# Patient Record
Sex: MALE | Race: WHITE | Employment: UNEMPLOYED | URBAN - METROPOLITAN AREA
[De-identification: names, ages, dates, MRNs, and addresses within clinical notes are randomized per-mention and may not be internally consistent; named-entity substitution may affect disease eponyms.]

---

## 2018-07-14 ENCOUNTER — HOSPITAL ENCOUNTER (INPATIENT)
Facility: HOSPITAL | Age: 48
LOS: 6 days | Discharge: HOME | DRG: 473 | End: 2018-07-20
Attending: EMERGENCY MEDICINE | Admitting: HOSPITALIST
Payer: COMMERCIAL

## 2018-07-14 ENCOUNTER — APPOINTMENT (OUTPATIENT)
Dept: MRI IMAGING | Facility: HOSPITAL | Age: 48
DRG: 473 | End: 2018-07-14
Attending: EMERGENCY MEDICINE
Payer: COMMERCIAL
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
## 2018-07-14 DIAGNOSIS — R20.2 PARESTHESIA OF ARM: ICD-10-CM

## 2018-07-14 DIAGNOSIS — S14.109A CONTUSION OF CERVICAL CORD, INITIAL ENCOUNTER (HCC): Primary | ICD-10-CM

## 2018-07-14 LAB
ANION GAP SERPL CALC-SCNC: 7 MMOL/L (ref 0–18)
ANTIBODY SCREEN: NEGATIVE
BASOPHILS # BLD: 0.2 K/UL (ref 0–0.2)
BASOPHILS NFR BLD: 2 %
BUN SERPL-MCNC: 13 MG/DL (ref 8–20)
BUN/CREAT SERPL: 15.5 (ref 10–20)
CALCIUM SERPL-MCNC: 8.9 MG/DL (ref 8.5–10.5)
CHLORIDE SERPL-SCNC: 106 MMOL/L (ref 95–110)
CO2 SERPL-SCNC: 23 MMOL/L (ref 22–32)
CREAT SERPL-MCNC: 0.84 MG/DL (ref 0.5–1.5)
EOSINOPHIL # BLD: 0.5 K/UL (ref 0–0.7)
EOSINOPHIL NFR BLD: 5 %
ERYTHROCYTE [DISTWIDTH] IN BLOOD BY AUTOMATED COUNT: 13.9 % (ref 11–15)
GLUCOSE SERPL-MCNC: 130 MG/DL (ref 70–99)
HCT VFR BLD AUTO: 41.6 % (ref 41–52)
HGB BLD-MCNC: 13.8 G/DL (ref 13.5–17.5)
LYMPHOCYTES # BLD: 2.5 K/UL (ref 1–4)
LYMPHOCYTES NFR BLD: 27 %
MCH RBC QN AUTO: 30.1 PG (ref 27–32)
MCHC RBC AUTO-ENTMCNC: 33.2 G/DL (ref 32–37)
MCV RBC AUTO: 90.7 FL (ref 80–100)
MONOCYTES # BLD: 0.7 K/UL (ref 0–1)
MONOCYTES NFR BLD: 7 %
NEUTROPHILS # BLD AUTO: 5.4 K/UL (ref 1.8–7.7)
NEUTROPHILS NFR BLD: 58 %
OSMOLALITY UR CALC.SUM OF ELEC: 284 MOSM/KG (ref 275–295)
PLATELET # BLD AUTO: 294 K/UL (ref 140–400)
PMV BLD AUTO: 8.9 FL (ref 7.4–10.3)
POTASSIUM SERPL-SCNC: 3.8 MMOL/L (ref 3.3–5.1)
RBC # BLD AUTO: 4.59 M/UL (ref 4.5–5.9)
RH BLOOD TYPE: POSITIVE
SODIUM SERPL-SCNC: 136 MMOL/L (ref 136–144)
WBC # BLD AUTO: 9.3 K/UL (ref 4–11)

## 2018-07-14 PROCEDURE — 96376 TX/PRO/DX INJ SAME DRUG ADON: CPT

## 2018-07-14 PROCEDURE — 86901 BLOOD TYPING SEROLOGIC RH(D): CPT | Performed by: EMERGENCY MEDICINE

## 2018-07-14 PROCEDURE — A4216 STERILE WATER/SALINE, 10 ML: HCPCS | Performed by: HOSPITALIST

## 2018-07-14 PROCEDURE — 86900 BLOOD TYPING SEROLOGIC ABO: CPT | Performed by: EMERGENCY MEDICINE

## 2018-07-14 PROCEDURE — 72141 MRI NECK SPINE W/O DYE: CPT | Performed by: EMERGENCY MEDICINE

## 2018-07-14 PROCEDURE — 86850 RBC ANTIBODY SCREEN: CPT | Performed by: EMERGENCY MEDICINE

## 2018-07-14 PROCEDURE — 85025 COMPLETE CBC W/AUTO DIFF WBC: CPT | Performed by: EMERGENCY MEDICINE

## 2018-07-14 PROCEDURE — 96374 THER/PROPH/DIAG INJ IV PUSH: CPT

## 2018-07-14 PROCEDURE — 99285 EMERGENCY DEPT VISIT HI MDM: CPT

## 2018-07-14 PROCEDURE — 96375 TX/PRO/DX INJ NEW DRUG ADDON: CPT

## 2018-07-14 PROCEDURE — 80048 BASIC METABOLIC PNL TOTAL CA: CPT | Performed by: EMERGENCY MEDICINE

## 2018-07-14 RX ORDER — SODIUM CHLORIDE 0.9 % (FLUSH) 0.9 %
3 SYRINGE (ML) INJECTION AS NEEDED
Status: DISCONTINUED | OUTPATIENT
Start: 2018-07-14 | End: 2018-07-20

## 2018-07-14 RX ORDER — ONDANSETRON 2 MG/ML
4 INJECTION INTRAMUSCULAR; INTRAVENOUS EVERY 6 HOURS PRN
Status: DISCONTINUED | OUTPATIENT
Start: 2018-07-14 | End: 2018-07-20

## 2018-07-14 RX ORDER — MORPHINE SULFATE 2 MG/ML
2 INJECTION, SOLUTION INTRAMUSCULAR; INTRAVENOUS EVERY 2 HOUR PRN
Status: DISCONTINUED | OUTPATIENT
Start: 2018-07-14 | End: 2018-07-20

## 2018-07-14 RX ORDER — DOCUSATE SODIUM 100 MG/1
100 CAPSULE, LIQUID FILLED ORAL 2 TIMES DAILY
Status: DISCONTINUED | OUTPATIENT
Start: 2018-07-14 | End: 2018-07-20

## 2018-07-14 RX ORDER — SODIUM PHOSPHATE, DIBASIC AND SODIUM PHOSPHATE, MONOBASIC 7; 19 G/133ML; G/133ML
1 ENEMA RECTAL ONCE AS NEEDED
Status: DISCONTINUED | OUTPATIENT
Start: 2018-07-14 | End: 2018-07-20

## 2018-07-14 RX ORDER — MORPHINE SULFATE 4 MG/ML
4 INJECTION, SOLUTION INTRAMUSCULAR; INTRAVENOUS EVERY 2 HOUR PRN
Status: DISCONTINUED | OUTPATIENT
Start: 2018-07-14 | End: 2018-07-20

## 2018-07-14 RX ORDER — METOCLOPRAMIDE HYDROCHLORIDE 5 MG/ML
10 INJECTION INTRAMUSCULAR; INTRAVENOUS EVERY 8 HOURS PRN
Status: DISCONTINUED | OUTPATIENT
Start: 2018-07-14 | End: 2018-07-20

## 2018-07-14 RX ORDER — POLYETHYLENE GLYCOL 3350 17 G/17G
17 POWDER, FOR SOLUTION ORAL DAILY PRN
Status: DISCONTINUED | OUTPATIENT
Start: 2018-07-14 | End: 2018-07-20

## 2018-07-14 RX ORDER — DEXAMETHASONE SODIUM PHOSPHATE 4 MG/ML
4 VIAL (ML) INJECTION EVERY 6 HOURS
Status: DISCONTINUED | OUTPATIENT
Start: 2018-07-14 | End: 2018-07-17

## 2018-07-14 RX ORDER — DEXAMETHASONE SODIUM PHOSPHATE 4 MG/ML
10 VIAL (ML) INJECTION ONCE
Status: COMPLETED | OUTPATIENT
Start: 2018-07-14 | End: 2018-07-14

## 2018-07-14 RX ORDER — ONDANSETRON 2 MG/ML
4 INJECTION INTRAMUSCULAR; INTRAVENOUS EVERY 4 HOURS PRN
Status: DISCONTINUED | OUTPATIENT
Start: 2018-07-14 | End: 2018-07-20

## 2018-07-14 RX ORDER — MORPHINE SULFATE 4 MG/ML
4 INJECTION, SOLUTION INTRAMUSCULAR; INTRAVENOUS ONCE
Status: COMPLETED | OUTPATIENT
Start: 2018-07-14 | End: 2018-07-14

## 2018-07-14 RX ORDER — SODIUM CHLORIDE 9 MG/ML
INJECTION, SOLUTION INTRAVENOUS CONTINUOUS
Status: DISCONTINUED | OUTPATIENT
Start: 2018-07-14 | End: 2018-07-19

## 2018-07-14 RX ORDER — BISACODYL 10 MG
10 SUPPOSITORY, RECTAL RECTAL
Status: DISCONTINUED | OUTPATIENT
Start: 2018-07-14 | End: 2018-07-20

## 2018-07-14 RX ORDER — MORPHINE SULFATE 2 MG/ML
1 INJECTION, SOLUTION INTRAMUSCULAR; INTRAVENOUS EVERY 2 HOUR PRN
Status: DISCONTINUED | OUTPATIENT
Start: 2018-07-14 | End: 2018-07-20

## 2018-07-14 RX ORDER — DEXTROSE AND SODIUM CHLORIDE 5; .45 G/100ML; G/100ML
INJECTION, SOLUTION INTRAVENOUS CONTINUOUS
Status: DISPENSED | OUTPATIENT
Start: 2018-07-14 | End: 2018-07-15
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
## 2018-07-15 LAB
ANION GAP SERPL CALC-SCNC: 7 MMOL/L (ref 0–18)
BASOPHILS # BLD: 0 K/UL (ref 0–0.2)
BASOPHILS NFR BLD: 0 %
BUN SERPL-MCNC: 10 MG/DL (ref 8–20)
BUN/CREAT SERPL: 12.3 (ref 10–20)
CALCIUM SERPL-MCNC: 8.9 MG/DL (ref 8.5–10.5)
CHLORIDE SERPL-SCNC: 107 MMOL/L (ref 95–110)
CO2 SERPL-SCNC: 23 MMOL/L (ref 22–32)
CREAT SERPL-MCNC: 0.81 MG/DL (ref 0.5–1.5)
EOSINOPHIL # BLD: 0 K/UL (ref 0–0.7)
EOSINOPHIL NFR BLD: 0 %
ERYTHROCYTE [DISTWIDTH] IN BLOOD BY AUTOMATED COUNT: 13.9 % (ref 11–15)
GLUCOSE SERPL-MCNC: 172 MG/DL (ref 70–99)
HCT VFR BLD AUTO: 41.3 % (ref 41–52)
HGB BLD-MCNC: 13.7 G/DL (ref 13.5–17.5)
LYMPHOCYTES # BLD: 1.1 K/UL (ref 1–4)
LYMPHOCYTES NFR BLD: 12 %
MCH RBC QN AUTO: 30.4 PG (ref 27–32)
MCHC RBC AUTO-ENTMCNC: 33.2 G/DL (ref 32–37)
MCV RBC AUTO: 91.5 FL (ref 80–100)
MONOCYTES # BLD: 0.1 K/UL (ref 0–1)
MONOCYTES NFR BLD: 1 %
NEUTROPHILS # BLD AUTO: 8 K/UL (ref 1.8–7.7)
NEUTROPHILS NFR BLD: 87 %
OSMOLALITY UR CALC.SUM OF ELEC: 287 MOSM/KG (ref 275–295)
PLATELET # BLD AUTO: 262 K/UL (ref 140–400)
PMV BLD AUTO: 7.9 FL (ref 7.4–10.3)
POTASSIUM SERPL-SCNC: 4.1 MMOL/L (ref 3.3–5.1)
RBC # BLD AUTO: 4.52 M/UL (ref 4.5–5.9)
SODIUM SERPL-SCNC: 137 MMOL/L (ref 136–144)
WBC # BLD AUTO: 9.2 K/UL (ref 4–11)

## 2018-07-15 PROCEDURE — A4216 STERILE WATER/SALINE, 10 ML: HCPCS | Performed by: HOSPITALIST

## 2018-07-15 PROCEDURE — 80048 BASIC METABOLIC PNL TOTAL CA: CPT | Performed by: HOSPITALIST

## 2018-07-15 PROCEDURE — 85025 COMPLETE CBC W/AUTO DIFF WBC: CPT | Performed by: HOSPITALIST

## 2018-07-15 RX ORDER — CALCIUM CARBONATE 200(500)MG
500 TABLET,CHEWABLE ORAL
Status: DISCONTINUED | OUTPATIENT
Start: 2018-07-15 | End: 2018-07-20
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
## 2018-07-16 ENCOUNTER — APPOINTMENT (OUTPATIENT)
Dept: GENERAL RADIOLOGY | Facility: HOSPITAL | Age: 48
DRG: 473 | End: 2018-07-16
Attending: NEUROLOGICAL SURGERY
Payer: COMMERCIAL

## 2018-07-16 ENCOUNTER — ANESTHESIA (OUTPATIENT)
Dept: SURGERY | Facility: HOSPITAL | Age: 48
DRG: 473 | End: 2018-07-16
Payer: COMMERCIAL

## 2018-07-16 ENCOUNTER — ANESTHESIA EVENT (OUTPATIENT)
Dept: SURGERY | Facility: HOSPITAL | Age: 48
DRG: 473 | End: 2018-07-16
Payer: COMMERCIAL

## 2018-07-16 PROCEDURE — 72040 X-RAY EXAM NECK SPINE 2-3 VW: CPT | Performed by: NEUROLOGICAL SURGERY

## 2018-07-16 PROCEDURE — 0RG20A0 FUSION OF 2 OR MORE CERVICAL VERTEBRAL JOINTS WITH INTERBODY FUSION DEVICE, ANTERIOR APPROACH, ANTERIOR COLUMN, OPEN APPROACH: ICD-10-PCS | Performed by: NEUROLOGICAL SURGERY

## 2018-07-16 PROCEDURE — S0028 INJECTION, FAMOTIDINE, 20 MG: HCPCS | Performed by: INTERNAL MEDICINE

## 2018-07-16 PROCEDURE — 0RB30ZZ EXCISION OF CERVICAL VERTEBRAL DISC, OPEN APPROACH: ICD-10-PCS | Performed by: NEUROLOGICAL SURGERY

## 2018-07-16 PROCEDURE — 76001 XR C-ARM FLUORO >1 HOUR  (CPT=76001): CPT | Performed by: NEUROLOGICAL SURGERY

## 2018-07-16 PROCEDURE — 93010 ELECTROCARDIOGRAM REPORT: CPT | Performed by: INTERNAL MEDICINE

## 2018-07-16 PROCEDURE — 93005 ELECTROCARDIOGRAM TRACING: CPT

## 2018-07-16 DEVICE — PLATE 1 LEVEL 22MM 7740122: Type: IMPLANTABLE DEVICE | Status: FUNCTIONAL

## 2018-07-16 DEVICE — COROENT ACR MRKR 7X17X14 10DEG: Type: IMPLANTABLE DEVICE | Status: FUNCTIONAL

## 2018-07-16 DEVICE — SCREW HELIX-T 4.5X15 VAR SLF T: Type: IMPLANTABLE DEVICE | Status: FUNCTIONAL

## 2018-07-16 DEVICE — OSTEOCEL PRO BONE MATRIX SM: Type: IMPLANTABLE DEVICE | Status: FUNCTIONAL

## 2018-07-16 RX ORDER — HYDROCODONE BITARTRATE AND ACETAMINOPHEN 10; 325 MG/1; MG/1
1 TABLET ORAL EVERY 4 HOURS PRN
Status: DISCONTINUED | OUTPATIENT
Start: 2018-07-16 | End: 2018-07-17

## 2018-07-16 RX ORDER — METHOCARBAMOL 750 MG/1
750 TABLET, FILM COATED ORAL EVERY 8 HOURS PRN
Status: DISCONTINUED | OUTPATIENT
Start: 2018-07-16 | End: 2018-07-17

## 2018-07-16 RX ORDER — ROCURONIUM BROMIDE 10 MG/ML
INJECTION, SOLUTION INTRAVENOUS AS NEEDED
Status: DISCONTINUED | OUTPATIENT
Start: 2018-07-16 | End: 2018-07-16 | Stop reason: SURG

## 2018-07-16 RX ORDER — HALOPERIDOL 5 MG/ML
0.25 INJECTION INTRAMUSCULAR ONCE AS NEEDED
Status: DISCONTINUED | OUTPATIENT
Start: 2018-07-16 | End: 2018-07-16 | Stop reason: HOSPADM

## 2018-07-16 RX ORDER — HYDROMORPHONE HYDROCHLORIDE 1 MG/ML
0.4 INJECTION, SOLUTION INTRAMUSCULAR; INTRAVENOUS; SUBCUTANEOUS EVERY 5 MIN PRN
Status: DISCONTINUED | OUTPATIENT
Start: 2018-07-16 | End: 2018-07-16 | Stop reason: HOSPADM

## 2018-07-16 RX ORDER — GLYCOPYRROLATE 0.2 MG/ML
INJECTION INTRAMUSCULAR; INTRAVENOUS AS NEEDED
Status: DISCONTINUED | OUTPATIENT
Start: 2018-07-16 | End: 2018-07-16 | Stop reason: SURG

## 2018-07-16 RX ORDER — HYDROCODONE BITARTRATE AND ACETAMINOPHEN 5; 325 MG/1; MG/1
2 TABLET ORAL AS NEEDED
Status: COMPLETED | OUTPATIENT
Start: 2018-07-16 | End: 2018-07-16

## 2018-07-16 RX ORDER — ONDANSETRON 2 MG/ML
INJECTION INTRAMUSCULAR; INTRAVENOUS AS NEEDED
Status: DISCONTINUED | OUTPATIENT
Start: 2018-07-16 | End: 2018-07-16 | Stop reason: SURG

## 2018-07-16 RX ORDER — HYDROMORPHONE HYDROCHLORIDE 1 MG/ML
0.2 INJECTION, SOLUTION INTRAMUSCULAR; INTRAVENOUS; SUBCUTANEOUS EVERY 5 MIN PRN
Status: DISCONTINUED | OUTPATIENT
Start: 2018-07-16 | End: 2018-07-16 | Stop reason: HOSPADM

## 2018-07-16 RX ORDER — ONDANSETRON 2 MG/ML
4 INJECTION INTRAMUSCULAR; INTRAVENOUS ONCE AS NEEDED
Status: DISCONTINUED | OUTPATIENT
Start: 2018-07-16 | End: 2018-07-16 | Stop reason: HOSPADM

## 2018-07-16 RX ORDER — SODIUM CHLORIDE, SODIUM LACTATE, POTASSIUM CHLORIDE, CALCIUM CHLORIDE 600; 310; 30; 20 MG/100ML; MG/100ML; MG/100ML; MG/100ML
INJECTION, SOLUTION INTRAVENOUS CONTINUOUS PRN
Status: DISCONTINUED | OUTPATIENT
Start: 2018-07-16 | End: 2018-07-16 | Stop reason: SURG

## 2018-07-16 RX ORDER — CEFAZOLIN SODIUM/WATER 2 G/20 ML
2 SYRINGE (ML) INTRAVENOUS ONCE
Status: COMPLETED | OUTPATIENT
Start: 2018-07-16 | End: 2018-07-16

## 2018-07-16 RX ORDER — FAMOTIDINE 10 MG/ML
20 INJECTION, SOLUTION INTRAVENOUS ONCE
Status: COMPLETED | OUTPATIENT
Start: 2018-07-16 | End: 2018-07-16

## 2018-07-16 RX ORDER — HYDROCODONE BITARTRATE AND ACETAMINOPHEN 5; 325 MG/1; MG/1
1 TABLET ORAL AS NEEDED
Status: COMPLETED | OUTPATIENT
Start: 2018-07-16 | End: 2018-07-16

## 2018-07-16 RX ORDER — SODIUM CHLORIDE, SODIUM LACTATE, POTASSIUM CHLORIDE, CALCIUM CHLORIDE 600; 310; 30; 20 MG/100ML; MG/100ML; MG/100ML; MG/100ML
INJECTION, SOLUTION INTRAVENOUS CONTINUOUS
Status: DISCONTINUED | OUTPATIENT
Start: 2018-07-16 | End: 2018-07-16 | Stop reason: HOSPADM

## 2018-07-16 RX ORDER — LIDOCAINE HYDROCHLORIDE 10 MG/ML
INJECTION, SOLUTION EPIDURAL; INFILTRATION; INTRACAUDAL; PERINEURAL AS NEEDED
Status: DISCONTINUED | OUTPATIENT
Start: 2018-07-16 | End: 2018-07-16 | Stop reason: SURG

## 2018-07-16 RX ORDER — NEOSTIGMINE METHYLSULFATE 0.5 MG/ML
INJECTION INTRAVENOUS AS NEEDED
Status: DISCONTINUED | OUTPATIENT
Start: 2018-07-16 | End: 2018-07-16 | Stop reason: SURG

## 2018-07-16 RX ORDER — HYDROCODONE BITARTRATE AND ACETAMINOPHEN 10; 325 MG/1; MG/1
2 TABLET ORAL EVERY 6 HOURS PRN
Status: DISCONTINUED | OUTPATIENT
Start: 2018-07-16 | End: 2018-07-17

## 2018-07-16 RX ORDER — HYDROMORPHONE HYDROCHLORIDE 1 MG/ML
0.6 INJECTION, SOLUTION INTRAMUSCULAR; INTRAVENOUS; SUBCUTANEOUS EVERY 5 MIN PRN
Status: DISCONTINUED | OUTPATIENT
Start: 2018-07-16 | End: 2018-07-16 | Stop reason: HOSPADM

## 2018-07-16 RX ORDER — NALOXONE HYDROCHLORIDE 0.4 MG/ML
80 INJECTION, SOLUTION INTRAMUSCULAR; INTRAVENOUS; SUBCUTANEOUS AS NEEDED
Status: DISCONTINUED | OUTPATIENT
Start: 2018-07-16 | End: 2018-07-16 | Stop reason: HOSPADM

## 2018-07-16 RX ADMIN — SODIUM CHLORIDE, SODIUM LACTATE, POTASSIUM CHLORIDE, CALCIUM CHLORIDE: 600; 310; 30; 20 INJECTION, SOLUTION INTRAVENOUS at 16:20:00

## 2018-07-16 RX ADMIN — NEOSTIGMINE METHYLSULFATE 3 MG: 0.5 INJECTION INTRAVENOUS at 16:13:00

## 2018-07-16 RX ADMIN — ROCURONIUM BROMIDE 10 MG: 10 INJECTION, SOLUTION INTRAVENOUS at 14:38:00

## 2018-07-16 RX ADMIN — GLYCOPYRROLATE 0.6 MG: 0.2 INJECTION INTRAMUSCULAR; INTRAVENOUS at 16:13:00

## 2018-07-16 RX ADMIN — LIDOCAINE HYDROCHLORIDE 50 MG: 10 INJECTION, SOLUTION EPIDURAL; INFILTRATION; INTRACAUDAL; PERINEURAL at 14:38:00

## 2018-07-16 RX ADMIN — ONDANSETRON 4 MG: 2 INJECTION INTRAMUSCULAR; INTRAVENOUS at 16:10:00

## 2018-07-16 RX ADMIN — SODIUM CHLORIDE, SODIUM LACTATE, POTASSIUM CHLORIDE, CALCIUM CHLORIDE: 600; 310; 30; 20 INJECTION, SOLUTION INTRAVENOUS at 14:15:00

## 2018-07-16 RX ADMIN — CEFAZOLIN SODIUM/WATER 2 G: 2 G/20 ML SYRINGE (ML) INTRAVENOUS at 14:32:00

## 2018-07-16 RX ADMIN — ROCURONIUM BROMIDE 20 MG: 10 INJECTION, SOLUTION INTRAVENOUS at 14:50:00

## 2018-07-16 NOTE — ANESTHESIA PROCEDURE NOTES
Airway  Urgency: elective      General Information and Staff    Patient location during procedure: OR  Anesthesiologist: Franklin Fraga  Performed: anesthesiologist     Indications and Patient Condition  Indications for airway management: anesthesia  S

## 2018-07-16 NOTE — PROGRESS NOTES
[facility] Hospitalist Progress Note     PCP: No primary care provider on file. Chief Complaint: follow-up    Overnight/Interim Events:      SUBJECTIVE:  Pt feeling ok, still numbness in hands. Some knee pain.      OBJECTIVE:  Temp:  [97.8 °F (36.6 °C)-98.3 ° HYDROmorphone HCl, Atropine Sulfate, ondansetron HCl, Prochlorperazine Edisylate, haloperidol lactate, Naloxone HCl, [MAR Hold] benzocaine-menthol, [MAR Hold] Calcium Carbonate Antacid, [MAR Hold] ondansetron HCl, [MAR Hold] Normal Saline Flush, [MAR Hold]
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
## 2018-07-16 NOTE — ANESTHESIA POSTPROCEDURE EVALUATION
Patient: Asher Carlson    Procedure Summary     Date:  07/16/18 Room / Location:  [address] [place] [address] MAIN OR 02 / 300 [facility] MAIN OR    Anesthesia Start:  3444 Anesthesia Stop:      Procedure:  ANTERIOR CERVICAL FUSION BG & INST 2 LEVEL (N/A ) Diagnosis:       Cervical steno

## 2018-07-16 NOTE — PLAN OF CARE
DISCHARGE PLANNING    • Discharge to home or other facility with appropriate resources Progressing        Per Dr. Helen Dorantes patient is on track for dc tomorrow am to home.      GENITOURINARY - ADULT    • Absence of urinary retention Progressing        Check void

## 2018-07-16 NOTE — ANESTHESIA PREPROCEDURE EVALUATION
Anesthesia PreOp Note    HPI:     Veronica Gallo is a 52year old male who presents for preoperative consultation requested by: Sangeeta Lamar MD    Date of Surgery: 7/14/2018 - 7/16/2018    Procedure(s):  ANTERIOR CERVICAL FUSION BG & INST 2 LEVEL  Indic Intravenous Q2H PRN Wilmar Dugan DO    Or        [facility] Hold] morphINE sulfate (PF) 2 MG/ML injection 2 mg 2 mg Intravenous Q2H PRN Wilmar Dugan DO 2 mg at 07/16/18 1023   Or        [MAR Hold] morphINE sulfate (PF) 4 MG/ML injection 4 mg 4 mg Intravenous 07/15/2018   K 4.1 07/15/2018    07/15/2018   CO2 23 07/15/2018   BUN 10 07/15/2018   CREATSERUM 0.81 07/15/2018    (H) 07/15/2018   CA 8.9 07/15/2018          Vital Signs: Body mass index is 38.74 kg/m².    height is 1.778 m (5' 10\") and kimi

## 2018-07-17 ENCOUNTER — APPOINTMENT (OUTPATIENT)
Dept: GENERAL RADIOLOGY | Facility: HOSPITAL | Age: 48
DRG: 473 | End: 2018-07-17
Attending: INTERNAL MEDICINE
Payer: COMMERCIAL

## 2018-07-17 PROCEDURE — A4216 STERILE WATER/SALINE, 10 ML: HCPCS | Performed by: HOSPITALIST

## 2018-07-17 PROCEDURE — 73560 X-RAY EXAM OF KNEE 1 OR 2: CPT | Performed by: INTERNAL MEDICINE

## 2018-07-17 RX ORDER — METHOCARBAMOL 750 MG/1
750 TABLET, FILM COATED ORAL 3 TIMES DAILY
Status: DISCONTINUED | OUTPATIENT
Start: 2018-07-17 | End: 2018-07-19

## 2018-07-17 RX ORDER — TRAMADOL HYDROCHLORIDE 50 MG/1
50 TABLET ORAL EVERY 6 HOURS PRN
Status: DISCONTINUED | OUTPATIENT
Start: 2018-07-17 | End: 2018-07-18

## 2018-07-17 RX ORDER — OXYCODONE AND ACETAMINOPHEN 10; 325 MG/1; MG/1
1-2 TABLET ORAL EVERY 6 HOURS PRN
Status: DISCONTINUED | OUTPATIENT
Start: 2018-07-17 | End: 2018-07-18
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
## 2018-07-17 NOTE — PROGRESS NOTES
POD #1  D/C note    Mr. Stephy Ramos is awake and alert and resting comfortably. He complains of neck pain and numbness in both arms. Feels better than before surgery. He has been out of bed and ambulating to the bathroom without difficulty.     VSS-afebri

## 2018-07-18 LAB
TROPONIN I SERPL-MCNC: 0 NG/ML (ref ?–0.03)

## 2018-07-18 PROCEDURE — 84484 ASSAY OF TROPONIN QUANT: CPT | Performed by: HOSPITALIST

## 2018-07-18 PROCEDURE — 93005 ELECTROCARDIOGRAM TRACING: CPT

## 2018-07-18 PROCEDURE — 93010 ELECTROCARDIOGRAM REPORT: CPT | Performed by: HOSPITALIST

## 2018-07-18 RX ORDER — OXYCODONE AND ACETAMINOPHEN 10; 325 MG/1; MG/1
1 TABLET ORAL EVERY 4 HOURS PRN
Qty: 40 TABLET | Refills: 0 | Status: SHIPPED | OUTPATIENT
Start: 2018-07-18 | End: 2019-06-13 | Stop reason: ALTCHOICE

## 2018-07-18 RX ORDER — NITROGLYCERIN 0.4 MG/1
TABLET SUBLINGUAL
Status: DISCONTINUED
Start: 2018-07-18 | End: 2018-07-18 | Stop reason: WASHOUT

## 2018-07-18 RX ORDER — MORPHINE SULFATE 2 MG/ML
1 INJECTION, SOLUTION INTRAMUSCULAR; INTRAVENOUS ONCE
Status: COMPLETED | OUTPATIENT
Start: 2018-07-18 | End: 2018-07-18

## 2018-07-18 RX ORDER — OXYCODONE AND ACETAMINOPHEN 10; 325 MG/1; MG/1
1-2 TABLET ORAL EVERY 4 HOURS PRN
Status: DISCONTINUED | OUTPATIENT
Start: 2018-07-18 | End: 2018-07-20

## 2018-07-18 RX ORDER — PSEUDOEPHEDRINE HCL 30 MG
100 TABLET ORAL 2 TIMES DAILY PRN
Qty: 14 CAPSULE | Refills: 0 | Status: SHIPPED | OUTPATIENT
Start: 2018-07-18 | End: 2019-04-01 | Stop reason: ALTCHOICE

## 2018-07-18 RX ORDER — NITROGLYCERIN 0.4 MG/1
0.4 TABLET SUBLINGUAL EVERY 5 MIN PRN
Status: DISCONTINUED | OUTPATIENT
Start: 2018-07-18 | End: 2018-07-20

## 2018-07-18 NOTE — PROGRESS NOTES
07/18/18 1422   Clinical Encounter Type   Visited With Health care provider   Routine Visit Introduction   Continue Visiting Yes   Crisis Visit (RRT)   Patient Spiritual Encounters   Spiritual Assessment Completed No   Responded to RRT.  Patient in care

## 2018-07-18 NOTE — PROGRESS NOTES
[facility] Hospitalist Progress Note     PCP: No primary care provider on file. Chief Complaint: follow-up    Overnight/Interim Events:RRT called this afternoon for chest pain      SUBJECTIVE:  Pt with chest pain moderate, radiating down L arm.  Worse with mo 07/17/18 1635     nitroGLYCERIN, TraMADol HCl, oxyCODONE-acetaminophen, Benzocaine, benzocaine-menthol, Calcium Carbonate Antacid, ondansetron HCl, Normal Saline Flush, morphINE sulfate **OR** morphINE sulfate **OR** morphINE sulfate, ondansetron HCl, Meto

## 2018-07-18 NOTE — PROGRESS NOTES
RRT    *See RRT Documentation Record*    Reason the RRT was called: c/o chest pain, sharp/squeezing 6/10, not relived by repositioning and not affected by breathing  Assessment of patient leading up to RRT: pt sitting in the chair, few min prior c/o chest

## 2018-07-19 ENCOUNTER — APPOINTMENT (OUTPATIENT)
Dept: GENERAL RADIOLOGY | Facility: HOSPITAL | Age: 48
DRG: 473 | End: 2018-07-19
Attending: HOSPITALIST
Payer: COMMERCIAL

## 2018-07-19 PROCEDURE — 71045 X-RAY EXAM CHEST 1 VIEW: CPT | Performed by: HOSPITALIST

## 2018-07-19 PROCEDURE — 97162 PT EVAL MOD COMPLEX 30 MIN: CPT

## 2018-07-19 PROCEDURE — 97530 THERAPEUTIC ACTIVITIES: CPT

## 2018-07-19 PROCEDURE — 97116 GAIT TRAINING THERAPY: CPT

## 2018-07-19 RX ORDER — METHOCARBAMOL 750 MG/1
750 TABLET, FILM COATED ORAL 3 TIMES DAILY PRN
Qty: 21 TABLET | Refills: 0 | Status: SHIPPED | OUTPATIENT
Start: 2018-07-19 | End: 2018-07-20

## 2018-07-19 RX ORDER — METHOCARBAMOL 750 MG/1
750 TABLET, FILM COATED ORAL 3 TIMES DAILY PRN
Status: DISCONTINUED | OUTPATIENT
Start: 2018-07-19 | End: 2018-07-19
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
## 2018-07-19 NOTE — PLAN OF CARE
DISCHARGE PLANNING    • Discharge to home or other facility with appropriate resources Progressing        GENITOURINARY - ADULT    • Absence of urinary retention Progressing        Impaired Functional Mobility    • Achieve highest/safest level of mobility/ consult.
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
## 2018-07-20 VITALS
HEART RATE: 93 BPM | TEMPERATURE: 98 F | SYSTOLIC BLOOD PRESSURE: 140 MMHG | HEIGHT: 70 IN | WEIGHT: 270 LBS | RESPIRATION RATE: 18 BRPM | OXYGEN SATURATION: 95 % | DIASTOLIC BLOOD PRESSURE: 97 MMHG | BODY MASS INDEX: 38.65 KG/M2

## 2018-07-20 PROCEDURE — 97110 THERAPEUTIC EXERCISES: CPT

## 2018-07-20 PROCEDURE — 97116 GAIT TRAINING THERAPY: CPT

## 2018-07-20 PROCEDURE — 97530 THERAPEUTIC ACTIVITIES: CPT

## 2018-07-20 RX ORDER — POLYETHYLENE GLYCOL 3350 17 G/17G
17 POWDER, FOR SOLUTION ORAL DAILY PRN
Qty: 14 EACH | Refills: 0 | Status: SHIPPED | OUTPATIENT
Start: 2018-07-20 | End: 2019-07-18 | Stop reason: ALTCHOICE

## 2018-07-20 RX ORDER — DOCUSATE SODIUM 100 MG/1
100 CAPSULE, LIQUID FILLED ORAL 2 TIMES DAILY PRN
Qty: 60 CAPSULE | Refills: 0 | Status: SHIPPED | OUTPATIENT
Start: 2018-07-20 | End: 2019-04-01 | Stop reason: ALTCHOICE
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
## 2018-07-20 NOTE — CM/SW NOTE
RUBÉN met with the pt. At bedside. The pt. Lives alone in a 2 story home with the bedrooms on the 2nd floor. The pt. Reports being independent prior to admission with adls, ambulation and driving. The pt. Has supportive family living in the area. The pt.
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
## 2018-07-22 ENCOUNTER — APPOINTMENT (OUTPATIENT)
Dept: CT IMAGING | Facility: HOSPITAL | Age: 48
End: 2018-07-22
Attending: EMERGENCY MEDICINE
Payer: COMMERCIAL

## 2018-07-22 ENCOUNTER — HOSPITAL ENCOUNTER (EMERGENCY)
Facility: HOSPITAL | Age: 48
Discharge: HOME | End: 2018-07-22
Attending: EMERGENCY MEDICINE
Payer: COMMERCIAL

## 2018-07-22 VITALS
TEMPERATURE: 99 F | WEIGHT: 260 LBS | HEIGHT: 70 IN | SYSTOLIC BLOOD PRESSURE: 148 MMHG | RESPIRATION RATE: 16 BRPM | OXYGEN SATURATION: 96 % | HEART RATE: 93 BPM | DIASTOLIC BLOOD PRESSURE: 89 MMHG | BODY MASS INDEX: 37.22 KG/M2

## 2018-07-22 DIAGNOSIS — S16.1XXA STRAIN OF CERVICAL PORTION OF RIGHT TRAPEZIUS MUSCLE: Primary | ICD-10-CM

## 2018-07-22 LAB
ANION GAP SERPL CALC-SCNC: 9 MMOL/L (ref 0–18)
BASOPHILS # BLD: 0.1 K/UL (ref 0–0.2)
BASOPHILS NFR BLD: 1 %
BUN SERPL-MCNC: 11 MG/DL (ref 8–20)
BUN/CREAT SERPL: 14.5 (ref 10–20)
CALCIUM SERPL-MCNC: 9.2 MG/DL (ref 8.5–10.5)
CHLORIDE SERPL-SCNC: 97 MMOL/L (ref 95–110)
CO2 SERPL-SCNC: 26 MMOL/L (ref 22–32)
CREAT SERPL-MCNC: 0.76 MG/DL (ref 0.5–1.5)
EOSINOPHIL # BLD: 0.6 K/UL (ref 0–0.7)
EOSINOPHIL NFR BLD: 4 %
ERYTHROCYTE [DISTWIDTH] IN BLOOD BY AUTOMATED COUNT: 14 % (ref 11–15)
GLUCOSE SERPL-MCNC: 143 MG/DL (ref 70–99)
HCT VFR BLD AUTO: 45.6 % (ref 41–52)
HGB BLD-MCNC: 15 G/DL (ref 13.5–17.5)
LYMPHOCYTES # BLD: 1.8 K/UL (ref 1–4)
LYMPHOCYTES NFR BLD: 12 %
MCH RBC QN AUTO: 29.7 PG (ref 27–32)
MCHC RBC AUTO-ENTMCNC: 32.9 G/DL (ref 32–37)
MCV RBC AUTO: 90.3 FL (ref 80–100)
MONOCYTES # BLD: 1.4 K/UL (ref 0–1)
MONOCYTES NFR BLD: 9 %
NEUTROPHILS # BLD AUTO: 10.7 K/UL (ref 1.8–7.7)
NEUTROPHILS NFR BLD: 73 %
OSMOLALITY UR CALC.SUM OF ELEC: 276 MOSM/KG (ref 275–295)
PLATELET # BLD AUTO: 297 K/UL (ref 140–400)
PMV BLD AUTO: 8 FL (ref 7.4–10.3)
POTASSIUM SERPL-SCNC: 4.5 MMOL/L (ref 3.3–5.1)
RBC # BLD AUTO: 5.04 M/UL (ref 4.5–5.9)
SODIUM SERPL-SCNC: 132 MMOL/L (ref 136–144)
WBC # BLD AUTO: 14.7 K/UL (ref 4–11)

## 2018-07-22 PROCEDURE — 99285 EMERGENCY DEPT VISIT HI MDM: CPT

## 2018-07-22 PROCEDURE — 96374 THER/PROPH/DIAG INJ IV PUSH: CPT

## 2018-07-22 PROCEDURE — 96376 TX/PRO/DX INJ SAME DRUG ADON: CPT

## 2018-07-22 PROCEDURE — 80048 BASIC METABOLIC PNL TOTAL CA: CPT | Performed by: EMERGENCY MEDICINE

## 2018-07-22 PROCEDURE — 85025 COMPLETE CBC W/AUTO DIFF WBC: CPT | Performed by: EMERGENCY MEDICINE

## 2018-07-22 PROCEDURE — 72125 CT NECK SPINE W/O DYE: CPT | Performed by: EMERGENCY MEDICINE

## 2018-07-22 RX ORDER — DEXAMETHASONE 4 MG/1
4 TABLET ORAL 2 TIMES DAILY WITH MEALS
Qty: 10 TABLET | Refills: 0 | Status: SHIPPED | OUTPATIENT
Start: 2018-07-22 | End: 2019-06-13 | Stop reason: ALTCHOICE

## 2018-07-22 RX ORDER — POLYETHYLENE GLYCOL 3350 17 G/17G
17 POWDER, FOR SOLUTION ORAL DAILY PRN
Qty: 12 EACH | Refills: 0 | Status: SHIPPED | OUTPATIENT
Start: 2018-07-22 | End: 2018-08-21

## 2018-07-22 RX ORDER — MORPHINE SULFATE 4 MG/ML
4 INJECTION, SOLUTION INTRAMUSCULAR; INTRAVENOUS ONCE
Status: COMPLETED | OUTPATIENT
Start: 2018-07-22 | End: 2018-07-22

## 2018-07-22 RX ORDER — LORAZEPAM 2 MG/ML
INJECTION INTRAMUSCULAR
Status: DISCONTINUED
Start: 2018-07-22 | End: 2018-07-22 | Stop reason: WASHOUT

## 2018-07-22 RX ORDER — MORPHINE SULFATE 4 MG/ML
INJECTION, SOLUTION INTRAMUSCULAR; INTRAVENOUS
Status: COMPLETED
Start: 2018-07-22 | End: 2018-07-22

## 2018-07-22 RX ORDER — GABAPENTIN 300 MG/1
300 CAPSULE ORAL 3 TIMES DAILY
Qty: 90 CAPSULE | Refills: 0 | Status: SHIPPED | OUTPATIENT
Start: 2018-07-22 | End: 2019-04-01 | Stop reason: ALTCHOICE

## 2018-07-22 NOTE — ED PROVIDER NOTES
Patient signed out with postsurgical neck pain CT was negative for acute process there was no hematoma noted.   Patient was going to get MRI but could not tolerate the MRI and did not want any relaxant medication he prefers to call the neurosurgeon tomorrow

## 2018-07-22 NOTE — ED PROVIDER NOTES
Patient Seen in: [facility] AND [facility] Emergency Department    History   Patient presents with:  Shoulder Pain    Stated Complaint: R shoulder pain     HPI    49-year-old male with past medical history significant for cervical spinal stenosis who is postoper (37.1 °C) (Temporal)   Resp 18   Ht 177.8 cm (5' 10\")   Wt 117.9 kg   SpO2 98%   BMI 37.31 kg/m²         Physical Exam    Physical Exam   Constitutional: Patient is oriented to person, place, and time.  Appears well-developed and well-nourished, with no di -----------         ------                     CBC W/ DIFFERENTIAL[[id number]]          Abnormal            Final result                 Please view results for these tests on the individual orders.    RAINBOW DRAW BLUE   RAINBOW

## 2018-07-22 NOTE — ED NOTES
Patient received discharge instructions with follow-up instructions/medications and verbalized understanding. Patient discharged out of ER in stable condition in no apparent distress, to be picked up by mother.

## 2018-07-30 NOTE — OPERATIVE REPORT
NEUROSURGERY OPERATIVE NOTE    Surgery Date: 7/16/2018  Hospital: [facility]  Patient Name: Dahlia Ohara  Patient MR#: [id number]  Surgeon: Dr. Tasia Shin.  Amanuel  Co-Surgeon: None  Assistant: None    Anesthesia:  GETA  Length: 2 hours  Antibiotics: IV  Spec potential risk for nerve or spinal cord injury leading to death, stroke, paralysis, weakness, numbness or pain were discussed.   I have specifically discussed that this surgery may not eliminate the patient’s complaints especially neck pain and may lead to and a small self-retaining retractor was placed to hold the surgical field open. A small opening was made in the platysma on the lateral edge and then undermined using the Metzenbaum scissors in a transverse fashion.   The platysma was divided transversely distraction between the two pins.   A #15 scalpel was used to incise the anterior disc annulus around its periphery and then various curettes and rongeurs were used to grossly remove the anterior annulus and debulk the disc space of disc material.  The micr debulk the disc space of disc material.  The microscope was then brought in for illumination and magnification.   A Midas Yordan drill with a matchstick eliseo was used to removed the remainder of the deeper disc material and expand the discectomy space laterall and then handheld Cloward retractors were used to inspect the surgical field. The esophagus was identified and inspected for any tears or injury but none were found.   Hemostasis was obtained with a combination of bipolar electrocautery and gelfoam and thr

## 2018-08-24 ENCOUNTER — HOSPITAL ENCOUNTER (OUTPATIENT)
Dept: MRI IMAGING | Facility: HOSPITAL | Age: 48
Discharge: HOME | End: 2018-08-24
Attending: ORTHOPAEDIC SURGERY
Payer: COMMERCIAL

## 2018-08-24 DIAGNOSIS — L85.8 KP (KERATOSIS PILARIS): ICD-10-CM

## 2018-08-24 PROCEDURE — 73721 MRI JNT OF LWR EXTRE W/O DYE: CPT | Performed by: ORTHOPAEDIC SURGERY

## 2018-09-20 ENCOUNTER — HOSPITAL ENCOUNTER (OUTPATIENT)
Dept: MRI IMAGING | Age: 48
Discharge: HOME | End: 2018-09-20
Attending: ANESTHESIOLOGY
Payer: COMMERCIAL

## 2018-09-20 DIAGNOSIS — M54.2 NECK PAIN: ICD-10-CM

## 2018-09-20 PROCEDURE — 72141 MRI NECK SPINE W/O DYE: CPT | Performed by: ANESTHESIOLOGY

## 2019-04-01 ENCOUNTER — OFFICE VISIT (OUTPATIENT)
Dept: INTERNAL MEDICINE CLINIC | Facility: CLINIC | Age: 49
End: 2019-04-01
Payer: MEDICAID

## 2019-04-01 VITALS
SYSTOLIC BLOOD PRESSURE: 130 MMHG | TEMPERATURE: 99 F | BODY MASS INDEX: 35.14 KG/M2 | HEART RATE: 71 BPM | WEIGHT: 251 LBS | RESPIRATION RATE: 17 BRPM | OXYGEN SATURATION: 99 % | DIASTOLIC BLOOD PRESSURE: 82 MMHG | HEIGHT: 71 IN

## 2019-04-01 DIAGNOSIS — K63.5 POLYP OF COLON, UNSPECIFIED PART OF COLON, UNSPECIFIED TYPE: ICD-10-CM

## 2019-04-01 DIAGNOSIS — Z12.5 SCREENING FOR PROSTATE CANCER: ICD-10-CM

## 2019-04-01 DIAGNOSIS — Z00.00 ANNUAL PHYSICAL EXAM: Primary | ICD-10-CM

## 2019-04-01 PROCEDURE — 99386 PREV VISIT NEW AGE 40-64: CPT | Performed by: INTERNAL MEDICINE

## 2019-04-01 RX ORDER — HYDROCODONE BITARTRATE AND ACETAMINOPHEN 10; 325 MG/1; MG/1
TABLET ORAL
Refills: 0 | COMMUNITY
Start: 2019-03-11 | End: 2019-06-13

## 2019-04-01 RX ORDER — PSEUDOEPHED/ACETAMINOPH/DIPHEN 30MG-500MG
TABLET ORAL
Refills: 0 | COMMUNITY
Start: 2019-03-25 | End: 2019-07-18 | Stop reason: ALTCHOICE

## 2019-04-01 RX ORDER — AMOXICILLIN 500 MG/1
CAPSULE ORAL
Refills: 0 | COMMUNITY
Start: 2019-03-25 | End: 2019-04-01 | Stop reason: ALTCHOICE

## 2019-04-01 RX ORDER — MORPHINE SULFATE 15 MG/1
TABLET, FILM COATED, EXTENDED RELEASE ORAL
Refills: 0 | COMMUNITY
Start: 2018-12-19 | End: 2019-04-01 | Stop reason: ALTCHOICE

## 2019-04-01 RX ORDER — DIAZEPAM 10 MG/1
TABLET ORAL
Refills: 0 | COMMUNITY
Start: 2019-02-21 | End: 2019-04-01 | Stop reason: ALTCHOICE

## 2019-04-01 NOTE — PROGRESS NOTES
Jame Mayers is a 50year old male.     Chief complaint:   Annual physical exam   HPI:   50year old male with PMH as listed below here for   Annual physical exam   Patient reports   Neck pain and radiating pain down to his arms  He is s/p spinal fusi systems was completed.   Pertinent positives and negatives noted in the the HPI            EXAM:   /62 (BP Location: Right arm, Patient Position: Sitting, Cuff Size: large)   Pulse 71   Temp 98.7 °F (37.1 °C) (Oral)   Resp 17   Ht 71\"   Wt 251 lb   S

## 2019-04-02 ENCOUNTER — NURSE ONLY (OUTPATIENT)
Dept: INTERNAL MEDICINE CLINIC | Facility: CLINIC | Age: 49
End: 2019-04-02
Payer: MEDICAID

## 2019-04-02 DIAGNOSIS — Z00.00 ANNUAL PHYSICAL EXAM: ICD-10-CM

## 2019-04-02 PROCEDURE — 80053 COMPREHEN METABOLIC PANEL: CPT | Performed by: INTERNAL MEDICINE

## 2019-04-02 PROCEDURE — 36415 COLL VENOUS BLD VENIPUNCTURE: CPT | Performed by: INTERNAL MEDICINE

## 2019-04-02 PROCEDURE — 85025 COMPLETE CBC W/AUTO DIFF WBC: CPT | Performed by: INTERNAL MEDICINE

## 2019-04-02 PROCEDURE — 80061 LIPID PANEL: CPT | Performed by: INTERNAL MEDICINE

## 2019-04-02 NOTE — PROGRESS NOTES
Pt presented to clinic today for blood draw. Per physician able to draw orders. Orders  documented within chart. Pt tolerated lab draw well.  verified.   Orders drawn include: cbc, cmp, lipid, psa  Site of draw: rt haja Braun CMA

## 2019-04-17 ENCOUNTER — OFFICE VISIT (OUTPATIENT)
Dept: INTERNAL MEDICINE CLINIC | Facility: CLINIC | Age: 49
End: 2019-04-17
Payer: MEDICAID

## 2019-04-17 ENCOUNTER — TELEPHONE (OUTPATIENT)
Dept: INTERNAL MEDICINE CLINIC | Facility: CLINIC | Age: 49
End: 2019-04-17

## 2019-04-17 VITALS
HEART RATE: 74 BPM | HEIGHT: 71 IN | BODY MASS INDEX: 34.72 KG/M2 | WEIGHT: 248 LBS | DIASTOLIC BLOOD PRESSURE: 72 MMHG | OXYGEN SATURATION: 99 % | SYSTOLIC BLOOD PRESSURE: 118 MMHG

## 2019-04-17 DIAGNOSIS — B35.3 TINEA PEDIS, UNSPECIFIED LATERALITY: ICD-10-CM

## 2019-04-17 DIAGNOSIS — B35.1 ONYCHOMYCOSIS: Primary | ICD-10-CM

## 2019-04-17 DIAGNOSIS — M72.2 PLANTAR FASCIITIS: ICD-10-CM

## 2019-04-17 PROCEDURE — 99213 OFFICE O/P EST LOW 20 MIN: CPT | Performed by: INTERNAL MEDICINE

## 2019-04-17 NOTE — PROGRESS NOTES
Eduin Le is a 50year old male.     Chief complaint: foot pain left and left toe color changes     HPI:   50year old male with PMH as listed below here for   Left foot pain   Has been going on for couple of months   Worse in the last week   Pain Position: Sitting, Cuff Size: adult)   Pulse 74   Ht 71\"   Wt 248 lb   SpO2 99%   BMI 34.59 kg/m²   GENERAL: well developed, well nourished,in no apparent distress  SKIN: no rashes,no suspicious lesions  HEENT: atraumatic, normocephalic,ears and throat ar

## 2019-04-17 NOTE — PATIENT INSTRUCTIONS
Plantar Fasciitis  Plantar fasciitis is a painful swelling of the plantar fascia. The plantar fascia is a thick, fibrous layer of tissue. It covers the bones on the bottom of your foot. And it supports the foot bones in an arched position.   This can happ · First thing in the morning and before sports, stretch the bottom of your feet. Gently flex your ankle so the toes move toward your knee. · Icing may help control heel pain. Apply an ice pack to the heel for 10-20 minutes as a preventive.  Or ice your sugey · To lessen severe pain and swelling, your healthcare provider may give you pills or injections. In some cases, you may need a walking cast. Physical therapy, such as ultrasound or a daily stretching program, may also be recommended.  Surgery is rarely need How to say it  PLAN-angelahr fa-see-IY-tis   What causes plantar fasciitis? Plantar fasciitis most often occurs from overusing the plantar fascia. The tissue may become damaged from activities that put repeated stress on the heel and foot.  Or it may wear down · Doing exercises and physical therapy. These stretch and strengthen the plantar fascia and the muscles in the leg that support the heel and foot. · Getting shots of medicine into the foot. These may help relieve symptoms for a time. · Having surgery.  Guillermina Hsu · It is important to keep the feet dry. Use absorbent cotton socks and change them if they become sweaty. Or wear an open-toe shoe or sandal. Wash the feet at least once a day with soap and water. · Apply the antifungal cream as prescribed.  Some antifunga A nail fungal infection changes the way fingernails and toenails look. They may thicken, discolor, change shape, or split. This condition is hard to treat because nails grow slowly and have limited blood supply.  The infection often comes back after treatme Call your healthcare provider right away if any of these occur:  · Skin by the nail becomes red, swollen, painful, or drains pus (a creamy yellow or white liquid)  · Side effects from oral anti-fungal medicines  Date Last Reviewed: 8/1/2016 © 2000-2018 Th

## 2019-04-18 ENCOUNTER — TELEPHONE (OUTPATIENT)
Dept: INTERNAL MEDICINE CLINIC | Facility: CLINIC | Age: 49
End: 2019-04-18

## 2019-05-14 ENCOUNTER — OFFICE VISIT (OUTPATIENT)
Dept: INTERNAL MEDICINE CLINIC | Facility: CLINIC | Age: 49
End: 2019-05-14
Payer: MEDICAID

## 2019-05-14 VITALS
WEIGHT: 247.38 LBS | SYSTOLIC BLOOD PRESSURE: 130 MMHG | HEIGHT: 71 IN | OXYGEN SATURATION: 99 % | BODY MASS INDEX: 34.63 KG/M2 | HEART RATE: 69 BPM | RESPIRATION RATE: 17 BRPM | TEMPERATURE: 99 F | DIASTOLIC BLOOD PRESSURE: 62 MMHG

## 2019-05-14 DIAGNOSIS — K43.9 ABDOMINAL WALL HERNIA: Primary | ICD-10-CM

## 2019-05-14 DIAGNOSIS — R10.9 ABDOMINAL PAIN, UNSPECIFIED ABDOMINAL LOCATION: ICD-10-CM

## 2019-05-14 PROCEDURE — 99213 OFFICE O/P EST LOW 20 MIN: CPT | Performed by: INTERNAL MEDICINE

## 2019-05-14 RX ORDER — AMOXICILLIN 500 MG/1
CAPSULE ORAL
Refills: 0 | COMMUNITY
Start: 2019-04-11 | End: 2019-05-21 | Stop reason: ALTCHOICE

## 2019-05-14 NOTE — PROGRESS NOTES
Cele Banks is a 50year old male.     Chief complaint: abdominal wall bulge/ pain    HPI:   Here for abdominal wall bulge / discomfort   Started around 4-8 weeks ago   More bulging at times   + pain with food   No N or vomiting  No diarrhea  No cons cord, initial encounter ([facility] [address]     Paresthesia of arm      REVIEW OF SYSTEMS:   A comprehensive 10 point review of systems was completed.   Pertinent positives and negatives noted in the the HPI            EXAM:   /62   Pulse 69   Temp 98.5 °F (36.9 °C

## 2019-05-16 DIAGNOSIS — M72.2 PLANTAR FASCIITIS: ICD-10-CM

## 2019-05-16 DIAGNOSIS — B35.1 ONYCHOMYCOSIS: ICD-10-CM

## 2019-05-16 DIAGNOSIS — B35.3 TINEA PEDIS, UNSPECIFIED LATERALITY: ICD-10-CM

## 2019-05-17 ENCOUNTER — HOSPITAL ENCOUNTER (OUTPATIENT)
Dept: GENERAL RADIOLOGY | Age: 49
Discharge: HOME | End: 2019-05-17
Attending: INTERNAL MEDICINE
Payer: MEDICAID

## 2019-05-17 ENCOUNTER — APPOINTMENT (OUTPATIENT)
Dept: LAB | Age: 49
End: 2019-05-17
Attending: INTERNAL MEDICINE
Payer: MEDICAID

## 2019-05-17 ENCOUNTER — HOSPITAL ENCOUNTER (OUTPATIENT)
Dept: CT IMAGING | Age: 49
Discharge: HOME | End: 2019-05-17
Attending: INTERNAL MEDICINE
Payer: MEDICAID

## 2019-05-17 DIAGNOSIS — B35.1 ONYCHOMYCOSIS: ICD-10-CM

## 2019-05-17 DIAGNOSIS — K43.9 ABDOMINAL WALL HERNIA: ICD-10-CM

## 2019-05-17 DIAGNOSIS — B35.3 TINEA PEDIS, UNSPECIFIED LATERALITY: ICD-10-CM

## 2019-05-17 DIAGNOSIS — R10.9 ABDOMINAL PAIN, UNSPECIFIED ABDOMINAL LOCATION: ICD-10-CM

## 2019-05-17 DIAGNOSIS — M72.2 PLANTAR FASCIITIS: ICD-10-CM

## 2019-05-17 PROCEDURE — 73630 X-RAY EXAM OF FOOT: CPT | Performed by: INTERNAL MEDICINE

## 2019-05-17 PROCEDURE — 84403 ASSAY OF TOTAL TESTOSTERONE: CPT

## 2019-05-17 PROCEDURE — 74177 CT ABD & PELVIS W/CONTRAST: CPT | Performed by: INTERNAL MEDICINE

## 2019-05-17 PROCEDURE — 36415 COLL VENOUS BLD VENIPUNCTURE: CPT

## 2019-05-17 PROCEDURE — 82565 ASSAY OF CREATININE: CPT

## 2019-05-17 PROCEDURE — 84402 ASSAY OF FREE TESTOSTERONE: CPT

## 2019-05-20 DIAGNOSIS — D35.00 ADRENAL ADENOMA, UNSPECIFIED LATERALITY: Primary | ICD-10-CM

## 2019-05-21 ENCOUNTER — OFFICE VISIT (OUTPATIENT)
Dept: SURGERY | Facility: CLINIC | Age: 49
End: 2019-05-21
Payer: MEDICAID

## 2019-05-21 ENCOUNTER — TELEPHONE (OUTPATIENT)
Dept: INTERNAL MEDICINE CLINIC | Facility: CLINIC | Age: 49
End: 2019-05-21

## 2019-05-21 VITALS — HEIGHT: 71 IN | BODY MASS INDEX: 33.6 KG/M2 | WEIGHT: 240 LBS

## 2019-05-21 DIAGNOSIS — M72.2 PLANTAR FASCIITIS: ICD-10-CM

## 2019-05-21 DIAGNOSIS — M79.673 PAIN OF FOOT, UNSPECIFIED LATERALITY: Primary | ICD-10-CM

## 2019-05-21 DIAGNOSIS — K43.9 VENTRAL HERNIA WITHOUT OBSTRUCTION OR GANGRENE: Primary | ICD-10-CM

## 2019-05-21 DIAGNOSIS — K40.90 NON-RECURRENT UNILATERAL INGUINAL HERNIA WITHOUT OBSTRUCTION OR GANGRENE: ICD-10-CM

## 2019-05-21 DIAGNOSIS — M19.079 OSTEOARTHRITIS OF FOOT, UNSPECIFIED LATERALITY, UNSPECIFIED OSTEOARTHRITIS TYPE: ICD-10-CM

## 2019-05-21 DIAGNOSIS — B35.1 ONYCHOMYCOSIS: ICD-10-CM

## 2019-05-21 PROCEDURE — 99204 OFFICE O/P NEW MOD 45 MIN: CPT | Performed by: SURGERY

## 2019-05-21 PROCEDURE — 99212 OFFICE O/P EST SF 10 MIN: CPT | Performed by: SURGERY

## 2019-05-21 NOTE — TELEPHONE ENCOUNTER
Pt could not get an appointment until 6.10.19 but PT says he can hardly walk on his foot it is so swollen    Is there another doctor he can see sooner and get a referral to     Please advise
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
## 2019-05-22 ENCOUNTER — OFFICE VISIT (OUTPATIENT)
Dept: ENDOCRINOLOGY CLINIC | Facility: CLINIC | Age: 49
End: 2019-05-22
Payer: MEDICAID

## 2019-05-22 VITALS
HEIGHT: 71 IN | DIASTOLIC BLOOD PRESSURE: 75 MMHG | BODY MASS INDEX: 34.19 KG/M2 | HEART RATE: 65 BPM | SYSTOLIC BLOOD PRESSURE: 113 MMHG | WEIGHT: 244.19 LBS

## 2019-05-22 DIAGNOSIS — D35.01 ADENOMA OF RIGHT ADRENAL GLAND: Primary | ICD-10-CM

## 2019-05-22 DIAGNOSIS — R79.89 LOW TESTOSTERONE: Primary | ICD-10-CM

## 2019-05-22 PROCEDURE — 99212 OFFICE O/P EST SF 10 MIN: CPT | Performed by: INTERNAL MEDICINE

## 2019-05-22 PROCEDURE — 99203 OFFICE O/P NEW LOW 30 MIN: CPT | Performed by: INTERNAL MEDICINE

## 2019-05-22 RX ORDER — DEXAMETHASONE 1 MG
1 TABLET ORAL ONCE
Qty: 1 TABLET | Refills: 0 | Status: SHIPPED | OUTPATIENT
Start: 2019-05-22 | End: 2019-05-22

## 2019-05-22 NOTE — H&P
New Patient Evaluation - History and Physical    CONSULT - Reason for Visit:  Adrenal adenoma  Requesting Physician: Dr. Angelo Mckee:  Patient presents with:  Consult: Adrenal adenoma referred by Dr. Rehana Vazquez Smoker      Smokeless tobacco: Never Used    Substance and Sexual Activity      Alcohol use:  Yes        Alcohol/week: 0.6 oz        Types: 1 Glasses of wine per week      Drug use: No    Other Topics      Concerns:      FAMILY HISTORY:   Family History   P reviewed    ASSESSMENT AND PLAN:    Patient is a 50year old male with a right sided adrenal adenoma.      I discussed the following:  - Adrenal gland structure and function  - Adrenal incidentaloma is a lesion that is over 1 cm in size  - All patients with

## 2019-05-22 NOTE — PATIENT INSTRUCTIONS
Complete lab for metanpehrine provided to you at the office visit today. Do these labs separate from your cortisol test. Perform fasting 7am-8am.      Once the first labs are complete. You can proceed with further testing as follows:     Take dexamethasone

## 2019-05-23 ENCOUNTER — TELEPHONE (OUTPATIENT)
Dept: ENDOCRINOLOGY CLINIC | Facility: CLINIC | Age: 49
End: 2019-05-23

## 2019-05-23 DIAGNOSIS — D35.00 ADRENAL ADENOMA, UNSPECIFIED LATERALITY: Primary | ICD-10-CM

## 2019-05-23 RX ORDER — DEXAMETHASONE 1 MG
TABLET ORAL
Qty: 1 TABLET | Refills: 0 | Status: SHIPPED | OUTPATIENT
Start: 2019-05-23 | End: 2019-06-13 | Stop reason: ALTCHOICE

## 2019-05-23 NOTE — TELEPHONE ENCOUNTER
ZOLTAN - RN spoke with patient and he responded as follows:    1.  Symptoms of low T  erectile dysfunction - YES  low sperm count - YES  depressed mood - MAYBE A LITTLE  decreased libido - YES  Lethargy - NOT SURE/HAD SURGERY   sleep disturbances - NO  decrease

## 2019-05-23 NOTE — TELEPHONE ENCOUNTER
I recently saw the patient for adrenal adenoma. I spoke with his PCP who will also like me to address him low testosterone levels. Please discuss the following:    A diagnosis of hypogonadism requires:    1.  Symptoms of low T  erectile dysfunction  l

## 2019-06-06 ENCOUNTER — LAB ENCOUNTER (OUTPATIENT)
Dept: LAB | Facility: HOSPITAL | Age: 49
End: 2019-06-06
Attending: INTERNAL MEDICINE
Payer: MEDICAID

## 2019-06-06 DIAGNOSIS — D35.01 ADENOMA OF RIGHT ADRENAL GLAND: ICD-10-CM

## 2019-06-06 DIAGNOSIS — R79.89 LOW TESTOSTERONE: ICD-10-CM

## 2019-06-06 DIAGNOSIS — D35.00 ADRENAL ADENOMA, UNSPECIFIED LATERALITY: ICD-10-CM

## 2019-06-06 PROCEDURE — 83001 ASSAY OF GONADOTROPIN (FSH): CPT

## 2019-06-06 PROCEDURE — 84402 ASSAY OF FREE TESTOSTERONE: CPT

## 2019-06-06 PROCEDURE — 83002 ASSAY OF GONADOTROPIN (LH): CPT

## 2019-06-06 PROCEDURE — 84403 ASSAY OF TOTAL TESTOSTERONE: CPT

## 2019-06-06 PROCEDURE — 83835 ASSAY OF METANEPHRINES: CPT

## 2019-06-06 PROCEDURE — 36415 COLL VENOUS BLD VENIPUNCTURE: CPT

## 2019-06-10 ENCOUNTER — TELEPHONE (OUTPATIENT)
Dept: ENDOCRINOLOGY CLINIC | Facility: CLINIC | Age: 49
End: 2019-06-10

## 2019-06-10 DIAGNOSIS — E29.1 HYPOGONADISM IN MALE: Primary | ICD-10-CM

## 2019-06-10 NOTE — TELEPHONE ENCOUNTER
RN spoke with patient about note below from provider. Patient verbalized understanding and states he has been dieting and lost 45 lbs. Pt declined referral for bariatrics. Pt states he would like to recheck TT in 3 mos per AM. Labs ordered per AM written.

## 2019-06-10 NOTE — TELEPHONE ENCOUNTER
TT is normal on repeat testing  I suggest weight loss to help improve it even further  Work on low carb, low calorie diet, can mail reading material  Okay to provided referral to bariatric clinic  Also, we can plan to repeat TT in 3 months.      Also, for a

## 2019-06-13 ENCOUNTER — OFFICE VISIT (OUTPATIENT)
Dept: PODIATRY CLINIC | Facility: CLINIC | Age: 49
End: 2019-06-13
Payer: MEDICAID

## 2019-06-13 DIAGNOSIS — M72.2 PLANTAR FASCIITIS OF LEFT FOOT: Primary | ICD-10-CM

## 2019-06-13 DIAGNOSIS — B35.1 ONYCHOMYCOSIS: ICD-10-CM

## 2019-06-13 PROCEDURE — 99203 OFFICE O/P NEW LOW 30 MIN: CPT | Performed by: PODIATRIST

## 2019-06-19 ENCOUNTER — APPOINTMENT (OUTPATIENT)
Dept: LAB | Facility: HOSPITAL | Age: 49
End: 2019-06-19
Attending: INTERNAL MEDICINE
Payer: MEDICAID

## 2019-06-19 DIAGNOSIS — D35.01 ADENOMA OF RIGHT ADRENAL GLAND: ICD-10-CM

## 2019-06-19 PROCEDURE — 82533 TOTAL CORTISOL: CPT

## 2019-06-19 PROCEDURE — 36415 COLL VENOUS BLD VENIPUNCTURE: CPT

## 2019-07-05 ENCOUNTER — TELEPHONE (OUTPATIENT)
Dept: ENDOCRINOLOGY CLINIC | Facility: CLINIC | Age: 49
End: 2019-07-05

## 2019-07-05 NOTE — TELEPHONE ENCOUNTER
Bud Solomon requesting RN to obtain prior auth for 7/8/2019 CT Scan. Pls call Angelica with auth #. Thank you.

## 2019-07-05 NOTE — TELEPHONE ENCOUNTER
Called Ted and initiated PA for CT adrenals scheduled Monday 7/8. [facility]#[id number]. Call transferred to clinical review. Meets protocol for CPT 55214 with and without contrast.    Authorized. 7/5/19 - 8/19/19. [id number].     Current order is for CT with

## 2019-07-08 ENCOUNTER — HOSPITAL ENCOUNTER (OUTPATIENT)
Dept: CT IMAGING | Age: 49
Discharge: HOME | End: 2019-07-08
Attending: INTERNAL MEDICINE
Payer: MEDICAID

## 2019-07-08 DIAGNOSIS — D35.01 ADENOMA OF RIGHT ADRENAL GLAND: ICD-10-CM

## 2019-07-08 LAB — CREAT BLD-MCNC: 0.7 MG/DL (ref 0.7–1.3)

## 2019-07-08 PROCEDURE — 74170 CT ABD WO CNTRST FLWD CNTRST: CPT | Performed by: INTERNAL MEDICINE

## 2019-07-08 PROCEDURE — 82565 ASSAY OF CREATININE: CPT

## 2019-07-10 ENCOUNTER — TELEPHONE (OUTPATIENT)
Dept: ENDOCRINOLOGY CLINIC | Facility: CLINIC | Age: 49
End: 2019-07-10

## 2019-07-10 DIAGNOSIS — R63.8 UNABLE TO LOSE WEIGHT: Primary | ICD-10-CM

## 2019-07-10 NOTE — TELEPHONE ENCOUNTER
Spoke w/ Joanne Woodward. He verbalizes understanding of CT result. Booked 9 mo follow up for 4/3/20.

## 2019-07-10 NOTE — TELEPHONE ENCOUNTER
CT scan shows a stable adrenal adenoma  We will repeat a CT scan in 9 months to monitor for stability  Please book FU in 9 months  Can provide orders for labs and CT scan at that time  Thanks

## 2019-07-11 ENCOUNTER — TELEPHONE (OUTPATIENT)
Dept: INTERNAL MEDICINE CLINIC | Facility: CLINIC | Age: 49
End: 2019-07-11

## 2019-07-11 NOTE — TELEPHONE ENCOUNTER
1)  Would like a referral to a pain specialist.  Knee and spine pain. 2)  Can he have a pain medication until he sees the specialist?  In a lot of pain. Please call with information.   He is willing to pay out of pocket until insurance authorization co

## 2019-07-18 ENCOUNTER — OFFICE VISIT (OUTPATIENT)
Dept: SURGERY | Facility: CLINIC | Age: 49
End: 2019-07-18
Payer: MEDICAID

## 2019-07-18 VITALS
HEIGHT: 71 IN | WEIGHT: 250.19 LBS | HEART RATE: 76 BPM | BODY MASS INDEX: 35.02 KG/M2 | SYSTOLIC BLOOD PRESSURE: 122 MMHG | OXYGEN SATURATION: 98 % | DIASTOLIC BLOOD PRESSURE: 74 MMHG

## 2019-07-18 DIAGNOSIS — S14.109S CONTUSION OF CERVICAL CORD, SEQUELA (HCC): ICD-10-CM

## 2019-07-18 DIAGNOSIS — E66.9 OBESITY (BMI 30-39.9): ICD-10-CM

## 2019-07-18 DIAGNOSIS — M54.12 CERVICAL RADICULOPATHY: ICD-10-CM

## 2019-07-18 DIAGNOSIS — Z51.81 ENCOUNTER FOR THERAPEUTIC DRUG MONITORING: ICD-10-CM

## 2019-07-18 DIAGNOSIS — R63.5 WEIGHT GAIN: Primary | ICD-10-CM

## 2019-07-18 DIAGNOSIS — R63.2 INCREASED APPETITE: ICD-10-CM

## 2019-07-18 PROCEDURE — 99204 OFFICE O/P NEW MOD 45 MIN: CPT | Performed by: NURSE PRACTITIONER

## 2019-07-18 RX ORDER — HYDROCODONE BITARTRATE AND ACETAMINOPHEN 5; 325 MG/1; MG/1
2 TABLET ORAL EVERY 4 HOURS
COMMUNITY
Start: 2019-07-01 | End: 2020-02-04 | Stop reason: ALTCHOICE

## 2019-07-18 RX ORDER — BUPROPION HYDROCHLORIDE 150 MG/1
150 TABLET ORAL DAILY
Qty: 30 TABLET | Refills: 1 | Status: SHIPPED | OUTPATIENT
Start: 2019-07-18 | End: 2020-02-24

## 2019-07-18 NOTE — PROGRESS NOTES
[facility] Consultation Note       Date of Consult:  2019    Patient:  Jame Mayers  :      1970  MRN:      [id number]    Referring Provider: Dr. Yanira Vides       Chief Complaint:  Patient presents with:  Consult  Weigh lb  05/14/19 : 247 lb 6.4 oz  04/17/19 : 248 lb  04/01/19 : 251 lb       Patient Medications:      Current Outpatient Medications:  HYDROcodone-acetaminophen (NORCO) 5-325 MG Oral Tab Take 2 tablets by mouth every 4 (four) hours.  Disp:  Rfl:        Loki Amor Concern: Not Asked        Weight Concern: Not Asked    Social History Narrative      Not on file    Surgical History:    Past Surgical History:   Procedure Laterality Date   • ANTERIOR CERVICAL FUSION BG & INST 2 LEVEL N/A 7/16/2018    Performed by Holden Hopkins negative  Integument/breast: negative  Hematologic/lymphatic: negative  Musculoskeletal:positive for arthralgias, neck pain and right knee pain, left foot pain  Neurological: positive for dizziness and headaches  Behavioral/Psych: stress  Endocrine: negati minutes of moderate physical activity per week. Discussed the importance of whole food, plant based, minimally to non-processed diet rich in fruits, vegetables, whole grains and healthy fats, and meats/seafood without hormones, steroids, or antibiotics.

## 2019-07-18 NOTE — PATIENT INSTRUCTIONS
Luis Collins, CBD oil    Meditation:  5-10 minutes at night  \"I am\" (breathe in for 5 seconds),    \"Happy\" (breathe out for 5 seconds)  Consider acupuncture     Consider a sleep study     Sit and Be Fit    Fast 16:8 or work up to 20 hours- [employer]

## 2019-08-08 ENCOUNTER — TELEPHONE (OUTPATIENT)
Dept: SURGERY | Facility: CLINIC | Age: 49
End: 2019-08-08

## 2019-08-08 ENCOUNTER — OFFICE VISIT (OUTPATIENT)
Dept: PODIATRY CLINIC | Facility: CLINIC | Age: 49
End: 2019-08-08
Payer: MEDICAID

## 2019-08-08 DIAGNOSIS — M72.2 PLANTAR FASCIITIS OF LEFT FOOT: Primary | ICD-10-CM

## 2019-08-08 DIAGNOSIS — E66.9 OBESITY (BMI 30-39.9): ICD-10-CM

## 2019-08-08 DIAGNOSIS — R63.5 WEIGHT GAIN: ICD-10-CM

## 2019-08-08 DIAGNOSIS — R63.2 INCREASED APPETITE: Primary | ICD-10-CM

## 2019-08-08 DIAGNOSIS — Z51.81 ENCOUNTER FOR THERAPEUTIC DRUG MONITORING: ICD-10-CM

## 2019-08-08 PROCEDURE — 99213 OFFICE O/P EST LOW 20 MIN: CPT | Performed by: PODIATRIST

## 2019-08-08 RX ORDER — TOPIRAMATE 25 MG/1
25 TABLET ORAL DAILY
Qty: 30 TABLET | Refills: 1 | Status: SHIPPED | OUTPATIENT
Start: 2019-08-08 | End: 2020-02-24

## 2019-08-08 NOTE — TELEPHONE ENCOUNTER
Spoke to patient re: Carmelina message.  Explained he can move wellbutrin dose to the morning (150 mg daily) and start topiramate in the evening 25 mg tablet to help with evening cravings as he does not feel Wellbutrin is helping with cravings or helping him

## 2019-08-08 NOTE — PROGRESS NOTES
HPI:    Patient ID: Shabnam Enriquez is a 52year old male. This 35-year-old male presents for follow-up in reference to left heel pain. Patient states on questioning that he is a minimum of 50% better.   He still has pain depending upon the degree of

## 2019-08-29 ENCOUNTER — OFFICE VISIT (OUTPATIENT)
Dept: PODIATRY CLINIC | Facility: CLINIC | Age: 49
End: 2019-08-29
Payer: MEDICAID

## 2019-08-29 DIAGNOSIS — M72.2 PLANTAR FASCIITIS OF LEFT FOOT: Primary | ICD-10-CM

## 2019-08-29 PROCEDURE — 99213 OFFICE O/P EST LOW 20 MIN: CPT | Performed by: PODIATRIST

## 2019-08-29 NOTE — PROGRESS NOTES
HPI:    Patient ID: Ericka Roman is a 52year old male. This 63-year-old male presents for follow-up in reference to left heel pain. Today he states he has no pain. It is off and on but nothing like it was originally.   He finds it necessary to w

## 2019-09-03 ENCOUNTER — OFFICE VISIT (OUTPATIENT)
Dept: INTERNAL MEDICINE CLINIC | Facility: CLINIC | Age: 49
End: 2019-09-03
Payer: MEDICAID

## 2019-09-03 VITALS
HEIGHT: 71 IN | DIASTOLIC BLOOD PRESSURE: 82 MMHG | HEART RATE: 74 BPM | BODY MASS INDEX: 35.02 KG/M2 | TEMPERATURE: 98 F | WEIGHT: 250.19 LBS | OXYGEN SATURATION: 98 % | SYSTOLIC BLOOD PRESSURE: 120 MMHG | RESPIRATION RATE: 18 BRPM

## 2019-09-03 DIAGNOSIS — M48.00 CENTRAL STENOSIS OF SPINAL CANAL: ICD-10-CM

## 2019-09-03 DIAGNOSIS — M48.00 SPINAL STENOSIS, UNSPECIFIED SPINAL REGION: ICD-10-CM

## 2019-09-03 DIAGNOSIS — M54.9 BACK PAIN, UNSPECIFIED BACK LOCATION, UNSPECIFIED BACK PAIN LATERALITY, UNSPECIFIED CHRONICITY: ICD-10-CM

## 2019-09-03 DIAGNOSIS — G47.00 INSOMNIA, UNSPECIFIED TYPE: Primary | ICD-10-CM

## 2019-09-03 PROCEDURE — 99213 OFFICE O/P EST LOW 20 MIN: CPT | Performed by: INTERNAL MEDICINE

## 2019-09-03 NOTE — PROGRESS NOTES
Mona Card is a 52year old male.     Chief complaint: spinal stenosis ,   DJD   Insomnia     HPI:   Here for insomnia   Has been having difficulty sleeping for years   Was told at times that he snores at night   Sleeps max for couple of hours  Uses Paresthesia of arm     Ventral hernia without obstruction or gangrene     Non-recurrent unilateral inguinal hernia without obstruction or gangrene     Adenoma of right adrenal gland     Weight gain     Obesity (BMI 30-39. 9)     Cervical radiculopathy     E Medicine  Diplomate of the American Board of Obesity Medicine

## 2019-09-12 ENCOUNTER — OFFICE VISIT (OUTPATIENT)
Dept: PODIATRY CLINIC | Facility: CLINIC | Age: 49
End: 2019-09-12
Payer: MEDICAID

## 2019-09-12 DIAGNOSIS — M72.2 PLANTAR FASCIITIS OF LEFT FOOT: Primary | ICD-10-CM

## 2019-09-12 PROCEDURE — 99212 OFFICE O/P EST SF 10 MIN: CPT | Performed by: PODIATRIST

## 2019-09-12 NOTE — PROGRESS NOTES
HPI:    Patient ID: Marisela Torres is a 52year old male. 49-year-old female presents for follow-up care in reference to left heel pain. He states that he essentially has no pain. He has some occasional discomfort.   He states that the insole matters

## 2019-09-17 ENCOUNTER — NURSE ONLY (OUTPATIENT)
Dept: INTERNAL MEDICINE CLINIC | Facility: CLINIC | Age: 49
End: 2019-09-17
Payer: MEDICAID

## 2019-09-17 PROCEDURE — 90686 IIV4 VACC NO PRSV 0.5 ML IM: CPT | Performed by: INTERNAL MEDICINE

## 2019-09-17 PROCEDURE — 90471 IMMUNIZATION ADMIN: CPT | Performed by: INTERNAL MEDICINE

## 2019-09-17 NOTE — PROGRESS NOTES
Per Dr. Ravindra Buchanan order, flu vaccine was given in right arm. Patient tolerated vaccine well.

## 2019-10-09 ENCOUNTER — TELEPHONE (OUTPATIENT)
Dept: INTERNAL MEDICINE CLINIC | Facility: CLINIC | Age: 49
End: 2019-10-09

## 2019-10-09 NOTE — TELEPHONE ENCOUNTER
referral has been approved.  CPT [id number] Authorization # [id number] Effective from 10/8/19 - 1/6/2020.  The patient can schedule this test at their convenience to take place on or before 01/06/2020.

## 2019-12-27 ENCOUNTER — APPOINTMENT (OUTPATIENT)
Dept: LAB | Facility: HOSPITAL | Age: 49
End: 2019-12-27
Attending: INTERNAL MEDICINE
Payer: MEDICAID

## 2019-12-27 DIAGNOSIS — E29.1 HYPOGONADISM IN MALE: ICD-10-CM

## 2019-12-27 PROCEDURE — 84403 ASSAY OF TOTAL TESTOSTERONE: CPT

## 2019-12-27 PROCEDURE — 36415 COLL VENOUS BLD VENIPUNCTURE: CPT

## 2019-12-28 ENCOUNTER — PATIENT MESSAGE (OUTPATIENT)
Dept: ENDOCRINOLOGY CLINIC | Facility: CLINIC | Age: 49
End: 2019-12-28

## 2019-12-29 ENCOUNTER — TELEPHONE (OUTPATIENT)
Dept: ENDOCRINOLOGY CLINIC | Facility: CLINIC | Age: 49
End: 2019-12-29

## 2019-12-30 NOTE — TELEPHONE ENCOUNTER
From: Artie Kang  To: Georgette Limon MD  Sent: 12/28/2019 3:55 PM CST  Subject: Test Results Katelynn Elizabeth   I have a question about TESTOSTERONE TOTAL resulted on 12/27/19 at 10:49 AM. Is this good or bad?  Please a

## 2019-12-30 NOTE — TELEPHONE ENCOUNTER
TT has been low normal in the past, but it is low now, Hence, I will like to see him in the clinic to discuss further.    Please book first available  Thanks

## 2020-01-25 ENCOUNTER — TELEPHONE (OUTPATIENT)
Dept: INTERNAL MEDICINE CLINIC | Facility: CLINIC | Age: 50
End: 2020-01-25

## 2020-01-25 NOTE — TELEPHONE ENCOUNTER
record request from [facility], [address] [place] [address] office asking for all records including X-rays and all  Imaging .  Request has been sent to scan stat    Mail request does have a check for 25.00 attached all sent to scan stat

## 2020-01-31 ENCOUNTER — TELEPHONE (OUTPATIENT)
Dept: SURGERY | Facility: CLINIC | Age: 50
End: 2020-01-31

## 2020-02-03 ENCOUNTER — TELEPHONE (OUTPATIENT)
Dept: SURGERY | Facility: CLINIC | Age: 50
End: 2020-02-03

## 2020-02-03 NOTE — TELEPHONE ENCOUNTER
L/M for pt. Due to surgery, I need to move pt's appt down to 12:45 tomorrow. Please call to confirm.

## 2020-02-04 ENCOUNTER — OFFICE VISIT (OUTPATIENT)
Dept: SURGERY | Facility: CLINIC | Age: 50
End: 2020-02-04
Payer: MEDICAID

## 2020-02-04 ENCOUNTER — TELEPHONE (OUTPATIENT)
Dept: INTERNAL MEDICINE CLINIC | Facility: CLINIC | Age: 50
End: 2020-02-04

## 2020-02-04 VITALS — BODY MASS INDEX: 35 KG/M2 | WEIGHT: 250 LBS

## 2020-02-04 DIAGNOSIS — K43.9 VENTRAL HERNIA WITHOUT OBSTRUCTION OR GANGRENE: Primary | ICD-10-CM

## 2020-02-04 PROCEDURE — 99214 OFFICE O/P EST MOD 30 MIN: CPT | Performed by: SURGERY

## 2020-02-04 NOTE — TELEPHONE ENCOUNTER
Having hernia repair March 3rd. In [facility] pain 7/10 constant for over one week. Doesn't think he can wait until March.   What can he do??    I suggested ER, but wants PCP input first.

## 2020-02-04 NOTE — TELEPHONE ENCOUNTER
Before calling patient back to inform him the PCP concurs that he should go to the ER, I noted he just completed a visit with the surgeon. I called surgeon's office and spoke with his nurse, Yasmin Alvarado, who stated that Dr. David Garcia did not see his issue as urgent.

## 2020-02-05 NOTE — H&P (VIEW-ONLY)
HPI:    Patient ID: Junaid Baca is a 52year old male presenting with Patient presents with:  Hernia: Patient states he has three hernias. Only knows he has a right groin hernia, does not know where the other ones are. Not able to reduce. on file        Attends Judaism service: Not on file        Active member of club or organization: Not on file        Attends meetings of clubs or organizations: Not on file        Relationship status: Not on file      Intimate partner violence:        Fe tenderness. There is no rebound and no guarding. No obvious fascia defect noted on physical exam due to pt's body habitus. Musculoskeletal: Normal range of motion. Neurological: He is alert and oriented to person, place, and time.    Skin: Skin is war

## 2020-02-05 NOTE — H&P
HPI:    Patient ID: Spring Wilkerson is a 52year old male presenting with Patient presents with:  Hernia: Patient states he has three hernias. Only knows he has a right groin hernia, does not know where the other ones are. Not able to reduce. on file        Attends [employer] service: Not on file        Active member of club or organization: Not on file        Attends meetings of clubs or organizations: Not on file        Relationship status: Not on file      Intimate partner violence:        Fe tenderness. There is no rebound and no guarding. No obvious fascia defect noted on physical exam due to pt's body habitus. Musculoskeletal: Normal range of motion. Neurological: He is alert and oriented to person, place, and time.    Skin: Skin is war

## 2020-02-11 ENCOUNTER — OFFICE VISIT (OUTPATIENT)
Dept: ENDOCRINOLOGY CLINIC | Facility: CLINIC | Age: 50
End: 2020-02-11
Payer: MEDICAID

## 2020-02-11 VITALS
WEIGHT: 273 LBS | HEART RATE: 73 BPM | DIASTOLIC BLOOD PRESSURE: 82 MMHG | BODY MASS INDEX: 38 KG/M2 | SYSTOLIC BLOOD PRESSURE: 121 MMHG

## 2020-02-11 DIAGNOSIS — E29.1 HYPOGONADISM IN MALE: ICD-10-CM

## 2020-02-11 DIAGNOSIS — D35.00 ADRENAL ADENOMA, UNSPECIFIED LATERALITY: Primary | ICD-10-CM

## 2020-02-11 PROCEDURE — 99214 OFFICE O/P EST MOD 30 MIN: CPT | Performed by: INTERNAL MEDICINE

## 2020-02-11 RX ORDER — HYDROCODONE BITARTRATE AND ACETAMINOPHEN 10; 325 MG/1; MG/1
1 TABLET ORAL EVERY 4 HOURS PRN
COMMUNITY
Start: 2020-02-06 | End: 2020-08-27

## 2020-02-11 RX ORDER — DEXAMETHASONE 1 MG
1 TABLET ORAL ONCE
Qty: 1 TABLET | Refills: 0 | Status: SHIPPED | OUTPATIENT
Start: 2020-02-11 | End: 2020-02-11

## 2020-02-11 NOTE — PROGRESS NOTES
Return Office Visit     CHIEF COMPLAINT:  Patient presents with: Follow - Up: Adenoma of right adrenal gland. Patient has hernia repair surgery on March 2nd.        HISTORY OF PRESENT ILLNESS:  Mahsa Monique is a 52year old male who presents Negative for: weight change, fever, fatigue, cold/heat intolerance  Eyes: Negative for:  Visual changes, proptosis, blurring  ENT: Negative for:  dysphagia, neck swelling, dysphonia  Respiratory: Negative for:  dyspnea, cough  Cardiovascular: Negative for: renin/ jose  - FU with imaging , adrenal dedicated CT in summer 2020  Will follow hormone levels annually x 5 years.        2.  Hypogonadism  Discussed Endocrine society guidelines for diagnosis of Hypogonadism.     Discussed the changes that can be induced

## 2020-02-25 ENCOUNTER — TELEPHONE (OUTPATIENT)
Dept: ENDOCRINOLOGY CLINIC | Facility: CLINIC | Age: 50
End: 2020-02-25

## 2020-02-25 NOTE — TELEPHONE ENCOUNTER
Please do CT after May 2020  Seems like they will cover CT in one year from last one  Last one was in may 2019  Thanks

## 2020-02-25 NOTE — TELEPHONE ENCOUNTER
Good Morning Dr Sangita Sifuentes    Referral for Ct Abdomen was denied by health plan    I contacted patient and informed to reach your office for plan of care      He verbally understood    I faxed denial letter to your office    Please advise denial letter Yudelka Jones

## 2020-02-26 NOTE — TELEPHONE ENCOUNTER
Called patient and states he is aware that it has to be done one year from previous one.  Will make appointment then

## 2020-03-02 ENCOUNTER — ANESTHESIA (OUTPATIENT)
Dept: SURGERY | Facility: HOSPITAL | Age: 50
End: 2020-03-02
Payer: MEDICAID

## 2020-03-02 ENCOUNTER — ANESTHESIA EVENT (OUTPATIENT)
Dept: SURGERY | Facility: HOSPITAL | Age: 50
End: 2020-03-02
Payer: MEDICAID

## 2020-03-02 ENCOUNTER — HOSPITAL ENCOUNTER (OUTPATIENT)
Facility: HOSPITAL | Age: 50
Discharge: HOME | End: 2020-03-03
Attending: SURGERY | Admitting: SURGERY
Payer: MEDICAID

## 2020-03-02 DIAGNOSIS — K43.9 VENTRAL HERNIA WITHOUT OBSTRUCTION OR GANGRENE: ICD-10-CM

## 2020-03-02 DIAGNOSIS — K66.0 ABDOMINAL ADHESIONS: Primary | ICD-10-CM

## 2020-03-02 PROCEDURE — 0WUF4JZ SUPPLEMENT ABDOMINAL WALL WITH SYNTHETIC SUBSTITUTE, PERCUTANEOUS ENDOSCOPIC APPROACH: ICD-10-PCS | Performed by: SURGERY

## 2020-03-02 PROCEDURE — 99225 SUBSEQUENT OBSERVATION CARE: CPT | Performed by: HOSPITALIST

## 2020-03-02 PROCEDURE — 49653 LAP VENT/ABD HERN PROC COMP: CPT | Performed by: SURGERY

## 2020-03-02 DEVICE — VENTRALIGHT ST MESH WITH ECHO PS POSITIONING SYSTEM
Type: IMPLANTABLE DEVICE | Site: ABDOMEN | Status: FUNCTIONAL
Brand: VENTRALIGHT ST MESH WITH ECHO PS POSITIONING SYSTEM

## 2020-03-02 RX ORDER — MORPHINE SULFATE 4 MG/ML
2 INJECTION, SOLUTION INTRAMUSCULAR; INTRAVENOUS EVERY 10 MIN PRN
Status: DISCONTINUED | OUTPATIENT
Start: 2020-03-02 | End: 2020-03-02 | Stop reason: HOSPADM

## 2020-03-02 RX ORDER — MORPHINE SULFATE 4 MG/ML
4 INJECTION, SOLUTION INTRAMUSCULAR; INTRAVENOUS EVERY 2 HOUR PRN
Status: DISCONTINUED | OUTPATIENT
Start: 2020-03-02 | End: 2020-03-03

## 2020-03-02 RX ORDER — NEOSTIGMINE METHYLSULFATE 1 MG/ML
INJECTION INTRAVENOUS AS NEEDED
Status: DISCONTINUED | OUTPATIENT
Start: 2020-03-02 | End: 2020-03-02 | Stop reason: SURG

## 2020-03-02 RX ORDER — HYDROMORPHONE HYDROCHLORIDE 1 MG/ML
0.2 INJECTION, SOLUTION INTRAMUSCULAR; INTRAVENOUS; SUBCUTANEOUS EVERY 5 MIN PRN
Status: DISCONTINUED | OUTPATIENT
Start: 2020-03-02 | End: 2020-03-02 | Stop reason: HOSPADM

## 2020-03-02 RX ORDER — HYDROCODONE BITARTRATE AND ACETAMINOPHEN 5; 325 MG/1; MG/1
1 TABLET ORAL AS NEEDED
Status: DISCONTINUED | OUTPATIENT
Start: 2020-03-02 | End: 2020-03-02 | Stop reason: HOSPADM

## 2020-03-02 RX ORDER — METOCLOPRAMIDE HYDROCHLORIDE 5 MG/ML
10 INJECTION INTRAMUSCULAR; INTRAVENOUS EVERY 6 HOURS PRN
Status: DISCONTINUED | OUTPATIENT
Start: 2020-03-02 | End: 2020-03-03

## 2020-03-02 RX ORDER — POLYETHYLENE GLYCOL 3350 17 G/17G
17 POWDER, FOR SOLUTION ORAL DAILY PRN
Status: DISCONTINUED | OUTPATIENT
Start: 2020-03-02 | End: 2020-03-03

## 2020-03-02 RX ORDER — ONDANSETRON 2 MG/ML
4 INJECTION INTRAMUSCULAR; INTRAVENOUS EVERY 6 HOURS PRN
Status: DISCONTINUED | OUTPATIENT
Start: 2020-03-02 | End: 2020-03-03

## 2020-03-02 RX ORDER — MORPHINE SULFATE 4 MG/ML
4 INJECTION, SOLUTION INTRAMUSCULAR; INTRAVENOUS EVERY 10 MIN PRN
Status: DISCONTINUED | OUTPATIENT
Start: 2020-03-02 | End: 2020-03-02 | Stop reason: HOSPADM

## 2020-03-02 RX ORDER — DOCUSATE SODIUM 100 MG/1
100 CAPSULE, LIQUID FILLED ORAL 2 TIMES DAILY
Status: DISCONTINUED | OUTPATIENT
Start: 2020-03-02 | End: 2020-03-03

## 2020-03-02 RX ORDER — ONDANSETRON 2 MG/ML
INJECTION INTRAMUSCULAR; INTRAVENOUS AS NEEDED
Status: DISCONTINUED | OUTPATIENT
Start: 2020-03-02 | End: 2020-03-02 | Stop reason: SURG

## 2020-03-02 RX ORDER — HYDROCODONE BITARTRATE AND ACETAMINOPHEN 5; 325 MG/1; MG/1
2 TABLET ORAL EVERY 4 HOURS PRN
Status: DISCONTINUED | OUTPATIENT
Start: 2020-03-02 | End: 2020-03-03

## 2020-03-02 RX ORDER — CEFAZOLIN SODIUM/WATER 2 G/20 ML
2 SYRINGE (ML) INTRAVENOUS ONCE
Status: COMPLETED | OUTPATIENT
Start: 2020-03-02 | End: 2020-03-02

## 2020-03-02 RX ORDER — METOCLOPRAMIDE 10 MG/1
10 TABLET ORAL ONCE
Status: COMPLETED | OUTPATIENT
Start: 2020-03-02 | End: 2020-03-02

## 2020-03-02 RX ORDER — KETOROLAC TROMETHAMINE 30 MG/ML
30 INJECTION, SOLUTION INTRAMUSCULAR; INTRAVENOUS EVERY 6 HOURS
Status: DISCONTINUED | OUTPATIENT
Start: 2020-03-02 | End: 2020-03-03

## 2020-03-02 RX ORDER — HYDROMORPHONE HYDROCHLORIDE 1 MG/ML
0.6 INJECTION, SOLUTION INTRAMUSCULAR; INTRAVENOUS; SUBCUTANEOUS EVERY 5 MIN PRN
Status: DISCONTINUED | OUTPATIENT
Start: 2020-03-02 | End: 2020-03-02 | Stop reason: HOSPADM

## 2020-03-02 RX ORDER — FAMOTIDINE 20 MG/1
20 TABLET ORAL ONCE
Status: COMPLETED | OUTPATIENT
Start: 2020-03-02 | End: 2020-03-02

## 2020-03-02 RX ORDER — PROCHLORPERAZINE EDISYLATE 5 MG/ML
5 INJECTION INTRAMUSCULAR; INTRAVENOUS ONCE AS NEEDED
Status: DISCONTINUED | OUTPATIENT
Start: 2020-03-02 | End: 2020-03-02 | Stop reason: HOSPADM

## 2020-03-02 RX ORDER — GLYCOPYRROLATE 0.2 MG/ML
INJECTION INTRAMUSCULAR; INTRAVENOUS AS NEEDED
Status: DISCONTINUED | OUTPATIENT
Start: 2020-03-02 | End: 2020-03-02 | Stop reason: SURG

## 2020-03-02 RX ORDER — HYDROMORPHONE HYDROCHLORIDE 1 MG/ML
0.4 INJECTION, SOLUTION INTRAMUSCULAR; INTRAVENOUS; SUBCUTANEOUS EVERY 5 MIN PRN
Status: DISCONTINUED | OUTPATIENT
Start: 2020-03-02 | End: 2020-03-02 | Stop reason: HOSPADM

## 2020-03-02 RX ORDER — MORPHINE SULFATE 4 MG/ML
8 INJECTION, SOLUTION INTRAMUSCULAR; INTRAVENOUS EVERY 2 HOUR PRN
Status: DISCONTINUED | OUTPATIENT
Start: 2020-03-02 | End: 2020-03-03

## 2020-03-02 RX ORDER — LIDOCAINE HYDROCHLORIDE 10 MG/ML
INJECTION, SOLUTION EPIDURAL; INFILTRATION; INTRACAUDAL; PERINEURAL AS NEEDED
Status: DISCONTINUED | OUTPATIENT
Start: 2020-03-02 | End: 2020-03-02 | Stop reason: SURG

## 2020-03-02 RX ORDER — BISACODYL 10 MG
10 SUPPOSITORY, RECTAL RECTAL
Status: DISCONTINUED | OUTPATIENT
Start: 2020-03-02 | End: 2020-03-03

## 2020-03-02 RX ORDER — ROCURONIUM BROMIDE 10 MG/ML
INJECTION, SOLUTION INTRAVENOUS AS NEEDED
Status: DISCONTINUED | OUTPATIENT
Start: 2020-03-02 | End: 2020-03-02 | Stop reason: SURG

## 2020-03-02 RX ORDER — ACETAMINOPHEN 500 MG
1000 TABLET ORAL ONCE
Status: DISCONTINUED | OUTPATIENT
Start: 2020-03-02 | End: 2020-03-02 | Stop reason: HOSPADM

## 2020-03-02 RX ORDER — NALOXONE HYDROCHLORIDE 0.4 MG/ML
80 INJECTION, SOLUTION INTRAMUSCULAR; INTRAVENOUS; SUBCUTANEOUS AS NEEDED
Status: DISCONTINUED | OUTPATIENT
Start: 2020-03-02 | End: 2020-03-02 | Stop reason: HOSPADM

## 2020-03-02 RX ORDER — MORPHINE SULFATE 10 MG/ML
6 INJECTION, SOLUTION INTRAMUSCULAR; INTRAVENOUS EVERY 10 MIN PRN
Status: DISCONTINUED | OUTPATIENT
Start: 2020-03-02 | End: 2020-03-02 | Stop reason: HOSPADM

## 2020-03-02 RX ORDER — HALOPERIDOL 5 MG/ML
0.25 INJECTION INTRAMUSCULAR ONCE AS NEEDED
Status: DISCONTINUED | OUTPATIENT
Start: 2020-03-02 | End: 2020-03-02 | Stop reason: HOSPADM

## 2020-03-02 RX ORDER — SODIUM CHLORIDE, SODIUM LACTATE, POTASSIUM CHLORIDE, CALCIUM CHLORIDE 600; 310; 30; 20 MG/100ML; MG/100ML; MG/100ML; MG/100ML
INJECTION, SOLUTION INTRAVENOUS CONTINUOUS
Status: DISCONTINUED | OUTPATIENT
Start: 2020-03-02 | End: 2020-03-03

## 2020-03-02 RX ORDER — DEXAMETHASONE SODIUM PHOSPHATE 4 MG/ML
VIAL (ML) INJECTION AS NEEDED
Status: DISCONTINUED | OUTPATIENT
Start: 2020-03-02 | End: 2020-03-02 | Stop reason: SURG

## 2020-03-02 RX ORDER — SODIUM PHOSPHATE, DIBASIC AND SODIUM PHOSPHATE, MONOBASIC 7; 19 G/133ML; G/133ML
1 ENEMA RECTAL ONCE AS NEEDED
Status: DISCONTINUED | OUTPATIENT
Start: 2020-03-02 | End: 2020-03-03

## 2020-03-02 RX ORDER — BUPIVACAINE HYDROCHLORIDE 5 MG/ML
INJECTION, SOLUTION EPIDURAL; INTRACAUDAL AS NEEDED
Status: DISCONTINUED | OUTPATIENT
Start: 2020-03-02 | End: 2020-03-02 | Stop reason: HOSPADM

## 2020-03-02 RX ORDER — ONDANSETRON 2 MG/ML
4 INJECTION INTRAMUSCULAR; INTRAVENOUS ONCE AS NEEDED
Status: DISCONTINUED | OUTPATIENT
Start: 2020-03-02 | End: 2020-03-02 | Stop reason: HOSPADM

## 2020-03-02 RX ORDER — HYDROCODONE BITARTRATE AND ACETAMINOPHEN 5; 325 MG/1; MG/1
1 TABLET ORAL EVERY 4 HOURS PRN
Status: DISCONTINUED | OUTPATIENT
Start: 2020-03-02 | End: 2020-03-03

## 2020-03-02 RX ORDER — HYDROCODONE BITARTRATE AND ACETAMINOPHEN 5; 325 MG/1; MG/1
2 TABLET ORAL AS NEEDED
Status: DISCONTINUED | OUTPATIENT
Start: 2020-03-02 | End: 2020-03-02 | Stop reason: HOSPADM

## 2020-03-02 RX ORDER — MORPHINE SULFATE 2 MG/ML
2 INJECTION, SOLUTION INTRAMUSCULAR; INTRAVENOUS EVERY 2 HOUR PRN
Status: DISCONTINUED | OUTPATIENT
Start: 2020-03-02 | End: 2020-03-03

## 2020-03-02 RX ORDER — MIDAZOLAM HYDROCHLORIDE 1 MG/ML
INJECTION INTRAMUSCULAR; INTRAVENOUS AS NEEDED
Status: DISCONTINUED | OUTPATIENT
Start: 2020-03-02 | End: 2020-03-02 | Stop reason: SURG

## 2020-03-02 RX ORDER — SODIUM CHLORIDE, SODIUM LACTATE, POTASSIUM CHLORIDE, CALCIUM CHLORIDE 600; 310; 30; 20 MG/100ML; MG/100ML; MG/100ML; MG/100ML
INJECTION, SOLUTION INTRAVENOUS CONTINUOUS
Status: DISCONTINUED | OUTPATIENT
Start: 2020-03-02 | End: 2020-03-02 | Stop reason: HOSPADM

## 2020-03-02 RX ADMIN — NEOSTIGMINE METHYLSULFATE 5 MG: 1 INJECTION INTRAVENOUS at 14:36:00

## 2020-03-02 RX ADMIN — LIDOCAINE HYDROCHLORIDE 30 MG: 10 INJECTION, SOLUTION EPIDURAL; INFILTRATION; INTRACAUDAL; PERINEURAL at 12:58:00

## 2020-03-02 RX ADMIN — SODIUM CHLORIDE, SODIUM LACTATE, POTASSIUM CHLORIDE, CALCIUM CHLORIDE: 600; 310; 30; 20 INJECTION, SOLUTION INTRAVENOUS at 14:30:00

## 2020-03-02 RX ADMIN — ONDANSETRON 4 MG: 2 INJECTION INTRAMUSCULAR; INTRAVENOUS at 13:09:00

## 2020-03-02 RX ADMIN — CEFAZOLIN SODIUM/WATER 2 G: 2 G/20 ML SYRINGE (ML) INTRAVENOUS at 13:08:00

## 2020-03-02 RX ADMIN — MIDAZOLAM HYDROCHLORIDE 2 MG: 1 INJECTION INTRAMUSCULAR; INTRAVENOUS at 12:53:00

## 2020-03-02 RX ADMIN — ROCURONIUM BROMIDE 30 MG: 10 INJECTION, SOLUTION INTRAVENOUS at 13:09:00

## 2020-03-02 RX ADMIN — LIDOCAINE HYDROCHLORIDE 10 MG: 10 INJECTION, SOLUTION EPIDURAL; INFILTRATION; INTRACAUDAL; PERINEURAL at 13:47:00

## 2020-03-02 RX ADMIN — DEXAMETHASONE SODIUM PHOSPHATE 4 MG: 4 MG/ML VIAL (ML) INJECTION at 13:09:00

## 2020-03-02 RX ADMIN — GLYCOPYRROLATE 1 MG: 0.2 INJECTION INTRAMUSCULAR; INTRAVENOUS at 14:36:00

## 2020-03-02 NOTE — INTERVAL H&P NOTE
Pre-op Diagnosis: Ventral hernia without obstruction or gangrene [K43.9]    The above referenced H&P was reviewed by Eleonora Mcduffie MD on 3/2/2020, the patient was examined and no significant changes have occurred in the patient's condition since the H&P was pe

## 2020-03-02 NOTE — PROGRESS NOTES
[facility]    Progress Note    Brendan Burkitt Patient Status:  Outpatient in a Bed    1970 MRN [id number]   Location [address] [facility] Attending Nicole Marina MD   Hosp Day # 0 PCP Todd Rodriguez RESPIRATORY STATUS. Adhesion of omentum  SEE ABOVE.              Results:     Lab Results   Component Value Date    WBC 8.8 04/02/2019    HGB 13.4 04/02/2019    HCT 44.4 04/02/2019    .0 04/02/2019    CREATSERUM 0.82 04/02/2019    BUN 16 04/02/

## 2020-03-02 NOTE — OPERATIVE REPORT
Operative Report    Patient Name:  Stephan Nagy  MR:  [id number]  :  1970  DOS:  20    Preop Dx: Incarcerated ventral hernia  Postop Dx: Incarcerated ventral hernia, Intra-abdominal adhesions   Procedure:    1.  Laparoscopic emily ligasure. Once we were able to completely free the omental adhesions to the hernia sac and abdominal wall, the two ventral hernias were seen. One measured 4 x 3 cm and the more cephalad one measured 1 x 1 cm.   The hernia defects were primarily closed usi

## 2020-03-02 NOTE — ANESTHESIA PROCEDURE NOTES
Airway  Urgency: Elective      General Information and Staff    Patient location during procedure: OR  Anesthesiologist: Karlee Ruby MD  Resident/CRNA: Sona Kurtz CRNA  Performed: CRNA     Indications and Patient Condition  Indications for airw

## 2020-03-02 NOTE — ANESTHESIA PREPROCEDURE EVALUATION
Anesthesia PreOp Note    HPI:     Radha Killian is a 52year old male who presents for preoperative consultation requested by: Preethi Bashir MD    Date of Surgery: 3/2/2020    Procedure(s):  LAPAROSCOPIC VENTRAL HERNIA REPAIR  Indication: Ventral HYDROcodone-acetaminophen  MG Oral Tab, Take 1 tablet by mouth every 4 (four) hours as needed.   , Disp: , Rfl: , 3/2/2020 at Unknown time      lactated ringers infusion, , Intravenous, Continuous, Omar George MD, Last Rate: 20 mL/hr at 03/02/20 1049 Relationship status: Not on file      Intimate partner violence:        Fear of current or ex partner: Not on file        Emotionally abused: Not on file        Physically abused: Not on file        Forced sexual activity: Not on file    Other Topics I have informed Pasquale Noel and/or legal guardian or family member of the nature of the anesthetic plan, benefits, risks including possible dental damage if relevant, major complications, and any alternative forms of anesthetic management.

## 2020-03-02 NOTE — ANESTHESIA POSTPROCEDURE EVALUATION
Patient: Paul Valverde    Procedure Summary     Date:  03/02/20 Room / Location:  [facility] OR  / [facility] OR    Anesthesia Start:  0860 Anesthesia Stop:      Procedure:  LAPAROSCOPIC VENTRAL HERNIA REPAIR (N/A Abdomen) Diagnosis:       Isa Loera

## 2020-03-03 VITALS
OXYGEN SATURATION: 95 % | SYSTOLIC BLOOD PRESSURE: 98 MMHG | TEMPERATURE: 98 F | RESPIRATION RATE: 20 BRPM | WEIGHT: 258 LBS | HEIGHT: 70 IN | DIASTOLIC BLOOD PRESSURE: 74 MMHG | HEART RATE: 56 BPM | BODY MASS INDEX: 36.94 KG/M2

## 2020-03-03 PROCEDURE — 99226 SUBSEQUENT OBSERVATION CARE: CPT | Performed by: HOSPITALIST

## 2020-03-03 RX ORDER — PSEUDOEPHEDRINE HCL 30 MG
100 TABLET ORAL 2 TIMES DAILY
Qty: 30 CAPSULE | Refills: 0 | Status: SHIPPED | OUTPATIENT
Start: 2020-03-03 | End: 2020-06-22

## 2020-03-03 NOTE — PLAN OF CARE
Problem: Patient Centered Care  Goal: Patient preferences are identified and integrated in the patient's plan of care  Description  Interventions:  - What would you like us to know as we care for you?  Keep family informed    - Provide timely, complete, a schedule  Outcome: Progressing    Pt  arrived on floor, vitals stable. Pain medication given. Admission complete. Tolerating CLD. Check void. [place] fall precautions in place, call light in reach.

## 2020-03-03 NOTE — PLAN OF CARE
Pt is A&Ox4, VSS. Complaining of abdominal pain, scheduled toradol given, PRN norco and morphine given too. Saline locked. Voiding. Tolerating full liquid diet, advanced to low fiber/soft for AM. Lap sites are CDI. Up with SBA.  Pt ambulated in [facility] fall precautions as indicated by assessment.  - Educate pt/family on patient safety including physical limitations  - Instruct pt to call for assistance with activity based on assessment  - Modify environment to reduce risk of injury  - Provide assistive d

## 2020-03-03 NOTE — PROGRESS NOTES
[facility] - [facility]    Progress Note    Oscar Key Patient Status:  Outpatient in a Bed    1970 MRN [id number]   Location [facility] 4W/SW/SE Attending Misha Elaine MD   Hosp Day # 0 PCP Dwain Sherman MD     Assess Exam:   /83 (BP Location: Right arm)   Pulse 72   Temp 97.8 °F (36.6 °C) (Oral)   Resp 20   Ht 5' 10\" (1.778 m)   Wt 258 lb (117 kg)   SpO2 93%   BMI 37.02 kg/m²   Gen:  NAD  Abd:  Soft, mild generalized TTP, incisions without associated eryth

## 2020-03-03 NOTE — PLAN OF CARE
Patient is alert and orientated. VSS. Toradol, Morphine and Norco for pain control. Abdominal lap sites c/d/I. Voiding freely. IVF infusing. Tolerating LFS diet. Ambulating independently. Plan for discharge today. All needs met at this time. IV removed.  Judd Cava of falls.   - [place] fall precautions as indicated by assessment.  - Educate pt/family on patient safety including physical limitations  - Instruct pt to call for assistance with activity based on assessment  - Modify environment to reduce risk of injury

## 2020-03-03 NOTE — PROGRESS NOTES
[facility] - [facility]    Progress Note    Joanne Talbert Patient Status:  Outpatient in a Bed    1970 MRN [id number]   Location [address] [facility] Attending Symone Ny MD   Hosp Day # 0 PCP Todd Chapa CONTROL  ADVANCE DIET AS TOLERATED  DVT PROPHYLAXIS. Difficulty urinating  -check post void residual    Obesity (BMI 30-39. 9)  MONITOR HEMODYNAMIC AND RESPIRATORY STATUS. Adhesion of omentum  SEE ABOVE.      Dispo--> pending, home later today

## 2020-03-11 ENCOUNTER — APPOINTMENT (OUTPATIENT)
Dept: CT IMAGING | Facility: HOSPITAL | Age: 50
End: 2020-03-11
Attending: EMERGENCY MEDICINE
Payer: MEDICAID

## 2020-03-11 ENCOUNTER — OFFICE VISIT (OUTPATIENT)
Dept: INTERNAL MEDICINE CLINIC | Facility: CLINIC | Age: 50
End: 2020-03-11
Payer: MEDICAID

## 2020-03-11 ENCOUNTER — HOSPITAL ENCOUNTER (EMERGENCY)
Facility: HOSPITAL | Age: 50
Discharge: HOME | End: 2020-03-11
Attending: EMERGENCY MEDICINE
Payer: MEDICAID

## 2020-03-11 VITALS
DIASTOLIC BLOOD PRESSURE: 62 MMHG | HEIGHT: 70 IN | WEIGHT: 268 LBS | HEART RATE: 74 BPM | SYSTOLIC BLOOD PRESSURE: 108 MMHG | TEMPERATURE: 99 F | OXYGEN SATURATION: 98 % | BODY MASS INDEX: 38.37 KG/M2

## 2020-03-11 VITALS
HEART RATE: 80 BPM | HEIGHT: 70 IN | WEIGHT: 260 LBS | BODY MASS INDEX: 37.22 KG/M2 | OXYGEN SATURATION: 94 % | TEMPERATURE: 99 F | SYSTOLIC BLOOD PRESSURE: 125 MMHG | RESPIRATION RATE: 18 BRPM | DIASTOLIC BLOOD PRESSURE: 79 MMHG

## 2020-03-11 DIAGNOSIS — Z98.890 S/P LAPAROSCOPIC HERNIA REPAIR: ICD-10-CM

## 2020-03-11 DIAGNOSIS — K59.00 CONSTIPATION, UNSPECIFIED CONSTIPATION TYPE: ICD-10-CM

## 2020-03-11 DIAGNOSIS — R68.83 CHILLS: ICD-10-CM

## 2020-03-11 DIAGNOSIS — R10.9 ABDOMINAL PAIN, UNSPECIFIED ABDOMINAL LOCATION: Primary | ICD-10-CM

## 2020-03-11 DIAGNOSIS — Z87.19 S/P LAPAROSCOPIC HERNIA REPAIR: ICD-10-CM

## 2020-03-11 DIAGNOSIS — K56.2: Primary | ICD-10-CM

## 2020-03-11 LAB
ALBUMIN SERPL-MCNC: 3.5 G/DL (ref 3.4–5)
ALBUMIN/GLOB SERPL: 0.8 {RATIO} (ref 1–2)
ALP LIVER SERPL-CCNC: 76 U/L (ref 45–117)
ALT SERPL-CCNC: 30 U/L (ref 16–61)
ANION GAP SERPL CALC-SCNC: 5 MMOL/L (ref 0–18)
AST SERPL-CCNC: 15 U/L (ref 15–37)
BASOPHILS # BLD AUTO: 0.06 X10(3) UL (ref 0–0.2)
BASOPHILS NFR BLD AUTO: 0.5 %
BILIRUB SERPL-MCNC: 0.3 MG/DL (ref 0.1–2)
BUN BLD-MCNC: 12 MG/DL (ref 7–18)
BUN/CREAT SERPL: 13.2 (ref 10–20)
CALCIUM BLD-MCNC: 9.1 MG/DL (ref 8.5–10.1)
CHLORIDE SERPL-SCNC: 104 MMOL/L (ref 98–112)
CO2 SERPL-SCNC: 28 MMOL/L (ref 21–32)
CREAT BLD-MCNC: 0.91 MG/DL (ref 0.7–1.3)
DEPRECATED RDW RBC AUTO: 44.4 FL (ref 35.1–46.3)
EOSINOPHIL # BLD AUTO: 0.56 X10(3) UL (ref 0–0.7)
EOSINOPHIL NFR BLD AUTO: 5 %
ERYTHROCYTE [DISTWIDTH] IN BLOOD BY AUTOMATED COUNT: 12.8 % (ref 11–15)
GLOBULIN PLAS-MCNC: 4.6 G/DL (ref 2.8–4.4)
GLUCOSE BLD-MCNC: 131 MG/DL (ref 70–99)
HCT VFR BLD AUTO: 41.4 % (ref 39–53)
HGB BLD-MCNC: 13.3 G/DL (ref 13–17.5)
IMM GRANULOCYTES # BLD AUTO: 0.06 X10(3) UL (ref 0–1)
IMM GRANULOCYTES NFR BLD: 0.5 %
LYMPHOCYTES # BLD AUTO: 2.35 X10(3) UL (ref 1–4)
LYMPHOCYTES NFR BLD AUTO: 20.8 %
M PROTEIN MFR SERPL ELPH: 8.1 G/DL (ref 6.4–8.2)
MCH RBC QN AUTO: 30.2 PG (ref 26–34)
MCHC RBC AUTO-ENTMCNC: 32.1 G/DL (ref 31–37)
MCV RBC AUTO: 93.9 FL (ref 80–100)
MONOCYTES # BLD AUTO: 1.05 X10(3) UL (ref 0.1–1)
MONOCYTES NFR BLD AUTO: 9.3 %
NEUTROPHILS # BLD AUTO: 7.2 X10 (3) UL (ref 1.5–7.7)
NEUTROPHILS # BLD AUTO: 7.2 X10(3) UL (ref 1.5–7.7)
NEUTROPHILS NFR BLD AUTO: 63.9 %
OSMOLALITY SERPL CALC.SUM OF ELEC: 286 MOSM/KG (ref 275–295)
PLATELET # BLD AUTO: 355 10(3)UL (ref 150–450)
POTASSIUM SERPL-SCNC: 3.8 MMOL/L (ref 3.5–5.1)
RBC # BLD AUTO: 4.41 X10(6)UL (ref 4.3–5.7)
SODIUM SERPL-SCNC: 137 MMOL/L (ref 136–145)
WBC # BLD AUTO: 11.3 X10(3) UL (ref 4–11)

## 2020-03-11 PROCEDURE — 74177 CT ABD & PELVIS W/CONTRAST: CPT | Performed by: EMERGENCY MEDICINE

## 2020-03-11 PROCEDURE — 85025 COMPLETE CBC W/AUTO DIFF WBC: CPT | Performed by: EMERGENCY MEDICINE

## 2020-03-11 PROCEDURE — 96360 HYDRATION IV INFUSION INIT: CPT

## 2020-03-11 PROCEDURE — 80053 COMPREHEN METABOLIC PANEL: CPT | Performed by: EMERGENCY MEDICINE

## 2020-03-11 PROCEDURE — 85025 COMPLETE CBC W/AUTO DIFF WBC: CPT

## 2020-03-11 PROCEDURE — 96361 HYDRATE IV INFUSION ADD-ON: CPT

## 2020-03-11 PROCEDURE — 99214 OFFICE O/P EST MOD 30 MIN: CPT | Performed by: INTERNAL MEDICINE

## 2020-03-11 PROCEDURE — 80053 COMPREHEN METABOLIC PANEL: CPT

## 2020-03-11 PROCEDURE — 1111F DSCHRG MED/CURRENT MED MERGE: CPT | Performed by: INTERNAL MEDICINE

## 2020-03-11 PROCEDURE — 99284 EMERGENCY DEPT VISIT MOD MDM: CPT

## 2020-03-11 RX ORDER — IBUPROFEN 600 MG/1
600 TABLET ORAL EVERY 6 HOURS PRN
Qty: 30 TABLET | Refills: 0 | Status: SHIPPED | OUTPATIENT
Start: 2020-03-11 | End: 2020-03-18

## 2020-03-11 RX ORDER — HYDROCODONE BITARTRATE AND ACETAMINOPHEN 5; 325 MG/1; MG/1
2 TABLET ORAL ONCE
Status: COMPLETED | OUTPATIENT
Start: 2020-03-11 | End: 2020-03-11

## 2020-03-11 NOTE — ED NOTES
Patient presents with abdominal pain   Admits to having periods of chills and diaphoresis at home. States he has been urinating normally, did admit that he had not has a BM in 1 week, but has been somewhat normal again since Friday.    Patient feels his a

## 2020-03-11 NOTE — ED INITIAL ASSESSMENT (HPI)
Pt s/p hernia surgery 3/2. Pt now havin generalized abd pain. \"lump\" at surgical site. +fevers. MD sent here for workup.

## 2020-03-11 NOTE — ED NOTES
Patient safe to DC home per MD. Gaurav Bruno to dress self. DC teaching done, instructions reviewed with patient including when and how to follow up with healthcare providers and when to seek emergency care. The patient verbalizes understanding.  Peripheral IV disc
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
## 2020-03-11 NOTE — ED PROVIDER NOTES
Patient Seen in: [facility] AND [facility] Emergency Department      History   Patient presents with:  Postop/Procedure Problem    Stated Complaint: wound care    HPI    52year old male with recent incarcerated ventral hernia repair with mesh placement on 3/2 w Vitals signs and nursing note reviewed. Constitutional:       General: He is not in acute distress. Appearance: He is well-developed. He is not toxic-appearing or diaphoretic. HENT:      Head: Normocephalic and atraumatic.    Eyes:      Conjunctiva/ The following orders were created for panel order CBC WITH DIFFERENTIAL WITH PLATELET.   Procedure                               Abnormality         Status                     ---------                               -----------         ------ D/w Dr Fantasma Blanchard - advises pt to f/u outpatient in office in next 1-2 days for recheck, fat incarceration would be managed non-surgically, monitor for signs of infection.     I advised the pt of the plan, he is comfortable f/u in office for re-exam and underst

## 2020-06-22 ENCOUNTER — OFFICE VISIT (OUTPATIENT)
Dept: INTERNAL MEDICINE CLINIC | Facility: CLINIC | Age: 50
End: 2020-06-22
Payer: MEDICAID

## 2020-06-22 VITALS
SYSTOLIC BLOOD PRESSURE: 118 MMHG | WEIGHT: 255.63 LBS | BODY MASS INDEX: 36.6 KG/M2 | OXYGEN SATURATION: 99 % | HEART RATE: 67 BPM | DIASTOLIC BLOOD PRESSURE: 84 MMHG | HEIGHT: 70 IN

## 2020-06-22 DIAGNOSIS — L91.8 CUTANEOUS SKIN TAGS: ICD-10-CM

## 2020-06-22 DIAGNOSIS — M48.00 CENTRAL STENOSIS OF SPINAL CANAL: ICD-10-CM

## 2020-06-22 DIAGNOSIS — E29.1 HYPOGONADISM MALE: ICD-10-CM

## 2020-06-22 DIAGNOSIS — M17.0 OSTEOARTHRITIS OF BOTH KNEES, UNSPECIFIED OSTEOARTHRITIS TYPE: ICD-10-CM

## 2020-06-22 DIAGNOSIS — M54.12 CERVICAL RADICULOPATHY: ICD-10-CM

## 2020-06-22 DIAGNOSIS — M48.00 SPINAL STENOSIS, UNSPECIFIED SPINAL REGION: ICD-10-CM

## 2020-06-22 DIAGNOSIS — D35.00 ADRENAL ADENOMA, UNSPECIFIED LATERALITY: ICD-10-CM

## 2020-06-22 DIAGNOSIS — M54.10 RADICULOPATHY, UNSPECIFIED SPINAL REGION: ICD-10-CM

## 2020-06-22 DIAGNOSIS — Z87.19 S/P HERNIA REPAIR: ICD-10-CM

## 2020-06-22 DIAGNOSIS — K59.00 CONSTIPATION, UNSPECIFIED CONSTIPATION TYPE: ICD-10-CM

## 2020-06-22 DIAGNOSIS — Z98.890 S/P HERNIA REPAIR: ICD-10-CM

## 2020-06-22 DIAGNOSIS — M19.90 OSTEOARTHRITIS, UNSPECIFIED OSTEOARTHRITIS TYPE, UNSPECIFIED SITE: ICD-10-CM

## 2020-06-22 DIAGNOSIS — Z00.00 ANNUAL PHYSICAL EXAM: Primary | ICD-10-CM

## 2020-06-22 DIAGNOSIS — Z87.19 S/P LAPAROSCOPIC HERNIA REPAIR: ICD-10-CM

## 2020-06-22 DIAGNOSIS — Z98.890 S/P LAPAROSCOPIC HERNIA REPAIR: ICD-10-CM

## 2020-06-22 PROCEDURE — 99396 PREV VISIT EST AGE 40-64: CPT | Performed by: INTERNAL MEDICINE

## 2020-06-22 RX ORDER — DULOXETIN HYDROCHLORIDE 20 MG/1
CAPSULE, DELAYED RELEASE ORAL
COMMUNITY
Start: 2020-06-02 | End: 2020-10-16

## 2020-06-23 ENCOUNTER — TELEPHONE (OUTPATIENT)
Dept: INTERNAL MEDICINE CLINIC | Facility: CLINIC | Age: 50
End: 2020-06-23

## 2020-06-23 NOTE — TELEPHONE ENCOUNTER
Jesi Manley called the office today requesting that we expedite authorization for CT of Pelvis and Abdomen for this patient. Pt is very uncomfortable and has appointment scheduled Saturday morning.  Jesi Manley just wants to ensure that this is authorized bruce

## 2020-06-27 ENCOUNTER — HOSPITAL ENCOUNTER (OUTPATIENT)
Dept: CT IMAGING | Facility: HOSPITAL | Age: 50
Discharge: HOME | End: 2020-06-27
Attending: INTERNAL MEDICINE
Payer: MEDICAID

## 2020-06-27 ENCOUNTER — LAB ENCOUNTER (OUTPATIENT)
Dept: LAB | Facility: HOSPITAL | Age: 50
End: 2020-06-27
Attending: INTERNAL MEDICINE
Payer: MEDICAID

## 2020-06-27 ENCOUNTER — TELEPHONE (OUTPATIENT)
Dept: ENDOCRINOLOGY CLINIC | Facility: CLINIC | Age: 50
End: 2020-06-27

## 2020-06-27 DIAGNOSIS — M48.00 SPINAL STENOSIS, UNSPECIFIED SPINAL REGION: ICD-10-CM

## 2020-06-27 DIAGNOSIS — M54.12 CERVICAL RADICULOPATHY: ICD-10-CM

## 2020-06-27 DIAGNOSIS — Z98.890 S/P LAPAROSCOPIC HERNIA REPAIR: ICD-10-CM

## 2020-06-27 DIAGNOSIS — Z87.19 S/P HERNIA REPAIR: ICD-10-CM

## 2020-06-27 DIAGNOSIS — Z98.890 S/P HERNIA REPAIR: ICD-10-CM

## 2020-06-27 DIAGNOSIS — Z00.00 ANNUAL PHYSICAL EXAM: ICD-10-CM

## 2020-06-27 DIAGNOSIS — Z87.19 S/P LAPAROSCOPIC HERNIA REPAIR: ICD-10-CM

## 2020-06-27 DIAGNOSIS — M19.90 OSTEOARTHRITIS, UNSPECIFIED OSTEOARTHRITIS TYPE, UNSPECIFIED SITE: ICD-10-CM

## 2020-06-27 DIAGNOSIS — K59.00 CONSTIPATION, UNSPECIFIED CONSTIPATION TYPE: ICD-10-CM

## 2020-06-27 DIAGNOSIS — E29.1 HYPOGONADISM IN MALE: ICD-10-CM

## 2020-06-27 DIAGNOSIS — M54.10 RADICULOPATHY, UNSPECIFIED SPINAL REGION: ICD-10-CM

## 2020-06-27 DIAGNOSIS — M48.00 CENTRAL STENOSIS OF SPINAL CANAL: ICD-10-CM

## 2020-06-27 DIAGNOSIS — M17.0 OSTEOARTHRITIS OF BOTH KNEES, UNSPECIFIED OSTEOARTHRITIS TYPE: ICD-10-CM

## 2020-06-27 DIAGNOSIS — L91.8 CUTANEOUS SKIN TAGS: ICD-10-CM

## 2020-06-27 DIAGNOSIS — D35.00 ADRENAL ADENOMA, UNSPECIFIED LATERALITY: ICD-10-CM

## 2020-06-27 DIAGNOSIS — E29.1 HYPOGONADISM MALE: ICD-10-CM

## 2020-06-27 PROCEDURE — 36415 COLL VENOUS BLD VENIPUNCTURE: CPT

## 2020-06-27 PROCEDURE — 82533 TOTAL CORTISOL: CPT

## 2020-06-27 PROCEDURE — 82248 BILIRUBIN DIRECT: CPT

## 2020-06-27 PROCEDURE — 85025 COMPLETE CBC W/AUTO DIFF WBC: CPT

## 2020-06-27 PROCEDURE — 80061 LIPID PANEL: CPT

## 2020-06-27 PROCEDURE — 80053 COMPREHEN METABOLIC PANEL: CPT

## 2020-06-27 PROCEDURE — 83835 ASSAY OF METANEPHRINES: CPT

## 2020-06-27 PROCEDURE — 84403 ASSAY OF TOTAL TESTOSTERONE: CPT

## 2020-06-27 PROCEDURE — 74177 CT ABD & PELVIS W/CONTRAST: CPT | Performed by: INTERNAL MEDICINE

## 2020-06-27 NOTE — TELEPHONE ENCOUNTER
Reviewed labs. TT is low  Baseline labs okay  Can start T 125 mg q 2 weeks. Two injections in the office, if he is comfortable injecting insulin, we can prescribe for home use after that  TT levels one week after 5th injection.      Metanephrine level i

## 2020-06-29 ENCOUNTER — PATIENT MESSAGE (OUTPATIENT)
Dept: ENDOCRINOLOGY CLINIC | Facility: CLINIC | Age: 50
End: 2020-06-29

## 2020-06-29 NOTE — TELEPHONE ENCOUNTER
From: Ronnell Hazel  To: Laurelyn Spatz, MD  Sent: 6/29/2020 9:35 AM CDT  Subject: Test Results Question    I have a question about CORTISOL resulted on 6/27/20 at 8:57 AM.    Good morning Doctor   Could you please elaborate the test results?

## 2020-07-01 ENCOUNTER — TELEPHONE (OUTPATIENT)
Dept: ENDOCRINOLOGY CLINIC | Facility: CLINIC | Age: 50
End: 2020-07-01

## 2020-07-01 ENCOUNTER — PATIENT MESSAGE (OUTPATIENT)
Dept: ENDOCRINOLOGY CLINIC | Facility: CLINIC | Age: 50
End: 2020-07-01

## 2020-07-01 DIAGNOSIS — N32.89 BLADDER WALL THICKENING: Primary | ICD-10-CM

## 2020-07-01 DIAGNOSIS — E29.1 HYPOGONADISM IN MALE: Primary | ICD-10-CM

## 2020-07-01 LAB
METANEPHRINE: 0.15 NMOL/L
NORMETANEPHRINE: 0.28 NMOL/L

## 2020-07-02 RX ORDER — TESTOSTERONE CYPIONATE 200 MG/ML
125 INJECTION INTRAMUSCULAR
Status: DISCONTINUED | OUTPATIENT
Start: 2020-07-07 | End: 2020-07-21

## 2020-07-02 NOTE — TELEPHONE ENCOUNTER
Pt called and informed of message by dr Rosalinda Neville,   Dr Rosalinda Neville patient did not take dexamethasone Piyush Christophe not ordered for him    Patient made cece for testosterone inj 7/720

## 2020-07-02 NOTE — TELEPHONE ENCOUNTER
Metanephrine normal  Cortisol is normal: please confirm that he DID NOT take dex the night before cortisol test  Thanks

## 2020-07-07 ENCOUNTER — NURSE ONLY (OUTPATIENT)
Dept: ENDOCRINOLOGY CLINIC | Facility: CLINIC | Age: 50
End: 2020-07-07
Payer: MEDICAID

## 2020-07-07 DIAGNOSIS — E29.1 HYPOGONADISM IN MALE: Primary | ICD-10-CM

## 2020-07-07 PROCEDURE — 96372 THER/PROPH/DIAG INJ SC/IM: CPT | Performed by: INTERNAL MEDICINE

## 2020-07-07 RX ADMIN — TESTOSTERONE CYPIONATE 126 MG: 200 INJECTION INTRAMUSCULAR at 14:30:00

## 2020-07-07 NOTE — PROGRESS NOTES
RN reviewed pt lab work, appropriate to receive testosterone injection during RN visit. Per encounter on 6/29/20, plan is to start testosterone at 125mg every 2 weeks. Two injections in the office, and if pt comfortable can prescribe for home use.  Pt admin

## 2020-07-21 ENCOUNTER — NURSE ONLY (OUTPATIENT)
Dept: ENDOCRINOLOGY CLINIC | Facility: CLINIC | Age: 50
End: 2020-07-21
Payer: MEDICAID

## 2020-07-21 ENCOUNTER — TELEPHONE (OUTPATIENT)
Dept: ENDOCRINOLOGY CLINIC | Facility: CLINIC | Age: 50
End: 2020-07-21

## 2020-07-21 DIAGNOSIS — E29.1 HYPOGONADISM IN MALE: Primary | ICD-10-CM

## 2020-07-21 PROCEDURE — 96372 THER/PROPH/DIAG INJ SC/IM: CPT | Performed by: INTERNAL MEDICINE

## 2020-07-21 RX ORDER — TESTOSTERONE CYPIONATE 200 MG/ML
126 INJECTION INTRAMUSCULAR
Qty: 4 ML | Refills: 0 | Status: SHIPPED | OUTPATIENT
Start: 2020-07-21 | End: 2020-11-30

## 2020-07-21 RX ORDER — TESTOSTERONE CYPIONATE 200 MG/ML
126 INJECTION INTRAMUSCULAR
Qty: 4 ML | Refills: 0 | Status: CANCELLED | OUTPATIENT
Start: 2020-07-21

## 2020-07-21 RX ADMIN — TESTOSTERONE CYPIONATE 126 MG: 200 INJECTION INTRAMUSCULAR at 09:35:00

## 2020-07-21 NOTE — TELEPHONE ENCOUNTER
Go to [email or web address] and click enter a key  [id number]     •  Testosterone cypionate 200 MG/ML Intramuscular Solution, Inject 0.63 mL (126 mg total) into the muscle every 14 (fourteen) days. , Disp: 4 mL, Rfl: 0  •

## 2020-07-21 NOTE — PROGRESS NOTES
Patient presents to clinic for second testosterone injection. Tolerated 126 mg of testosterone injection to the left deltoid. Third dose will be given at home as per previous plan.   Patient states his sister, who is a paramedic, will be administering his

## 2020-07-21 NOTE — TELEPHONE ENCOUNTER
Medication PA Requested:  Testosterone 200 mg/ml                                                         CoverMyMeds Used:  Yes  Key: [id number]  Sig: Inject 126 mg IM every 14 days  DX Code:  E29.1                                   CPT code (if applicable):

## 2020-07-21 NOTE — TELEPHONE ENCOUNTER
Dr. Franki Albert,     Per your plan patient's 3rd dose will be administered at home. Please review and sign pended script for testosterone therapy at home. Thank you.

## 2020-07-24 NOTE — TELEPHONE ENCOUNTER
Medication PA Requested:  Testosterone 200 mg/ml                                                         CoverMyMeds Used:  Yes  Key: [id number]  Sig: Inject 126 mg IM every 14 days  DX Code:  E29.1                                   CPT code (if applicable):

## 2020-07-27 NOTE — TELEPHONE ENCOUNTER
Please let the patient know  We can do the lab again, before 10 am  Total and Free testosterone  Thanks

## 2020-07-27 NOTE — TELEPHONE ENCOUNTER
Called pt and notified to get lab drawn again, pt states he is going tomorrow morning before 10am. Will review results and send to insurance for appeal.

## 2020-07-27 NOTE — TELEPHONE ENCOUNTER
Received fax from [address] that PA for Testosterone Cypoionate 200mg/ml Soln was denied. Must have two low testosterone levels, it appears pt only have one low testosterone total from 5/17/19.  Testosterone drawn on 6/27/20 was within normal r

## 2020-07-28 ENCOUNTER — APPOINTMENT (OUTPATIENT)
Dept: LAB | Facility: HOSPITAL | Age: 50
End: 2020-07-28
Attending: INTERNAL MEDICINE
Payer: MEDICAID

## 2020-07-28 DIAGNOSIS — E29.1 HYPOGONADISM IN MALE: ICD-10-CM

## 2020-07-28 DIAGNOSIS — N32.89 BLADDER WALL THICKENING: ICD-10-CM

## 2020-07-28 LAB
BACTERIA UR QL AUTO: NEGATIVE /HPF
BILIRUB UR QL: NEGATIVE
CLARITY UR: CLEAR
COLOR UR: YELLOW
GLUCOSE UR-MCNC: NEGATIVE MG/DL
KETONES UR-MCNC: NEGATIVE MG/DL
LEUKOCYTE ESTERASE UR QL STRIP.AUTO: NEGATIVE
NITRITE UR QL STRIP.AUTO: NEGATIVE
PH UR: 5 [PH] (ref 5–8)
PROT UR-MCNC: NEGATIVE MG/DL
RBC #/AREA URNS AUTO: <1 /HPF
SP GR UR STRIP: 1.02 (ref 1–1.03)
UROBILINOGEN UR STRIP-ACNC: <2
WBC #/AREA URNS AUTO: 1 /HPF

## 2020-07-28 PROCEDURE — 36415 COLL VENOUS BLD VENIPUNCTURE: CPT

## 2020-07-28 PROCEDURE — 81001 URINALYSIS AUTO W/SCOPE: CPT

## 2020-07-28 PROCEDURE — 84403 ASSAY OF TOTAL TESTOSTERONE: CPT

## 2020-07-28 PROCEDURE — 84402 ASSAY OF FREE TESTOSTERONE: CPT

## 2020-07-31 DIAGNOSIS — R82.90 ABNORMAL URINALYSIS: Primary | ICD-10-CM

## 2020-07-31 LAB
TESTOSTERONE, FREE, S: 14.6 NG/DL
TESTOSTERONE, TOTAL, S: 523 NG/DL

## 2020-08-03 ENCOUNTER — OFFICE VISIT (OUTPATIENT)
Dept: NEUROLOGY | Facility: CLINIC | Age: 50
End: 2020-08-03
Payer: MEDICAID

## 2020-08-03 VITALS — BODY MASS INDEX: 34.3 KG/M2 | WEIGHT: 245 LBS | HEIGHT: 71 IN

## 2020-08-03 DIAGNOSIS — M48.02 CERVICAL STENOSIS OF SPINE: ICD-10-CM

## 2020-08-03 DIAGNOSIS — R29.898 WEAKNESS OF BOTH HANDS: ICD-10-CM

## 2020-08-03 DIAGNOSIS — M50.90 CERVICAL DISC DISEASE: ICD-10-CM

## 2020-08-03 DIAGNOSIS — R20.2 NUMBNESS AND TINGLING IN BOTH HANDS: Primary | ICD-10-CM

## 2020-08-03 DIAGNOSIS — Z98.1 S/P CERVICAL SPINAL FUSION: ICD-10-CM

## 2020-08-03 DIAGNOSIS — R20.0 NUMBNESS AND TINGLING IN BOTH HANDS: Primary | ICD-10-CM

## 2020-08-03 PROCEDURE — 3008F BODY MASS INDEX DOCD: CPT | Performed by: PHYSICAL MEDICINE & REHABILITATION

## 2020-08-03 PROCEDURE — 99244 OFF/OP CNSLTJ NEW/EST MOD 40: CPT | Performed by: PHYSICAL MEDICINE & REHABILITATION

## 2020-08-03 RX ORDER — CYCLOBENZAPRINE HCL 10 MG
10 TABLET ORAL NIGHTLY
Qty: 30 TABLET | Refills: 2 | Status: SHIPPED | OUTPATIENT
Start: 2020-08-03 | End: 2020-09-28

## 2020-08-03 NOTE — PATIENT INSTRUCTIONS
Plan  He will bring in his old records and most recent films for my review. After I have reviewed these, I will decide which type of injection and PT he will need. He will try Flexeril for the insomnia.     He will continue with the Norco for the pain
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
## 2020-08-04 ENCOUNTER — NURSE ONLY (OUTPATIENT)
Dept: ENDOCRINOLOGY CLINIC | Facility: CLINIC | Age: 50
End: 2020-08-04
Payer: MEDICAID

## 2020-08-04 DIAGNOSIS — E29.1 HYPOGONADISM IN MALE: Primary | ICD-10-CM

## 2020-08-04 PROCEDURE — 96372 THER/PROPH/DIAG INJ SC/IM: CPT | Performed by: INTERNAL MEDICINE

## 2020-08-04 RX ORDER — TESTOSTERONE CYPIONATE 200 MG/ML
126 INJECTION INTRAMUSCULAR
Status: DISCONTINUED | OUTPATIENT
Start: 2020-08-04 | End: 2020-11-30

## 2020-08-04 RX ADMIN — TESTOSTERONE CYPIONATE 126 MG: 200 INJECTION INTRAMUSCULAR at 10:22:00

## 2020-08-04 NOTE — TELEPHONE ENCOUNTER
Pt here in office today for testosterone injections/. Gita Lucio to give per dr Shawanda Clark and wait 6 weeks for next testosterone level. Patient advised and agreed with plan.   Lab ordered

## 2020-08-04 NOTE — TELEPHONE ENCOUNTER
Repeat testosterone labs. Please advise on next steps.      Component      Latest Ref Rng & Units 7/28/2020   Testosterone, Free, S      4.26 - 16.4 ng/dL 14.6   Testosterone, Total, S      240 - 950 ng/dL 52

## 2020-08-04 NOTE — TELEPHONE ENCOUNTER
His repeat T level is normal   Hence, he is not a candidate for T replacement based on this level    Please make sure he is not on any T replacement     He can repeat a TT before 10 am in 3 months to be monitor for stability    Thanks

## 2020-08-04 NOTE — PROGRESS NOTES
Patient presents to clinic for testosterone injection. Tolerated testosterone injection to Right deltoid well.  Patient understands plan of care to hold testosterone injections for 6 weeks and then have a testosterone level done before resuming to get insur

## 2020-08-27 RX ORDER — HYDROCODONE BITARTRATE AND ACETAMINOPHEN 10; 325 MG/1; MG/1
TABLET ORAL
Qty: 150 TABLET | Refills: 0 | Status: SHIPPED | OUTPATIENT
Start: 2020-09-01 | End: 2020-09-28

## 2020-08-27 NOTE — TELEPHONE ENCOUNTER
Medication request: Norco 10/325mg Oral Tab, #150, no refills  Take 1 tablet by mouth every 4-6 hours PRN (max 5 tabs per day)    [place]: [place]2/20    LOV: 8/3/20  NOV: Not yet scheduled - to f/u in 2 months - routing to front office to schedule

## 2020-09-10 ENCOUNTER — TELEPHONE (OUTPATIENT)
Dept: NEUROLOGY | Facility: CLINIC | Age: 50
End: 2020-09-10

## 2020-09-10 DIAGNOSIS — R29.898 WEAKNESS OF BOTH HANDS: ICD-10-CM

## 2020-09-10 DIAGNOSIS — R20.0 NUMBNESS AND TINGLING IN BOTH HANDS: ICD-10-CM

## 2020-09-10 DIAGNOSIS — R20.2 NUMBNESS AND TINGLING IN BOTH HANDS: ICD-10-CM

## 2020-09-10 DIAGNOSIS — Z98.1 S/P CERVICAL SPINAL FUSION: ICD-10-CM

## 2020-09-10 DIAGNOSIS — M50.90 CERVICAL DISC DISEASE: Primary | ICD-10-CM

## 2020-09-10 DIAGNOSIS — M48.02 CERVICAL STENOSIS OF SPINE: ICD-10-CM

## 2020-09-10 NOTE — TELEPHONE ENCOUNTER
I reviewed through his old records and if he does not have a MRI that I can review from the last year, then he will need to have a new MRI of the cervical spine before I can do any injections.   I have placed a PT order and he can get started on this now th

## 2020-09-10 NOTE — TELEPHONE ENCOUNTER
Patient informed of Dr. Yolette Turner message verbalizing understanding. Patient will get new MRI once return from out of town and will call to schedule PT at [facility].

## 2020-09-10 NOTE — TELEPHONE ENCOUNTER
Received records from [facility]    Dr. Yvonne Cobb reviewed records  8/26/2019 C2-4 laminectomy and fusion    Records sent to scanning.

## 2020-09-10 NOTE — TELEPHONE ENCOUNTER
Ted Celaya for authorization of approval for MRI C-spine w/wo cpt code 23962. Approval was given with Authorization Number: [id number] effective 09/10/20 to 03/09/21. Will call Pt. To inform. Pt.  Informed of approval. He will call later to schedule appt

## 2020-09-14 ENCOUNTER — APPOINTMENT (OUTPATIENT)
Dept: GENERAL RADIOLOGY | Facility: HOSPITAL | Age: 50
End: 2020-09-14
Attending: NURSE PRACTITIONER
Payer: MEDICAID

## 2020-09-14 ENCOUNTER — HOSPITAL ENCOUNTER (EMERGENCY)
Facility: HOSPITAL | Age: 50
Discharge: HOME | End: 2020-09-14
Payer: MEDICAID

## 2020-09-14 ENCOUNTER — HOSPITAL ENCOUNTER (OUTPATIENT)
Dept: MRI IMAGING | Age: 50
Discharge: HOME | End: 2020-09-14
Attending: PHYSICAL MEDICINE & REHABILITATION
Payer: MEDICAID

## 2020-09-14 VITALS
WEIGHT: 250 LBS | TEMPERATURE: 98 F | BODY MASS INDEX: 35 KG/M2 | HEART RATE: 84 BPM | RESPIRATION RATE: 15 BRPM | DIASTOLIC BLOOD PRESSURE: 91 MMHG | OXYGEN SATURATION: 96 % | HEIGHT: 71 IN | SYSTOLIC BLOOD PRESSURE: 136 MMHG

## 2020-09-14 DIAGNOSIS — R20.2 NUMBNESS AND TINGLING IN BOTH HANDS: ICD-10-CM

## 2020-09-14 DIAGNOSIS — S83.92XA SPRAIN OF LEFT KNEE, UNSPECIFIED LIGAMENT, INITIAL ENCOUNTER: ICD-10-CM

## 2020-09-14 DIAGNOSIS — Z98.1 S/P CERVICAL SPINAL FUSION: ICD-10-CM

## 2020-09-14 DIAGNOSIS — R20.0 NUMBNESS AND TINGLING IN BOTH HANDS: ICD-10-CM

## 2020-09-14 DIAGNOSIS — M54.10 BACK PAIN WITH RADICULOPATHY: Primary | ICD-10-CM

## 2020-09-14 DIAGNOSIS — R29.898 WEAKNESS OF BOTH HANDS: ICD-10-CM

## 2020-09-14 DIAGNOSIS — S93.401A MILD SPRAIN OF RIGHT ANKLE, INITIAL ENCOUNTER: ICD-10-CM

## 2020-09-14 DIAGNOSIS — M50.90 CERVICAL DISC DISEASE: ICD-10-CM

## 2020-09-14 DIAGNOSIS — M48.02 CERVICAL STENOSIS OF SPINE: ICD-10-CM

## 2020-09-14 PROCEDURE — A9575 INJ GADOTERATE MEGLUMI 0.1ML: HCPCS | Performed by: PHYSICAL MEDICINE & REHABILITATION

## 2020-09-14 PROCEDURE — 72156 MRI NECK SPINE W/O & W/DYE: CPT | Performed by: PHYSICAL MEDICINE & REHABILITATION

## 2020-09-14 PROCEDURE — 73610 X-RAY EXAM OF ANKLE: CPT | Performed by: NURSE PRACTITIONER

## 2020-09-14 PROCEDURE — 73502 X-RAY EXAM HIP UNI 2-3 VIEWS: CPT | Performed by: NURSE PRACTITIONER

## 2020-09-14 PROCEDURE — 72100 X-RAY EXAM L-S SPINE 2/3 VWS: CPT | Performed by: NURSE PRACTITIONER

## 2020-09-14 PROCEDURE — 73560 X-RAY EXAM OF KNEE 1 OR 2: CPT | Performed by: NURSE PRACTITIONER

## 2020-09-14 PROCEDURE — 99284 EMERGENCY DEPT VISIT MOD MDM: CPT

## 2020-09-14 RX ORDER — METHYLPREDNISOLONE 4 MG/1
TABLET ORAL
Qty: 1 PACKAGE | Refills: 0 | Status: SHIPPED | OUTPATIENT
Start: 2020-09-14 | End: 2020-10-06

## 2020-09-14 RX ORDER — HYDROCODONE BITARTRATE AND ACETAMINOPHEN 5; 325 MG/1; MG/1
1 TABLET ORAL ONCE
Status: COMPLETED | OUTPATIENT
Start: 2020-09-14 | End: 2020-09-14

## 2020-09-15 NOTE — ED PROVIDER NOTES
Patient Seen in: [facility] AND [facility] Emergency Department      History   Patient presents with:  Pain    Stated Complaint: fall     HPI    70-year-old male, with a history of chronic neck pain post cervical fusion surgery in 2018, presents to the emergency 136/91   Pulse 84   Resp 15   Temp 98.4 °F (36.9 °C)   Temp src Oral   SpO2 96 %   O2 Device None (Room air)       Current:BP (!) 136/91   Pulse 84   Temp 98.4 °F (36.9 °C) (Oral)   Resp 15   Ht 180.3 cm (5' 11\")   Wt 113.4 kg   SpO2 96%   BMI 34.87 kg/m² current pain medication as needed  Patient states that his specialist has prescribed him muscle relaxers as well will attempt a course of steroids for tx of radicular symptoms  Ace wrap left knee/stirrup splint to ankle  Close pmd fu/strict return precauti

## 2020-09-15 NOTE — ED INITIAL ASSESSMENT (HPI)
Patient c/o pain to his left leg, hip and back after he fell through his friend's deck on 9/6. Patient states only his leg went through the deck.

## 2020-09-21 ENCOUNTER — TELEPHONE (OUTPATIENT)
Dept: ENDOCRINOLOGY CLINIC | Facility: CLINIC | Age: 50
End: 2020-09-21

## 2020-09-21 NOTE — TELEPHONE ENCOUNTER
Pt states he just got back in town and did not do the lab he was supposed to do and wants to know if he should cancel his appt. Please call and he now says he wants to reschedule for Friday.  Please advise

## 2020-09-21 NOTE — TELEPHONE ENCOUNTER
Spoke with Ricky Dears:    Patient will be getting his lab work completed tomorrow. His appointment is on 9/28. RN reassured patient that should be enough time, and there is no need to cancel.

## 2020-09-23 ENCOUNTER — LAB ENCOUNTER (OUTPATIENT)
Dept: LAB | Facility: HOSPITAL | Age: 50
End: 2020-09-23
Attending: INTERNAL MEDICINE
Payer: MEDICAID

## 2020-09-23 DIAGNOSIS — E29.1 HYPOGONADISM IN MALE: ICD-10-CM

## 2020-09-23 PROCEDURE — 84402 ASSAY OF FREE TESTOSTERONE: CPT

## 2020-09-23 PROCEDURE — 84403 ASSAY OF TOTAL TESTOSTERONE: CPT

## 2020-09-23 PROCEDURE — 36415 COLL VENOUS BLD VENIPUNCTURE: CPT

## 2020-09-28 ENCOUNTER — TELEPHONE (OUTPATIENT)
Dept: ENDOCRINOLOGY CLINIC | Facility: CLINIC | Age: 50
End: 2020-09-28

## 2020-09-28 NOTE — TELEPHONE ENCOUNTER
We can re schedule his visit to one in another week once we have the results  I can just seehim that day instaed of his apt on 10/31 to save him a visit  Thanks

## 2020-09-28 NOTE — TELEPHONE ENCOUNTER
Pt agreeable to reschedule injection this Friday 10/2/20 for RN visit for testosterone injection. Pt needs second low T result for insurance to cover TT replacement at home. Will await result from lab. Pt was taking 125 mg every 2 weeks.

## 2020-09-28 NOTE — TELEPHONE ENCOUNTER
Refill request for norco 10/325 mg, take 1 tab every 4-6 hrs as needed, #150, no refills    LOV: 8/3/20  NOV: 10/26/20  [facility]     Refill request for cyclobenzaprine 10 mg, take 1 tab nightly, #30, 2 refills - last refilled on 8/

## 2020-09-28 NOTE — TELEPHONE ENCOUNTER
Please advise if ok to give testosterone injection today w/ RN and if so how much?     TT lab in still \"in process\" from 8/23/20 - RN called lab for result and they state the person who does this at the lab is not here today but they will work on it and c

## 2020-09-30 ENCOUNTER — TELEPHONE (OUTPATIENT)
Dept: ENDOCRINOLOGY CLINIC | Facility: CLINIC | Age: 50
End: 2020-09-30

## 2020-09-30 LAB
TESTOSTERONE, FREE, S: 6.32 NG/DL
TESTOSTERONE, TOTAL, S: 204 NG/DL

## 2020-09-30 NOTE — TELEPHONE ENCOUNTER
T is low  Insurance wanted two low T levels to approve T for home use.    Can be submitted to insurance  Thanks

## 2020-10-01 RX ORDER — CYCLOBENZAPRINE HCL 10 MG
10 TABLET ORAL NIGHTLY
Qty: 30 TABLET | Refills: 2 | Status: SHIPPED | OUTPATIENT
Start: 2020-10-01 | End: 2021-01-25

## 2020-10-01 RX ORDER — HYDROCODONE BITARTRATE AND ACETAMINOPHEN 10; 325 MG/1; MG/1
TABLET ORAL
Qty: 150 TABLET | Refills: 0 | Status: SHIPPED | OUTPATIENT
Start: 2020-10-01 | End: 2020-10-16

## 2020-10-01 NOTE — TELEPHONE ENCOUNTER
Spoke to Michelle Co - per Michelle Co - new PA needed with chart notes and last 2 low testosterone labs    Thanks

## 2020-10-02 ENCOUNTER — NURSE ONLY (OUTPATIENT)
Dept: ENDOCRINOLOGY CLINIC | Facility: CLINIC | Age: 50
End: 2020-10-02
Payer: MEDICAID

## 2020-10-02 DIAGNOSIS — E29.1 HYPOGONADISM IN MALE: Primary | ICD-10-CM

## 2020-10-02 PROCEDURE — 96372 THER/PROPH/DIAG INJ SC/IM: CPT | Performed by: INTERNAL MEDICINE

## 2020-10-02 RX ADMIN — TESTOSTERONE CYPIONATE 126 MG: 200 INJECTION INTRAMUSCULAR at 11:17:00

## 2020-10-02 NOTE — TELEPHONE ENCOUNTER
Medication PA Requested:  Testosterone Cypoinate 200 MG/ML             CoverMyMeds Used: Yes  Key: [id number]  Si MG every 14 days  DX Code:  Hypogonadism                                     PA submitted with 2 low testosterone levels and LOV note.  Adi

## 2020-10-02 NOTE — PROGRESS NOTES
RN reviewed patient lab work prior to appointment. Last injection in the clinic was 8/4/20. Per TE 9/28/20, insurance requesting two low testosterone levels to cover TT replacement at home.  Patient recently completed the second testosterone level, so the p

## 2020-10-05 ENCOUNTER — TELEPHONE (OUTPATIENT)
Dept: NEUROLOGY | Facility: CLINIC | Age: 50
End: 2020-10-05

## 2020-10-05 NOTE — TELEPHONE ENCOUNTER
PA approved for testosterone cypionate. Does patient need RN visit to learn how administer injections properly at home?

## 2020-10-05 NOTE — TELEPHONE ENCOUNTER
PA approved for TT - Please advise if okay to send/fill to pharm. Pt states his sister is a \"medic\" in the [employer] and will give injections to him at home.     Last injection 10/2/20 -

## 2020-10-05 NOTE — TELEPHONE ENCOUNTER
----- Message from Kaleb Tellez MD sent at 10/4/2020  9:00 PM CDT -----  C2-3 and C6-7 bulging discs. Will discuss with him at his follow up. His follow up appointment needs to be moved up.

## 2020-10-06 ENCOUNTER — OFFICE VISIT (OUTPATIENT)
Dept: ENDOCRINOLOGY CLINIC | Facility: CLINIC | Age: 50
End: 2020-10-06
Payer: MEDICAID

## 2020-10-06 VITALS — SYSTOLIC BLOOD PRESSURE: 128 MMHG | HEART RATE: 89 BPM | DIASTOLIC BLOOD PRESSURE: 83 MMHG

## 2020-10-06 DIAGNOSIS — E29.1 HYPOGONADISM IN MALE: Primary | ICD-10-CM

## 2020-10-06 PROCEDURE — 3079F DIAST BP 80-89 MM HG: CPT | Performed by: INTERNAL MEDICINE

## 2020-10-06 PROCEDURE — 3074F SYST BP LT 130 MM HG: CPT | Performed by: INTERNAL MEDICINE

## 2020-10-06 PROCEDURE — 99214 OFFICE O/P EST MOD 30 MIN: CPT | Performed by: INTERNAL MEDICINE

## 2020-10-06 NOTE — PROGRESS NOTES
Return Office Visit     CHIEF COMPLAINT:  Patient presents with: Follow - Up: follow visit for hormones.         HISTORY OF PRESENT ILLNESS:  Brendan Burkitt is a 48year old male who presents for follow up for for evaluation of an adrenal adeno MEDICAL, SOCIAL AND FAMILY HISTORY:  See past medical history marked as reviewed. See past surgical history marked as reviewed. See past family history marked as reviewed. See past social history marked as reviewed.     ASSESSMENTS:     REVIEW OF SYSTEMS metanephrine normal in 6/2020, will repeat next year  - He does not have HTN, hence does not need renin/ jose  - CT abdomen 6/2020: Stable 2.6 cm right adrenal adenoma.   Will repeat in 12-18 months  Will follow hormone levels annually x 5 years from [place] trough TT and Hct after 5th injection       Patient verbalized a complete  understanding of all of the above and did not have any further questions.         RTC in 6 months  Call for results.             No orders of the defined types were placed in this en

## 2020-10-06 NOTE — TELEPHONE ENCOUNTER
Spoke with Kareen Ruffin:    Relayed message below. Patient would like to start on his injections at home. Since his appointment is not until 10/31, would you like for him to schedule another nurse visit?  He is willing to come into the office for another injection

## 2020-10-16 ENCOUNTER — OFFICE VISIT (OUTPATIENT)
Dept: NEUROLOGY | Facility: CLINIC | Age: 50
End: 2020-10-16
Payer: MEDICAID

## 2020-10-16 DIAGNOSIS — R29.898 WEAKNESS OF BOTH HANDS: ICD-10-CM

## 2020-10-16 DIAGNOSIS — M51.9 LUMBAR DISC DISEASE: ICD-10-CM

## 2020-10-16 DIAGNOSIS — R20.2 NUMBNESS AND TINGLING IN BOTH HANDS: ICD-10-CM

## 2020-10-16 DIAGNOSIS — R20.0 NUMBNESS AND TINGLING IN BOTH HANDS: ICD-10-CM

## 2020-10-16 DIAGNOSIS — M45.0 ANKYLOSING SPONDYLITIS OF MULTIPLE SITES IN SPINE (HCC): ICD-10-CM

## 2020-10-16 DIAGNOSIS — M43.22 ACQUIRED FUSION OF CERVICAL SPINE: Primary | ICD-10-CM

## 2020-10-16 DIAGNOSIS — M48.02 CERVICAL STENOSIS OF SPINE: ICD-10-CM

## 2020-10-16 DIAGNOSIS — M50.90 CERVICAL DISC DISEASE: ICD-10-CM

## 2020-10-16 PROCEDURE — 99214 OFFICE O/P EST MOD 30 MIN: CPT | Performed by: PHYSICAL MEDICINE & REHABILITATION

## 2020-10-16 RX ORDER — HYDROCODONE BITARTRATE AND ACETAMINOPHEN 10; 325 MG/1; MG/1
TABLET ORAL
Qty: 150 TABLET | Refills: 0 | Status: SHIPPED | OUTPATIENT
Start: 2020-10-31 | End: 2020-11-30

## 2020-10-16 NOTE — PROGRESS NOTES
Low Back Pain H & P    Chief Complaint: Patient presents with:  Neck Pain: pt here for follow up to review Cervical Spine MRI with c/o continued neck pain with numbning pain in both hands. pain increase with certain sitting positions.  LOV 8/3/20    Nursing REPAIR   • LAPAROSCOPIC VENTRAL HERNIA REPAIR N/A 3/2/2020    Performed by Preethi Bashir MD at [facility] MAIN OR       Family History   Family History   Problem Relation Age of Onset   • Heart Disorder Paternal Grandmother    • Diabetes Paternal Grandmother    • Ca Weight Concern: Not Asked         Service: Not Asked        Blood Transfusions: Not Asked        Occupational Exposure: Not Asked        Hobby Hazards: Not Asked        Sleep Concern: Not Asked        Back Care: Not Asked        Bike Helmet: No Spinous Processes: Non-tender for all Spinous Processes   Z-joints: Tender at  right T7-8 and right T11-12   SIJ: Tender at right > left SIJ   Piriformis Muscle: Tender at right Piriformis muscle(s)   Greater Trochanteric Bursa: Non-tender for bilateral

## 2020-10-16 NOTE — PATIENT INSTRUCTIONS
As of October 6th 2014, the Drug Enforcement Agency [facility]) is reclassifying all hydrocodone combination medications from Schedule III to Schedule II. This includes medications such as Norco, Vicodin, Lortab, Zohydro, and Vicoprofen.     What this means for y will bring in the report for the EMG/NCs of the hands that he had last year. He will bring in copies of the MRI scans of the thoracic and lumbar spines that he recently had. He will get lab work to diagnose the ankylosing spondylitis.     He will cont

## 2020-11-30 ENCOUNTER — PATIENT MESSAGE (OUTPATIENT)
Dept: NEUROLOGY | Facility: CLINIC | Age: 50
End: 2020-11-30

## 2020-11-30 RX ORDER — CYCLOBENZAPRINE HCL 10 MG
10 TABLET ORAL NIGHTLY
Qty: 30 TABLET | Refills: 2 | Status: CANCELLED | OUTPATIENT
Start: 2020-11-30

## 2020-11-30 RX ORDER — TESTOSTERONE CYPIONATE 200 MG/ML
126 INJECTION INTRAMUSCULAR
Qty: 4 ML | Refills: 0 | Status: SHIPPED | OUTPATIENT
Start: 2020-11-30 | End: 2021-01-04

## 2020-11-30 NOTE — TELEPHONE ENCOUNTER
Refill request for norco 10/325 mg, take 1 tab every 4-6 hrs as needed, #150, no refills    LOV: 10/16/20  NOV: none  [place]    Cyclobenzaprine 10 mg was refilled on 10/1/20 for 30 day supply with 2 refills

## 2020-12-01 ENCOUNTER — PATIENT MESSAGE (OUTPATIENT)
Dept: NEUROLOGY | Facility: CLINIC | Age: 50
End: 2020-12-01

## 2020-12-01 RX ORDER — HYDROCODONE BITARTRATE AND ACETAMINOPHEN 10; 325 MG/1; MG/1
TABLET ORAL
Qty: 150 TABLET | Refills: 0 | Status: SHIPPED | OUTPATIENT
Start: 2020-12-01 | End: 2020-12-30

## 2020-12-01 NOTE — TELEPHONE ENCOUNTER
From: Filemon Stratton  To: Bandar Desai MD  Sent: 11/30/2020 6:10 PM CST  Subject: Prescription Question    Hello Doctor   [facility] all is well. I was told that would get a call to schedule an appointment but nothing yet.  My prescription is due t

## 2020-12-01 NOTE — TELEPHONE ENCOUNTER
Replied to patient to inform him that Thai Euceda has his request also that he needs to schedule a NOV.

## 2020-12-01 NOTE — TELEPHONE ENCOUNTER
From: Filemon Stratton  To: Bandar Desai MD  Sent: 12/1/2020 2:54 PM CST  Subject: Prescription Question    Good afternoon Doctor  [facility] AT [facility] all is well. I am out of pain med and they are do today.  I sent a message yesterday but I still haven't hea

## 2020-12-02 NOTE — TELEPHONE ENCOUNTER
From: Beatriz Norman  To: Sindi Donnelly MD  Sent: 12/1/2020 4:29 PM CST  Subject: Other    The pharmacy never received my prescription order. I'm out of meds in a lot of pain. Can you guys help? Or should I find another doctor?  I called Shiraz Damon

## 2020-12-02 NOTE — TELEPHONE ENCOUNTER
Called patient to inform him that Yessica Maxwell will sign off on RX    Patient also informed he will need a NOV. Patient request video visit for next visit relate to Covid.     Instructed patient to obtain all the records per LOV note and have testing done pr

## 2020-12-02 NOTE — TELEPHONE ENCOUNTER
ACM attempted ED outreach. Left message. Contact information for call back provided.      ED visit on 7/17/20 nasal congestion, pharyngitis Medication refilled last night.

## 2020-12-29 ENCOUNTER — LAB ENCOUNTER (OUTPATIENT)
Dept: LAB | Facility: HOSPITAL | Age: 50
End: 2020-12-29
Attending: PHYSICAL MEDICINE & REHABILITATION
Payer: MEDICAID

## 2020-12-29 DIAGNOSIS — M45.0 ANKYLOSING SPONDYLITIS OF MULTIPLE SITES IN SPINE (HCC): ICD-10-CM

## 2020-12-29 DIAGNOSIS — R82.90 ABNORMAL URINALYSIS: ICD-10-CM

## 2020-12-29 DIAGNOSIS — M43.22 ACQUIRED FUSION OF CERVICAL SPINE: ICD-10-CM

## 2020-12-29 DIAGNOSIS — E29.1 HYPOGONADISM IN MALE: ICD-10-CM

## 2020-12-29 LAB
BACTERIA UR QL AUTO: NEGATIVE /HPF
BILIRUB UR QL: NEGATIVE
CLARITY UR: CLEAR
COLOR UR: YELLOW
DEPRECATED RDW RBC AUTO: 48.4 FL (ref 35.1–46.3)
ERYTHROCYTE [DISTWIDTH] IN BLOOD BY AUTOMATED COUNT: 14 % (ref 11–15)
GLUCOSE UR-MCNC: NEGATIVE MG/DL
HCT VFR BLD AUTO: 42.6 %
HGB BLD-MCNC: 13.8 G/DL
HYALINE CASTS #/AREA URNS AUTO: 3 /LPF
KETONES UR-MCNC: NEGATIVE MG/DL
LEUKOCYTE ESTERASE UR QL STRIP.AUTO: NEGATIVE
MCH RBC QN AUTO: 30.5 PG (ref 26–34)
MCHC RBC AUTO-ENTMCNC: 32.4 G/DL (ref 31–37)
MCV RBC AUTO: 94 FL
NITRITE UR QL STRIP.AUTO: NEGATIVE
PH UR: 5 [PH] (ref 5–8)
PLATELET # BLD AUTO: 275 10(3)UL (ref 150–450)
PROT UR-MCNC: 30 MG/DL
RBC # BLD AUTO: 4.53 X10(6)UL
RBC #/AREA URNS AUTO: 2 /HPF
SP GR UR STRIP: 1.02 (ref 1–1.03)
TESTOST SERPL-MCNC: 96.34 NG/DL
UROBILINOGEN UR STRIP-ACNC: <2
WBC # BLD AUTO: 7.5 X10(3) UL (ref 4–11)
WBC #/AREA URNS AUTO: 4 /HPF

## 2020-12-29 PROCEDURE — 84403 ASSAY OF TOTAL TESTOSTERONE: CPT

## 2020-12-29 PROCEDURE — 80053 COMPREHEN METABOLIC PANEL: CPT | Performed by: PHYSICAL MEDICINE & REHABILITATION

## 2020-12-29 PROCEDURE — 36415 COLL VENOUS BLD VENIPUNCTURE: CPT | Performed by: PHYSICAL MEDICINE & REHABILITATION

## 2020-12-29 PROCEDURE — 82607 VITAMIN B-12: CPT | Performed by: PHYSICAL MEDICINE & REHABILITATION

## 2020-12-29 PROCEDURE — 85652 RBC SED RATE AUTOMATED: CPT | Performed by: PHYSICAL MEDICINE & REHABILITATION

## 2020-12-29 PROCEDURE — 86812 HLA TYPING A B OR C: CPT

## 2020-12-29 PROCEDURE — 82746 ASSAY OF FOLIC ACID SERUM: CPT | Performed by: PHYSICAL MEDICINE & REHABILITATION

## 2020-12-29 PROCEDURE — 86140 C-REACTIVE PROTEIN: CPT | Performed by: PHYSICAL MEDICINE & REHABILITATION

## 2020-12-29 PROCEDURE — 86431 RHEUMATOID FACTOR QUANT: CPT | Performed by: PHYSICAL MEDICINE & REHABILITATION

## 2020-12-29 PROCEDURE — 85027 COMPLETE CBC AUTOMATED: CPT

## 2020-12-29 PROCEDURE — 81001 URINALYSIS AUTO W/SCOPE: CPT

## 2020-12-30 ENCOUNTER — PATIENT MESSAGE (OUTPATIENT)
Dept: NEUROLOGY | Facility: CLINIC | Age: 50
End: 2020-12-30

## 2020-12-30 RX ORDER — HYDROCODONE BITARTRATE AND ACETAMINOPHEN 10; 325 MG/1; MG/1
TABLET ORAL
Qty: 150 TABLET | Refills: 0 | Status: SHIPPED | OUTPATIENT
Start: 2020-12-31 | End: 2021-01-25

## 2020-12-30 NOTE — TELEPHONE ENCOUNTER
Refill request for norco 10/325 mg, take 1 tab every 4-6 hrs as needed, #150, no refills    LOV: 10/16/20  NOV: 1/14/21  [place]

## 2020-12-30 NOTE — TELEPHONE ENCOUNTER
From: Pasquale Noel  To: Yonas Neville MD  Sent: 12/30/2020 11:04 AM CST  Subject: Prescription Question    Good morning  Happy Holidays. Just wanted request my prescription that is due tomorrow, please.      Thank you kindly    Tarun Angeles

## 2021-01-02 DIAGNOSIS — E29.1 HYPOGONADISM IN MALE: Primary | ICD-10-CM

## 2021-01-02 NOTE — TELEPHONE ENCOUNTER
T is low  Please confirm dose of T 126 mg q 2 weeks  Also, please confirm the timing of labs. Did he do them midway between two injections? Please ask when he injected before doing the blood work?    Thanks

## 2021-01-04 ENCOUNTER — PATIENT MESSAGE (OUTPATIENT)
Dept: NEUROLOGY | Facility: CLINIC | Age: 51
End: 2021-01-04

## 2021-01-04 RX ORDER — TESTOSTERONE CYPIONATE 200 MG/ML
150 INJECTION INTRAMUSCULAR
Qty: 4 ML | Refills: 0 | Status: SHIPPED | OUTPATIENT
Start: 2021-01-04 | End: 2021-02-24

## 2021-01-04 NOTE — TELEPHONE ENCOUNTER
Noted  Increase T from 126 to 150 mg every two weeks  Repeat TT , PSA, hematocrit, ha[atic panel and HDL one week after 5th dose and FU in clinic  Lease book   Thanks

## 2021-01-04 NOTE — TELEPHONE ENCOUNTER
rn called patient and jverbalized understanding of new instructions  Booked cece 3/3/21  rx pended for provider as is controlled

## 2021-01-04 NOTE — TELEPHONE ENCOUNTER
rn called patient , patient states he had injection 1 week before doing blood work and confirmed he is getting the dose ordered.    Please advise

## 2021-01-05 DIAGNOSIS — R31.29 MICROHEMATURIA: Primary | ICD-10-CM

## 2021-01-05 NOTE — TELEPHONE ENCOUNTER
From: Yu Najera  To: Angel TOSCANO [place]y Molina MD  Sent: 1/4/2021 4:50 PM CST  Subject: Referral Wolfgang gonsalez all is well. Dr. Betty Prince referred my mother Laverne Delgado to you and supposedly she sent you a copy of her file.  This

## 2021-01-13 ENCOUNTER — TELEMEDICINE (OUTPATIENT)
Dept: NEUROLOGY | Facility: CLINIC | Age: 51
End: 2021-01-13
Payer: MEDICAID

## 2021-01-13 DIAGNOSIS — M51.9 LUMBAR DISC DISEASE: ICD-10-CM

## 2021-01-13 DIAGNOSIS — M54.12 CERVICAL RADICULOPATHY: ICD-10-CM

## 2021-01-13 DIAGNOSIS — M45.0 ANKYLOSING SPONDYLITIS OF MULTIPLE SITES IN SPINE (HCC): ICD-10-CM

## 2021-01-13 DIAGNOSIS — R29.898 WEAKNESS OF BOTH HANDS: ICD-10-CM

## 2021-01-13 DIAGNOSIS — R20.0 NUMBNESS AND TINGLING IN BOTH HANDS: ICD-10-CM

## 2021-01-13 DIAGNOSIS — Z98.1 S/P CERVICAL SPINAL FUSION: ICD-10-CM

## 2021-01-13 DIAGNOSIS — M48.02 CERVICAL STENOSIS OF SPINE: ICD-10-CM

## 2021-01-13 DIAGNOSIS — M50.90 CERVICAL DISC DISEASE: Primary | ICD-10-CM

## 2021-01-13 DIAGNOSIS — R20.2 NUMBNESS AND TINGLING IN BOTH HANDS: ICD-10-CM

## 2021-01-13 PROCEDURE — 99214 OFFICE O/P EST MOD 30 MIN: CPT | Performed by: PHYSICAL MEDICINE & REHABILITATION

## 2021-01-14 NOTE — PROGRESS NOTES
Yordan King 89  [facility]    Telemedicine Visit - Evaluation    Oscar Key verbally consents to a Telemedicine Visit on 01/13/21.  This visit is conducted using Telemedicine with live, interactive audio and INST 2 LEVEL N/A 7/16/2018    Performed by Jacque Peña MD at [facility] MAIN OR   • CT CERVICAL SPINE      c3 & 4    • HERNIA SURGERY  2005    hiatal hernia   • KNEE SURGERY      RIGHT ACL REPAIR   • LAPAROSCOPIC VENTRAL HERNIA REPAIR N/A 3/2/2020    Performed Level   Head: In neutral   Cervical Flexion: 40 degrees Painless   Cervical Extension: 20 degrees Painless   RIGHT rotation: 30 degrees Painless   LEFT rotation: 40 degrees Painless       LABS:   No results found for: EAG, A1C  Lab Results   Component Valu get copies of the MRI scans and drop them off for my review. He will get the copy of the EMG report and will drop this off for my review. He will get the HLA B27 blood work when he is able.     Follow-up:  3 months or sooner if needed    We discussed in [place]. Included in this visit, time may have been spent reviewing labs, medications, radiology tests and decision making. Appropriate medical decision-making and tests are ordered as detailed in the plan of care above.   Coding/billing information i

## 2021-01-25 RX ORDER — HYDROCODONE BITARTRATE AND ACETAMINOPHEN 10; 325 MG/1; MG/1
TABLET ORAL
Qty: 150 TABLET | Refills: 0 | Status: SHIPPED | OUTPATIENT
Start: 2021-01-30 | End: 2021-03-01

## 2021-01-25 RX ORDER — CYCLOBENZAPRINE HCL 10 MG
10 TABLET ORAL NIGHTLY
Qty: 30 TABLET | Refills: 0 | Status: SHIPPED | OUTPATIENT
Start: 2021-01-25 | End: 2021-03-03

## 2021-01-25 NOTE — TELEPHONE ENCOUNTER
Norco 10/325mg Take 1 tablet every 4-6 hours prn pain.  #150     [place]    Flexeril 10mg Take 1 tablet rayne    Last filled-10/1/20 with 2 refill      LOV-1/13/2021  NOV-none    ezequielhart messaged patient to inform to schedule NOV

## 2021-02-24 DIAGNOSIS — E29.1 HYPOGONADISM IN MALE: ICD-10-CM

## 2021-02-27 RX ORDER — TESTOSTERONE CYPIONATE 200 MG/ML
150 INJECTION INTRAMUSCULAR
Qty: 4 ML | Refills: 0 | Status: SHIPPED | OUTPATIENT
Start: 2021-02-27 | End: 2021-04-15

## 2021-03-01 ENCOUNTER — PATIENT MESSAGE (OUTPATIENT)
Dept: NEUROLOGY | Facility: CLINIC | Age: 51
End: 2021-03-01

## 2021-03-01 NOTE — TELEPHONE ENCOUNTER
From: Nan Reyes  To: Carolina Hernandez MD  Sent: 3/1/2021 2:01 PM CST  Subject: Prescription Curvin Perish Dr. Cielo Musa all is well.  I'm not sure if you received my request yet, this cece doesn't let me know or shows me the option to see sen

## 2021-03-01 NOTE — TELEPHONE ENCOUNTER
Medication request: HYDROcodone-acetaminophen  MG Oral Tab. Take 1 tablet by mouth every 4-6 hours PRN pain (max 5 tabs in 24 hours). [place] 01/31/2021 #150 Refill-0    Medication request: cyclobenzaprine 10 MG Oral Tab.  Take 1 table

## 2021-03-03 ENCOUNTER — PATIENT MESSAGE (OUTPATIENT)
Dept: NEUROLOGY | Facility: CLINIC | Age: 51
End: 2021-03-03

## 2021-03-03 ENCOUNTER — TELEPHONE (OUTPATIENT)
Dept: NEUROLOGY | Facility: CLINIC | Age: 51
End: 2021-03-03

## 2021-03-03 RX ORDER — HYDROCODONE BITARTRATE AND ACETAMINOPHEN 10; 325 MG/1; MG/1
TABLET ORAL
Qty: 150 TABLET | Refills: 0 | Status: SHIPPED | OUTPATIENT
Start: 2021-03-03 | End: 2021-03-30

## 2021-03-03 RX ORDER — CYCLOBENZAPRINE HCL 10 MG
10 TABLET ORAL NIGHTLY
Qty: 30 TABLET | Refills: 0 | Status: SHIPPED | OUTPATIENT
Start: 2021-03-03 | End: 2021-03-30

## 2021-03-03 NOTE — TELEPHONE ENCOUNTER
From: Filemon Stratton  To: Bandar Desai MD  Sent: 3/3/2021 12:40 PM CST  Subject: Prescription Question    Good afternoon Doctor  I understand that your busy but I've been calling and even scheduled an appointment to see you on March 15th as

## 2021-03-04 NOTE — TELEPHONE ENCOUNTER
Called pharmacy to confirm [place] scripted was sent over.  [facility]

## 2021-03-29 ENCOUNTER — PATIENT MESSAGE (OUTPATIENT)
Dept: NEUROLOGY | Facility: CLINIC | Age: 51
End: 2021-03-29

## 2021-03-30 NOTE — TELEPHONE ENCOUNTER
Medication request: HYDROcodone-acetaminophen  MG Oral Tab. Take 1 tablet by mouth every 4-6 hours PRN pain (max 5 tabs in 24 hours). [place]: 03/03/2021 #150 Refill-0    Medication request: cyclobenzaprine 10 MG Oral Tab.  Take 1 tablet

## 2021-03-30 NOTE — TELEPHONE ENCOUNTER
From: Alen Nyhan  To: Masha Kwon MD  Sent: 3/29/2021 5:35 PM CDT  Subject: Prescription Question    Hello there.   I would like to request a refill for my prescriptions due on 4/2/21 please    Thank you

## 2021-03-31 RX ORDER — CYCLOBENZAPRINE HCL 10 MG
10 TABLET ORAL NIGHTLY
Qty: 30 TABLET | Refills: 0 | Status: SHIPPED | OUTPATIENT
Start: 2021-04-02 | End: 2021-04-28

## 2021-03-31 RX ORDER — HYDROCODONE BITARTRATE AND ACETAMINOPHEN 10; 325 MG/1; MG/1
TABLET ORAL
Qty: 150 TABLET | Refills: 0 | Status: SHIPPED | OUTPATIENT
Start: 2021-04-02 | End: 2021-04-28

## 2021-04-03 ENCOUNTER — LAB ENCOUNTER (OUTPATIENT)
Dept: LAB | Facility: HOSPITAL | Age: 51
End: 2021-04-03
Attending: PHYSICAL MEDICINE & REHABILITATION
Payer: MEDICAID

## 2021-04-03 DIAGNOSIS — E29.1 HYPOGONADISM IN MALE: ICD-10-CM

## 2021-04-03 DIAGNOSIS — R31.29 MICROHEMATURIA: ICD-10-CM

## 2021-04-03 DIAGNOSIS — M43.22 ACQUIRED FUSION OF CERVICAL SPINE: ICD-10-CM

## 2021-04-03 PROCEDURE — 86235 NUCLEAR ANTIGEN ANTIBODY: CPT

## 2021-04-03 PROCEDURE — 82570 ASSAY OF URINE CREATININE: CPT

## 2021-04-03 PROCEDURE — 84403 ASSAY OF TOTAL TESTOSTERONE: CPT

## 2021-04-03 PROCEDURE — 84402 ASSAY OF FREE TESTOSTERONE: CPT

## 2021-04-03 PROCEDURE — 83718 ASSAY OF LIPOPROTEIN: CPT

## 2021-04-03 PROCEDURE — 81001 URINALYSIS AUTO W/SCOPE: CPT

## 2021-04-03 PROCEDURE — 82043 UR ALBUMIN QUANTITATIVE: CPT

## 2021-04-03 PROCEDURE — 86039 ANTINUCLEAR ANTIBODIES (ANA): CPT

## 2021-04-03 PROCEDURE — 80076 HEPATIC FUNCTION PANEL: CPT

## 2021-04-03 PROCEDURE — 86225 DNA ANTIBODY NATIVE: CPT

## 2021-04-03 PROCEDURE — 86038 ANTINUCLEAR ANTIBODIES: CPT

## 2021-04-03 PROCEDURE — 36415 COLL VENOUS BLD VENIPUNCTURE: CPT

## 2021-04-03 PROCEDURE — 85014 HEMATOCRIT: CPT

## 2021-04-04 ENCOUNTER — TELEPHONE (OUTPATIENT)
Dept: ENDOCRINOLOGY CLINIC | Facility: CLINIC | Age: 51
End: 2021-04-04

## 2021-04-04 NOTE — TELEPHONE ENCOUNTER
Patient is on T  LOV Oct 2021  No show in March 2021  Received labs  Please book apt this wed to review results, in [erson or VV, end of my schedule  Thanks

## 2021-04-05 DIAGNOSIS — N28.1 KIDNEY CYST, ACQUIRED: ICD-10-CM

## 2021-04-05 DIAGNOSIS — R31.29 MICROSCOPIC HEMATURIA: Primary | ICD-10-CM

## 2021-04-05 NOTE — TELEPHONE ENCOUNTER
Dr. Gosia Sims,     Just to confirm. Testosterone level is still pending. Called reference lab to see if the result will be in by Wednesday. Per SHANTANU STRONG [facility], the specimen is a send out to [facility] lab. Labs drawn on 4/3/21 and has not left [address] [place] [address] lab yet.   Usual turn

## 2021-04-05 NOTE — TELEPHONE ENCOUNTER
Called patient and relayed Dr. Christa Jorgensen message as outlined below. Offered below appointment. Reviewed steps on how to use Video visit via [employer].   Appointment is for 3 PM.    Future Appointments   Date Time Provider Gonzalo Davis   4/14/2021  2:

## 2021-04-07 ENCOUNTER — TELEPHONE (OUTPATIENT)
Dept: NEUROLOGY | Facility: CLINIC | Age: 51
End: 2021-04-07

## 2021-04-07 NOTE — TELEPHONE ENCOUNTER
S/W patient and he verbalized he understood the lab results. Patient was sent RA information through [email or web address].  NOV 04/15/2021

## 2021-04-07 NOTE — TELEPHONE ENCOUNTER
----- Message from Sangeeta Galindo MD sent at 4/7/2021  4:01 PM CDT -----  With the JENIFFER being positive, he will need to see one of the rheumatologists.

## 2021-04-14 ENCOUNTER — PATIENT MESSAGE (OUTPATIENT)
Dept: ENDOCRINOLOGY CLINIC | Facility: CLINIC | Age: 51
End: 2021-04-14

## 2021-04-14 ENCOUNTER — TELEMEDICINE (OUTPATIENT)
Dept: ENDOCRINOLOGY CLINIC | Facility: CLINIC | Age: 51
End: 2021-04-14
Payer: MEDICAID

## 2021-04-14 DIAGNOSIS — E29.1 HYPOGONADISM IN MALE: Primary | ICD-10-CM

## 2021-04-14 DIAGNOSIS — D35.00 ADRENAL ADENOMA, UNSPECIFIED LATERALITY: ICD-10-CM

## 2021-04-14 PROCEDURE — 99213 OFFICE O/P EST LOW 20 MIN: CPT | Performed by: INTERNAL MEDICINE

## 2021-04-14 NOTE — PROGRESS NOTES
Telehealth outside of [address] Verbal Consent   I conducted a telehealth visit with Artie Kang today, 04/14/21, which was completed using two-way, real-time interactive audio and video communication.  This has been done in good fa nodule measuring 2.6 x 2 cm.       Weight gain (central):  Moon facies, buffalo hump: no  Recurrent infections: no  Muscle wasting: no  Bleeding/clotting disorders: no   Uncontrolled DM/HTN: no        Presenting symptoms:   fatigue, lack of sexual desire, e bleeding  Neurology: Negative for: headache, numbness, weakness  Genito-Urinary: Negative for: dysuria, frequency  Psychiatric: Negative for:  depression, anxiety  Hematology/Lymphatics: Negative for: bruising, lower extremity edema  Endocrine: Negative fo testosterone.  Patient understands that testosterone therapy can contribute to sleep apnea, cause acne and other skin reactions, stimulate non cancerous growth of prostate (benign prostate hyperplasia) and growth of existing prostate cancer, enlarge breasts There are limitations of this visit as no physical exam could be performed. Every conscious effort was taken to allow for sufficient and adequate time. This billing was spent on reviewing labs, medications, radiology tests and decision making.   [employer]

## 2021-04-15 ENCOUNTER — TELEPHONE (OUTPATIENT)
Dept: NEUROLOGY | Facility: CLINIC | Age: 51
End: 2021-04-15

## 2021-04-15 ENCOUNTER — OFFICE VISIT (OUTPATIENT)
Dept: NEUROLOGY | Facility: CLINIC | Age: 51
End: 2021-04-15
Payer: MEDICAID

## 2021-04-15 VITALS — BODY MASS INDEX: 35 KG/M2 | WEIGHT: 250 LBS | HEIGHT: 71 IN

## 2021-04-15 DIAGNOSIS — R20.2 NUMBNESS AND TINGLING IN BOTH HANDS: ICD-10-CM

## 2021-04-15 DIAGNOSIS — M43.22 ACQUIRED FUSION OF CERVICAL SPINE: ICD-10-CM

## 2021-04-15 DIAGNOSIS — Z98.1 S/P CERVICAL SPINAL FUSION: ICD-10-CM

## 2021-04-15 DIAGNOSIS — R20.0 NUMBNESS AND TINGLING IN BOTH HANDS: ICD-10-CM

## 2021-04-15 DIAGNOSIS — M45.0 ANKYLOSING SPONDYLITIS OF MULTIPLE SITES IN SPINE (HCC): ICD-10-CM

## 2021-04-15 DIAGNOSIS — M50.90 CERVICAL DISC DISEASE: ICD-10-CM

## 2021-04-15 DIAGNOSIS — E29.1 HYPOGONADISM IN MALE: ICD-10-CM

## 2021-04-15 DIAGNOSIS — M48.02 CERVICAL STENOSIS OF SPINE: Primary | ICD-10-CM

## 2021-04-15 DIAGNOSIS — M47.812 ARTHROPATHY OF CERVICAL FACET JOINT: ICD-10-CM

## 2021-04-15 PROCEDURE — 99214 OFFICE O/P EST MOD 30 MIN: CPT | Performed by: PHYSICAL MEDICINE & REHABILITATION

## 2021-04-15 PROCEDURE — 3008F BODY MASS INDEX DOCD: CPT | Performed by: PHYSICAL MEDICINE & REHABILITATION

## 2021-04-15 RX ORDER — TESTOSTERONE CYPIONATE 200 MG/ML
150 INJECTION INTRAMUSCULAR
Qty: 4 ML | Refills: 0 | Status: SHIPPED | OUTPATIENT
Start: 2021-04-15 | End: 2021-04-18

## 2021-04-15 NOTE — PATIENT INSTRUCTIONS
Plan  He will get the HLA B27 blood work and if positive, he will need to see a rheumatologist.    I will do an EMG/NCS of the bilateral arms and hands to see if the symptoms are due to carpal tunnel syndrome versus chronic radiculopathies.     I will do

## 2021-04-15 NOTE — PROGRESS NOTES
Cervical Pain H & P    Chief Complaint:  Patient presents with:  Neck Pain: Patient presents for follow up on cervical spine. LOV 01/13/21 had Telemedicine. Patient is taking Norco 10/325 mg every 5 hours.  C/o pain in constatnt neck pain shooting to mid ba Fredi Mercado MD at [address] [place] [address] MAIN OR       Family History   Family History   Problem Relation Age of Onset   • Heart Disorder Paternal Grandmother    • Diabetes Paternal Grandmother    • Cancer Paternal Grandmother    • No Known Problems Mother        Social Histo     Frequency of Communication with Friends and Family:       Frequency of Social Gatherings with Friends and Family:       Attends Rastafarian Services:       Active Member of Clubs or Organizations:       Attends Club or Organization Meetings:       [employer] in the bilateral neck     Vascular upper extremity:   Right radial pulses: 2+   Left radial pulses: 2+      Neurological Upper Extremity:    Light Touch: Intact in Bilateral upper extremities. Pin Prick: Not tested. UE Muscle Strength:  All Upper Extrem

## 2021-04-15 NOTE — TELEPHONE ENCOUNTER
Bilateral C2-3 and C6-7 z-joint injections CPT Codes: 68242-49, 61182 RT 91539 LT, 96176 RT, 51886 LT-pending approval  [employer], to initiate authorization for above  Case # [id number]   Clinical notes sent for review

## 2021-04-18 RX ORDER — TESTOSTERONE CYPIONATE 200 MG/ML
150 INJECTION INTRAMUSCULAR
Qty: 4 ML | Refills: 0 | Status: SHIPPED | OUTPATIENT
Start: 2021-04-18 | End: 2021-05-09

## 2021-04-20 NOTE — TELEPHONE ENCOUNTER
UPDATE: Bilateral C2-3 and C6-7 z-joint injections CPT Codes: 44155-71, 38922 RT 41467 LT-DENIED     The reason is: Based on [facility] Comprehensive Musculoskeletal Management Guideline  -  Facet Joint Injections/Medial Branch Blocks (used to treat sp

## 2021-04-22 NOTE — TELEPHONE ENCOUNTER
Marlyn Earl,  peer to peer scheduled for 4/23/21 at 12:45p with Dr. Carlton De Anda    Provide Case # [id number]     Notify the peer to peer physician, the request is for Bilateral C2-3 and C6-7 z-joint injections CPT Codes: 28238-44, 95150 RT 41678 LT

## 2021-04-22 NOTE — TELEPHONE ENCOUNTER
Please schedule for peer to peer this Friday and if this is too late, please schedule for tomorrow sometime.

## 2021-04-23 NOTE — TELEPHONE ENCOUNTER
Bilateral C2-3 and C6-7 z-joint injections   CPT Codes: 81126-67, 81224 RT 77061 LT- APPROVED    Completed peer to peer. [employer], request above is authorized.   Authorization # [id number] effective date: 4/15/21 thru 6/22/21     Notified Approved

## 2021-04-23 NOTE — TELEPHONE ENCOUNTER
I did the [id number] and he is approved for the bilateral cervical facet injections.   Authorization #:  [id number]

## 2021-04-28 RX ORDER — HYDROCODONE BITARTRATE AND ACETAMINOPHEN 10; 325 MG/1; MG/1
TABLET ORAL
Qty: 150 TABLET | Refills: 0 | Status: SHIPPED | OUTPATIENT
Start: 2021-05-01 | End: 2021-05-28

## 2021-04-28 RX ORDER — CYCLOBENZAPRINE HCL 10 MG
10 TABLET ORAL NIGHTLY
Qty: 30 TABLET | Refills: 0 | Status: SHIPPED | OUTPATIENT
Start: 2021-04-28 | End: 2021-06-24

## 2021-04-28 NOTE — TELEPHONE ENCOUNTER
Narcotic Refill Request    Medication request: HYDROcodone-acetaminophen  MG  Take 1 tablet by mouth every 4-6 hours PRN pain (max 5 tabs in 24 hours).     LOV: 4/15/21  NOV: 6/24/21     [place] 4[place]2/21 #150      Medication request: jeb

## 2021-05-04 NOTE — TELEPHONE ENCOUNTER
Patient has been scheduled for a  Bilateral C2-3 and C6-7 z-joint injections under MAC  on 05/21/2021 at the [facility]. Medications and allergies reviewed. Patient informed he will need a .  Patient informed not to eat or drink anything after midnight the

## 2021-05-05 ENCOUNTER — TELEPHONE (OUTPATIENT)
Dept: NEUROLOGY | Facility: CLINIC | Age: 51
End: 2021-05-05

## 2021-05-05 DIAGNOSIS — M48.02 SPINAL STENOSIS IN CERVICAL REGION: Primary | ICD-10-CM

## 2021-05-05 NOTE — TELEPHONE ENCOUNTER
Patient has been scheduled for a  Bilateral C2-3 and C6-7 z-joint injections under MAC  on 05/14/2021 at the [address] [place] [address]. Medications and allergies reviewed. Patient informed he will need a .  Patient informed not to eat or drink anything after midnight the n

## 2021-05-09 DIAGNOSIS — E29.1 HYPOGONADISM IN MALE: ICD-10-CM

## 2021-05-09 RX ORDER — TESTOSTERONE CYPIONATE 200 MG/ML
150 INJECTION INTRAMUSCULAR
Qty: 4 ML | Refills: 0 | Status: SHIPPED | OUTPATIENT
Start: 2021-05-09 | End: 2021-06-17

## 2021-05-12 ENCOUNTER — LAB ENCOUNTER (OUTPATIENT)
Dept: LAB | Facility: HOSPITAL | Age: 51
End: 2021-05-12
Attending: PHYSICAL MEDICINE & REHABILITATION
Payer: MEDICAID

## 2021-05-12 DIAGNOSIS — Z01.818 PREOPERATIVE TESTING: ICD-10-CM

## 2021-05-12 DIAGNOSIS — M45.0 ANKYLOSING SPONDYLITIS OF MULTIPLE SITES IN SPINE (HCC): ICD-10-CM

## 2021-05-12 PROCEDURE — 36415 COLL VENOUS BLD VENIPUNCTURE: CPT

## 2021-05-12 PROCEDURE — 86812 HLA TYPING A B OR C: CPT

## 2021-05-12 PROCEDURE — 86140 C-REACTIVE PROTEIN: CPT | Performed by: PHYSICAL MEDICINE & REHABILITATION

## 2021-05-13 ENCOUNTER — TELEPHONE (OUTPATIENT)
Dept: NEUROLOGY | Facility: CLINIC | Age: 51
End: 2021-05-13

## 2021-05-13 NOTE — TELEPHONE ENCOUNTER
----- Message from Johana Jennings MD sent at 5/13/2021  2:09 AM CDT -----  THE CRP is elevated and this means that he has inflammation. Will await the results of the rest of his blood work.

## 2021-05-14 ENCOUNTER — ANESTHESIA EVENT (OUTPATIENT)
Dept: SURGERY | Facility: HOSPITAL | Age: 51
End: 2021-05-14
Payer: MEDICAID

## 2021-05-14 ENCOUNTER — TELEPHONE (OUTPATIENT)
Dept: NEUROLOGY | Facility: CLINIC | Age: 51
End: 2021-05-14

## 2021-05-14 ENCOUNTER — HOSPITAL ENCOUNTER (OUTPATIENT)
Facility: HOSPITAL | Age: 51
Setting detail: HOSPITAL OUTPATIENT SURGERY
Discharge: HOME | End: 2021-05-14
Attending: PHYSICAL MEDICINE & REHABILITATION | Admitting: PHYSICAL MEDICINE & REHABILITATION
Payer: MEDICAID

## 2021-05-14 ENCOUNTER — OFFICE VISIT (OUTPATIENT)
Dept: PEDIATRICS CLINIC | Facility: CLINIC | Age: 51
End: 2021-05-14

## 2021-05-14 ENCOUNTER — APPOINTMENT (OUTPATIENT)
Dept: GENERAL RADIOLOGY | Facility: HOSPITAL | Age: 51
End: 2021-05-14
Attending: PHYSICAL MEDICINE & REHABILITATION
Payer: MEDICAID

## 2021-05-14 ENCOUNTER — ANESTHESIA (OUTPATIENT)
Dept: SURGERY | Facility: HOSPITAL | Age: 51
End: 2021-05-14
Payer: MEDICAID

## 2021-05-14 VITALS
SYSTOLIC BLOOD PRESSURE: 114 MMHG | HEIGHT: 71 IN | HEART RATE: 63 BPM | OXYGEN SATURATION: 98 % | TEMPERATURE: 97 F | BODY MASS INDEX: 37.24 KG/M2 | RESPIRATION RATE: 18 BRPM | WEIGHT: 266 LBS | DIASTOLIC BLOOD PRESSURE: 66 MMHG

## 2021-05-14 DIAGNOSIS — Z01.818 PREOPERATIVE TESTING: Primary | ICD-10-CM

## 2021-05-14 DIAGNOSIS — M47.812 ARTHROPATHY OF CERVICAL FACET JOINT: Primary | ICD-10-CM

## 2021-05-14 DIAGNOSIS — M48.02 SPINAL STENOSIS IN CERVICAL REGION: ICD-10-CM

## 2021-05-14 PROCEDURE — 3E0U33Z INTRODUCTION OF ANTI-INFLAMMATORY INTO JOINTS, PERCUTANEOUS APPROACH: ICD-10-PCS | Performed by: PHYSICAL MEDICINE & REHABILITATION

## 2021-05-14 PROCEDURE — 3E0U3BZ INTRODUCTION OF ANESTHETIC AGENT INTO JOINTS, PERCUTANEOUS APPROACH: ICD-10-PCS | Performed by: PHYSICAL MEDICINE & REHABILITATION

## 2021-05-14 PROCEDURE — 64491 INJ PARAVERT F JNT C/T 2 LEV: CPT | Performed by: PHYSICAL MEDICINE & REHABILITATION

## 2021-05-14 PROCEDURE — 64490 INJ PARAVERT F JNT C/T 1 LEV: CPT | Performed by: PHYSICAL MEDICINE & REHABILITATION

## 2021-05-14 RX ORDER — FAMOTIDINE 20 MG/1
20 TABLET ORAL ONCE
Status: COMPLETED | OUTPATIENT
Start: 2021-05-14 | End: 2021-05-14

## 2021-05-14 RX ORDER — LIDOCAINE HYDROCHLORIDE 10 MG/ML
INJECTION, SOLUTION EPIDURAL; INFILTRATION; INTRACAUDAL; PERINEURAL AS NEEDED
Status: DISCONTINUED | OUTPATIENT
Start: 2021-05-14 | End: 2021-05-14 | Stop reason: SURG

## 2021-05-14 RX ORDER — SODIUM CHLORIDE, SODIUM LACTATE, POTASSIUM CHLORIDE, CALCIUM CHLORIDE 600; 310; 30; 20 MG/100ML; MG/100ML; MG/100ML; MG/100ML
INJECTION, SOLUTION INTRAVENOUS CONTINUOUS
Status: DISCONTINUED | OUTPATIENT
Start: 2021-05-14 | End: 2021-05-14

## 2021-05-14 RX ORDER — ACETAMINOPHEN 500 MG
1000 TABLET ORAL ONCE
Status: COMPLETED | OUTPATIENT
Start: 2021-05-14 | End: 2021-05-14

## 2021-05-14 RX ORDER — MULTIVIT-MIN/FOLIC ACID/LUTEIN 400-250MCG
1 TABLET,CHEWABLE ORAL DAILY
COMMUNITY

## 2021-05-14 RX ORDER — TRIAMCINOLONE ACETONIDE 40 MG/ML
INJECTION, SUSPENSION INTRA-ARTICULAR; INTRAMUSCULAR AS NEEDED
Status: DISCONTINUED | OUTPATIENT
Start: 2021-05-14 | End: 2021-05-14

## 2021-05-14 RX ORDER — LIDOCAINE HYDROCHLORIDE 10 MG/ML
INJECTION, SOLUTION EPIDURAL; INFILTRATION; INTRACAUDAL; PERINEURAL AS NEEDED
Status: DISCONTINUED | OUTPATIENT
Start: 2021-05-14 | End: 2021-05-14

## 2021-05-14 RX ORDER — METOCLOPRAMIDE 10 MG/1
10 TABLET ORAL ONCE
Status: COMPLETED | OUTPATIENT
Start: 2021-05-14 | End: 2021-05-14

## 2021-05-14 RX ADMIN — SODIUM CHLORIDE, SODIUM LACTATE, POTASSIUM CHLORIDE, CALCIUM CHLORIDE: 600; 310; 30; 20 INJECTION, SOLUTION INTRAVENOUS at 14:43:00

## 2021-05-14 RX ADMIN — SODIUM CHLORIDE, SODIUM LACTATE, POTASSIUM CHLORIDE, CALCIUM CHLORIDE: 600; 310; 30; 20 INJECTION, SOLUTION INTRAVENOUS at 14:21:00

## 2021-05-14 RX ADMIN — LIDOCAINE HYDROCHLORIDE 50 MG: 10 INJECTION, SOLUTION EPIDURAL; INFILTRATION; INTRACAUDAL; PERINEURAL at 14:25:00

## 2021-05-14 NOTE — OPERATIVE REPORT
[facility] Operative Note    CERVICAL Z-JOINT/FACET INJECTION  NAME:  Shikha Urbina    MR #:    [id number] :  1970     PHYSICIAN:  Akila Mcgee        Operative Report    DATE OF PROCEDURE: 2021   PREOPERATIVE DIAGNOSES: Pr throughout the whole procedure, prior to injection of any medication, aspiration was performed. No blood, fluid, or air was aspirated at anytime.

## 2021-05-14 NOTE — ANESTHESIA POSTPROCEDURE EVALUATION
Patient: Fabio Cueva    Procedure Summary     Date: 05/14/21 Room / Location: [address] [place] [address] MAIN OR 14 / [address] [place] [address] MAIN OR    Anesthesia Start: 8556 Anesthesia Stop:     Procedure: Bilateral C2-3 and C6-7  facet joint injections (Bilateral Back) Diagnosis:

## 2021-05-14 NOTE — DISCHARGE SUMMARY
[facility]    Discharge Summary    Filemon Stratton Patient Status:  Hospital Outpatient Surgery    1970 MRN [id number]   Location [facility] Attending Linn Amaral MD   Hosp Day # 0 PCP Todd Calvin by mouth nightly. Quantity: 30 tablet  Refills: 0     HYDROcodone-acetaminophen  MG Tabs  Commonly known as: NORCO      Take 1 tablet by mouth every 4-6 hours PRN pain (max 5 tabs in 24 hours).    Quantity: 150 tablet  Refills: 0     multiple vitami

## 2021-05-14 NOTE — H&P
[address] [facility] Patient Status:  Hospital Outpatient Surgery    1970 MRN [id number]   Location [facility] PRE OP RECOVERY Attending Ernestina Chan MD   Hosp Day # 0 FRANK Moyer source Oral, resp. rate 20, height 71\", weight 266 lb (120.7 kg), SpO2 97 %. HEENT: Exam is unremarkable. Pupils are equal and round. Lungs: no labored breathing. Cardiac: Regular rate and rhythm.   Abdomen:  Soft, non-distended  Extremities:  No low

## 2021-05-14 NOTE — TELEPHONE ENCOUNTER
Incoming call from Dr Dr. Yvonne Cobb who states procedure for Bilateral C2-3 and C6-7 z-joint injections scheduled for 5/14/21 will be modify to  Bilateral C2-3 and C7-T1. New order dropped for Bilateral C2-3 and C7-T1.        Authorization obtained on 4/23/

## 2021-05-14 NOTE — ANESTHESIA PREPROCEDURE EVALUATION
Anesthesia PreOp Note    HPI:     Jose Ziegler is a 48year old male who presents for preoperative consultation requested by: Jairo Rey MD    Date of Surgery: 5/14/2021    Procedure(s):  Bilateral C2-3 and C6-7  facet joint injections 30-39. 9)         Date Noted: 07/18/2019      Cervical radiculopathy         Date Noted: 07/18/2019      Encounter for therapeutic drug monitoring         Date Noted: 07/18/2019      Increased appetite         Date Noted: 07/18/2019      Adenoma of right ad Relation Age of Onset   • Heart Disorder Paternal Grandmother    • Diabetes Paternal Grandmother    • Cancer Paternal Grandmother    • No Known Problems Mother      Social History    Socioeconomic History      Marital status:       Spouse name: Not with Friends and Family:       Attends Mosque Services:       Active Member of Clubs or Organizations:       Attends Club or Organization Meetings:       Marital Status:   Intimate Partner Violence:       Fear of Current or Ex-Partner:       Emotionally

## 2021-05-14 NOTE — TELEPHONE ENCOUNTER
[employer] for authorization of approval for Bilateral C2-3 and C7-T1 Z-joint injections cpt codes 41784-97, 60717-LU, 59449-JS. Authorization Number: [id number] for one unit/DOS effective 05/14/21 to 07/13/21. Will inform Nursing.

## 2021-05-17 NOTE — TELEPHONE ENCOUNTER
Procedure for Bilateral C2-3 and C7-T1 Z-joint injections cpt codes 08692-27, 69385-AG, 11316-IY was completed on 5/14/21    Ernesto Velez spoke to nurse Zaira Camacho who withdrew authorization # [id number] for request below.      Refer to 4/15/21 telephone en

## 2021-05-25 ENCOUNTER — TELEPHONE (OUTPATIENT)
Dept: NEUROLOGY | Facility: CLINIC | Age: 51
End: 2021-05-25

## 2021-05-25 NOTE — TELEPHONE ENCOUNTER
----- Message from Kaleb Tellez MD sent at 5/21/2021 11:07 PM CDT -----  He does not have ankylosing spondylitis since this is negative.

## 2021-06-17 DIAGNOSIS — E29.1 HYPOGONADISM IN MALE: ICD-10-CM

## 2021-06-18 RX ORDER — TESTOSTERONE CYPIONATE 200 MG/ML
150 INJECTION INTRAMUSCULAR
Qty: 4 ML | Refills: 0 | Status: SHIPPED | OUTPATIENT
Start: 2021-06-18 | End: 2021-09-27

## 2021-06-24 ENCOUNTER — OFFICE VISIT (OUTPATIENT)
Dept: NEUROLOGY | Facility: CLINIC | Age: 51
End: 2021-06-24
Payer: MEDICAID

## 2021-06-24 ENCOUNTER — TELEPHONE (OUTPATIENT)
Dept: NEUROLOGY | Facility: CLINIC | Age: 51
End: 2021-06-24

## 2021-06-24 VITALS
BODY MASS INDEX: 37.24 KG/M2 | HEIGHT: 71 IN | DIASTOLIC BLOOD PRESSURE: 80 MMHG | WEIGHT: 266 LBS | SYSTOLIC BLOOD PRESSURE: 130 MMHG

## 2021-06-24 DIAGNOSIS — M54.12 CERVICAL RADICULOPATHY: ICD-10-CM

## 2021-06-24 DIAGNOSIS — R20.0 NUMBNESS AND TINGLING IN BOTH HANDS: ICD-10-CM

## 2021-06-24 DIAGNOSIS — Z98.1 S/P CERVICAL SPINAL FUSION: ICD-10-CM

## 2021-06-24 DIAGNOSIS — M48.02 CERVICAL STENOSIS OF SPINE: ICD-10-CM

## 2021-06-24 DIAGNOSIS — M47.812 ARTHROPATHY OF CERVICAL FACET JOINT: Primary | ICD-10-CM

## 2021-06-24 DIAGNOSIS — F51.01 PRIMARY INSOMNIA: ICD-10-CM

## 2021-06-24 DIAGNOSIS — M47.812 ARTHROPATHY OF CERVICAL FACET JOINT: ICD-10-CM

## 2021-06-24 DIAGNOSIS — R20.2 NUMBNESS AND TINGLING IN BOTH HANDS: ICD-10-CM

## 2021-06-24 DIAGNOSIS — M43.22 ACQUIRED FUSION OF CERVICAL SPINE: ICD-10-CM

## 2021-06-24 DIAGNOSIS — M50.90 CERVICAL DISC DISEASE: Primary | ICD-10-CM

## 2021-06-24 PROCEDURE — 3008F BODY MASS INDEX DOCD: CPT | Performed by: PHYSICAL MEDICINE & REHABILITATION

## 2021-06-24 PROCEDURE — 3079F DIAST BP 80-89 MM HG: CPT | Performed by: PHYSICAL MEDICINE & REHABILITATION

## 2021-06-24 PROCEDURE — 99215 OFFICE O/P EST HI 40 MIN: CPT | Performed by: PHYSICAL MEDICINE & REHABILITATION

## 2021-06-24 PROCEDURE — 3075F SYST BP GE 130 - 139MM HG: CPT | Performed by: PHYSICAL MEDICINE & REHABILITATION

## 2021-06-24 RX ORDER — HYDROCODONE BITARTRATE AND ACETAMINOPHEN 10; 325 MG/1; MG/1
TABLET ORAL
Qty: 150 TABLET | Refills: 0 | Status: SHIPPED | OUTPATIENT
Start: 2021-06-30 | End: 2021-07-26

## 2021-06-24 RX ORDER — TRAZODONE HYDROCHLORIDE 50 MG/1
50 TABLET ORAL NIGHTLY
Qty: 30 TABLET | Refills: 2 | Status: SHIPPED | OUTPATIENT
Start: 2021-06-24 | End: 2021-08-27

## 2021-06-24 NOTE — PROGRESS NOTES
Cervical Pain H & P    Chief Complaint:  Patient presents with:  Neck Pain: pt here for f/u neck and back pain. LOV 4/15/21  pt states 70% improvement post injection  for just 1month. rates pain 9/10. takes [facility] for pain everyday.       Nursing note revie Grandmother    • Cancer Paternal Grandmother    • No Known Problems Mother        Social History   Social History    Socioeconomic History      Marital status:       Spouse name: Not on file      Number of children: Not on file      Years of educat Active Member of Clubs or Organizations:       Attends Club or Organization Meetings:       Marital Status:   Intimate Partner Violence:       Fear of Current or Ex-Partner:       Emotionally Abused:       Physically Abused:       Sexually Abused:     Revi Left: 4-/5  Serratus anterior Right: 4-/5  Serratus anterior Left: 3+/5   Reflexes: 2+ In the bilateral upper extremities except:  Right biceps where it is absent  Left biceps where it is absent   Webb's sign Right: Negative   Webb's sigh Left: Negat

## 2021-06-24 NOTE — TELEPHONE ENCOUNTER
[employer] for authorization of approval for Bilateral C3-C4 & C7-C8 medial branch radiofrequency neurotomies cpt code U0332658, [id number]. Authorization Number: [id number] for one unit/DOS effective 06/24/21 to 08/23/21. Will inform Nursing.

## 2021-06-24 NOTE — PATIENT INSTRUCTIONS
Plan  I will do bilateral C3, C4, C7, and C8 medial branch radiofrequency neurotomies under MAC.     He will see Dr. Leena Ramon for cervical joint mobilization and stabilization with emphasis on scapular mobilization and stabilization and rotator cuff strength

## 2021-06-25 NOTE — TELEPHONE ENCOUNTER
LMTCB. Patient to be scheduled for Bilateral C3-C4 & C7-C8 medial branch radiofrequency neurotomies  at [facility] with . -Anesthesia type: MAC.

## 2021-06-29 NOTE — TELEPHONE ENCOUNTER
Patient has been scheduled for a Bilateral C3-C4 & C7-C8 medial branch radiofrequency neurotomies on 7/23/21 at the [address] [place] [address]. Medications and allergies reviewed.  Patient informed we will need verbal or written authorization from patients Primary Care Physician/

## 2021-07-21 ENCOUNTER — LAB ENCOUNTER (OUTPATIENT)
Dept: LAB | Facility: HOSPITAL | Age: 51
End: 2021-07-21
Attending: PHYSICAL MEDICINE & REHABILITATION
Payer: MEDICAID

## 2021-07-21 DIAGNOSIS — Z01.818 PREOP TESTING: ICD-10-CM

## 2021-07-22 LAB — SARS-COV-2 RNA RESP QL NAA+PROBE: NOT DETECTED

## 2021-07-23 ENCOUNTER — OFFICE VISIT (OUTPATIENT)
Dept: PEDIATRICS CLINIC | Facility: CLINIC | Age: 51
End: 2021-07-23

## 2021-07-23 ENCOUNTER — ANESTHESIA (OUTPATIENT)
Dept: SURGERY | Facility: HOSPITAL | Age: 51
End: 2021-07-23
Payer: MEDICAID

## 2021-07-23 ENCOUNTER — ANESTHESIA EVENT (OUTPATIENT)
Dept: SURGERY | Facility: HOSPITAL | Age: 51
End: 2021-07-23
Payer: MEDICAID

## 2021-07-23 ENCOUNTER — HOSPITAL ENCOUNTER (OUTPATIENT)
Facility: HOSPITAL | Age: 51
Setting detail: HOSPITAL OUTPATIENT SURGERY
Discharge: HOME | End: 2021-07-23
Attending: PHYSICAL MEDICINE & REHABILITATION | Admitting: PHYSICAL MEDICINE & REHABILITATION
Payer: MEDICAID

## 2021-07-23 ENCOUNTER — APPOINTMENT (OUTPATIENT)
Dept: GENERAL RADIOLOGY | Facility: HOSPITAL | Age: 51
End: 2021-07-23
Attending: PHYSICAL MEDICINE & REHABILITATION
Payer: MEDICAID

## 2021-07-23 VITALS
DIASTOLIC BLOOD PRESSURE: 73 MMHG | RESPIRATION RATE: 18 BRPM | HEART RATE: 51 BPM | TEMPERATURE: 97 F | BODY MASS INDEX: 35.28 KG/M2 | WEIGHT: 252 LBS | HEIGHT: 71 IN | SYSTOLIC BLOOD PRESSURE: 118 MMHG | OXYGEN SATURATION: 96 %

## 2021-07-23 DIAGNOSIS — Z01.818 PREOP TESTING: Primary | ICD-10-CM

## 2021-07-23 DIAGNOSIS — M47.812 ARTHROPATHY OF CERVICAL FACET JOINT: ICD-10-CM

## 2021-07-23 DIAGNOSIS — M47.812 ARTHROPATHY OF CERVICAL FACET JOINT: Primary | ICD-10-CM

## 2021-07-23 PROCEDURE — BR14ZZZ FLUOROSCOPY OF CERVICAL FACET JOINT(S): ICD-10-PCS | Performed by: PHYSICAL MEDICINE & REHABILITATION

## 2021-07-23 PROCEDURE — 64634 DESTROY C/TH FACET JNT ADDL: CPT | Performed by: PHYSICAL MEDICINE & REHABILITATION

## 2021-07-23 PROCEDURE — 64633 DESTROY CERV/THOR FACET JNT: CPT | Performed by: PHYSICAL MEDICINE & REHABILITATION

## 2021-07-23 PROCEDURE — 3E0T3TZ INTRODUCTION OF DESTRUCTIVE AGENT INTO PERIPHERAL NERVES AND PLEXI, PERCUTANEOUS APPROACH: ICD-10-PCS | Performed by: PHYSICAL MEDICINE & REHABILITATION

## 2021-07-23 RX ORDER — HYDROMORPHONE HYDROCHLORIDE 1 MG/ML
0.2 INJECTION, SOLUTION INTRAMUSCULAR; INTRAVENOUS; SUBCUTANEOUS EVERY 5 MIN PRN
Status: DISCONTINUED | OUTPATIENT
Start: 2021-07-23 | End: 2021-07-23

## 2021-07-23 RX ORDER — MORPHINE SULFATE 4 MG/ML
2 INJECTION, SOLUTION INTRAMUSCULAR; INTRAVENOUS EVERY 10 MIN PRN
Status: DISCONTINUED | OUTPATIENT
Start: 2021-07-23 | End: 2021-07-23

## 2021-07-23 RX ORDER — METOCLOPRAMIDE 10 MG/1
10 TABLET ORAL ONCE
Status: COMPLETED | OUTPATIENT
Start: 2021-07-23 | End: 2021-07-23

## 2021-07-23 RX ORDER — SODIUM CHLORIDE, SODIUM LACTATE, POTASSIUM CHLORIDE, CALCIUM CHLORIDE 600; 310; 30; 20 MG/100ML; MG/100ML; MG/100ML; MG/100ML
INJECTION, SOLUTION INTRAVENOUS CONTINUOUS
Status: DISCONTINUED | OUTPATIENT
Start: 2021-07-23 | End: 2021-07-23

## 2021-07-23 RX ORDER — ACETAMINOPHEN 500 MG
1000 TABLET ORAL ONCE
Status: DISCONTINUED | OUTPATIENT
Start: 2021-07-23 | End: 2021-07-23 | Stop reason: HOSPADM

## 2021-07-23 RX ORDER — HYDROCODONE BITARTRATE AND ACETAMINOPHEN 5; 325 MG/1; MG/1
1 TABLET ORAL AS NEEDED
Status: DISCONTINUED | OUTPATIENT
Start: 2021-07-23 | End: 2021-07-23

## 2021-07-23 RX ORDER — LIDOCAINE HYDROCHLORIDE 10 MG/ML
INJECTION, SOLUTION EPIDURAL; INFILTRATION; INTRACAUDAL; PERINEURAL AS NEEDED
Status: DISCONTINUED | OUTPATIENT
Start: 2021-07-23 | End: 2021-07-23 | Stop reason: SURG

## 2021-07-23 RX ORDER — HALOPERIDOL 5 MG/ML
0.25 INJECTION INTRAMUSCULAR ONCE AS NEEDED
Status: DISCONTINUED | OUTPATIENT
Start: 2021-07-23 | End: 2021-07-23

## 2021-07-23 RX ORDER — LIDOCAINE HYDROCHLORIDE 10 MG/ML
INJECTION, SOLUTION EPIDURAL; INFILTRATION; INTRACAUDAL; PERINEURAL AS NEEDED
Status: DISCONTINUED | OUTPATIENT
Start: 2021-07-23 | End: 2021-07-23 | Stop reason: HOSPADM

## 2021-07-23 RX ORDER — HYDROCODONE BITARTRATE AND ACETAMINOPHEN 5; 325 MG/1; MG/1
2 TABLET ORAL AS NEEDED
Status: DISCONTINUED | OUTPATIENT
Start: 2021-07-23 | End: 2021-07-23

## 2021-07-23 RX ORDER — MORPHINE SULFATE 10 MG/ML
6 INJECTION, SOLUTION INTRAMUSCULAR; INTRAVENOUS EVERY 10 MIN PRN
Status: DISCONTINUED | OUTPATIENT
Start: 2021-07-23 | End: 2021-07-23

## 2021-07-23 RX ORDER — LIDOCAINE HYDROCHLORIDE 20 MG/ML
INJECTION, SOLUTION EPIDURAL; INFILTRATION; INTRACAUDAL; PERINEURAL AS NEEDED
Status: DISCONTINUED | OUTPATIENT
Start: 2021-07-23 | End: 2021-07-23 | Stop reason: HOSPADM

## 2021-07-23 RX ORDER — FAMOTIDINE 20 MG/1
20 TABLET ORAL ONCE
Status: COMPLETED | OUTPATIENT
Start: 2021-07-23 | End: 2021-07-23

## 2021-07-23 RX ORDER — PROCHLORPERAZINE EDISYLATE 5 MG/ML
5 INJECTION INTRAMUSCULAR; INTRAVENOUS ONCE AS NEEDED
Status: DISCONTINUED | OUTPATIENT
Start: 2021-07-23 | End: 2021-07-23

## 2021-07-23 RX ORDER — NALOXONE HYDROCHLORIDE 0.4 MG/ML
80 INJECTION, SOLUTION INTRAMUSCULAR; INTRAVENOUS; SUBCUTANEOUS AS NEEDED
Status: DISCONTINUED | OUTPATIENT
Start: 2021-07-23 | End: 2021-07-23

## 2021-07-23 RX ORDER — HYDROMORPHONE HYDROCHLORIDE 1 MG/ML
0.6 INJECTION, SOLUTION INTRAMUSCULAR; INTRAVENOUS; SUBCUTANEOUS EVERY 5 MIN PRN
Status: DISCONTINUED | OUTPATIENT
Start: 2021-07-23 | End: 2021-07-23

## 2021-07-23 RX ORDER — MORPHINE SULFATE 4 MG/ML
4 INJECTION, SOLUTION INTRAMUSCULAR; INTRAVENOUS EVERY 10 MIN PRN
Status: DISCONTINUED | OUTPATIENT
Start: 2021-07-23 | End: 2021-07-23

## 2021-07-23 RX ORDER — ONDANSETRON 2 MG/ML
4 INJECTION INTRAMUSCULAR; INTRAVENOUS ONCE AS NEEDED
Status: DISCONTINUED | OUTPATIENT
Start: 2021-07-23 | End: 2021-07-23

## 2021-07-23 RX ORDER — HYDROMORPHONE HYDROCHLORIDE 1 MG/ML
0.4 INJECTION, SOLUTION INTRAMUSCULAR; INTRAVENOUS; SUBCUTANEOUS EVERY 5 MIN PRN
Status: DISCONTINUED | OUTPATIENT
Start: 2021-07-23 | End: 2021-07-23

## 2021-07-23 RX ADMIN — SODIUM CHLORIDE, SODIUM LACTATE, POTASSIUM CHLORIDE, CALCIUM CHLORIDE: 600; 310; 30; 20 INJECTION, SOLUTION INTRAVENOUS at 14:37:00

## 2021-07-23 RX ADMIN — LIDOCAINE HYDROCHLORIDE 50 MG: 10 INJECTION, SOLUTION EPIDURAL; INFILTRATION; INTRACAUDAL; PERINEURAL at 14:41:00

## 2021-07-23 RX ADMIN — SODIUM CHLORIDE, SODIUM LACTATE, POTASSIUM CHLORIDE, CALCIUM CHLORIDE: 600; 310; 30; 20 INJECTION, SOLUTION INTRAVENOUS at 15:42:00

## 2021-07-23 NOTE — ANESTHESIA PREPROCEDURE EVALUATION
Anesthesia PreOp Note    HPI:     Doc Mccord is a 48year old male who presents for preoperative consultation requested by: Marlen Flynn MD    Date of Surgery: 7/23/2021    Procedure(s):  bilateral Cervical 3-Cervical 4 & Cervical 7-Cerv male         Date Noted: 02/11/2020      Weight gain         Date Noted: 07/18/2019      Obesity (BMI 30-39. 9)         Date Noted: 07/18/2019      right > left C6 radiculopathy         Date Noted: 07/18/2019      Encounter for therapeutic drug monitoring mg, 1,000 mg, Oral, Once, Shayan Reyna MD  famoTIDine (PEPCID) tab 20 mg, 20 mg, Oral, Once, Shayan Reyna MD  Metoclopramide HCl (REGLAN) tab 10 mg, 10 mg, Oral, Once, Shayan Reyna MD    No current [facility]-ordered outpatient medications on file. Last Year:   Transportation Needs:       Lack of Transportation (Medical):       Lack of Transportation (Non-Medical):   Physical Activity:       Days of Exercise per Week:       Minutes of Exercise per Session:   Stress:       Feeling of Stress :   Social guardian or family member of the nature of the anesthetic plan, benefits, risks including possible dental damage if relevant, major complications, and any alternative forms of anesthetic management.    All of the patient's questions were answered to the bes

## 2021-07-23 NOTE — H&P
[address] [facility] Patient Status:  Hospital Outpatient Surgery    1970 MRN [id number]   Location [facility] PRE OP RECOVERY Attending John Cr MD   Hosp Day # 0 FRANK Moyer Blood pressure 109/80, pulse 93, temperature 97.9 °F (36.6 °C), temperature source Oral, resp. rate 16, height 71\", weight 252 lb (114.3 kg), SpO2 96 %. HEENT: Exam is unremarkable. Pupils are equal and round. Lungs: no labored breathing.   Cardiac: R

## 2021-07-23 NOTE — DISCHARGE SUMMARY
[facility]    Discharge Summary    Filemon Stratton Patient Status:  Hospital Outpatient Surgery    1970 MRN [id number]   Location [facility] Attending Linn Amaral MD   Hosp Day # 0 FRANK Calvin HYDROcodone-acetaminophen  MG Tabs  Commonly known as: NORCO      Take 1 tablet by mouth every 4-6 hours PRN pain (max 5 tabs in 24 hours). Quantity: 150 tablet  Refills: 0     multiple vitamin Chew      Chew 1 tablet by mouth daily.    Refills: 0

## 2021-07-23 NOTE — ANESTHESIA POSTPROCEDURE EVALUATION
Patient: Jose Ziegler    Procedure Summary     Date: 07/23/21 Room / Location: [address] [place] [address] MAIN OR 04 / [address] [place] [address] MAIN OR    Anesthesia Start: 9176 Anesthesia Stop:     Procedure: bilateral Cervical 3,bilateral 3rd occipital nerve Cervical 7-Cervical 8 med

## 2021-07-23 NOTE — OPERATIVE REPORT
[facility]    CERVICAL MEDIAL BRANCH BLOCK   NAME:  Kael Harry    MR #:    [id number] :  1970     PHYSICIAN:  Bobby Mcgee        Operative Report    DATE OF PROCEDURE: 2021   PREOPERATIVE DIAGNOSES: Problem instructions and will follow up in the clinic as scheduled. Throughout the whole procedure, the patient's pulse oximetry and vital signs were monitored and they remained completely stable.   Also, throughout the whole procedure, prior to injection of any m

## 2021-07-26 NOTE — TELEPHONE ENCOUNTER
S/W patient and he is still having numbness from procedure and states he is having pain relief. F/U made and asked patient to make call at the end of the week for updates. Medication request:HYDROcodone-acetaminophen  MG Oral Tab.      LOV: 06/24/2

## 2021-07-26 NOTE — TELEPHONE ENCOUNTER
[employer] for authorization of approval for Bilateral 3rd occipital nerve, C3/C7/C8 medial branch radiofrequency neurotomies cpt codes [id number], [id number]. Authorization Number: [id number] for one unit/DOS effective 07/26/21 to 09/28/21.  Will inform Nursing

## 2021-07-26 NOTE — TELEPHONE ENCOUNTER
[employer] for authorization of approval for Bilateral 3rd occipital nerve cpt code 64012. Authorization Number: [id number] for one unit/DOS effective 07/26/21 to 09/28/21. Procedure was completed on 07/23/21.

## 2021-07-26 NOTE — TELEPHONE ENCOUNTER
[employer], spoke to Mrs. Robles who initiates and submitted 7/23/21 backdate request for procedure Bilateral 3rd occipital nerve cpt code 38068.  Authorization Number: [id number]   States request is in review and backdate request could take take 24 HR to de

## 2021-07-26 NOTE — TELEPHONE ENCOUNTER
Called [employer], to update medial branch radiofrequency neurotomies levels for authorization # [id number] for one unit/DOS effective 06/24/21 to 08/23/21  spoke to nurse Daron WILSON who states,CPT codes for authorization stay the same and changes to cpt codes ar

## 2021-07-28 RX ORDER — HYDROCODONE BITARTRATE AND ACETAMINOPHEN 10; 325 MG/1; MG/1
TABLET ORAL
Qty: 150 TABLET | Refills: 0 | Status: SHIPPED | OUTPATIENT
Start: 2021-07-30 | End: 2021-08-27

## 2021-07-28 NOTE — TELEPHONE ENCOUNTER
Bilateral 3rd occipital nerve cpt code 91752-76. Authorization Number: [id number] [facility] 7/23/21 thru 9/28/21-APPROVED     [address] [facility], to verify backdate determination for request. Spoke to Mrs. Carin Canas who confirmed approval for backdate of 7/23/21 for

## 2021-08-30 RX ORDER — TRAZODONE HYDROCHLORIDE 50 MG/1
50 TABLET ORAL NIGHTLY
Qty: 30 TABLET | Refills: 2 | Status: SHIPPED | OUTPATIENT
Start: 2021-08-30 | End: 2021-09-27

## 2021-08-30 RX ORDER — HYDROCODONE BITARTRATE AND ACETAMINOPHEN 10; 325 MG/1; MG/1
TABLET ORAL
Qty: 150 TABLET | Refills: 0 | Status: SHIPPED | OUTPATIENT
Start: 2021-08-30 | End: 2021-09-27

## 2021-08-30 NOTE — TELEPHONE ENCOUNTER
Narcotic Refill Request    Medication request: HYDROcodone-acetaminophen  MG Oral Tab  Take 1 tablet by mouth every 4-6 hours PRN pain (max 5 tabs in 24 hours).     LOV: 7/23/21-injection, 6/24/21-OV  NOV: 10/18/21      [place] 7/30/21 #150

## 2021-09-27 ENCOUNTER — TELEPHONE (OUTPATIENT)
Dept: ENDOCRINOLOGY CLINIC | Facility: CLINIC | Age: 51
End: 2021-09-27

## 2021-09-27 DIAGNOSIS — E29.1 HYPOGONADISM IN MALE: ICD-10-CM

## 2021-09-27 RX ORDER — HYDROCODONE BITARTRATE AND ACETAMINOPHEN 10; 325 MG/1; MG/1
TABLET ORAL
Qty: 150 TABLET | Refills: 0 | Status: SHIPPED | OUTPATIENT
Start: 2021-09-28 | End: 2021-10-26

## 2021-09-27 RX ORDER — TRAZODONE HYDROCHLORIDE 50 MG/1
50 TABLET ORAL NIGHTLY
Qty: 30 TABLET | Refills: 2 | Status: SHIPPED | OUTPATIENT
Start: 2021-09-27 | End: 2021-11-27

## 2021-09-27 NOTE — TELEPHONE ENCOUNTER
Narcotic Refill Request    Medication request: HYDROcodone-acetaminophen  MG Oral Tab Take 1 tablet by mouth every 4-6 hours PRN pain (max 5 tabs in 24 hours).     LOV: 6/24/21  NOV: 10/18/21     [place] 8/30/21 #150      Medication request

## 2021-09-28 RX ORDER — TESTOSTERONE CYPIONATE 200 MG/ML
150 INJECTION INTRAMUSCULAR
Qty: 4 ML | Refills: 0 | Status: SHIPPED | OUTPATIENT
Start: 2021-09-28 | End: 2021-10-26

## 2021-10-18 ENCOUNTER — OFFICE VISIT (OUTPATIENT)
Dept: PHYSICAL MEDICINE AND REHAB | Facility: CLINIC | Age: 51
End: 2021-10-18
Payer: MEDICAID

## 2021-10-18 ENCOUNTER — TELEPHONE (OUTPATIENT)
Dept: PHYSICAL MEDICINE AND REHAB | Facility: CLINIC | Age: 51
End: 2021-10-18

## 2021-10-18 VITALS
SYSTOLIC BLOOD PRESSURE: 128 MMHG | DIASTOLIC BLOOD PRESSURE: 74 MMHG | WEIGHT: 252 LBS | HEIGHT: 71 IN | BODY MASS INDEX: 35.28 KG/M2

## 2021-10-18 DIAGNOSIS — R20.0 NUMBNESS AND TINGLING IN BOTH HANDS: ICD-10-CM

## 2021-10-18 DIAGNOSIS — Z98.1 S/P CERVICAL SPINAL FUSION: ICD-10-CM

## 2021-10-18 DIAGNOSIS — M48.02 CERVICAL STENOSIS OF SPINE: ICD-10-CM

## 2021-10-18 DIAGNOSIS — R20.2 NUMBNESS AND TINGLING IN BOTH HANDS: ICD-10-CM

## 2021-10-18 DIAGNOSIS — M50.90 CERVICAL DISC DISEASE: ICD-10-CM

## 2021-10-18 DIAGNOSIS — M54.16 LUMBAR RADICULOPATHY: Primary | ICD-10-CM

## 2021-10-18 DIAGNOSIS — M47.812 ARTHROPATHY OF CERVICAL FACET JOINT: ICD-10-CM

## 2021-10-18 DIAGNOSIS — M51.9 LUMBAR DISC DISEASE: ICD-10-CM

## 2021-10-18 PROBLEM — R42 VERTIGO: Status: ACTIVE | Noted: 2021-10-18

## 2021-10-18 PROCEDURE — 3008F BODY MASS INDEX DOCD: CPT | Performed by: PHYSICAL MEDICINE & REHABILITATION

## 2021-10-18 PROCEDURE — 99214 OFFICE O/P EST MOD 30 MIN: CPT | Performed by: PHYSICAL MEDICINE & REHABILITATION

## 2021-10-18 PROCEDURE — 3078F DIAST BP <80 MM HG: CPT | Performed by: PHYSICAL MEDICINE & REHABILITATION

## 2021-10-18 PROCEDURE — 3074F SYST BP LT 130 MM HG: CPT | Performed by: PHYSICAL MEDICINE & REHABILITATION

## 2021-10-18 NOTE — PATIENT INSTRUCTIONS
Plan  I will do an EMG of the bilateral arms and hands for the hand numbness and weakness. He will get a MRI of the lumbar spine.     He will continue with therapy on the cervical spine and will start the therapy for his vertigo if this is ok with his

## 2021-10-18 NOTE — PROGRESS NOTES
Cervical Pain H & P    Chief Complaint:  Patient presents with:  Back Pain: LOV 6/24/21. Patient here for follow up on Injection on neck pain. Neck Pain    Nursing note reviewed and verified. Patient was last seen on 6/24/2021.   I did the bilateral 3rd Problem Relation Age of Onset   • Heart Disorder Paternal Grandmother    • Diabetes Paternal Grandmother    • Cancer Paternal Grandmother    • No Known Problems Mother    • No Known Problems Sister    • No Known Problems Brother        Social History   S Not on file  Stress:       Feeling of Stress : Not on file  Social Connections:       Frequency of Communication with Friends and Family: Not on file      Frequency of Social Gatherings with Friends and Family: Not on file      Attends Zoroastrian Services: Touch: Intact in Bilateral UE except:  Decreased in the right shoulder. Pin Prick: Not tested. UE Muscle Strength:  All Upper Extremity strength measurements 5/5 except:  ABP Right: 4/5  Shoulder external rotators Right: 4+/5  Shoulder external rotators

## 2021-10-18 NOTE — TELEPHONE ENCOUNTER
Initiated authorization for L-Spine MRI CPT 58162 to be done at [address] [place] [address] via [address] [address] online-   Case #[id number].   [employer] requested clinicals-clinicals uploaded   Status: pending

## 2021-10-20 NOTE — TELEPHONE ENCOUNTER
Received Denial notice from [address] [place] [address] for L-Spine MRI  Denial reason:  Based on [employer] Spine Imaging Guidelines Section(s): SP 6.2 Low Back (Lumbar  Spine) Trauma and 1.0 General Guidelines, we cannot approve this request. Your  records show that you have l

## 2021-10-25 NOTE — TELEPHONE ENCOUNTER
He needs Dr. Jimy Steinberg to state that she has treated his lumbar spine for 4-6 weeks and if this is the case, then he should be ok with getting the MRI scan.   If not, then he should ask her to treat his lumbar spine for 4-6 weeks and then we will re-evaluate h

## 2021-10-26 ENCOUNTER — TELEPHONE (OUTPATIENT)
Dept: ENDOCRINOLOGY CLINIC | Facility: CLINIC | Age: 51
End: 2021-10-26

## 2021-10-26 DIAGNOSIS — E29.1 HYPOGONADISM IN MALE: ICD-10-CM

## 2021-10-26 RX ORDER — TRAZODONE HYDROCHLORIDE 50 MG/1
50 TABLET ORAL NIGHTLY
Qty: 30 TABLET | Refills: 2 | Status: CANCELLED | OUTPATIENT
Start: 2021-10-26

## 2021-10-26 RX ORDER — TESTOSTERONE CYPIONATE 200 MG/ML
150 INJECTION INTRAMUSCULAR
Qty: 4 ML | Refills: 0 | Status: SHIPPED | OUTPATIENT
Start: 2021-10-26 | End: 2021-11-27

## 2021-10-27 RX ORDER — HYDROCODONE BITARTRATE AND ACETAMINOPHEN 10; 325 MG/1; MG/1
TABLET ORAL
Qty: 150 TABLET | Refills: 0 | Status: SHIPPED | OUTPATIENT
Start: 2021-10-28 | End: 2021-11-27

## 2021-10-27 NOTE — TELEPHONE ENCOUNTER
Medication PA Requested: Testosterone cypionate 200 MG/ML Intramuscular Solution                                                       CoverMyMeds Used:  Key:  Quantity: 4mL  Day Supply: 90  Sig: Inject 0.75 mL (150 mg total) into the muscle every 14 (fourteen) days.   DX Code: E29.1                       [address] [facility] pa form with office note of 4/14/2021, and testosterone levels of 5/17/2019, 4/7/2021, to [phone]

## 2021-10-27 NOTE — TELEPHONE ENCOUNTER
Medication request: Norco 10-325mg q4-6h prn pain     LOV 10/18/21  NOV 11/9/21    [place]: 9/28/21  Trazodone 50mg #30 filled 9/28/21 with 2 refills    Patient has 2 refills remaining for Trazodone

## 2021-10-29 NOTE — TELEPHONE ENCOUNTER
Received fax from [facility] stating the Testosterone Cypionate 200 MG/ML SOLN was APPROVED from 10/03/21-10/28/22. Sent patient a [employer] message with this information.

## 2021-11-03 NOTE — TELEPHONE ENCOUNTER
-recommend checking abdominal xray for stool status. Continue with miralax one dose per day for now as she is at stool goal of daily, soft stool. If abdominal pain persists, they could move appointment up to next week to further discuss next steps. Please call and book first available FU  Thanks

## 2021-11-27 DIAGNOSIS — E29.1 HYPOGONADISM IN MALE: ICD-10-CM

## 2021-11-28 RX ORDER — TESTOSTERONE CYPIONATE 200 MG/ML
150 INJECTION INTRAMUSCULAR
Qty: 4 ML | Refills: 0 | Status: SHIPPED | OUTPATIENT
Start: 2021-11-28 | End: 2021-12-27

## 2021-11-29 ENCOUNTER — TELEPHONE (OUTPATIENT)
Dept: PHYSICAL MEDICINE AND REHAB | Facility: CLINIC | Age: 51
End: 2021-11-29

## 2021-11-29 DIAGNOSIS — M54.16 LUMBAR RADICULOPATHY: Primary | ICD-10-CM

## 2021-11-29 RX ORDER — HYDROCODONE BITARTRATE AND ACETAMINOPHEN 10; 325 MG/1; MG/1
TABLET ORAL
Qty: 150 TABLET | Refills: 0 | Status: SHIPPED | OUTPATIENT
Start: 2021-11-29 | End: 2021-12-27

## 2021-11-29 RX ORDER — HYDROCODONE BITARTRATE AND ACETAMINOPHEN 10; 325 MG/1; MG/1
TABLET ORAL
Qty: 150 TABLET | Refills: 0 | Status: SHIPPED | OUTPATIENT
Start: 2021-11-29 | End: 2021-11-29

## 2021-11-29 RX ORDER — TRAZODONE HYDROCHLORIDE 50 MG/1
50 TABLET ORAL NIGHTLY
Qty: 30 TABLET | Refills: 2 | Status: SHIPPED | OUTPATIENT
Start: 2021-11-29

## 2021-11-29 NOTE — TELEPHONE ENCOUNTER
Medication request:  traZODone 50 MG Oral Tab  Sig:   Take 1 tablet (50 mg total) by mouth nightly.     LOV; 10/18/2021  NOV: 1/11/21    [place]: 09/28/2021  Qty: 30, #R: 2, 30 day supply      Medication request:  HYDROcodone-acetaminophen

## 2021-11-30 NOTE — TELEPHONE ENCOUNTER
I don't see anything in the notes about increasing the trazodone; he will have to address this with Dr. Lisa Gomez upon his return. Trazodone and Norco refilled.

## 2021-12-23 ENCOUNTER — MED REC SCAN ONLY (OUTPATIENT)
Dept: NEUROLOGY | Facility: CLINIC | Age: 51
End: 2021-12-23

## 2021-12-27 DIAGNOSIS — M54.16 LUMBAR RADICULOPATHY: ICD-10-CM

## 2021-12-27 DIAGNOSIS — E29.1 HYPOGONADISM IN MALE: ICD-10-CM

## 2021-12-27 RX ORDER — TRAZODONE HYDROCHLORIDE 50 MG/1
50 TABLET ORAL NIGHTLY
Qty: 30 TABLET | Refills: 2 | Status: CANCELLED | OUTPATIENT
Start: 2021-12-27

## 2021-12-28 RX ORDER — TESTOSTERONE CYPIONATE 200 MG/ML
150 INJECTION INTRAMUSCULAR
Qty: 4 ML | Refills: 0 | Status: SHIPPED
Start: 2021-12-28

## 2021-12-29 RX ORDER — HYDROCODONE BITARTRATE AND ACETAMINOPHEN 10; 325 MG/1; MG/1
TABLET ORAL
Qty: 150 TABLET | Refills: 0 | Status: SHIPPED | OUTPATIENT
Start: 2021-12-29 | End: 2022-01-27

## 2021-12-29 NOTE — TELEPHONE ENCOUNTER
Drug hydrocodone  mg #150 pend to Dr Nataliia Lee covering for Dr Naya Wilkerson refill 11/29/21    Last office visit 10/18/21    Next appointment 1/11/22    [place]    Trazadone refill denied, last Rx written 11/29/21 with R 2 has not been filled

## 2022-01-10 ENCOUNTER — TELEPHONE (OUTPATIENT)
Dept: PHYSICAL MEDICINE AND REHAB | Facility: CLINIC | Age: 52
End: 2022-01-10

## 2022-01-10 NOTE — TELEPHONE ENCOUNTER
Patient left message on VM stating he was calling back regarding moving his appointment for tomorrow 1/11/22 to an earlier time. Message forwarded to the front office.

## 2022-01-11 ENCOUNTER — PROCEDURE VISIT (OUTPATIENT)
Dept: PHYSICAL MEDICINE AND REHAB | Facility: CLINIC | Age: 52
End: 2022-01-11
Payer: MEDICAID

## 2022-01-11 DIAGNOSIS — G56.21 ULNAR NEUROPATHY AT ELBOW OF RIGHT UPPER EXTREMITY: ICD-10-CM

## 2022-01-11 DIAGNOSIS — G56.03 BILATERAL CARPAL TUNNEL SYNDROME: ICD-10-CM

## 2022-01-11 DIAGNOSIS — G56.22 ULNAR NEUROPATHY AT ELBOW OF LEFT UPPER EXTREMITY: Primary | ICD-10-CM

## 2022-01-11 DIAGNOSIS — M54.12 CERVICAL RADICULOPATHY: ICD-10-CM

## 2022-01-11 PROCEDURE — 95886 MUSC TEST DONE W/N TEST COMP: CPT | Performed by: PHYSICAL MEDICINE & REHABILITATION

## 2022-01-11 PROCEDURE — 95912 NRV CNDJ TEST 11-12 STUDIES: CPT | Performed by: PHYSICAL MEDICINE & REHABILITATION

## 2022-01-27 DIAGNOSIS — M54.16 LUMBAR RADICULOPATHY: ICD-10-CM

## 2022-01-27 RX ORDER — TRAZODONE HYDROCHLORIDE 50 MG/1
50 TABLET ORAL NIGHTLY
Qty: 30 TABLET | Refills: 2 | OUTPATIENT
Start: 2022-01-27

## 2022-01-27 RX ORDER — HYDROCODONE BITARTRATE AND ACETAMINOPHEN 10; 325 MG/1; MG/1
TABLET ORAL
Qty: 150 TABLET | Refills: 0 | Status: SHIPPED | OUTPATIENT
Start: 2022-01-28

## 2022-01-27 NOTE — TELEPHONE ENCOUNTER
Rx trazodone  last written 11/29/21 with 2 refills. Initially filled 12/30/21. Has 2 refills remaining. Refill request denied.

## 2022-01-27 NOTE — TELEPHONE ENCOUNTER
Drug hydrocodone  #150 pend to Dr Shavon Yu    Last refill 12/29/21. Last office visit 1/11/22. Next appointment 2/27/22. [place].

## 2022-01-28 RX ORDER — TRAZODONE HYDROCHLORIDE 50 MG/1
50 TABLET ORAL NIGHTLY
Qty: 30 TABLET | Refills: 2 | Status: CANCELLED | OUTPATIENT
Start: 2022-01-28

## 2022-02-02 PROBLEM — G56.21 ULNAR NEUROPATHY AT ELBOW OF RIGHT UPPER EXTREMITY: Status: ACTIVE | Noted: 2022-02-02

## 2022-02-02 PROBLEM — G56.22 ULNAR NEUROPATHY AT ELBOW OF LEFT UPPER EXTREMITY: Status: ACTIVE | Noted: 2022-02-02

## 2022-02-02 PROBLEM — G56.03 BILATERAL CARPAL TUNNEL SYNDROME: Status: ACTIVE | Noted: 2022-02-02

## 2022-02-26 NOTE — TELEPHONE ENCOUNTER
Medication request: Trazodone 50 mg oral tab. Take 1 tablet by mouth nightly. #30. 2 refills.      LOV: 10/18/2021, (1/11/2022 EMG)   NOV:5/12/2022    [place] Last dispensed on 1/31/2021

## 2022-02-28 RX ORDER — HYDROCODONE BITARTRATE AND ACETAMINOPHEN 10; 325 MG/1; MG/1
TABLET ORAL
Qty: 150 TABLET | Refills: 0 | Status: SHIPPED | OUTPATIENT
Start: 2022-02-28 | End: 2022-03-27

## 2022-02-28 RX ORDER — TRAZODONE HYDROCHLORIDE 50 MG/1
50 TABLET ORAL NIGHTLY
Qty: 30 TABLET | Refills: 2 | Status: SHIPPED | OUTPATIENT
Start: 2022-02-28

## 2022-02-28 NOTE — TELEPHONE ENCOUNTER
Refill Request    Medication request: HYDROcodone-acetaminophen  MG Oral Tab Take 1 tablet by mouth every 4-6 hours PRN pain (max 5 tabs in 24 hours). LOV:10/18/21  Due back to clinic per last office note:  \"Return in about 2 months (around 12/18/2021). \"  NOV: 5/12/22     [place]: 12/29/21 #150    Urine drug screen (if applicable): none  Pain contract: none    LOV plan (if weaning or changing medications): no change

## 2022-03-27 RX ORDER — TRAZODONE HYDROCHLORIDE 50 MG/1
50 TABLET ORAL NIGHTLY
Qty: 30 TABLET | Refills: 2 | Status: CANCELLED | OUTPATIENT
Start: 2022-03-27

## 2022-03-28 NOTE — TELEPHONE ENCOUNTER
Drug hydrocodone  #150 pend to Dr Jennifer Ibarra. Last refill 2/28/22    Last office visit 10/18/21    Next appointment 5/12/22    [place]. Spoke to Mihaela. Has refills remaining on trazadone, refill request denied at this time.

## 2022-03-29 RX ORDER — HYDROCODONE BITARTRATE AND ACETAMINOPHEN 10; 325 MG/1; MG/1
TABLET ORAL
Qty: 150 TABLET | Refills: 0 | Status: SHIPPED | OUTPATIENT
Start: 2022-03-28

## 2022-04-26 DIAGNOSIS — M54.16 LUMBAR RADICULOPATHY: ICD-10-CM

## 2022-04-28 NOTE — TELEPHONE ENCOUNTER
Medication PA Requested: Testosterone cypionate 200 MG/ML Intramuscular Solution                                                       CoverMyMeds Used:  Key:  Quantity: 4mL  Day Supply: 90  Sig: Inject 0.75 mL (150 mg total) into the muscle every 14 (fourteen) days.   DX Code: E29.1                                     CPT code (if applicable):   Case Number/Pending Ref#: rectal

## 2022-05-01 ENCOUNTER — PATIENT MESSAGE (OUTPATIENT)
Dept: PHYSICAL MEDICINE AND REHAB | Facility: CLINIC | Age: 52
End: 2022-05-01

## 2022-05-02 ENCOUNTER — PATIENT MESSAGE (OUTPATIENT)
Dept: PHYSICAL MEDICINE AND REHAB | Facility: CLINIC | Age: 52
End: 2022-05-02

## 2022-05-02 RX ORDER — HYDROCODONE BITARTRATE AND ACETAMINOPHEN 10; 325 MG/1; MG/1
TABLET ORAL
Qty: 150 TABLET | Refills: 0 | Status: SHIPPED | OUTPATIENT
Start: 2022-05-02 | End: 2022-05-29

## 2022-05-02 RX ORDER — TRAZODONE HYDROCHLORIDE 50 MG/1
50 TABLET ORAL NIGHTLY
Qty: 30 TABLET | Refills: 2 | Status: SHIPPED | OUTPATIENT
Start: 2022-05-02 | End: 2022-06-24

## 2022-05-02 NOTE — TELEPHONE ENCOUNTER
Refill Request    Medication request: ***    LOV:6/24/2021 Mary Ellen Mcgee MD   Due back to clinic per last office note:  ***  NOV: Visit date not found      [place]: *** #***    Urine drug screen (if applicable): ***  Pain contract: ***    LOV plan (if weaning or changing medications): ***

## 2022-05-02 NOTE — TELEPHONE ENCOUNTER
From: Katelin Bean  To: Adam Gonzalez MD  Sent: 5/1/2022 4:23 PM CDT  Subject: Still waiting for Refills on my prescriptions    Good afternoon Doctor   When would I be getting the refills on mu presentations that I sent a request 5 days ago. Please elaborate what the hold up may be.      Thank you

## 2022-05-02 NOTE — TELEPHONE ENCOUNTER
Refill Request    Medication request: HYDROcodone-acetaminophen  MG Oral Tab. Take 1 tablet by mouth every 4-6 hours PRN pain (max 5 tabs in 24 hours). LOV:01/11/22 (EMG - BUE); 10/18/21 (OV)Perla Mcgee MD   Due back to clinic per last office note:  Per Dr. Macho Gudino: Lily Kim in about 2 months (12/18/2021). \"  NOV: 5/12/2022     [place]: 3/29/2022 #150    Urine drug screen (if applicable): none  Pain contract: none    LOV plan (if weaning or changing medications): Per LOV note - no changes to medications at this time. Per OV note from 6/24/21: \"He will continue with the [place] as needed for the pain. He will try Trazodone for the insomnia. \"        Refill Request    Medication request: traZODone 50 MG Oral Tab. Take 1 tablet (50 mg total) by mouth nightly. AUK:5/74/5721 Winnie Tobar MD   Due back to clinic per last office note: Per Dr. Macho Gudino: Lily Kim in about 2 months (12/18/2021). \"  NOV: 5/12/2022     [place]: 3/27/2022 #30    Urine drug screen (if applicable): none  Pain contract: none    LOV plan (if weaning or changing medications): Per LOV note - no changes to medications at this time. Per OV note from 6/24/21: \"He will continue with the [place] as needed for the pain. He will try Trazodone for the insomnia. \"

## 2022-05-02 NOTE — TELEPHONE ENCOUNTER
Patient calling to find out status of his prescription. Why is it taking so long to refill? He is in a lot of pain. Please call patient with status.

## 2022-05-12 ENCOUNTER — TELEPHONE (OUTPATIENT)
Dept: PHYSICAL MEDICINE AND REHAB | Facility: CLINIC | Age: 52
End: 2022-05-12

## 2022-05-12 ENCOUNTER — OFFICE VISIT (OUTPATIENT)
Dept: PHYSICAL MEDICINE AND REHAB | Facility: CLINIC | Age: 52
End: 2022-05-12
Payer: MEDICAID

## 2022-05-12 VITALS
WEIGHT: 265 LBS | SYSTOLIC BLOOD PRESSURE: 144 MMHG | OXYGEN SATURATION: 96 % | DIASTOLIC BLOOD PRESSURE: 96 MMHG | HEART RATE: 82 BPM | HEIGHT: 71 IN | BODY MASS INDEX: 37.1 KG/M2

## 2022-05-12 DIAGNOSIS — G56.21 ULNAR NEUROPATHY AT ELBOW OF RIGHT UPPER EXTREMITY: ICD-10-CM

## 2022-05-12 DIAGNOSIS — G56.03 BILATERAL CARPAL TUNNEL SYNDROME: ICD-10-CM

## 2022-05-12 DIAGNOSIS — M47.812 ARTHROPATHY OF CERVICAL FACET JOINT: ICD-10-CM

## 2022-05-12 DIAGNOSIS — E66.9 OBESITY (BMI 30-39.9): ICD-10-CM

## 2022-05-12 DIAGNOSIS — G56.22 ULNAR NEUROPATHY AT ELBOW OF LEFT UPPER EXTREMITY: ICD-10-CM

## 2022-05-12 DIAGNOSIS — M48.02 CERVICAL STENOSIS OF SPINE: ICD-10-CM

## 2022-05-12 DIAGNOSIS — M50.90 CERVICAL DISC DISEASE: Primary | ICD-10-CM

## 2022-05-12 DIAGNOSIS — M47.812 ARTHROPATHY OF CERVICAL FACET JOINT: Primary | ICD-10-CM

## 2022-05-12 DIAGNOSIS — M54.12 CERVICAL RADICULOPATHY: ICD-10-CM

## 2022-05-12 DIAGNOSIS — Z98.1 S/P CERVICAL SPINAL FUSION: ICD-10-CM

## 2022-05-12 PROCEDURE — 3080F DIAST BP >= 90 MM HG: CPT | Performed by: PHYSICAL MEDICINE & REHABILITATION

## 2022-05-12 PROCEDURE — 99214 OFFICE O/P EST MOD 30 MIN: CPT | Performed by: PHYSICAL MEDICINE & REHABILITATION

## 2022-05-12 PROCEDURE — 3077F SYST BP >= 140 MM HG: CPT | Performed by: PHYSICAL MEDICINE & REHABILITATION

## 2022-05-12 PROCEDURE — 3008F BODY MASS INDEX DOCD: CPT | Performed by: PHYSICAL MEDICINE & REHABILITATION

## 2022-05-12 NOTE — TELEPHONE ENCOUNTER
Initiated authorization for left 3rd occipital nerve, C3/C7/C8 medial branch radiofrequency neurotomies CPT 03982+00954 dx:M47.812 to be done at [address] [place] [address] with Ted online  Case #[id number].   [employer] requested clinicals-clinicals uploaded  Status: pending

## 2022-05-12 NOTE — PATIENT INSTRUCTIONS
Plan  He will get back into the chiropractic care with Dr. Edmund Peacock. I will repeat the left 3rd occipital nerve and left C3, C7, and C8 radiofrequency neurotomies under MAC. He would like to hold on carpal tunnel injections at this time. Once he is feeling better after having the above, then I will start to wean him down on the [place].    The patient will follow up in 2 months, but the patient will call me 2 weeks after having the injection to let me know how the injection worked.

## 2022-05-16 NOTE — TELEPHONE ENCOUNTER
Received Denial from [address] for left 3rd occipital nerve, C3/C7/C8 medial branch radiofrequency neurotomies  Denial reason-Based on [facility] Comprehensive Musculoskeletal Management Guideline, :  Radiofrequency Joint Ablation/Denervation, we cannot approve this request because  the following are needed: There was at least 50% improvement of pain for at least 12  weeks after the last treatment. The information provided does not show what is needed. Contacted Ms Rubiasheri Ying at [address] who states the documentation only showed 50% relief for one month then 30% relief after that.   Ms Bethany Ying states next option would be a Reconsideration fax or P2P which is allotted for 7 days post denial

## 2022-05-18 ENCOUNTER — TELEPHONE (OUTPATIENT)
Dept: NEUROLOGY | Facility: CLINIC | Age: 52
End: 2022-05-18

## 2022-05-18 DIAGNOSIS — Z98.1 S/P CERVICAL SPINAL FUSION: ICD-10-CM

## 2022-05-18 DIAGNOSIS — M47.812 ARTHROPATHY OF CERVICAL FACET JOINT: Primary | ICD-10-CM

## 2022-05-18 NOTE — TELEPHONE ENCOUNTER
[employer] for authorization of approval for Left C2-3 and C7-T1 Zygapophyseal joint injections cpt codes 25563-KD, 46552-KV, [id number]. Uploaded clinical notes.  Case Number: [id number] pending approval.

## 2022-05-18 NOTE — TELEPHONE ENCOUNTER
Spoke with patient and let him know the MBRFN has been denied but Dr. Ashley Galo would like to do left C2-3 and C7-T1 z-joint injections and he wanted to know if the patient needs to have MAC sedation. Patient stated he would like to have MAC. Order modified for left C2-3 and C7-T1 z-joint injections under MAC.

## 2022-05-19 NOTE — TELEPHONE ENCOUNTER
[employer] to check on status of authorization for Left C2-3 and C7-T1 Zygapophyseal joint injections. They were denied  Based on [facility] Comprehensive Musculoskeletal Management Guideline, : Facet Joint Injections/Medial Branch Blocks, we cannot approve this request because   the following are needed: There is neck or back pain that does not travel to other areas of the body. There was at least 80% less pain. This improvement should have lasted for the amount   of time the drug used was meant to work. Or there has been a spine fusion since the   last injection and this request will be at a level of the spine that has not been fused. Other conditions that could be the cause of this pain have been ruled out. These may   include: one, infection. Two, tumor. Three, fracture. Or four, cord compression in the   Spine. Escobar [email or web address] scheduled peer to Peer with Dr. Connie Whitakeron 05/20/22 at 2:15 pm.CST. Case Number: [id number]. Will inform Dr. Enma Mendez.

## 2022-05-20 NOTE — TELEPHONE ENCOUNTER
I spoke with Dr. Cl Nina he stated that he would authorization for cervical medial branch blocks and the authorization number is [id number]. If he does well with these, then I can re-order the RFN.

## 2022-05-23 NOTE — TELEPHONE ENCOUNTER
Patient has been scheduled for Left C2-3 and C7-T1 Zygapophyseal joint injections  on 07/08/22 at the [address] [place] [address] with [address]. -Anesthesia type: MAC.  -If receiving MAC or IVC sedation patient will need to get COVID tested 3 days prior even if already vaccinated (order placed by [facility].)  -If scheduling [address] [place] [address] covid testing required for all procedures whether patient is vaccinated or not. -Patient informed not to eat or drink anything after midnight the night prior to the procedure, if being sedated. -Patient was advised that if he/she does receive the covid vaccine it needs to be at least 2 weeks before or after the injection. -Medications and allergies reviewed. -Patient reminded to hold NSAIDs (Ibuprofen, ASA 81, Aleve, Naproxen, Mobic etc.) for 3 days prior to [facility]  if BMI is greater than 35. For Cervical injections only hold multivitamins, Vitamin E, Fish Oil, Phentermine (Lomaira) for 7 days prior to injection and NSAIDS.  mg to be held for 7 days prior to injections.  -If patient is receiving MAC/IVCS Phentermine Gaylia Few) will need to be held for 7 days prior to injection.  -If on blood thinner clearance has been received to hold this medication by provider.   -Patient informed he/she will need a  to and from procedure. -[facility] is located in the [facility] 1st floor. Patient may park in the yellow parking. Patient verbalized understanding and agrees with plan.  -----> Scheduled in Epic: Yes  -----> Scheduled in [employer]:  Yes

## 2022-05-23 NOTE — TELEPHONE ENCOUNTER
After Peer to Peer Left C2-3 and C7-T1 Zygapophyseal joint injections were approved. Authorization Number: [id number] valid 5/20/2022 - 7/19/2022. Will inform Nursing.

## 2022-05-29 DIAGNOSIS — M54.16 LUMBAR RADICULOPATHY: ICD-10-CM

## 2022-05-31 RX ORDER — TRAZODONE HYDROCHLORIDE 50 MG/1
50 TABLET ORAL NIGHTLY
Qty: 30 TABLET | Refills: 2 | Status: CANCELLED | OUTPATIENT
Start: 2022-05-31

## 2022-06-01 RX ORDER — HYDROCODONE BITARTRATE AND ACETAMINOPHEN 10; 325 MG/1; MG/1
TABLET ORAL
Qty: 150 TABLET | Refills: 0 | Status: SHIPPED | OUTPATIENT
Start: 2022-06-01

## 2022-06-24 DIAGNOSIS — M54.16 LUMBAR RADICULOPATHY: ICD-10-CM

## 2022-06-27 RX ORDER — HYDROCODONE BITARTRATE AND ACETAMINOPHEN 10; 325 MG/1; MG/1
TABLET ORAL
Qty: 150 TABLET | Refills: 0 | Status: SHIPPED | OUTPATIENT
Start: 2022-07-01

## 2022-06-27 RX ORDER — TRAZODONE HYDROCHLORIDE 50 MG/1
50 TABLET ORAL NIGHTLY
Qty: 30 TABLET | Refills: 2 | Status: SHIPPED | OUTPATIENT
Start: 2022-06-27

## 2022-06-27 NOTE — TELEPHONE ENCOUNTER
Refill Request    Medication request: HYDROcodone-acetaminophen  MG Oral Tab  Take 1 tablet by mouth every 4-6 hours PRN pain (max 5 tabs in 24 hours). LOV:5 /12/22  Due back to clinic per last office note:  \"follow up in 2 months\"  NOV: 8/18/22     [place] ref[place]: 6/1/22 #120    Urine drug screen (if applicable): none  Pain contract: Valid until 5/16/23    LOV plan (if weaning or changing medications): Per Katya Teague at [address] [place] [address]: \"Once he is feeling better after having the above, then I will start to wean him down on the Norco.\"         Medication request: traZODone 50 MG Oral Tab  Take 1 tablet (50 mg total) by mouth nightly.      [place] ref[place]: 5/2/22 #30

## 2022-07-05 ENCOUNTER — LAB ENCOUNTER (OUTPATIENT)
Dept: LAB | Facility: HOSPITAL | Age: 52
End: 2022-07-05
Attending: PHYSICAL MEDICINE & REHABILITATION
Payer: MEDICAID

## 2022-07-05 DIAGNOSIS — Z01.818 PREOP TESTING: ICD-10-CM

## 2022-07-05 LAB — SARS-COV-2 RNA RESP QL NAA+PROBE: NOT DETECTED

## 2022-07-08 ENCOUNTER — ANESTHESIA EVENT (OUTPATIENT)
Dept: SURGERY | Facility: HOSPITAL | Age: 52
End: 2022-07-08
Payer: MEDICAID

## 2022-07-08 ENCOUNTER — HOSPITAL ENCOUNTER (OUTPATIENT)
Facility: HOSPITAL | Age: 52
Setting detail: HOSPITAL OUTPATIENT SURGERY
Discharge: HOME | End: 2022-07-08
Attending: PHYSICAL MEDICINE & REHABILITATION | Admitting: PHYSICAL MEDICINE & REHABILITATION
Payer: MEDICAID

## 2022-07-08 ENCOUNTER — APPOINTMENT (OUTPATIENT)
Dept: GENERAL RADIOLOGY | Facility: HOSPITAL | Age: 52
End: 2022-07-08
Attending: PHYSICAL MEDICINE & REHABILITATION
Payer: MEDICAID

## 2022-07-08 ENCOUNTER — ANESTHESIA (OUTPATIENT)
Dept: SURGERY | Facility: HOSPITAL | Age: 52
End: 2022-07-08
Payer: MEDICAID

## 2022-07-08 VITALS
DIASTOLIC BLOOD PRESSURE: 82 MMHG | BODY MASS INDEX: 35.84 KG/M2 | OXYGEN SATURATION: 97 % | HEART RATE: 62 BPM | TEMPERATURE: 98 F | SYSTOLIC BLOOD PRESSURE: 116 MMHG | HEIGHT: 71 IN | WEIGHT: 256 LBS | RESPIRATION RATE: 16 BRPM

## 2022-07-08 DIAGNOSIS — Z98.1 S/P CERVICAL SPINAL FUSION: ICD-10-CM

## 2022-07-08 DIAGNOSIS — Z01.818 PREOP TESTING: Primary | ICD-10-CM

## 2022-07-08 DIAGNOSIS — M47.812 ARTHROPATHY OF CERVICAL FACET JOINT: ICD-10-CM

## 2022-07-08 PROCEDURE — 64491 INJ PARAVERT F JNT C/T 2 LEV: CPT | Performed by: PHYSICAL MEDICINE & REHABILITATION

## 2022-07-08 PROCEDURE — 3E0U3BZ INTRODUCTION OF ANESTHETIC AGENT INTO JOINTS, PERCUTANEOUS APPROACH: ICD-10-PCS | Performed by: PHYSICAL MEDICINE & REHABILITATION

## 2022-07-08 PROCEDURE — 3E0U33Z INTRODUCTION OF ANTI-INFLAMMATORY INTO JOINTS, PERCUTANEOUS APPROACH: ICD-10-PCS | Performed by: PHYSICAL MEDICINE & REHABILITATION

## 2022-07-08 PROCEDURE — 64490 INJ PARAVERT F JNT C/T 1 LEV: CPT | Performed by: PHYSICAL MEDICINE & REHABILITATION

## 2022-07-08 RX ORDER — MORPHINE SULFATE 4 MG/ML
2 INJECTION, SOLUTION INTRAMUSCULAR; INTRAVENOUS EVERY 10 MIN PRN
Status: DISCONTINUED | OUTPATIENT
Start: 2022-07-08 | End: 2022-07-08

## 2022-07-08 RX ORDER — LIDOCAINE HYDROCHLORIDE 10 MG/ML
INJECTION, SOLUTION EPIDURAL; INFILTRATION; INTRACAUDAL; PERINEURAL AS NEEDED
Status: DISCONTINUED | OUTPATIENT
Start: 2022-07-08 | End: 2022-07-08 | Stop reason: SURG

## 2022-07-08 RX ORDER — HYDROMORPHONE HYDROCHLORIDE 1 MG/ML
0.4 INJECTION, SOLUTION INTRAMUSCULAR; INTRAVENOUS; SUBCUTANEOUS EVERY 5 MIN PRN
Status: DISCONTINUED | OUTPATIENT
Start: 2022-07-08 | End: 2022-07-08

## 2022-07-08 RX ORDER — MORPHINE SULFATE 4 MG/ML
4 INJECTION, SOLUTION INTRAMUSCULAR; INTRAVENOUS EVERY 10 MIN PRN
Status: DISCONTINUED | OUTPATIENT
Start: 2022-07-08 | End: 2022-07-08

## 2022-07-08 RX ORDER — HYDROMORPHONE HYDROCHLORIDE 1 MG/ML
0.2 INJECTION, SOLUTION INTRAMUSCULAR; INTRAVENOUS; SUBCUTANEOUS EVERY 5 MIN PRN
Status: DISCONTINUED | OUTPATIENT
Start: 2022-07-08 | End: 2022-07-08

## 2022-07-08 RX ORDER — MORPHINE SULFATE 10 MG/ML
6 INJECTION, SOLUTION INTRAMUSCULAR; INTRAVENOUS EVERY 10 MIN PRN
Status: DISCONTINUED | OUTPATIENT
Start: 2022-07-08 | End: 2022-07-08

## 2022-07-08 RX ORDER — TRIAMCINOLONE ACETONIDE 40 MG/ML
INJECTION, SUSPENSION INTRA-ARTICULAR; INTRAMUSCULAR AS NEEDED
Status: DISCONTINUED | OUTPATIENT
Start: 2022-07-08 | End: 2022-07-08 | Stop reason: HOSPADM

## 2022-07-08 RX ORDER — FAMOTIDINE 20 MG/1
20 TABLET, FILM COATED ORAL ONCE
Status: COMPLETED | OUTPATIENT
Start: 2022-07-08 | End: 2022-07-08

## 2022-07-08 RX ORDER — GLYCOPYRROLATE 0.2 MG/ML
INJECTION, SOLUTION INTRAMUSCULAR; INTRAVENOUS AS NEEDED
Status: DISCONTINUED | OUTPATIENT
Start: 2022-07-08 | End: 2022-07-08 | Stop reason: SURG

## 2022-07-08 RX ORDER — LIDOCAINE HYDROCHLORIDE 10 MG/ML
INJECTION, SOLUTION EPIDURAL; INFILTRATION; INTRACAUDAL; PERINEURAL AS NEEDED
Status: DISCONTINUED | OUTPATIENT
Start: 2022-07-08 | End: 2022-07-08 | Stop reason: HOSPADM

## 2022-07-08 RX ORDER — SODIUM CHLORIDE, SODIUM LACTATE, POTASSIUM CHLORIDE, CALCIUM CHLORIDE 600; 310; 30; 20 MG/100ML; MG/100ML; MG/100ML; MG/100ML
INJECTION, SOLUTION INTRAVENOUS CONTINUOUS
Status: DISCONTINUED | OUTPATIENT
Start: 2022-07-08 | End: 2022-07-08

## 2022-07-08 RX ORDER — HYDROMORPHONE HYDROCHLORIDE 1 MG/ML
0.6 INJECTION, SOLUTION INTRAMUSCULAR; INTRAVENOUS; SUBCUTANEOUS EVERY 5 MIN PRN
Status: DISCONTINUED | OUTPATIENT
Start: 2022-07-08 | End: 2022-07-08

## 2022-07-08 RX ORDER — ACETAMINOPHEN 500 MG
1000 TABLET ORAL ONCE
Status: COMPLETED | OUTPATIENT
Start: 2022-07-08 | End: 2022-07-08

## 2022-07-08 RX ORDER — NALOXONE HYDROCHLORIDE 0.4 MG/ML
80 INJECTION, SOLUTION INTRAMUSCULAR; INTRAVENOUS; SUBCUTANEOUS AS NEEDED
Status: DISCONTINUED | OUTPATIENT
Start: 2022-07-08 | End: 2022-07-08

## 2022-07-08 RX ADMIN — SODIUM CHLORIDE, SODIUM LACTATE, POTASSIUM CHLORIDE, CALCIUM CHLORIDE: 600; 310; 30; 20 INJECTION, SOLUTION INTRAVENOUS at 14:14:00

## 2022-07-08 RX ADMIN — LIDOCAINE HYDROCHLORIDE 50 MG: 10 INJECTION, SOLUTION EPIDURAL; INFILTRATION; INTRACAUDAL; PERINEURAL at 14:22:00

## 2022-07-08 RX ADMIN — SODIUM CHLORIDE, SODIUM LACTATE, POTASSIUM CHLORIDE, CALCIUM CHLORIDE: 600; 310; 30; 20 INJECTION, SOLUTION INTRAVENOUS at 14:35:00

## 2022-07-08 RX ADMIN — GLYCOPYRROLATE 0.1 MG: 0.2 INJECTION, SOLUTION INTRAMUSCULAR; INTRAVENOUS at 14:14:00
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
## 2022-07-08 NOTE — OPERATIVE REPORT
[facility] Operative Note    CERVICAL Z-JOINT/FACET INJECTION  NAME:  Katelin Bean    MR #:    [id number] :  1970     PHYSICIAN:  Adam Mcgee MD        Operative Report    DATE OF PROCEDURE: 2022   PREOPERATIVE DIAGNOSES: Problem   Arthropathy of Cervical Facet Joint   S/P cervical spinal fusion: C3-7 ACDF       POSTOPERATIVE DIAGNOSES:   same    PROCEDURES: left C2-3 and C7-T1 Z-Joint Injection done under fluoroscopic guidance with contrast enhancement. SURGEON: Adam Mcgee MD   ANESTHESIA: MAC   INDICATIONS:      OPERATIVE PROCEDURE:  Written consent was obtained from the patient. The patient was brought into the operating room and placed in the prone position on the fluoroscopy table with pillow underneath the chest and shoulders. The patient's skin was cleaned and draped in a normal sterile fashion. Using AP fluoroscopy, the left C2-3 and C7-T1 z-joints were identified. Skin was anesthetized with 1% PF lidocaine without epinephrine overlying each joint. Then, a 3-1/2 inch, 22-gauge spinal needle was inserted and directed towards the left C2-3 and C7-T1 z-joint(s). When they were engaged, Omnipaque-240 contrast was used to obtain a good arthrogram indicating correct needle placement. Then, aspiration was performed. No blood, fluid, or air was aspirated. Then, each joint was injected with a 1 cc solution of 0.5 cc of 1% PF lidocaine without epinephrine and 0.5 cc of 1 mg/cc of 40 mg of Kenalog/cc. Then, the needles were removed. The left C3-4 z-joint was also injected in the same fashion as the other 2 joints. The patient's skin was cleaned. Band-Aids were applied. The patient was transferred to the cart and into [facility]. The patient was given discharge instructions and will follow up in the clinic as scheduled. Throughout the whole procedure, the patient's pulse oximetry and vital signs were monitored and they remained completely stable.   Also, throughout the whole procedure, prior to injection of any medication, aspiration was performed. No blood, fluid, or air was aspirated at anytime.

## 2022-07-12 NOTE — PROGRESS NOTES
HPI:    Patient ID: Michael Sanchez is a 50year old male. This pleasant 75-year-old male presents as new patient to me and states that he self-referred. His primary concern is a painful left heel that is been present for approximately 1 month.   He is antifungal solution on the nails for about a month and so far he is not seeing a change. I instructed this patient although I do not have a lot of genevieve in the antifungal solutions 1 month clearly is not sufficient.   I encourage washing[place] garaz and Saritha Mccall No

## 2022-07-26 DIAGNOSIS — M54.16 LUMBAR RADICULOPATHY: ICD-10-CM

## 2022-07-27 RX ORDER — HYDROCODONE BITARTRATE AND ACETAMINOPHEN 10; 325 MG/1; MG/1
TABLET ORAL
Qty: 150 TABLET | Refills: 0 | Status: SHIPPED | OUTPATIENT
Start: 2022-07-31

## 2022-07-27 NOTE — TELEPHONE ENCOUNTER
Refill Request    Medication request: HYDROcodone-acetaminophen  MG Oral Tab Take 1 tablet by mouth every 4-6 hours PRN pain (max 5 tabs in 24 hours). LOV: 5/12/22   Due back to clinic per last office note:  \"follow up in 2 months\"  NOV: 8/18/22     [place] ref[place]: 7/1/22 #150    Urine drug screen (if applicable): none  Pain contract: Valid until 5/16/23    LOV plan (if weaning or changing medications): Per  at [address] [place] [address]: \"He will get back into the chiropractic care with Dr. Garfield Velazquez. I will repeat the left 3rd occipital nerve and left C3, C7, and C8 radiofrequency neurotomies under MAC. He would like to hold on carpal tunnel injections at this time. Once he is feeling better after having the above, then I will start to wean him down on the [place].\"    Patient had left C2-3 and C7-T1 z-joint injections on 7/8/22.
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
## 2022-08-18 ENCOUNTER — OFFICE VISIT (OUTPATIENT)
Dept: PHYSICAL MEDICINE AND REHAB | Facility: CLINIC | Age: 52
End: 2022-08-18
Payer: MEDICAID

## 2022-08-18 VITALS — BODY MASS INDEX: 33.88 KG/M2 | HEART RATE: 97 BPM | OXYGEN SATURATION: 96 % | HEIGHT: 71 IN | WEIGHT: 242 LBS

## 2022-08-18 DIAGNOSIS — M45.0 ANKYLOSING SPONDYLITIS OF MULTIPLE SITES IN SPINE (HCC): ICD-10-CM

## 2022-08-18 DIAGNOSIS — E66.9 OBESITY (BMI 30-39.9): ICD-10-CM

## 2022-08-18 DIAGNOSIS — G56.03 BILATERAL CARPAL TUNNEL SYNDROME: Primary | ICD-10-CM

## 2022-08-18 DIAGNOSIS — M50.90 CERVICAL DISC DISEASE: ICD-10-CM

## 2022-08-18 DIAGNOSIS — M48.02 CERVICAL STENOSIS OF SPINE: ICD-10-CM

## 2022-08-18 DIAGNOSIS — Z98.1 S/P CERVICAL SPINAL FUSION: ICD-10-CM

## 2022-08-18 DIAGNOSIS — G56.22 ULNAR NEUROPATHY AT ELBOW OF LEFT UPPER EXTREMITY: ICD-10-CM

## 2022-08-18 DIAGNOSIS — G56.21 ULNAR NEUROPATHY AT ELBOW OF RIGHT UPPER EXTREMITY: ICD-10-CM

## 2022-08-18 PROCEDURE — 3008F BODY MASS INDEX DOCD: CPT | Performed by: PHYSICAL MEDICINE & REHABILITATION

## 2022-08-18 PROCEDURE — 99214 OFFICE O/P EST MOD 30 MIN: CPT | Performed by: PHYSICAL MEDICINE & REHABILITATION

## 2022-08-18 RX ORDER — TIZANIDINE 4 MG/1
4 TABLET ORAL NIGHTLY
Qty: 30 TABLET | Refills: 3 | Status: SHIPPED | OUTPATIENT
Start: 2022-08-18

## 2022-08-18 NOTE — PATIENT INSTRUCTIONS
Plan  He will decrease the Norco back to 4 tablets per day. He will try Tizanidine 4 mg at night time for the sleep. He will continue with Dr. William Coon. He will look into starting PT for the cervical spine. He will follow up in 3-6 months or sooner if needed.

## 2022-08-30 ENCOUNTER — PATIENT MESSAGE (OUTPATIENT)
Dept: PHYSICAL MEDICINE AND REHAB | Facility: CLINIC | Age: 52
End: 2022-08-30

## 2022-08-30 DIAGNOSIS — M54.16 LUMBAR RADICULOPATHY: ICD-10-CM

## 2022-08-31 NOTE — TELEPHONE ENCOUNTER
From: Hailee Ferro  To: John Duncan MD  Sent: 8/30/2022 1:13 PM CDT  Subject: In regards to the new prescription     [address] [facility] all [facility] well, I'm sorry I didn't get the chance to request my Narco prescription earlier as usual. With being said, just giving you an update on the new prescription that you resendly prescribe to me. The muscle relaxers unfortunately, are not helping so far. I get to sleep for a couple hours and I'm back up. The only one that has keeping the pain away at as well n I can sleep are the Hydrocodone so far. I thought shere that with you, and thank you for doing an amazing job with me so far.      Best Regards  Cee

## 2022-08-31 NOTE — TELEPHONE ENCOUNTER
Refill Request    Medication request: HYDROcodone-acetaminophen  MG Oral Tab Take 1 tablet by mouth every 4-6 hours PRN pain (max 5 tabs in 24 hours). LOV: 8/18/22  Due back to clinic per last office note:  \"follow up in 3-6 months \"  NOV: Visit date not found      [place] ref[place]: 7/31/22 #150    Urine drug screen (if applicable): none  Pain contract: Valid until 5/16/23    LOV plan (if weaning or changing medications): Per  at [address] [place] [address]: \"He will decrease the Norco back to 4 tablets per day. \"

## 2022-09-02 RX ORDER — HYDROCODONE BITARTRATE AND ACETAMINOPHEN 10; 325 MG/1; MG/1
TABLET ORAL
Qty: 150 TABLET | Refills: 0 | Status: SHIPPED | OUTPATIENT
Start: 2022-09-02

## 2022-09-30 DIAGNOSIS — M54.16 LUMBAR RADICULOPATHY: ICD-10-CM

## 2022-09-30 NOTE — TELEPHONE ENCOUNTER
Refill Request    Medication request: HYDROcodone-acetaminophen  MG Oral Tab. Take 1 tablet by mouth every 4-6 hours PRN pain (max 5 tabs in 24 hours). LOV: 08/18/2022 with Mehul Wang M.D. Due back to clinic per last office note: Per Dr. Zoila Wang: \"He will follow up in 3-6 months or sooner if needed. \"  NOV: Visit date not found   - Sent patient Pixchart message to make follow-up appointment     [place] refill: 09/02/2022 #150    Urine drug screen (if applicable): none  Pain contract: VALID - Expires on 05/16/2023. LOV plan (if weaning or changing medications): Per Dr. Zoila Wang: \"He will decrease the Norco back to 4 tablets per day. \"

## 2022-10-04 RX ORDER — HYDROCODONE BITARTRATE AND ACETAMINOPHEN 10; 325 MG/1; MG/1
TABLET ORAL
Qty: 150 TABLET | Refills: 0 | Status: SHIPPED | OUTPATIENT
Start: 2022-10-04

## 2022-10-25 DIAGNOSIS — M54.16 LUMBAR RADICULOPATHY: ICD-10-CM

## 2022-10-26 NOTE — TELEPHONE ENCOUNTER
Pt is calling to check status on medication refill, he has made his f/u appt in Feb. Please advise-NL

## 2022-10-26 NOTE — TELEPHONE ENCOUNTER
Message below noted. Patient has f/u appt scheduled for 2/13/22. Rx is currently pending review/approval by Melvi Bach. Message sent to patient via [employer]

## 2022-10-27 RX ORDER — HYDROCODONE BITARTRATE AND ACETAMINOPHEN 10; 325 MG/1; MG/1
TABLET ORAL
Qty: 150 TABLET | Refills: 0 | Status: SHIPPED | OUTPATIENT
Start: 2022-10-27

## 2022-11-01 ENCOUNTER — APPOINTMENT (OUTPATIENT)
Dept: GENERAL RADIOLOGY | Facility: HOSPITAL | Age: 52
End: 2022-11-01
Payer: MEDICAID

## 2022-11-01 ENCOUNTER — HOSPITAL ENCOUNTER (EMERGENCY)
Facility: HOSPITAL | Age: 52
Discharge: HOME | End: 2022-11-01
Attending: EMERGENCY MEDICINE
Payer: MEDICAID

## 2022-11-01 VITALS
DIASTOLIC BLOOD PRESSURE: 96 MMHG | WEIGHT: 251 LBS | RESPIRATION RATE: 18 BRPM | BODY MASS INDEX: 35 KG/M2 | OXYGEN SATURATION: 95 % | HEART RATE: 96 BPM | TEMPERATURE: 98 F | SYSTOLIC BLOOD PRESSURE: 142 MMHG

## 2022-11-01 DIAGNOSIS — M25.572 ACUTE LEFT ANKLE PAIN: Primary | ICD-10-CM

## 2022-11-01 PROCEDURE — 99283 EMERGENCY DEPT VISIT LOW MDM: CPT

## 2022-11-01 PROCEDURE — 73610 X-RAY EXAM OF ANKLE: CPT

## 2022-11-01 PROCEDURE — 99284 EMERGENCY DEPT VISIT MOD MDM: CPT

## 2022-11-01 RX ORDER — NAPROXEN 500 MG/1
500 TABLET ORAL 2 TIMES DAILY PRN
Qty: 20 TABLET | Refills: 0 | Status: SHIPPED | OUTPATIENT
Start: 2022-11-01

## 2022-11-01 RX ORDER — PREDNISONE 20 MG/1
40 TABLET ORAL ONCE
Status: COMPLETED | OUTPATIENT
Start: 2022-11-01 | End: 2022-11-01

## 2022-11-01 RX ORDER — PREDNISONE 20 MG/1
40 TABLET ORAL DAILY
Qty: 8 TABLET | Refills: 0 | Status: SHIPPED | OUTPATIENT
Start: 2022-11-01 | End: 2022-11-05

## 2022-11-01 RX ORDER — TRAMADOL HYDROCHLORIDE 50 MG/1
50 TABLET ORAL EVERY 8 HOURS PRN
Qty: 14 TABLET | Refills: 0 | Status: SHIPPED | OUTPATIENT
Start: 2022-11-01 | End: 2022-11-02

## 2022-11-01 RX ORDER — TRAMADOL HYDROCHLORIDE 50 MG/1
50 TABLET ORAL ONCE
Status: COMPLETED | OUTPATIENT
Start: 2022-11-01 | End: 2022-11-01

## 2022-11-01 RX ORDER — IBUPROFEN 600 MG/1
600 TABLET ORAL ONCE
Status: COMPLETED | OUTPATIENT
Start: 2022-11-01 | End: 2022-11-01

## 2022-11-02 ENCOUNTER — TELEPHONE (OUTPATIENT)
Dept: PHYSICAL MEDICINE AND REHAB | Facility: CLINIC | Age: 52
End: 2022-11-02
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
## 2022-11-02 NOTE — DISCHARGE INSTRUCTIONS
Rest ice elevate follow-up with your doctor naproxen twice daily for pain tramadol for severe pain prednisone daily

## 2022-11-04 ENCOUNTER — PATIENT OUTREACH (OUTPATIENT)
Dept: CASE MANAGEMENT | Age: 52
End: 2022-11-04

## 2022-11-04 NOTE — PROGRESS NOTES
1st attempt; pt had recent ED visit, calling to offer PCP f/u apt (dc 11/1)      [facility]  Internal Medicine  [place] [phone]      appt scheduled for 11/14/22 @ 9:00 AM Dr. Elise Springer to pt who verbalized understanding    Closing encounter

## 2022-11-26 DIAGNOSIS — M54.16 LUMBAR RADICULOPATHY: ICD-10-CM

## 2022-11-28 RX ORDER — HYDROCODONE BITARTRATE AND ACETAMINOPHEN 10; 325 MG/1; MG/1
TABLET ORAL
Qty: 150 TABLET | Refills: 0 | Status: SHIPPED | OUTPATIENT
Start: 2022-12-01

## 2022-12-06 ENCOUNTER — OFFICE VISIT (OUTPATIENT)
Dept: INTERNAL MEDICINE CLINIC | Facility: CLINIC | Age: 52
End: 2022-12-06
Payer: MEDICAID

## 2022-12-06 VITALS
HEART RATE: 74 BPM | WEIGHT: 272.81 LBS | OXYGEN SATURATION: 98 % | SYSTOLIC BLOOD PRESSURE: 106 MMHG | DIASTOLIC BLOOD PRESSURE: 72 MMHG | BODY MASS INDEX: 38.19 KG/M2 | HEIGHT: 71 IN

## 2022-12-06 DIAGNOSIS — R03.0 ELEVATED BLOOD PRESSURE READING: ICD-10-CM

## 2022-12-06 DIAGNOSIS — B35.1 ONYCHOMYCOSIS: ICD-10-CM

## 2022-12-06 DIAGNOSIS — E66.9 OBESITY (BMI 30-39.9): ICD-10-CM

## 2022-12-06 DIAGNOSIS — R25.2 LEG CRAMPS: ICD-10-CM

## 2022-12-06 DIAGNOSIS — R79.89 LOW TESTOSTERONE IN MALE: ICD-10-CM

## 2022-12-06 DIAGNOSIS — Z00.00 ANNUAL PHYSICAL EXAM: Primary | ICD-10-CM

## 2022-12-06 DIAGNOSIS — M47.812 OSTEOARTHRITIS OF CERVICAL SPINE, UNSPECIFIED SPINAL OSTEOARTHRITIS COMPLICATION STATUS: ICD-10-CM

## 2022-12-06 DIAGNOSIS — D35.00 ADRENAL ADENOMA, UNSPECIFIED LATERALITY: ICD-10-CM

## 2022-12-06 DIAGNOSIS — R42 VERTIGO: ICD-10-CM

## 2022-12-06 DIAGNOSIS — Z98.1 S/P CERVICAL SPINAL FUSION: ICD-10-CM

## 2022-12-06 DIAGNOSIS — E29.1 HYPOGONADISM IN MALE: ICD-10-CM

## 2022-12-06 DIAGNOSIS — Z87.19 S/P LAPAROSCOPIC HERNIA REPAIR: ICD-10-CM

## 2022-12-06 DIAGNOSIS — Z98.890 S/P LAPAROSCOPIC HERNIA REPAIR: ICD-10-CM

## 2022-12-06 DIAGNOSIS — M48.02 CERVICAL STENOSIS OF SPINE: ICD-10-CM

## 2022-12-06 PROCEDURE — 3008F BODY MASS INDEX DOCD: CPT | Performed by: INTERNAL MEDICINE

## 2022-12-06 PROCEDURE — 99396 PREV VISIT EST AGE 40-64: CPT | Performed by: INTERNAL MEDICINE

## 2022-12-06 PROCEDURE — 3074F SYST BP LT 130 MM HG: CPT | Performed by: INTERNAL MEDICINE

## 2022-12-06 PROCEDURE — 3078F DIAST BP <80 MM HG: CPT | Performed by: INTERNAL MEDICINE

## 2022-12-06 PROCEDURE — 99213 OFFICE O/P EST LOW 20 MIN: CPT | Performed by: INTERNAL MEDICINE

## 2022-12-06 RX ORDER — IBUPROFEN 600 MG/1
TABLET ORAL
COMMUNITY
Start: 2022-09-27 | End: 2022-12-06

## 2022-12-06 RX ORDER — AMOXICILLIN 500 MG/1
CAPSULE ORAL
COMMUNITY
Start: 2022-09-27 | End: 2022-12-06

## 2022-12-06 RX ORDER — MECLIZINE HYDROCHLORIDE 25 MG/1
25 TABLET ORAL 3 TIMES DAILY PRN
Qty: 30 TABLET | Refills: 0 | Status: SHIPPED | OUTPATIENT
Start: 2022-12-06

## 2022-12-06 RX ORDER — PANTOPRAZOLE SODIUM 20 MG/1
20 TABLET, DELAYED RELEASE ORAL
Qty: 90 TABLET | Refills: 1 | Status: SHIPPED | OUTPATIENT
Start: 2022-12-06 | End: 2023-03-06

## 2022-12-13 ENCOUNTER — TELEPHONE (OUTPATIENT)
Dept: INTERNAL MEDICINE CLINIC | Facility: CLINIC | Age: 52
End: 2022-12-13

## 2022-12-13 NOTE — TELEPHONE ENCOUNTER
Patient is calling in stating he would like to get an order for colon cancer test.    States he would like to know where else he can get his shingles injection. Please call to advise.

## 2022-12-16 ENCOUNTER — OFFICE VISIT (OUTPATIENT)
Dept: PODIATRY CLINIC | Facility: CLINIC | Age: 52
End: 2022-12-16
Payer: MEDICAID

## 2022-12-16 DIAGNOSIS — M25.572 CHRONIC PAIN OF LEFT ANKLE: Primary | ICD-10-CM

## 2022-12-16 DIAGNOSIS — G89.29 CHRONIC PAIN OF LEFT ANKLE: Primary | ICD-10-CM

## 2022-12-16 DIAGNOSIS — B35.1 ONYCHOMYCOSIS: ICD-10-CM

## 2022-12-21 ENCOUNTER — APPOINTMENT (OUTPATIENT)
Dept: PHYSICAL THERAPY | Facility: HOSPITAL | Age: 52
End: 2022-12-21
Attending: INTERNAL MEDICINE
Payer: MEDICAID

## 2022-12-22 ENCOUNTER — APPOINTMENT (OUTPATIENT)
Dept: PHYSICAL THERAPY | Facility: HOSPITAL | Age: 52
End: 2022-12-22
Attending: INTERNAL MEDICINE
Payer: MEDICAID

## 2022-12-22 ENCOUNTER — TELEPHONE (OUTPATIENT)
Dept: PHYSICAL THERAPY | Facility: HOSPITAL | Age: 52
End: 2022-12-22

## 2022-12-22 ENCOUNTER — TELEPHONE (OUTPATIENT)
Dept: INTERNAL MEDICINE CLINIC | Facility: CLINIC | Age: 52
End: 2022-12-22

## 2022-12-22 NOTE — TELEPHONE ENCOUNTER
Patient said he is having pain in his left wrist.  Hard to grab things. He is asking if he should have an X-Ray on his wrist?    Also, asking for orders to have his [place] vaccination.

## 2022-12-27 DIAGNOSIS — R52 PAIN: Primary | ICD-10-CM

## 2022-12-27 DIAGNOSIS — M54.16 LUMBAR RADICULOPATHY: ICD-10-CM

## 2022-12-27 RX ORDER — HYDROCODONE BITARTRATE AND ACETAMINOPHEN 10; 325 MG/1; MG/1
TABLET ORAL
Qty: 150 TABLET | Refills: 0 | Status: SHIPPED | OUTPATIENT
Start: 2022-12-31

## 2022-12-27 NOTE — TELEPHONE ENCOUNTER
Refill Request    Medication request: HYDROcodone-acetaminophen  MG Oral Tab Take 1 tablet by mouth every 4-6 hours PRN pain (max 5 tabs in 24 hours). LOV:8/18/22  Due back to clinic per last office note:  \"follow up in 3-6 months \"  NOV: 1/5/23     [place]: 12/1/22 #150    Urine drug screen (if applicable): none  Pain contract: Valid until 5/16/23    LOV plan (if weaning or changing medications): Per  at LOV: \"He will decrease the Norco back to 4 tablets per day. \"

## 2022-12-28 ENCOUNTER — LAB ENCOUNTER (OUTPATIENT)
Dept: LAB | Facility: HOSPITAL | Age: 52
End: 2022-12-28
Attending: INTERNAL MEDICINE
Payer: MEDICAID

## 2022-12-28 ENCOUNTER — OFFICE VISIT (OUTPATIENT)
Dept: PHYSICAL THERAPY | Facility: HOSPITAL | Age: 52
End: 2022-12-28
Attending: INTERNAL MEDICINE
Payer: MEDICAID

## 2022-12-28 ENCOUNTER — HOSPITAL ENCOUNTER (OUTPATIENT)
Dept: GENERAL RADIOLOGY | Facility: HOSPITAL | Age: 52
Discharge: HOME | End: 2022-12-28
Attending: INTERNAL MEDICINE
Payer: MEDICAID

## 2022-12-28 DIAGNOSIS — R52 PAIN: ICD-10-CM

## 2022-12-28 DIAGNOSIS — M48.02 CERVICAL STENOSIS OF SPINE: ICD-10-CM

## 2022-12-28 DIAGNOSIS — Z00.00 ANNUAL PHYSICAL EXAM: ICD-10-CM

## 2022-12-28 DIAGNOSIS — E66.9 OBESITY (BMI 30-39.9): ICD-10-CM

## 2022-12-28 DIAGNOSIS — R42 VERTIGO: ICD-10-CM

## 2022-12-28 DIAGNOSIS — B35.1 ONYCHOMYCOSIS: ICD-10-CM

## 2022-12-28 DIAGNOSIS — R25.2 LEG CRAMPS: ICD-10-CM

## 2022-12-28 DIAGNOSIS — R03.0 ELEVATED BLOOD PRESSURE READING: ICD-10-CM

## 2022-12-28 DIAGNOSIS — Z98.890 S/P LAPAROSCOPIC HERNIA REPAIR: ICD-10-CM

## 2022-12-28 DIAGNOSIS — R79.89 LOW TESTOSTERONE IN MALE: ICD-10-CM

## 2022-12-28 DIAGNOSIS — D35.00 ADRENAL ADENOMA, UNSPECIFIED LATERALITY: ICD-10-CM

## 2022-12-28 DIAGNOSIS — M47.812 OSTEOARTHRITIS OF CERVICAL SPINE, UNSPECIFIED SPINAL OSTEOARTHRITIS COMPLICATION STATUS: ICD-10-CM

## 2022-12-28 DIAGNOSIS — Z87.19 S/P LAPAROSCOPIC HERNIA REPAIR: ICD-10-CM

## 2022-12-28 DIAGNOSIS — Z98.1 S/P CERVICAL SPINAL FUSION: ICD-10-CM

## 2022-12-28 DIAGNOSIS — E29.1 HYPOGONADISM IN MALE: ICD-10-CM

## 2022-12-28 PROCEDURE — 93005 ELECTROCARDIOGRAM TRACING: CPT

## 2022-12-28 PROCEDURE — 97163 PT EVAL HIGH COMPLEX 45 MIN: CPT

## 2022-12-28 PROCEDURE — 73110 X-RAY EXAM OF WRIST: CPT | Performed by: INTERNAL MEDICINE

## 2022-12-28 PROCEDURE — 97140 MANUAL THERAPY 1/> REGIONS: CPT

## 2022-12-28 PROCEDURE — 93010 ELECTROCARDIOGRAM REPORT: CPT | Performed by: INTERNAL MEDICINE

## 2022-12-29 ENCOUNTER — NURSE ONLY (OUTPATIENT)
Dept: INTERNAL MEDICINE CLINIC | Facility: CLINIC | Age: 52
End: 2022-12-29
Payer: MEDICAID

## 2022-12-29 LAB
ATRIAL RATE: 77 BPM
P AXIS: 45 DEGREES
P-R INTERVAL: 140 MS
Q-T INTERVAL: 370 MS
QRS DURATION: 88 MS
QTC CALCULATION (BEZET): 418 MS
R AXIS: -35 DEGREES
T AXIS: 11 DEGREES
VENTRICULAR RATE: 77 BPM

## 2022-12-29 PROCEDURE — 90471 IMMUNIZATION ADMIN: CPT | Performed by: INTERNAL MEDICINE

## 2022-12-29 PROCEDURE — 90750 HZV VACC RECOMBINANT IM: CPT | Performed by: INTERNAL MEDICINE

## 2023-01-04 ENCOUNTER — OFFICE VISIT (OUTPATIENT)
Dept: PHYSICAL THERAPY | Facility: HOSPITAL | Age: 53
End: 2023-01-04
Attending: INTERNAL MEDICINE
Payer: MEDICAID

## 2023-01-04 DIAGNOSIS — Z76.89 SLEEP CONCERN: ICD-10-CM

## 2023-01-04 DIAGNOSIS — R94.31 ABNORMAL EKG: Primary | ICD-10-CM

## 2023-01-04 DIAGNOSIS — R94.31 ABNORMAL Q WAVES ON ELECTROCARDIOGRAM: ICD-10-CM

## 2023-01-04 PROCEDURE — 97110 THERAPEUTIC EXERCISES: CPT

## 2023-01-04 PROCEDURE — 97140 MANUAL THERAPY 1/> REGIONS: CPT

## 2023-01-05 ENCOUNTER — LAB ENCOUNTER (OUTPATIENT)
Dept: LAB | Facility: HOSPITAL | Age: 53
End: 2023-01-05
Attending: PHYSICAL MEDICINE & REHABILITATION
Payer: MEDICAID

## 2023-01-05 ENCOUNTER — OFFICE VISIT (OUTPATIENT)
Dept: PHYSICAL MEDICINE AND REHAB | Facility: CLINIC | Age: 53
End: 2023-01-05
Payer: MEDICAID

## 2023-01-05 VITALS — OXYGEN SATURATION: 96 % | HEART RATE: 88 BPM | BODY MASS INDEX: 38 KG/M2 | WEIGHT: 270 LBS

## 2023-01-05 DIAGNOSIS — E66.9 OBESITY (BMI 30-39.9): ICD-10-CM

## 2023-01-05 DIAGNOSIS — G56.21 ULNAR NEUROPATHY AT ELBOW OF RIGHT UPPER EXTREMITY: ICD-10-CM

## 2023-01-05 DIAGNOSIS — S69.82XA INJURY OF TRIANGULAR FIBROCARTILAGE COMPLEX (TFCC) OF LEFT WRIST, INITIAL ENCOUNTER: ICD-10-CM

## 2023-01-05 DIAGNOSIS — G56.22 ULNAR NEUROPATHY AT ELBOW OF LEFT UPPER EXTREMITY: ICD-10-CM

## 2023-01-05 DIAGNOSIS — M50.90 CERVICAL DISC DISEASE: Primary | ICD-10-CM

## 2023-01-05 DIAGNOSIS — Z98.1 S/P CERVICAL SPINAL FUSION: ICD-10-CM

## 2023-01-05 DIAGNOSIS — M11.20 CALCIUM PYROPHOSPHATE DEPOSITION DISEASE (CPPD): ICD-10-CM

## 2023-01-05 DIAGNOSIS — Q76.1 CERVICAL FUSION SYNDROME: ICD-10-CM

## 2023-01-05 DIAGNOSIS — M19.032 PRIMARY OSTEOARTHRITIS OF LEFT WRIST: ICD-10-CM

## 2023-01-05 DIAGNOSIS — F11.90 OPIOID USE: ICD-10-CM

## 2023-01-05 DIAGNOSIS — M48.02 CERVICAL STENOSIS OF SPINE: ICD-10-CM

## 2023-01-05 DIAGNOSIS — M45.0 ANKYLOSING SPONDYLITIS OF MULTIPLE SITES IN SPINE (HCC): ICD-10-CM

## 2023-01-05 DIAGNOSIS — G56.03 BILATERAL CARPAL TUNNEL SYNDROME: ICD-10-CM

## 2023-01-05 LAB
AMPHET UR QL SCN: NEGATIVE
BARBITURATES UR QL SCN: NEGATIVE
BENZODIAZ UR QL SCN: NEGATIVE
CANNABINOIDS UR QL SCN: NEGATIVE
COCAINE UR QL: NEGATIVE
CREAT UR-SCNC: 111 MG/DL
MDMA UR QL SCN: NEGATIVE
METHADONE UR QL SCN: NEGATIVE
OXYCODONE UR QL SCN: NEGATIVE
PCP UR QL SCN: NEGATIVE

## 2023-01-05 PROCEDURE — 80307 DRUG TEST PRSMV CHEM ANLYZR: CPT

## 2023-01-05 PROCEDURE — 99215 OFFICE O/P EST HI 40 MIN: CPT | Performed by: PHYSICAL MEDICINE & REHABILITATION

## 2023-01-05 NOTE — PATIENT INSTRUCTIONS
Plan  He will continue with the PT. He will get a resting wrist splint and will use it as needed for the left wrist pain. He will continue with the [place] for the pain. He will see a rheumatologist for the diagnosis of ankylosing spondylitis. He will get a UDS. He will follow up in 3 months or sooner if needed.

## 2023-01-09 ENCOUNTER — OFFICE VISIT (OUTPATIENT)
Dept: PHYSICAL THERAPY | Facility: HOSPITAL | Age: 53
End: 2023-01-09
Attending: INTERNAL MEDICINE
Payer: MEDICAID

## 2023-01-09 PROCEDURE — 97140 MANUAL THERAPY 1/> REGIONS: CPT

## 2023-01-09 PROCEDURE — 97110 THERAPEUTIC EXERCISES: CPT

## 2023-01-11 ENCOUNTER — OFFICE VISIT (OUTPATIENT)
Dept: PHYSICAL THERAPY | Facility: HOSPITAL | Age: 53
End: 2023-01-11
Attending: INTERNAL MEDICINE
Payer: MEDICAID

## 2023-01-11 PROCEDURE — 97012 MECHANICAL TRACTION THERAPY: CPT

## 2023-01-11 PROCEDURE — 97140 MANUAL THERAPY 1/> REGIONS: CPT

## 2023-01-11 PROCEDURE — 97110 THERAPEUTIC EXERCISES: CPT

## 2023-01-16 ENCOUNTER — OFFICE VISIT (OUTPATIENT)
Dept: FAMILY MEDICINE CLINIC | Facility: CLINIC | Age: 53
End: 2023-01-16
Payer: MEDICAID

## 2023-01-16 ENCOUNTER — OFFICE VISIT (OUTPATIENT)
Dept: PHYSICAL THERAPY | Facility: HOSPITAL | Age: 53
End: 2023-01-16
Attending: INTERNAL MEDICINE
Payer: MEDICAID

## 2023-01-16 ENCOUNTER — TELEPHONE (OUTPATIENT)
Dept: INTERNAL MEDICINE CLINIC | Facility: CLINIC | Age: 53
End: 2023-01-16

## 2023-01-16 VITALS
HEIGHT: 71 IN | SYSTOLIC BLOOD PRESSURE: 144 MMHG | BODY MASS INDEX: 37.8 KG/M2 | RESPIRATION RATE: 18 BRPM | DIASTOLIC BLOOD PRESSURE: 80 MMHG | HEART RATE: 76 BPM | OXYGEN SATURATION: 98 % | TEMPERATURE: 98 F | WEIGHT: 270 LBS

## 2023-01-16 DIAGNOSIS — H61.21 IMPACTED CERUMEN OF RIGHT EAR: Primary | ICD-10-CM

## 2023-01-16 PROCEDURE — 97140 MANUAL THERAPY 1/> REGIONS: CPT

## 2023-01-16 PROCEDURE — 97110 THERAPEUTIC EXERCISES: CPT

## 2023-01-16 PROCEDURE — 69210 REMOVE IMPACTED EAR WAX UNI: CPT | Performed by: NURSE PRACTITIONER

## 2023-01-16 PROCEDURE — 3008F BODY MASS INDEX DOCD: CPT | Performed by: NURSE PRACTITIONER

## 2023-01-16 PROCEDURE — 3077F SYST BP >= 140 MM HG: CPT | Performed by: NURSE PRACTITIONER

## 2023-01-16 PROCEDURE — 3079F DIAST BP 80-89 MM HG: CPT | Performed by: NURSE PRACTITIONER

## 2023-01-16 NOTE — TELEPHONE ENCOUNTER
Patient left a voicemail that he is loosing his hearing for the last week and a half. Asking for a call back, who he should see for hearing loss.

## 2023-01-16 NOTE — PROGRESS NOTES
Patient presents with:    Ear problem    HPI: Patient presents for ear feeling very plugged, new hearing loss on right. Symptoms for 10 days. In the past few days he is starting to get some minor discomfort as well. No prior known issue with ear wax. Home treatments tried: None. Denies ear pain, ear drainage, fever, chills. Cerumen Removal Procedure  Patient gave verbal consent. Risks and Benefits of removal were discussed with the patient. Pt agreed to proceed with procedure. Location: Right ear  Indication: TM not visible, feels plugged, decreased hearing. Prep: Hydrogen Peroxide with warm water via sterile syringe. Removal: Otoscope visualization of cerumen, curette, and ear lavage were used to remove the wax  Patient Status: Tolerated Well; No complications      EXAM: RT TM is only approx. 1/4 visible but appears normal.  Small to moderate amount of cerumen remains in canal.  Canal otherwise appears normal.    Assessment:  Cerumen impaction of right ear    Plan:   - Was able to remove approx 2/3 of the cerumen. Remaining portion is hard/sticky and far in canal.  Unable to remove this last portion even after utilization of wax softening gtts x15 mins. - Pt tolerated procedure well. He does have some improvement in hearing, feels a little less plugged. - Advised utilization of Debrox drops at home and return to clinic within next several days for 2nd attempt at removal.  - Follow up sooner if new ear pain, swelling, or drainage.  - Pt does mention some on-going left dorsal foot pain. Has had imaging and seen specialty in the past.  Further Tx out of scope for [facility]. Advised pt follows up with PCP or ortho/podiatry on this. - Pt verbalizes understanding and is agreeable with plan.

## 2023-01-16 NOTE — TELEPHONE ENCOUNTER
Returned call to patient messaging re: his hearing diminishing bilaterally, but worse on the right side than left over the past 7-10 days. States he is afebrile, had some nasal/ head congestion but no cold/ cough or other symptoms. Pt estimates he has lost 85% of his hearing in Right ear. Discussed range pf possible underlying root cause issues; Pt has not taken any OTC meds  see if he has impacted cerumen that can be flushed out. Advised walk-in clinic as option this afternoon to have ears examined and possibly ears irrigated if impacted cerumen noted. Pt agrees and states he is at his PT session currently. Will proceed to [facility] soon when finished w/ PT. Leonel Coyne

## 2023-01-17 ENCOUNTER — HOSPITAL ENCOUNTER (OUTPATIENT)
Dept: CV DIAGNOSTICS | Facility: HOSPITAL | Age: 53
Discharge: HOME | End: 2023-01-17
Attending: INTERNAL MEDICINE
Payer: MEDICAID

## 2023-01-17 ENCOUNTER — TELEPHONE (OUTPATIENT)
Dept: INTERNAL MEDICINE CLINIC | Facility: CLINIC | Age: 53
End: 2023-01-17

## 2023-01-17 ENCOUNTER — LAB ENCOUNTER (OUTPATIENT)
Dept: LAB | Facility: REFERENCE LAB | Age: 53
End: 2023-01-17
Attending: INTERNAL MEDICINE
Payer: MEDICAID

## 2023-01-17 DIAGNOSIS — R94.31 ABNORMAL Q WAVES ON ELECTROCARDIOGRAM: ICD-10-CM

## 2023-01-17 DIAGNOSIS — R79.89 LOW TESTOSTERONE IN MALE: ICD-10-CM

## 2023-01-17 DIAGNOSIS — Z98.890 S/P LAPAROSCOPIC HERNIA REPAIR: ICD-10-CM

## 2023-01-17 DIAGNOSIS — E66.9 OBESITY (BMI 30-39.9): ICD-10-CM

## 2023-01-17 DIAGNOSIS — Z99.89 CRUTCHES AS AMBULATION AID: ICD-10-CM

## 2023-01-17 DIAGNOSIS — R03.0 ELEVATED BLOOD PRESSURE READING: ICD-10-CM

## 2023-01-17 DIAGNOSIS — R25.2 LEG CRAMPS: ICD-10-CM

## 2023-01-17 DIAGNOSIS — R42 VERTIGO: ICD-10-CM

## 2023-01-17 DIAGNOSIS — M47.812 OSTEOARTHRITIS OF CERVICAL SPINE, UNSPECIFIED SPINAL OSTEOARTHRITIS COMPLICATION STATUS: ICD-10-CM

## 2023-01-17 DIAGNOSIS — Z00.00 ANNUAL PHYSICAL EXAM: ICD-10-CM

## 2023-01-17 DIAGNOSIS — M48.02 CERVICAL STENOSIS OF SPINE: ICD-10-CM

## 2023-01-17 DIAGNOSIS — R94.31 ABNORMAL EKG: ICD-10-CM

## 2023-01-17 DIAGNOSIS — R94.31 ABNORMAL ELECTROCARDIOGRAM: Primary | ICD-10-CM

## 2023-01-17 DIAGNOSIS — Z87.19 S/P LAPAROSCOPIC HERNIA REPAIR: ICD-10-CM

## 2023-01-17 DIAGNOSIS — B35.1 ONYCHOMYCOSIS: ICD-10-CM

## 2023-01-17 DIAGNOSIS — E29.1 HYPOGONADISM IN MALE: ICD-10-CM

## 2023-01-17 DIAGNOSIS — Z98.1 S/P CERVICAL SPINAL FUSION: ICD-10-CM

## 2023-01-17 DIAGNOSIS — D35.00 ADRENAL ADENOMA, UNSPECIFIED LATERALITY: ICD-10-CM

## 2023-01-17 LAB
ALBUMIN SERPL-MCNC: 3.6 G/DL (ref 3.4–5)
ALBUMIN/GLOB SERPL: 0.9 {RATIO} (ref 1–2)
ALP LIVER SERPL-CCNC: 54 U/L
ALT SERPL-CCNC: 35 U/L
ANION GAP SERPL CALC-SCNC: 6 MMOL/L (ref 0–18)
AST SERPL-CCNC: 18 U/L (ref 15–37)
BASOPHILS # BLD AUTO: 0.07 X10(3) UL (ref 0–0.2)
BASOPHILS NFR BLD AUTO: 1 %
BILIRUB SERPL-MCNC: 0.6 MG/DL (ref 0.1–2)
BILIRUB UR QL: NEGATIVE
BUN BLD-MCNC: 10 MG/DL (ref 7–18)
BUN/CREAT SERPL: 10.4 (ref 10–20)
CALCIUM BLD-MCNC: 8.7 MG/DL (ref 8.5–10.1)
CHLORIDE SERPL-SCNC: 107 MMOL/L (ref 98–112)
CHOLEST SERPL-MCNC: 185 MG/DL (ref ?–200)
CLARITY UR: CLEAR
CO2 SERPL-SCNC: 27 MMOL/L (ref 21–32)
COLOR UR: YELLOW
COMPLEXED PSA SERPL-MCNC: 0.72 NG/ML (ref ?–4)
CREAT BLD-MCNC: 0.96 MG/DL
DEPRECATED HBV CORE AB SER IA-ACNC: 62.4 NG/ML
DEPRECATED RDW RBC AUTO: 50.8 FL (ref 35.1–46.3)
EOSINOPHIL # BLD AUTO: 0.4 X10(3) UL (ref 0–0.7)
EOSINOPHIL NFR BLD AUTO: 5.5 %
ERYTHROCYTE [DISTWIDTH] IN BLOOD BY AUTOMATED COUNT: 14.6 % (ref 11–15)
EST. AVERAGE GLUCOSE BLD GHB EST-MCNC: 137 MG/DL (ref 68–126)
FASTING PATIENT LIPID ANSWER: YES
FASTING STATUS PATIENT QL REPORTED: YES
GFR SERPLBLD BASED ON 1.73 SQ M-ARVRAT: 95 ML/MIN/1.73M2 (ref 60–?)
GLOBULIN PLAS-MCNC: 3.9 G/DL (ref 2.8–4.4)
GLUCOSE BLD-MCNC: 114 MG/DL (ref 70–99)
GLUCOSE UR-MCNC: NEGATIVE MG/DL
HBA1C MFR BLD: 6.4 % (ref ?–5.7)
HCT VFR BLD AUTO: 51.3 %
HDLC SERPL-MCNC: 41 MG/DL (ref 40–59)
HGB BLD-MCNC: 16.9 G/DL
IMM GRANULOCYTES # BLD AUTO: 0.04 X10(3) UL (ref 0–1)
IMM GRANULOCYTES NFR BLD: 0.5 %
IRON SATN MFR SERPL: 31 %
IRON SERPL-MCNC: 112 UG/DL
KETONES UR-MCNC: NEGATIVE MG/DL
LDLC SERPL CALC-MCNC: 123 MG/DL (ref ?–100)
LEUKOCYTE ESTERASE UR QL STRIP.AUTO: NEGATIVE
LYMPHOCYTES # BLD AUTO: 2.38 X10(3) UL (ref 1–4)
LYMPHOCYTES NFR BLD AUTO: 32.5 %
MAGNESIUM SERPL-MCNC: 2 MG/DL (ref 1.6–2.6)
MCH RBC QN AUTO: 30.7 PG (ref 26–34)
MCHC RBC AUTO-ENTMCNC: 32.9 G/DL (ref 31–37)
MCV RBC AUTO: 93.1 FL
MONOCYTES # BLD AUTO: 0.77 X10(3) UL (ref 0.1–1)
MONOCYTES NFR BLD AUTO: 10.5 %
NEUTROPHILS # BLD AUTO: 3.66 X10 (3) UL (ref 1.5–7.7)
NEUTROPHILS # BLD AUTO: 3.66 X10(3) UL (ref 1.5–7.7)
NEUTROPHILS NFR BLD AUTO: 50 %
NITRITE UR QL STRIP.AUTO: NEGATIVE
NONHDLC SERPL-MCNC: 144 MG/DL (ref ?–130)
OSMOLALITY SERPL CALC.SUM OF ELEC: 290 MOSM/KG (ref 275–295)
PH UR: 5.5 [PH] (ref 5–8)
PLATELET # BLD AUTO: 236 10(3)UL (ref 150–450)
POTASSIUM SERPL-SCNC: 4.1 MMOL/L (ref 3.5–5.1)
PROT SERPL-MCNC: 7.5 G/DL (ref 6.4–8.2)
RBC # BLD AUTO: 5.51 X10(6)UL
SODIUM SERPL-SCNC: 140 MMOL/L (ref 136–145)
SP GR UR STRIP: >=1.03 (ref 1–1.03)
TIBC SERPL-MCNC: 358 UG/DL (ref 240–450)
TRANSFERRIN SERPL-MCNC: 240 MG/DL (ref 200–360)
TRIGL SERPL-MCNC: 113 MG/DL (ref 30–149)
TSI SER-ACNC: 2.08 MIU/ML (ref 0.36–3.74)
UROBILINOGEN UR STRIP-ACNC: 0.2
VIT D+METAB SERPL-MCNC: 38.2 NG/ML (ref 30–100)
VLDLC SERPL CALC-MCNC: 20 MG/DL (ref 0–30)
WBC # BLD AUTO: 7.3 X10(3) UL (ref 4–11)

## 2023-01-17 PROCEDURE — 83540 ASSAY OF IRON: CPT

## 2023-01-17 PROCEDURE — 84403 ASSAY OF TOTAL TESTOSTERONE: CPT

## 2023-01-17 PROCEDURE — 84443 ASSAY THYROID STIM HORMONE: CPT

## 2023-01-17 PROCEDURE — 83735 ASSAY OF MAGNESIUM: CPT

## 2023-01-17 PROCEDURE — 81015 MICROSCOPIC EXAM OF URINE: CPT

## 2023-01-17 PROCEDURE — 81001 URINALYSIS AUTO W/SCOPE: CPT

## 2023-01-17 PROCEDURE — 84402 ASSAY OF FREE TESTOSTERONE: CPT

## 2023-01-17 PROCEDURE — 80053 COMPREHEN METABOLIC PANEL: CPT

## 2023-01-17 PROCEDURE — 83036 HEMOGLOBIN GLYCOSYLATED A1C: CPT

## 2023-01-17 PROCEDURE — 82728 ASSAY OF FERRITIN: CPT

## 2023-01-17 PROCEDURE — 84466 ASSAY OF TRANSFERRIN: CPT

## 2023-01-17 PROCEDURE — 80061 LIPID PANEL: CPT

## 2023-01-17 PROCEDURE — 36415 COLL VENOUS BLD VENIPUNCTURE: CPT

## 2023-01-17 PROCEDURE — 82306 VITAMIN D 25 HYDROXY: CPT

## 2023-01-17 PROCEDURE — 85025 COMPLETE CBC W/AUTO DIFF WBC: CPT

## 2023-01-17 NOTE — IMAGING NOTE
Pt here for stress echo. On crutches, states foot is painfool due to heel spur. [address] left at Dr. Aristides Lewis ofc. Re; Above c/o. [phone] Crackle nurse returned call and informed this RN for an order of Imelda Muscat stress test. Will wait for authorization from pt's. Health insurance before scheduling pt.[id number] Pt. Informed of new stress test ordered by ordering MD. Hever Barker to check with MD fox after 5 days for authorization and then call scheduling dept. Once it is approved. Pt verbalized understanding.

## 2023-01-17 NOTE — TELEPHONE ENCOUNTER
Triage call received from [facility]--Cardiac stress echo ordered cannot be performed today as pt arrived on crutches and cannot walk/run on treadmill for test to be performed. Asking for alternative orders for nuclear/ pharmacological stress echo. Discussed w/ Dr Jb Funes- Pharmacological nuclear stress test ordered.

## 2023-01-18 ENCOUNTER — OFFICE VISIT (OUTPATIENT)
Dept: PHYSICAL THERAPY | Facility: HOSPITAL | Age: 53
End: 2023-01-18
Attending: INTERNAL MEDICINE
Payer: MEDICAID

## 2023-01-18 DIAGNOSIS — R31.29 MICROSCOPIC HEMATURIA: Primary | ICD-10-CM

## 2023-01-18 DIAGNOSIS — R82.90 ABNORMAL URINALYSIS: ICD-10-CM

## 2023-01-18 PROCEDURE — 97140 MANUAL THERAPY 1/> REGIONS: CPT

## 2023-01-18 PROCEDURE — 97110 THERAPEUTIC EXERCISES: CPT

## 2023-01-23 ENCOUNTER — OFFICE VISIT (OUTPATIENT)
Dept: PHYSICAL THERAPY | Facility: HOSPITAL | Age: 53
End: 2023-01-23
Attending: INTERNAL MEDICINE
Payer: MEDICAID

## 2023-01-23 PROCEDURE — 97012 MECHANICAL TRACTION THERAPY: CPT

## 2023-01-23 PROCEDURE — 97140 MANUAL THERAPY 1/> REGIONS: CPT

## 2023-01-24 LAB
TESTOSTERONE, FREE, S: 28.9 NG/DL
TESTOSTERONE, TOTAL, S: 775 NG/DL

## 2023-01-27 DIAGNOSIS — M54.16 LUMBAR RADICULOPATHY: ICD-10-CM

## 2023-01-30 RX ORDER — HYDROCODONE BITARTRATE AND ACETAMINOPHEN 10; 325 MG/1; MG/1
TABLET ORAL
Qty: 150 TABLET | Refills: 0 | Status: SHIPPED | OUTPATIENT
Start: 2023-01-30

## 2023-01-30 NOTE — TELEPHONE ENCOUNTER
Refill Request    Medication request: HYDROcodone-acetaminophen  MG Oral Tab  Take 1 tablet by mouth every 4-6 hours PRN pain (max 5 tabs in 24 hours). LOV:1/5/23  Due back to clinic per last office note:  \"follow up in 3 months \"  NOV: Visit date not found      [place]: 12/31/22 #150    Urine drug screen (if applicable): 3/5/95  Pain contract: Valid until 5/16/23    LOV plan (if weaning or changing medications): Per  at [address] [place] [address]: \"He will continue with the [address] [place] [address],[address] for the pain. \"      Message sent to patient via [employer] reminding him to schedule a f/u appt.

## 2023-02-01 ENCOUNTER — TELEPHONE (OUTPATIENT)
Dept: INTERNAL MEDICINE CLINIC | Facility: CLINIC | Age: 53
End: 2023-02-01

## 2023-02-01 ENCOUNTER — APPOINTMENT (OUTPATIENT)
Dept: PHYSICAL THERAPY | Facility: HOSPITAL | Age: 53
End: 2023-02-01
Attending: INTERNAL MEDICINE
Payer: MEDICAID

## 2023-02-01 ENCOUNTER — TELEPHONE (OUTPATIENT)
Dept: PHYSICAL THERAPY | Facility: HOSPITAL | Age: 53
End: 2023-02-01

## 2023-02-01 NOTE — TELEPHONE ENCOUNTER
Responded to patient's message requesting referral/ order to complete colorectal cancer screening. Sent patient [employer] message RE: order/ referral to Dr Zaria Martinez placed in 12/2022 for patient by Dr Valorie Kolb. PH# to schedule appt/ colonoscopy px  Provided in message.

## 2023-02-02 ENCOUNTER — OFFICE VISIT (OUTPATIENT)
Dept: PHYSICAL THERAPY | Facility: HOSPITAL | Age: 53
End: 2023-02-02
Attending: INTERNAL MEDICINE
Payer: MEDICAID

## 2023-02-02 PROCEDURE — 97110 THERAPEUTIC EXERCISES: CPT

## 2023-02-02 PROCEDURE — 97140 MANUAL THERAPY 1/> REGIONS: CPT

## 2023-02-07 ENCOUNTER — HOSPITAL ENCOUNTER (OUTPATIENT)
Dept: CV DIAGNOSTICS | Facility: HOSPITAL | Age: 53
End: 2023-02-07
Attending: INTERNAL MEDICINE
Payer: MEDICAID

## 2023-02-07 ENCOUNTER — APPOINTMENT (OUTPATIENT)
Dept: NUCLEAR MEDICINE | Facility: HOSPITAL | Age: 53
End: 2023-02-07
Attending: INTERNAL MEDICINE
Payer: MEDICAID

## 2023-02-09 ENCOUNTER — OFFICE VISIT (OUTPATIENT)
Dept: PHYSICAL THERAPY | Facility: HOSPITAL | Age: 53
End: 2023-02-09
Attending: INTERNAL MEDICINE
Payer: MEDICAID

## 2023-02-09 PROCEDURE — 97110 THERAPEUTIC EXERCISES: CPT

## 2023-02-09 PROCEDURE — 97140 MANUAL THERAPY 1/> REGIONS: CPT

## 2023-02-13 ENCOUNTER — OFFICE VISIT (OUTPATIENT)
Dept: PHYSICAL THERAPY | Facility: HOSPITAL | Age: 53
End: 2023-02-13
Attending: INTERNAL MEDICINE
Payer: MEDICAID

## 2023-02-13 PROCEDURE — 97140 MANUAL THERAPY 1/> REGIONS: CPT

## 2023-02-13 PROCEDURE — 97110 THERAPEUTIC EXERCISES: CPT

## 2023-02-14 ENCOUNTER — HOSPITAL ENCOUNTER (OUTPATIENT)
Dept: CV DIAGNOSTICS | Facility: HOSPITAL | Age: 53
Discharge: HOME | End: 2023-02-14
Attending: INTERNAL MEDICINE
Payer: MEDICAID

## 2023-02-14 ENCOUNTER — HOSPITAL ENCOUNTER (OUTPATIENT)
Dept: NUCLEAR MEDICINE | Facility: HOSPITAL | Age: 53
Discharge: HOME | End: 2023-02-14
Attending: INTERNAL MEDICINE
Payer: MEDICAID

## 2023-02-14 DIAGNOSIS — Z99.89 CRUTCHES AS AMBULATION AID: ICD-10-CM

## 2023-02-14 DIAGNOSIS — R94.31 ABNORMAL ELECTROCARDIOGRAM: ICD-10-CM

## 2023-02-14 LAB
% OF MAX PREDICTED HR: 100 %
MAX DIASTOLIC BP: 81 MMHG
MAX HEART RATE: 92 BPM
MAX PREDICTED HEART RATE: 168 BPM
MAX SYSTOLIC BP: 136 MMHG
MAX WORK LOAD: 10

## 2023-02-14 PROCEDURE — 93016 CV STRESS TEST SUPVJ ONLY: CPT | Performed by: INTERNAL MEDICINE

## 2023-02-14 PROCEDURE — 93018 CV STRESS TEST I&R ONLY: CPT | Performed by: INTERNAL MEDICINE

## 2023-02-14 PROCEDURE — 78452 HT MUSCLE IMAGE SPECT MULT: CPT | Performed by: INTERNAL MEDICINE

## 2023-02-14 PROCEDURE — 93017 CV STRESS TEST TRACING ONLY: CPT | Performed by: INTERNAL MEDICINE

## 2023-02-15 ENCOUNTER — NURSE ONLY (OUTPATIENT)
Facility: CLINIC | Age: 53
End: 2023-02-15

## 2023-02-20 ENCOUNTER — APPOINTMENT (OUTPATIENT)
Dept: PHYSICAL THERAPY | Facility: HOSPITAL | Age: 53
End: 2023-02-20
Attending: INTERNAL MEDICINE
Payer: MEDICAID

## 2023-02-20 ENCOUNTER — TELEPHONE (OUTPATIENT)
Dept: PHYSICAL THERAPY | Facility: HOSPITAL | Age: 53
End: 2023-02-20

## 2023-02-26 DIAGNOSIS — M54.16 LUMBAR RADICULOPATHY: ICD-10-CM

## 2023-02-27 ENCOUNTER — OFFICE VISIT (OUTPATIENT)
Dept: PHYSICAL THERAPY | Facility: HOSPITAL | Age: 53
End: 2023-02-27
Attending: INTERNAL MEDICINE
Payer: MEDICAID

## 2023-02-27 PROCEDURE — 97140 MANUAL THERAPY 1/> REGIONS: CPT

## 2023-02-27 PROCEDURE — 97110 THERAPEUTIC EXERCISES: CPT

## 2023-02-27 NOTE — TELEPHONE ENCOUNTER
Refill Request    Medication request: HYDROcodone-acetaminophen  MG Oral Tab Take 1 tablet by mouth every 4-6 hours PRN pain (max 5 tabs in 24 hours). LOV: 1/5/23   Due back to clinic per last office note: \" follow up in 3 months \"  NOV: 4/4/23     [place]: 1/30/23 #150    Urine drug screen (if applicable): 8/9/05  Pain contract: Valid until 5/16/23    LOV plan (if weaning or changing medications): Per  at LOV: \"Norco 10mg 4-5 a day. \" \"He will continue with the Norco for the pain. \"

## 2023-03-01 RX ORDER — HYDROCODONE BITARTRATE AND ACETAMINOPHEN 10; 325 MG/1; MG/1
TABLET ORAL
Qty: 150 TABLET | Refills: 0 | Status: SHIPPED | OUTPATIENT
Start: 2023-03-01

## 2023-03-06 ENCOUNTER — OFFICE VISIT (OUTPATIENT)
Dept: PHYSICAL THERAPY | Facility: HOSPITAL | Age: 53
End: 2023-03-06
Attending: INTERNAL MEDICINE
Payer: MEDICAID

## 2023-03-06 PROCEDURE — 97140 MANUAL THERAPY 1/> REGIONS: CPT

## 2023-03-06 PROCEDURE — 97110 THERAPEUTIC EXERCISES: CPT

## 2023-03-10 ENCOUNTER — TELEPHONE (OUTPATIENT)
Dept: SURGERY | Facility: CLINIC | Age: 53
End: 2023-03-10

## 2023-03-10 NOTE — TELEPHONE ENCOUNTER
-S/w pt; identity verified with name & .  -Pt scheduled for X2 Consult appts with [place]; 3/13 & 3/22/23.  -Pt clarified the 3/22/23 appt date is correct. -Encounter complete.

## 2023-03-23 ENCOUNTER — HOSPITAL ENCOUNTER (OUTPATIENT)
Dept: CT IMAGING | Age: 53
Discharge: HOME | End: 2023-03-23
Attending: INTERNAL MEDICINE

## 2023-03-23 DIAGNOSIS — Z98.890 S/P LAPAROSCOPIC HERNIA REPAIR: ICD-10-CM

## 2023-03-23 DIAGNOSIS — Z87.19 S/P LAPAROSCOPIC HERNIA REPAIR: ICD-10-CM

## 2023-03-23 DIAGNOSIS — M48.02 CERVICAL STENOSIS OF SPINE: ICD-10-CM

## 2023-03-23 DIAGNOSIS — R79.89 LOW TESTOSTERONE IN MALE: ICD-10-CM

## 2023-03-23 DIAGNOSIS — M47.812 OSTEOARTHRITIS OF CERVICAL SPINE, UNSPECIFIED SPINAL OSTEOARTHRITIS COMPLICATION STATUS: ICD-10-CM

## 2023-03-23 DIAGNOSIS — Z98.1 S/P CERVICAL SPINAL FUSION: ICD-10-CM

## 2023-03-23 DIAGNOSIS — Z00.00 ANNUAL PHYSICAL EXAM: ICD-10-CM

## 2023-03-23 DIAGNOSIS — D35.00 ADRENAL ADENOMA, UNSPECIFIED LATERALITY: ICD-10-CM

## 2023-03-23 DIAGNOSIS — E66.9 OBESITY (BMI 30-39.9): ICD-10-CM

## 2023-03-23 DIAGNOSIS — R42 VERTIGO: ICD-10-CM

## 2023-03-23 DIAGNOSIS — B35.1 ONYCHOMYCOSIS: ICD-10-CM

## 2023-03-23 DIAGNOSIS — R03.0 ELEVATED BLOOD PRESSURE READING: ICD-10-CM

## 2023-03-23 DIAGNOSIS — R25.2 LEG CRAMPS: ICD-10-CM

## 2023-03-23 DIAGNOSIS — E29.1 HYPOGONADISM IN MALE: ICD-10-CM

## 2023-03-23 NOTE — PROGRESS NOTES
Date of Service 3/23/2023    Ashely Meléndez  Date of Birth 8/1/1970    Patient Age: 46year old    PCP: Neptali Cali MD  [address] [facility] [address]    Consult Type  Type Scan/Screening: Heart Scan  Preliminary Heart Scan Score: 0                Body Mass Index  There is no height or weight on file to calculate BMI. Lipid Profile  Cholesterol: 185, done on 1/17/2023. HDL Cholesterol: 41, done on 1/17/2023. LDL Cholesterol: 123, done on 1/17/2023. TriGlycerides 113, done on 1/17/2023. No medication. Nurse Review  Risk factor information and results reviewed with Nurse: Yes     /80 no medication. Recommended Follow Up:  Consult your physician regarding[de-identified] Final Heart Scan Report; Discuss potential for Incidental Finding    No data recorded      Recommendations for Change:  Nutrition Changes: Low Saturated Fat;Low Fat Dairy; Low Salt Eating  Cholesterol Modification (goal of therapy depends upon your risk): Increase HDL (Healthy/Good) Normal >45 Men >55 Women;Decrease LDL (Lousy/Bad) Ideal <100  Exercise: Initiate Program  Smoking Cessation: No Change Needed  Weight Management: Decrease Current Weight  Stress Management: Adopt Stress Management Techniques  Repeat Heart Scan: 5 years if Calcium Score is 0. 0; Discuss with your Physician          Rae Recommended Resources:  Recommended Resources: Upcoming Classes, Medical Services and Health Library [email or web address]. Palma Prado RN        Please Contact the Nurse Heart Line with any Questions or Concerns [phone].

## 2023-03-27 DIAGNOSIS — M54.16 LUMBAR RADICULOPATHY: ICD-10-CM

## 2023-03-27 NOTE — TELEPHONE ENCOUNTER
Refill Request    Medication request: HYDROcodone-acetaminophen  MG Oral Tab  Take 1 tablet by mouth every 4-6 hours PRN pain (max 5 tabs in 24 hours). LOV:1/5/23  Due back to clinic per last office note:  \"follow up in 3 months \"  NOV: 4/4/23     [place]: 3/1/23 #150    Urine drug screen (if applicable): 0/8/98  Pain contract: Valid until 5/16/23    LOV plan (if weaning or changing medications): Per  at LOV: \"Norco 10mg 4-5 a day. \" \"He will continue with the Norco for the pain. \"

## 2023-03-29 RX ORDER — HYDROCODONE BITARTRATE AND ACETAMINOPHEN 10; 325 MG/1; MG/1
TABLET ORAL
Qty: 150 TABLET | Refills: 0 | Status: SHIPPED | OUTPATIENT
Start: 2023-03-31

## 2023-03-31 ENCOUNTER — HOSPITAL ENCOUNTER (OUTPATIENT)
Age: 53
Discharge: HOME | End: 2023-03-31
Payer: MEDICAID

## 2023-03-31 ENCOUNTER — APPOINTMENT (OUTPATIENT)
Dept: GENERAL RADIOLOGY | Age: 53
End: 2023-03-31
Attending: NURSE PRACTITIONER
Payer: MEDICAID

## 2023-03-31 VITALS
RESPIRATION RATE: 18 BRPM | SYSTOLIC BLOOD PRESSURE: 132 MMHG | OXYGEN SATURATION: 98 % | HEART RATE: 65 BPM | DIASTOLIC BLOOD PRESSURE: 82 MMHG | TEMPERATURE: 97 F

## 2023-03-31 DIAGNOSIS — M25.571 ACUTE RIGHT ANKLE PAIN: Primary | ICD-10-CM

## 2023-03-31 PROCEDURE — 99214 OFFICE O/P EST MOD 30 MIN: CPT

## 2023-03-31 PROCEDURE — 99213 OFFICE O/P EST LOW 20 MIN: CPT

## 2023-03-31 PROCEDURE — 73610 X-RAY EXAM OF ANKLE: CPT | Performed by: NURSE PRACTITIONER

## 2023-03-31 NOTE — ED INITIAL ASSESSMENT (HPI)
PATIENT ARRIVED AMBULATORY TO ROOM C/O RIGHT ANKLE PAIN. PATIENT STATES PAIN STARTED 2 WEEKS AGO. DENIES INJURY.

## 2023-03-31 NOTE — DISCHARGE INSTRUCTIONS
Continue taking the ibuprofen and your prescribed Norco for pain. Ice pack to the ankle area of discomfort 2-3 times per day inside of a pillowcase or cloth. Elevate the extremity when you are at rest.  Follow-up with your primary care provider in 2 to 3 days or take the first available appointment. If you develop worsening pain swelling redness or warmth of the skin fevers or chills return to [facility] or go to the nearest emergency department.

## 2023-04-04 ENCOUNTER — OFFICE VISIT (OUTPATIENT)
Dept: INTERNAL MEDICINE CLINIC | Facility: CLINIC | Age: 53
End: 2023-04-04
Payer: MEDICAID

## 2023-04-04 ENCOUNTER — OFFICE VISIT (OUTPATIENT)
Dept: PHYSICAL MEDICINE AND REHAB | Facility: CLINIC | Age: 53
End: 2023-04-04
Payer: MEDICAID

## 2023-04-04 VITALS
OXYGEN SATURATION: 100 % | BODY MASS INDEX: 39.43 KG/M2 | SYSTOLIC BLOOD PRESSURE: 130 MMHG | DIASTOLIC BLOOD PRESSURE: 88 MMHG | WEIGHT: 281.63 LBS | HEART RATE: 87 BPM | HEIGHT: 71 IN

## 2023-04-04 DIAGNOSIS — G54.0 TOS (THORACIC OUTLET SYNDROME): ICD-10-CM

## 2023-04-04 DIAGNOSIS — F11.90 OPIOID USE: ICD-10-CM

## 2023-04-04 DIAGNOSIS — M48.02 CERVICAL STENOSIS OF SPINE: ICD-10-CM

## 2023-04-04 DIAGNOSIS — M79.671 RIGHT FOOT PAIN: Primary | ICD-10-CM

## 2023-04-04 DIAGNOSIS — R53.83 OTHER FATIGUE: ICD-10-CM

## 2023-04-04 DIAGNOSIS — F43.9 STRESS: ICD-10-CM

## 2023-04-04 DIAGNOSIS — M45.0 ANKYLOSING SPONDYLITIS OF MULTIPLE SITES IN SPINE (HCC): ICD-10-CM

## 2023-04-04 DIAGNOSIS — M79.671 RIGHT FOOT PAIN: ICD-10-CM

## 2023-04-04 DIAGNOSIS — G56.22 ULNAR NEUROPATHY AT ELBOW OF LEFT UPPER EXTREMITY: ICD-10-CM

## 2023-04-04 DIAGNOSIS — Q76.1 CERVICAL FUSION SYNDROME: ICD-10-CM

## 2023-04-04 DIAGNOSIS — M54.16 LUMBAR RADICULOPATHY: ICD-10-CM

## 2023-04-04 DIAGNOSIS — R73.03 PREDIABETES: ICD-10-CM

## 2023-04-04 DIAGNOSIS — M19.071 PRIMARY OSTEOARTHRITIS OF RIGHT ANKLE: Primary | ICD-10-CM

## 2023-04-04 DIAGNOSIS — E66.9 OBESITY (BMI 30-39.9): ICD-10-CM

## 2023-04-04 DIAGNOSIS — G56.21 ULNAR NEUROPATHY AT ELBOW OF RIGHT UPPER EXTREMITY: ICD-10-CM

## 2023-04-04 DIAGNOSIS — M25.571 RIGHT ANKLE PAIN, UNSPECIFIED CHRONICITY: ICD-10-CM

## 2023-04-04 DIAGNOSIS — M54.12 CERVICAL RADICULOPATHY: ICD-10-CM

## 2023-04-04 DIAGNOSIS — M50.90 CERVICAL DISC DISEASE: ICD-10-CM

## 2023-04-04 DIAGNOSIS — Z98.1 S/P CERVICAL SPINAL FUSION: ICD-10-CM

## 2023-04-04 DIAGNOSIS — Z76.89 SLEEP CONCERN: ICD-10-CM

## 2023-04-04 PROCEDURE — 99214 OFFICE O/P EST MOD 30 MIN: CPT | Performed by: PHYSICAL MEDICINE & REHABILITATION

## 2023-04-04 PROCEDURE — 3008F BODY MASS INDEX DOCD: CPT | Performed by: INTERNAL MEDICINE

## 2023-04-04 PROCEDURE — 83036 HEMOGLOBIN GLYCOSYLATED A1C: CPT | Performed by: INTERNAL MEDICINE

## 2023-04-04 PROCEDURE — 3079F DIAST BP 80-89 MM HG: CPT | Performed by: INTERNAL MEDICINE

## 2023-04-04 PROCEDURE — 3075F SYST BP GE 130 - 139MM HG: CPT | Performed by: INTERNAL MEDICINE

## 2023-04-04 PROCEDURE — 82607 VITAMIN B-12: CPT | Performed by: INTERNAL MEDICINE

## 2023-04-04 PROCEDURE — 99214 OFFICE O/P EST MOD 30 MIN: CPT | Performed by: INTERNAL MEDICINE

## 2023-04-04 PROCEDURE — 3044F HG A1C LEVEL LT 7.0%: CPT | Performed by: INTERNAL MEDICINE

## 2023-04-04 RX ORDER — COVID-19 ANTIGEN TEST
KIT MISCELLANEOUS
COMMUNITY
Start: 2023-03-24

## 2023-04-04 RX ORDER — AMOXICILLIN 500 MG/1
CAPSULE ORAL
COMMUNITY
Start: 2023-03-30

## 2023-04-04 RX ORDER — IBUPROFEN 600 MG/1
TABLET ORAL
COMMUNITY
Start: 2023-03-30 | End: 2023-04-06

## 2023-04-04 RX ORDER — IBUPROFEN 600 MG/1
600 TABLET ORAL EVERY 8 HOURS PRN
Qty: 21 TABLET | Refills: 0 | Status: SHIPPED | OUTPATIENT
Start: 2023-04-04 | End: 2023-04-11

## 2023-04-04 RX ORDER — BUPROPION HYDROCHLORIDE 150 MG/1
150 TABLET, EXTENDED RELEASE ORAL 2 TIMES DAILY
Qty: 180 TABLET | Refills: 1 | Status: SHIPPED | OUTPATIENT
Start: 2023-04-04 | End: 2023-07-03

## 2023-04-04 NOTE — PATIENT INSTRUCTIONS
Plan  The patient will continue with his home exercise program.    He will continue to work on his posture. He will continue seeing Dr. Velma Sigala. He will see the podiatrist and will need to get new arch supports to help the right ankle pain. He will work on the weight loss. He will continue with the Norco taking no more than 5 per day. The patient does not need any injections at this time. He will follow up in 3 months or sooner if needed.

## 2023-04-05 LAB
EST. AVERAGE GLUCOSE BLD GHB EST-MCNC: 140 MG/DL (ref 68–126)
HBA1C MFR BLD: 6.5 % (ref ?–5.7)
VIT B12 SERPL-MCNC: 712 PG/ML (ref 193–986)

## 2023-04-06 ENCOUNTER — OFFICE VISIT (OUTPATIENT)
Dept: ORTHOPEDICS CLINIC | Facility: CLINIC | Age: 53
End: 2023-04-06
Payer: MEDICAID

## 2023-04-06 VITALS — BODY MASS INDEX: 39.2 KG/M2 | HEIGHT: 71 IN | WEIGHT: 280 LBS

## 2023-04-06 DIAGNOSIS — G89.29 CHRONIC PAIN OF RIGHT ANKLE: ICD-10-CM

## 2023-04-06 DIAGNOSIS — M25.871 IMPINGEMENT SYNDROME OF RIGHT ANKLE: Primary | ICD-10-CM

## 2023-04-06 DIAGNOSIS — M25.571 CHRONIC PAIN OF RIGHT ANKLE: ICD-10-CM

## 2023-04-06 PROCEDURE — 3008F BODY MASS INDEX DOCD: CPT | Performed by: PODIATRIST

## 2023-04-06 PROCEDURE — 99203 OFFICE O/P NEW LOW 30 MIN: CPT | Performed by: PODIATRIST

## 2023-04-07 ENCOUNTER — PATIENT MESSAGE (OUTPATIENT)
Dept: INTERNAL MEDICINE CLINIC | Facility: CLINIC | Age: 53
End: 2023-04-07

## 2023-04-07 ENCOUNTER — TELEPHONE (OUTPATIENT)
Dept: INTERNAL MEDICINE CLINIC | Facility: CLINIC | Age: 53
End: 2023-04-07

## 2023-04-07 DIAGNOSIS — E11.9 NEWLY DIAGNOSED DIABETES (HCC): Primary | ICD-10-CM

## 2023-04-07 RX ORDER — BLOOD-GLUCOSE METER
KIT MISCELLANEOUS
Qty: 100 STRIP | Refills: 0 | Status: SHIPPED | OUTPATIENT
Start: 2023-04-07 | End: 2024-04-06

## 2023-04-07 RX ORDER — LANCETS 30 GAUGE
EACH MISCELLANEOUS
Qty: 200 EACH | Refills: 1 | Status: SHIPPED | OUTPATIENT
Start: 2023-04-07

## 2023-04-07 RX ORDER — BLOOD-GLUCOSE METER
KIT MISCELLANEOUS
Qty: 100 STRIP | Refills: 0 | Status: SHIPPED | OUTPATIENT
Start: 2023-04-07 | End: 2023-04-07

## 2023-04-07 RX ORDER — METFORMIN HYDROCHLORIDE 750 MG/1
750 TABLET, EXTENDED RELEASE ORAL
Qty: 90 TABLET | Refills: 1 | Status: SHIPPED | OUTPATIENT
Start: 2023-04-07

## 2023-04-07 RX ORDER — BLOOD SUGAR DIAGNOSTIC
STRIP MISCELLANEOUS
Qty: 200 STRIP | Refills: 3 | Status: SHIPPED | OUTPATIENT
Start: 2023-04-07

## 2023-04-07 RX ORDER — BLOOD-GLUCOSE METER
1 KIT MISCELLANEOUS 2 TIMES DAILY
Qty: 1 EACH | Refills: 3 | COMMUNITY
Start: 2023-04-07 | End: 2024-04-06

## 2023-04-07 NOTE — TELEPHONE ENCOUNTER
Per MD result note:  \"Your blood work shows diabetes   Recommend that you go to diabetes education at [facility] metformin that can help with both weight loss and diabetes   Cut down on the carbohydrates   Will send you a glucometer \"    mychart to patient and reiterated note.

## 2023-04-07 NOTE — TELEPHONE ENCOUNTER
Elvis Matias called. Insurance doesn't cover Freestyle test strips. They are asking if order can be switched to One Touch test strips.

## 2023-04-07 NOTE — TELEPHONE ENCOUNTER
Changed to onetouch test strips per request. Noted to pharmacy to please provide covered alternative.

## 2023-04-07 NOTE — TELEPHONE ENCOUNTER
From: Dirk Steele  To: Jose Joshi MD  Sent: 4/7/2023 11:15 AM CDT  Subject: Question regarding HEMOGLOBIN A1C    Good morning Doctor Todd thank you for reaching out. So I'm diabetic now? If so, can I reverse it?  Please advise, thank you

## 2023-04-10 ENCOUNTER — OFFICE VISIT (OUTPATIENT)
Dept: SURGERY | Facility: CLINIC | Age: 53
End: 2023-04-10

## 2023-04-10 ENCOUNTER — TELEPHONE (OUTPATIENT)
Dept: INTERNAL MEDICINE CLINIC | Facility: CLINIC | Age: 53
End: 2023-04-10

## 2023-04-10 VITALS
HEART RATE: 81 BPM | WEIGHT: 280 LBS | HEIGHT: 71 IN | BODY MASS INDEX: 39.2 KG/M2 | SYSTOLIC BLOOD PRESSURE: 139 MMHG | DIASTOLIC BLOOD PRESSURE: 88 MMHG

## 2023-04-10 DIAGNOSIS — R31.29 MICROHEMATURIA: Primary | ICD-10-CM

## 2023-04-10 PROCEDURE — 3075F SYST BP GE 130 - 139MM HG: CPT | Performed by: NURSE PRACTITIONER

## 2023-04-10 PROCEDURE — 3079F DIAST BP 80-89 MM HG: CPT | Performed by: NURSE PRACTITIONER

## 2023-04-10 PROCEDURE — 99204 OFFICE O/P NEW MOD 45 MIN: CPT | Performed by: NURSE PRACTITIONER

## 2023-04-10 PROCEDURE — 3008F BODY MASS INDEX DOCD: CPT | Performed by: NURSE PRACTITIONER

## 2023-04-10 NOTE — TELEPHONE ENCOUNTER
Patient called the office as he said he is now diabetic. Medication was prescribed. But he said he has no machine. No strips. Nothing at the pharmacy.

## 2023-04-10 NOTE — TELEPHONE ENCOUNTER
Waiting for Payer Response  4/10/2023  3:34 PM  Case ID: [id number] Reply deadline: April 12, 2023 12:46 PM Sending user: Mervat Banks CMA   Note from payer: If you have an attachment to add, please visit this URL: [email or web address] and enter the code [email or web address] - Prescriber details have been updated to match the prescriber directory. Note to payer: E11.9   Payer:  [address] [place] [address] Road    [phone]    Attachment:  Document: ePA Attachment Glucose Blood (Amalia Ware) In Vitro Strip 4/10/2023- 3:33 PM   Medicaid [employer]SC Glucose Test Strips QL [id number]  Not available    Question  Answer   Select your role in relation to this review. Prescriber or submitting medical personnel   Is the patient currently taking the requested agent? No   Is the requested quantity for more than 102 tests per 30 days?   No         Waiting for Payer Response  4/7/2023 12:46 PM  Sending user: Dennis Delgado RN   Payer:  [address]    [phone]

## 2023-04-11 DIAGNOSIS — R31.29 MICROHEMATURIA: Primary | ICD-10-CM

## 2023-04-11 NOTE — TELEPHONE ENCOUNTER
Approved    Prior authorization approved   Case ID: [id number]      Payer:  [facility]    [phone]    The case has been Approved from   to   Electronic appeal:  Not supported   View History    Medication Being Authorized     Glucose Blood (ONETOUCH VERIO) In Vitro Strip    Test blood sugar twice daily. Dispense: 200 strip Refills: 3     Start: 2023      Class: Normal      This order has been released to its destination.    To be filled at: [facility] [id number]  [address] [place] [address] [phone], [phone]         Pharmacy Benefits     Kurtis THOMPSON [facility])    Covered:  Retail, Mail Order    Unknown:  Specialty, Long-Term Care   Member ID:  [id number]   Group ID:  Guillermina Otto   Group name:  Yani Pringle:  [id number]   PCN:  ILCAID    :  [id number]   Legal sex:  M   Address:  [address] [place] [id number]

## 2023-04-12 ENCOUNTER — HOSPITAL ENCOUNTER (OUTPATIENT)
Dept: ENDOCRINOLOGY | Facility: HOSPITAL | Age: 53
Discharge: HOME | End: 2023-04-12
Attending: INTERNAL MEDICINE
Payer: MEDICAID

## 2023-04-12 VITALS — BODY MASS INDEX: 39 KG/M2 | WEIGHT: 280.69 LBS

## 2023-04-12 DIAGNOSIS — E11.9 DIABETES MELLITUS, NEW ONSET (HCC): Primary | ICD-10-CM

## 2023-04-24 DIAGNOSIS — M54.16 LUMBAR RADICULOPATHY: ICD-10-CM

## 2023-04-25 RX ORDER — HYDROCODONE BITARTRATE AND ACETAMINOPHEN 10; 325 MG/1; MG/1
TABLET ORAL
Qty: 150 TABLET | Refills: 0 | Status: SHIPPED | OUTPATIENT
Start: 2023-04-25

## 2023-04-25 NOTE — TELEPHONE ENCOUNTER
Refill Request    Medication request: HYDROcodone-acetaminophen  MG Oral Tab    LOV:4/4/2023  RTC: 3 months  NOV: Visit date not found      [place]: 3/1/2023 #30 days    UDS: (if applicable): 4/3/1171  Pain contract: Valid through 5/16/2023    LOV plan (if weaning or changing medications):   He will continue with the Norco taking no more than 5 per day.

## 2023-04-26 ENCOUNTER — NURSE ONLY (OUTPATIENT)
Facility: CLINIC | Age: 53
End: 2023-04-26

## 2023-04-26 NOTE — PROGRESS NOTES
Patient contacted. PCP prescribed him some pantoprazole which is helping but has GERD and difficulty swallowing from time to time. He would like this addressed as well-failed TCS due to having symptoms. Patient accepted below appointment and given office directions.     Your Appointments       Friday May 26, 2023  8:30 AM  Consult with Veronica Lee PA-C  [address]) [address] [place] [address],[address]  [phone]

## 2023-05-09 ENCOUNTER — OFFICE VISIT (OUTPATIENT)
Dept: PHYSICAL THERAPY | Facility: HOSPITAL | Age: 53
End: 2023-05-09
Attending: PODIATRIST
Payer: MEDICAID

## 2023-05-09 DIAGNOSIS — M25.871 IMPINGEMENT SYNDROME OF RIGHT ANKLE: ICD-10-CM

## 2023-05-09 DIAGNOSIS — M25.571 CHRONIC PAIN OF RIGHT ANKLE: ICD-10-CM

## 2023-05-09 DIAGNOSIS — G89.29 CHRONIC PAIN OF RIGHT ANKLE: ICD-10-CM

## 2023-05-09 PROCEDURE — 97162 PT EVAL MOD COMPLEX 30 MIN: CPT

## 2023-05-09 PROCEDURE — 97140 MANUAL THERAPY 1/> REGIONS: CPT

## 2023-05-10 ENCOUNTER — APPOINTMENT (OUTPATIENT)
Dept: ENDOCRINOLOGY | Facility: HOSPITAL | Age: 53
End: 2023-05-10
Attending: INTERNAL MEDICINE
Payer: MEDICAID

## 2023-05-10 ENCOUNTER — PATIENT MESSAGE (OUTPATIENT)
Dept: INTERNAL MEDICINE CLINIC | Facility: CLINIC | Age: 53
End: 2023-05-10

## 2023-05-10 DIAGNOSIS — E11.9 NEWLY DIAGNOSED DIABETES (HCC): Primary | ICD-10-CM

## 2023-05-11 RX ORDER — TIRZEPATIDE 2.5 MG/.5ML
2.5 INJECTION, SOLUTION SUBCUTANEOUS WEEKLY
Qty: 2 ML | Refills: 0 | Status: SHIPPED | OUTPATIENT
Start: 2023-05-11

## 2023-05-11 NOTE — TELEPHONE ENCOUNTER
[employer], [address] 5/11/2023 7:53 AM CDT      ----- Message -----  From: Eileen Kay  Sent: 5/10/2023 10:18 PM CDT  To: Racheal Avery Clinical Staff  Subject: Question regarding metforming     Sorry I meant to say that she's also type 2 diabetic and her doctor prescribed her maunjaro so I think I'll be safe to take it as well.  Thank you :)

## 2023-05-15 ENCOUNTER — TELEPHONE (OUTPATIENT)
Dept: PHYSICAL THERAPY | Facility: HOSPITAL | Age: 53
End: 2023-05-15

## 2023-05-15 ENCOUNTER — APPOINTMENT (OUTPATIENT)
Dept: PHYSICAL THERAPY | Facility: HOSPITAL | Age: 53
End: 2023-05-15
Attending: PODIATRIST
Payer: MEDICAID

## 2023-05-17 ENCOUNTER — APPOINTMENT (OUTPATIENT)
Dept: ENDOCRINOLOGY | Facility: HOSPITAL | Age: 53
End: 2023-05-17
Attending: INTERNAL MEDICINE
Payer: MEDICAID

## 2023-05-17 ENCOUNTER — OFFICE VISIT (OUTPATIENT)
Dept: PHYSICAL THERAPY | Facility: HOSPITAL | Age: 53
End: 2023-05-17
Attending: PODIATRIST
Payer: MEDICAID

## 2023-05-17 PROCEDURE — 97110 THERAPEUTIC EXERCISES: CPT

## 2023-05-17 PROCEDURE — 97140 MANUAL THERAPY 1/> REGIONS: CPT

## 2023-05-22 ENCOUNTER — OFFICE VISIT (OUTPATIENT)
Dept: PHYSICAL THERAPY | Facility: HOSPITAL | Age: 53
End: 2023-05-22
Attending: PODIATRIST
Payer: MEDICAID

## 2023-05-22 PROCEDURE — 97110 THERAPEUTIC EXERCISES: CPT

## 2023-05-22 PROCEDURE — 97140 MANUAL THERAPY 1/> REGIONS: CPT

## 2023-05-24 ENCOUNTER — OFFICE VISIT (OUTPATIENT)
Dept: PHYSICAL THERAPY | Facility: HOSPITAL | Age: 53
End: 2023-05-24
Attending: PODIATRIST
Payer: MEDICAID

## 2023-05-24 PROCEDURE — 97110 THERAPEUTIC EXERCISES: CPT

## 2023-05-24 PROCEDURE — 97140 MANUAL THERAPY 1/> REGIONS: CPT

## 2023-05-26 ENCOUNTER — OFFICE VISIT (OUTPATIENT)
Facility: CLINIC | Age: 53
End: 2023-05-26

## 2023-05-26 ENCOUNTER — TELEPHONE (OUTPATIENT)
Facility: CLINIC | Age: 53
End: 2023-05-26

## 2023-05-26 VITALS
BODY MASS INDEX: 37.8 KG/M2 | SYSTOLIC BLOOD PRESSURE: 130 MMHG | WEIGHT: 270 LBS | DIASTOLIC BLOOD PRESSURE: 81 MMHG | HEIGHT: 71 IN | HEART RATE: 76 BPM

## 2023-05-26 DIAGNOSIS — R13.10 DYSPHAGIA, UNSPECIFIED TYPE: ICD-10-CM

## 2023-05-26 DIAGNOSIS — Z12.11 SCREENING FOR COLON CANCER: Primary | ICD-10-CM

## 2023-05-26 DIAGNOSIS — R13.19 ESOPHAGEAL DYSPHAGIA: ICD-10-CM

## 2023-05-26 DIAGNOSIS — K21.9 GASTROESOPHAGEAL REFLUX DISEASE WITHOUT ESOPHAGITIS: Primary | ICD-10-CM

## 2023-05-26 DIAGNOSIS — K21.9 GASTROESOPHAGEAL REFLUX DISEASE, UNSPECIFIED WHETHER ESOPHAGITIS PRESENT: ICD-10-CM

## 2023-05-26 DIAGNOSIS — Z12.11 COLON CANCER SCREENING: ICD-10-CM

## 2023-05-26 RX ORDER — PANTOPRAZOLE SODIUM 40 MG/1
40 TABLET, DELAYED RELEASE ORAL
Qty: 90 TABLET | Refills: 0 | Status: SHIPPED | OUTPATIENT
Start: 2023-05-26 | End: 2023-08-24

## 2023-05-26 RX ORDER — POLYETHYLENE GLYCOL 3350, SODIUM CHLORIDE, SODIUM BICARBONATE, POTASSIUM CHLORIDE 420; 11.2; 5.72; 1.48 G/4L; G/4L; G/4L; G/4L
POWDER, FOR SOLUTION ORAL
Qty: 1 EACH | Refills: 0 | Status: SHIPPED | OUTPATIENT
Start: 2023-05-26

## 2023-05-26 NOTE — PATIENT INSTRUCTIONS
1. Schedule colonoscopy/EGD with Dr. Americo Powers or Dr. Chely Velásquez with [facility]  [Diagnosis: crc screening, GERD, dysphagia]    2.  bowel prep from pharmacy ([facility] trilyte)    3. Hold trulicity hold one week before. 4. Read all bowel prep instructions carefully. Bowel prep instructions can also be found online at:  [email or web address]     5. AVOID seeds, nuts, popcorn, raw fruits and vegetables for 3 days before procedure    6. You MAY need to go for COVID testing 72 hours before procedure. The testing team will call you a few days before your procedure to discuss with you if testing is required. If you are asked to go for COVID testing and do not completed the test, the procedure cannot be performed. 7. If you start any NEW medication after your visit today, please notify us. Certain medications (like iron or weight loss medications) will need to be held before the procedure, or the procedure cannot be performed safely.

## 2023-05-26 NOTE — TELEPHONE ENCOUNTER
Scheduled for:  Colonoscopy 41713/[id number] EGD [id number]  Provider Name:  Dr Mariya Khan  Date:  08/30/2023  Location:  [facility]  Sedation:  [facility]  Time:  2:15 pm. (pt is aware to arrive at 1:15 pm)  Prep:  Split Trilyte  Meds/Allergies Reconciled?:  Les Screen PA reviewed  Diagnosis with codes:  CRC screening Z12.11, GERD K21.9, Dysphagia R13.19  Was patient informed to call insurance with codes (Y/N):  Yes   Referral sent?:  Referral was sent at the time of electronic surgical scheduling. [facility] or [address] notified?:  I sent an electronic request to Endo Scheduling and received a confirmation today. Medication Orders:  Pt is aware to NOT take iron pills, herbal meds and diet supplements for 7 days before exam.  Hold trulicity hold one week before   Also to NOT take any form of alcohol, recreational drugs and any forms of ED meds 24 hours before exam.   Misc Orders:  N/A   Further instructions given by staff:  I discussed the prep instructions with the patient which he verbally understood. I provided patient with prep instruction's sheet in office.

## 2023-05-29 DIAGNOSIS — M54.16 LUMBAR RADICULOPATHY: ICD-10-CM

## 2023-05-30 ENCOUNTER — OFFICE VISIT (OUTPATIENT)
Dept: PHYSICAL THERAPY | Facility: HOSPITAL | Age: 53
End: 2023-05-30
Attending: PODIATRIST
Payer: MEDICAID

## 2023-05-30 PROCEDURE — 97140 MANUAL THERAPY 1/> REGIONS: CPT

## 2023-05-30 PROCEDURE — 97110 THERAPEUTIC EXERCISES: CPT

## 2023-05-30 NOTE — TELEPHONE ENCOUNTER
Refill Request    Medication request: HYDROcodone-acetaminophen  MG Oral Tab Take 1 tablet by mouth every 4-6 hours PRN pain (max 5 tabs in 24 hours). LOV:23  Due back to clinic per last office note:  \"follow up in 3 months \"  NOV: Visit date not found      [place] 23 #150    Urine drug screen (if applicable): 23  Pain contract:  on 5/15/23    LOV plan (if weaning or changing medications): Per  at [address] [place] [address]: \"He will continue with the Norco taking no more than 5 per day. \"

## 2023-06-01 RX ORDER — HYDROCODONE BITARTRATE AND ACETAMINOPHEN 10; 325 MG/1; MG/1
TABLET ORAL
Qty: 150 TABLET | Refills: 0 | Status: SHIPPED | OUTPATIENT
Start: 2023-06-01

## 2023-06-07 ENCOUNTER — OFFICE VISIT (OUTPATIENT)
Dept: PHYSICAL THERAPY | Facility: HOSPITAL | Age: 53
End: 2023-06-07
Attending: PODIATRIST
Payer: MEDICAID

## 2023-06-07 PROCEDURE — 97140 MANUAL THERAPY 1/> REGIONS: CPT

## 2023-06-07 PROCEDURE — 97110 THERAPEUTIC EXERCISES: CPT

## 2023-06-12 ENCOUNTER — OFFICE VISIT (OUTPATIENT)
Dept: PHYSICAL THERAPY | Facility: HOSPITAL | Age: 53
End: 2023-06-12
Attending: PODIATRIST
Payer: MEDICAID

## 2023-06-12 DIAGNOSIS — Z76.89 SLEEP CONCERN: ICD-10-CM

## 2023-06-12 DIAGNOSIS — R53.83 OTHER FATIGUE: ICD-10-CM

## 2023-06-12 DIAGNOSIS — R73.03 PREDIABETES: ICD-10-CM

## 2023-06-12 DIAGNOSIS — M25.571 RIGHT ANKLE PAIN, UNSPECIFIED CHRONICITY: ICD-10-CM

## 2023-06-12 DIAGNOSIS — M79.671 RIGHT FOOT PAIN: ICD-10-CM

## 2023-06-12 DIAGNOSIS — E66.9 OBESITY (BMI 30-39.9): ICD-10-CM

## 2023-06-12 DIAGNOSIS — F43.9 STRESS: ICD-10-CM

## 2023-06-12 PROCEDURE — 97110 THERAPEUTIC EXERCISES: CPT

## 2023-06-12 PROCEDURE — 97140 MANUAL THERAPY 1/> REGIONS: CPT

## 2023-06-12 RX ORDER — DULAGLUTIDE 0.75 MG/.5ML
0.75 INJECTION, SOLUTION SUBCUTANEOUS WEEKLY
Qty: 6 ML | Refills: 0 | Status: SHIPPED | OUTPATIENT
Start: 2023-06-12

## 2023-06-12 RX ORDER — BUPROPION HYDROCHLORIDE 150 MG/1
150 TABLET, EXTENDED RELEASE ORAL 2 TIMES DAILY
Qty: 180 TABLET | Refills: 1 | Status: SHIPPED | OUTPATIENT
Start: 2023-06-12 | End: 2023-09-10

## 2023-06-14 ENCOUNTER — TELEPHONE (OUTPATIENT)
Dept: ENDOCRINOLOGY | Facility: HOSPITAL | Age: 53
End: 2023-06-14

## 2023-06-15 ENCOUNTER — APPOINTMENT (OUTPATIENT)
Dept: ENDOCRINOLOGY | Facility: HOSPITAL | Age: 53
End: 2023-06-15
Attending: INTERNAL MEDICINE
Payer: MEDICAID

## 2023-06-22 ENCOUNTER — APPOINTMENT (OUTPATIENT)
Dept: ENDOCRINOLOGY | Facility: HOSPITAL | Age: 53
End: 2023-06-22
Attending: INTERNAL MEDICINE
Payer: MEDICAID

## 2023-06-22 ENCOUNTER — OFFICE VISIT (OUTPATIENT)
Dept: PHYSICAL THERAPY | Facility: HOSPITAL | Age: 53
End: 2023-06-22
Attending: PODIATRIST
Payer: MEDICAID

## 2023-06-22 PROCEDURE — 97110 THERAPEUTIC EXERCISES: CPT

## 2023-06-26 ENCOUNTER — MED REC SCAN ONLY (OUTPATIENT)
Dept: PHYSICAL MEDICINE AND REHAB | Facility: CLINIC | Age: 53
End: 2023-06-26

## 2023-06-26 DIAGNOSIS — M54.16 LUMBAR RADICULOPATHY: ICD-10-CM

## 2023-06-28 ENCOUNTER — APPOINTMENT (OUTPATIENT)
Dept: ENDOCRINOLOGY | Facility: HOSPITAL | Age: 53
End: 2023-06-28
Attending: INTERNAL MEDICINE
Payer: MEDICAID

## 2023-06-29 ENCOUNTER — APPOINTMENT (OUTPATIENT)
Dept: ENDOCRINOLOGY | Facility: HOSPITAL | Age: 53
End: 2023-06-29
Attending: INTERNAL MEDICINE
Payer: MEDICAID

## 2023-06-29 ENCOUNTER — OFFICE VISIT (OUTPATIENT)
Dept: PHYSICAL THERAPY | Facility: HOSPITAL | Age: 53
End: 2023-06-29
Attending: PODIATRIST
Payer: MEDICAID

## 2023-06-29 PROCEDURE — 97110 THERAPEUTIC EXERCISES: CPT

## 2023-06-30 RX ORDER — HYDROCODONE BITARTRATE AND ACETAMINOPHEN 10; 325 MG/1; MG/1
TABLET ORAL
Qty: 150 TABLET | Refills: 0 | Status: SHIPPED | OUTPATIENT
Start: 2023-07-01

## 2023-07-05 ENCOUNTER — APPOINTMENT (OUTPATIENT)
Dept: PHYSICAL THERAPY | Facility: HOSPITAL | Age: 53
End: 2023-07-05
Attending: PODIATRIST
Payer: MEDICAID

## 2023-07-05 ENCOUNTER — TELEPHONE (OUTPATIENT)
Dept: PHYSICAL THERAPY | Facility: HOSPITAL | Age: 53
End: 2023-07-05

## 2023-07-06 ENCOUNTER — OFFICE VISIT (OUTPATIENT)
Dept: INTERNAL MEDICINE CLINIC | Facility: CLINIC | Age: 53
End: 2023-07-06
Payer: MEDICAID

## 2023-07-06 VITALS
BODY MASS INDEX: 36.59 KG/M2 | SYSTOLIC BLOOD PRESSURE: 118 MMHG | WEIGHT: 261.38 LBS | HEIGHT: 71 IN | OXYGEN SATURATION: 98 % | DIASTOLIC BLOOD PRESSURE: 80 MMHG | HEART RATE: 71 BPM

## 2023-07-06 DIAGNOSIS — M54.2 NECK PAIN: ICD-10-CM

## 2023-07-06 DIAGNOSIS — E11.9 TYPE 2 DIABETES MELLITUS WITHOUT COMPLICATION, UNSPECIFIED WHETHER LONG TERM INSULIN USE (HCC): Primary | ICD-10-CM

## 2023-07-06 DIAGNOSIS — B35.1 ONYCHOMYCOSIS: ICD-10-CM

## 2023-07-06 DIAGNOSIS — E66.9 OBESITY (BMI 30-39.9): ICD-10-CM

## 2023-07-06 DIAGNOSIS — Z23 NEED FOR VACCINATION: ICD-10-CM

## 2023-07-06 LAB
ALBUMIN SERPL-MCNC: 3.9 G/DL (ref 3.4–5)
ALBUMIN/GLOB SERPL: 1 {RATIO} (ref 1–2)
ALP LIVER SERPL-CCNC: 58 U/L
ALT SERPL-CCNC: 49 U/L
ANION GAP SERPL CALC-SCNC: 4 MMOL/L (ref 0–18)
AST SERPL-CCNC: 23 U/L (ref 15–37)
BILIRUB SERPL-MCNC: 0.5 MG/DL (ref 0.1–2)
BUN BLD-MCNC: 16 MG/DL (ref 7–18)
BUN/CREAT SERPL: 15.1 (ref 10–20)
CALCIUM BLD-MCNC: 8.9 MG/DL (ref 8.5–10.1)
CHLORIDE SERPL-SCNC: 107 MMOL/L (ref 98–112)
CHOLEST SERPL-MCNC: 139 MG/DL (ref ?–200)
CO2 SERPL-SCNC: 27 MMOL/L (ref 21–32)
CREAT BLD-MCNC: 1.06 MG/DL
CREAT UR-SCNC: 251 MG/DL
FASTING PATIENT LIPID ANSWER: NO
FASTING STATUS PATIENT QL REPORTED: NO
GFR SERPLBLD BASED ON 1.73 SQ M-ARVRAT: 84 ML/MIN/1.73M2 (ref 60–?)
GLOBULIN PLAS-MCNC: 3.8 G/DL (ref 2.8–4.4)
GLUCOSE BLD-MCNC: 69 MG/DL (ref 70–99)
HDLC SERPL-MCNC: 38 MG/DL (ref 40–59)
LDLC SERPL CALC-MCNC: 77 MG/DL (ref ?–100)
MICROALBUMIN UR-MCNC: 6.86 MG/DL
MICROALBUMIN/CREAT 24H UR-RTO: 27.3 UG/MG (ref ?–30)
NONHDLC SERPL-MCNC: 101 MG/DL (ref ?–130)
OSMOLALITY SERPL CALC.SUM OF ELEC: 286 MOSM/KG (ref 275–295)
POTASSIUM SERPL-SCNC: 4 MMOL/L (ref 3.5–5.1)
PROT SERPL-MCNC: 7.7 G/DL (ref 6.4–8.2)
SODIUM SERPL-SCNC: 138 MMOL/L (ref 136–145)
TRIGL SERPL-MCNC: 137 MG/DL (ref 30–149)
VLDLC SERPL CALC-MCNC: 21 MG/DL (ref 0–30)

## 2023-07-06 PROCEDURE — 80053 COMPREHEN METABOLIC PANEL: CPT | Performed by: INTERNAL MEDICINE

## 2023-07-06 PROCEDURE — 99214 OFFICE O/P EST MOD 30 MIN: CPT | Performed by: INTERNAL MEDICINE

## 2023-07-06 PROCEDURE — 83036 HEMOGLOBIN GLYCOSYLATED A1C: CPT | Performed by: INTERNAL MEDICINE

## 2023-07-06 PROCEDURE — 82043 UR ALBUMIN QUANTITATIVE: CPT | Performed by: INTERNAL MEDICINE

## 2023-07-06 PROCEDURE — 3061F NEG MICROALBUMINURIA REV: CPT | Performed by: INTERNAL MEDICINE

## 2023-07-06 PROCEDURE — 90471 IMMUNIZATION ADMIN: CPT | Performed by: INTERNAL MEDICINE

## 2023-07-06 PROCEDURE — 3079F DIAST BP 80-89 MM HG: CPT | Performed by: INTERNAL MEDICINE

## 2023-07-06 PROCEDURE — 82570 ASSAY OF URINE CREATININE: CPT | Performed by: INTERNAL MEDICINE

## 2023-07-06 PROCEDURE — 90715 TDAP VACCINE 7 YRS/> IM: CPT | Performed by: INTERNAL MEDICINE

## 2023-07-06 PROCEDURE — 80061 LIPID PANEL: CPT | Performed by: INTERNAL MEDICINE

## 2023-07-06 PROCEDURE — 3008F BODY MASS INDEX DOCD: CPT | Performed by: INTERNAL MEDICINE

## 2023-07-06 PROCEDURE — 3074F SYST BP LT 130 MM HG: CPT | Performed by: INTERNAL MEDICINE

## 2023-07-06 RX ORDER — TIRZEPATIDE 2.5 MG/.5ML
2.5 INJECTION, SOLUTION SUBCUTANEOUS WEEKLY
Qty: 2 ML | Refills: 0 | Status: SHIPPED | OUTPATIENT
Start: 2023-07-06

## 2023-07-06 RX ORDER — MELOXICAM 15 MG/1
15 TABLET ORAL DAILY
Qty: 7 TABLET | Refills: 0 | Status: SHIPPED | OUTPATIENT
Start: 2023-07-06 | End: 2023-07-13

## 2023-07-06 RX ORDER — COVID-19 ANTIGEN TEST
KIT MISCELLANEOUS
COMMUNITY
Start: 2023-04-25

## 2023-07-06 RX ORDER — METFORMIN HYDROCHLORIDE 750 MG/1
TABLET, EXTENDED RELEASE ORAL
COMMUNITY
Start: 2023-07-05

## 2023-07-06 RX ORDER — BUPROPION HYDROCHLORIDE 300 MG/1
300 TABLET ORAL DAILY
Qty: 90 TABLET | Refills: 0 | Status: SHIPPED | OUTPATIENT
Start: 2023-07-06 | End: 2023-10-04
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
## 2023-07-07 ENCOUNTER — PATIENT MESSAGE (OUTPATIENT)
Dept: INTERNAL MEDICINE CLINIC | Facility: CLINIC | Age: 53
End: 2023-07-07

## 2023-07-07 LAB
EST. AVERAGE GLUCOSE BLD GHB EST-MCNC: 108 MG/DL (ref 68–126)
HBA1C MFR BLD: 5.4 % (ref ?–5.7)

## 2023-07-10 ENCOUNTER — OFFICE VISIT (OUTPATIENT)
Dept: PHYSICAL THERAPY | Facility: HOSPITAL | Age: 53
End: 2023-07-10
Attending: PODIATRIST
Payer: MEDICAID

## 2023-07-10 PROCEDURE — 97110 THERAPEUTIC EXERCISES: CPT

## 2023-07-10 NOTE — PROGRESS NOTES
Referring Physician: Dr. Zakiya Bravo  Diagnosis:  Impingement syndrome of right ankle (M25.871)  Chronic pain of right ankle (M25.571,G89.29)  Insurance: [facility] Medicaid:     Date of Onset: 2 months ago  [employer]ore authorized visits:11 visits or 44 units until 7/8/23     LEFS Score: 35 % (5/9/2023 10:38 AM)    Date of eval: 5/9/2023      Insurance Primary/Secondary: [id number] [facility] / N/A     # Auth Visits: 11 through 8/6/23             Subjective: Pt reports pain R lateral ankle . 5/10. Reports L lateral ankle pain 010 today. Reports significant neck pain especially the last 2 weeks and reports he will be starting PT for same. Discussed 1 more visit for the ankle and then will do evaluation for the neck. Reports doing stairs in a fwd direction now and reciprocally. Pain:  R lateral ankle: .5/10           Objective:         7/10/23  Gait: toe out, wide based, ER both hips,  decreased pushoff at bilateral ankle. Posture:  Flat feet with B STN pronation, L foot abduction;     Palpation: TTP distal to  R  lateral malleolus. MMT R foot:  weak (3+/5): FDB, FDL, and lumbricals, FHL,  noted muscle cramping with MMT for all     AROM: (* denotes performed with pain)  Foot/Ankle   DF in STN:  R 10; L 5   DF (gross) R 5; L 10  PF: R 45; L 45  Great toe ext: R wnl; L wnl      Strength/MMT: (* denotes performed with pain)  Foot/Ankle   DF: R 5/5; L 5/5  PF: R 5/5; L 5/5 seated  PF: SL heel raises: pain limits same on both feet            Assessment: : progressing with rom and goals. Pt is able to do stairs reciprocally in a forward fashion now. Discussed 1 more visit of PT for the foot. Goals:  8-12 visits  Patient will demonstrate absence of significant right foot/ankle pain with ADL's and exercise program and fitness type activities by 8-12 sessions. Progressing  Patient will demonstrate signficant reduction in pain at ankle right at rest and with gait down to 2-3/10 at max .   MET  Patient will achieve 5-/5 strength at right ankle and 5-/5 at right hip abd/ER. Progressing  Patient will demonstrate normalcy of right ankle hindfoot mobility. Progressing  Patient will report reduced pain at right foot/ankle with ADL's by greater than 50% of original max pain  MET  Patient will demonstrate independent HEP with good tolerance . Progressing   Patient will demonstrate right Ankle DF AROM to 12 degrees to allow for reduction in strain at right foot. Progressing     Plan: Continue PT 1 more visit and discharge. Will review HEP and continue with MT/TE for rom/strength of the R foot/ankle. Date: 7/10/2023  TX#: 10/11 Date:                 TX#: 11/ Date:                 TX#:  Date:                 TX#:  Date: Tx#:    R foot  - TCJ  distraction,  post talar glides; MWM for ankle DF;    - TCJ  distraction manip  - STJ distraction; eversion/lateral glide   -STJ mob: fig 8's   -STJ manip  -FMP ankle rocking  -FMP gastroc/soleus      -Gliders for B hip ER 10 sec x10;    -seated towel scrunches 20x (HEP also)  -long sitting: \"make a fist\" with your foot: 5 sec holds/10x (HEP also)      - Standing arch lifts 3-5 sec x10;     -clinic stairs: 6\" ascends and descents reciprocally 2 steps x5; without railing with ease. -DL heel raises 2 x 10     -SL heel raises on R LE 10x; L LE 2x, stopped due to pain     -NAIDA bilat heel cord stretch at rail Level 4 for 20 sec x3     -partial lunges on step to promote L ankle DF 10x; (cues to avoid heel raising up). HEP:  HEP:                         Previous HEP    -6/7/23: towel scrunches for foot intrinsic strengthening.   - Arch Lifting  - 2 x daily - 7 x weekly - 2 sets - 10 reps  - Seated Calf Stretch with Strap  - 1 x daily - 7 x weekly - 1 sets - 4 reps - 15 s hold  Access Code: [email or web address]  URL: [email or web address]  Date: 05/30/2023     - Arch Lifting  - 2 x daily - 7 x weekly - 2 sets - 10 reps  - Seated Calf Stretch with Strap  - 1 x daily - 7 x weekly - 1 sets - 4 reps - 15 s hold  - Gastroc Stretch on Wall  - 1 x daily - 5 x weekly - 1 sets - 3 reps - 20 secs hold  - Clamshell with Resistance (Mirrored)  - 1 x daily - 5 x weekly - 3 sets - 10 reps  - Seated Ankle Inversion with Anchored Resistance (Mirrored)  - 1 x daily - 7 x weekly - 3 sets - 10 reps  - Seated Ankle Eversion with Anchored Resistance  - 1 x daily - 5 x weekly - 3 sets - 10 reps     6/12/23; advance clams to BTB     Charges: Tex3               Total Timed Treatment: 45 min              Total Treatment Time: 46 min

## 2023-07-12 DIAGNOSIS — B35.1 ONYCHOMYCOSIS: ICD-10-CM

## 2023-07-12 DIAGNOSIS — Z23 NEED FOR VACCINATION: ICD-10-CM

## 2023-07-12 DIAGNOSIS — E66.9 OBESITY (BMI 30-39.9): ICD-10-CM

## 2023-07-12 DIAGNOSIS — M54.2 NECK PAIN: ICD-10-CM

## 2023-07-12 DIAGNOSIS — E11.9 TYPE 2 DIABETES MELLITUS WITHOUT COMPLICATION, UNSPECIFIED WHETHER LONG TERM INSULIN USE (HCC): ICD-10-CM

## 2023-07-13 ENCOUNTER — HOSPITAL ENCOUNTER (OUTPATIENT)
Dept: GENERAL RADIOLOGY | Facility: HOSPITAL | Age: 53
Discharge: HOME | End: 2023-07-13
Attending: INTERNAL MEDICINE
Payer: MEDICAID

## 2023-07-13 ENCOUNTER — OFFICE VISIT (OUTPATIENT)
Dept: PHYSICAL THERAPY | Facility: HOSPITAL | Age: 53
End: 2023-07-13
Attending: INTERNAL MEDICINE
Payer: MEDICAID

## 2023-07-13 DIAGNOSIS — Z23 NEED FOR VACCINATION: ICD-10-CM

## 2023-07-13 DIAGNOSIS — E11.9 TYPE 2 DIABETES MELLITUS WITHOUT COMPLICATION, UNSPECIFIED WHETHER LONG TERM INSULIN USE (HCC): ICD-10-CM

## 2023-07-13 DIAGNOSIS — M54.2 NECK PAIN: ICD-10-CM

## 2023-07-13 DIAGNOSIS — E66.9 OBESITY (BMI 30-39.9): ICD-10-CM

## 2023-07-13 DIAGNOSIS — B35.1 ONYCHOMYCOSIS: ICD-10-CM

## 2023-07-13 PROCEDURE — 97110 THERAPEUTIC EXERCISES: CPT

## 2023-07-13 PROCEDURE — 72040 X-RAY EXAM NECK SPINE 2-3 VW: CPT | Performed by: INTERNAL MEDICINE

## 2023-07-13 RX ORDER — TIRZEPATIDE 2.5 MG/.5ML
2.5 INJECTION, SOLUTION SUBCUTANEOUS WEEKLY
Qty: 2 ML | Refills: 0 | Status: SHIPPED | OUTPATIENT
Start: 2023-07-13

## 2023-07-13 NOTE — PROGRESS NOTES
Discharge Summary  Pt has attended 11 visits in Physical Therapy. Referring Physician: Dr. Zakiya Bravo  Diagnosis:  Impingement syndrome of right ankle (M25.871)  Chronic pain of right ankle (M25.571,G89.29)  Insurance: [facility] Medicaid:     Date of Onset: 2 months ago  evicore authorized visits:11 visits or 44 units until 7/8/23     LEFS Score: 35 % (5/9/2023 10:38 AM)    Date of eval: 5/9/2023      Insurance Primary/Secondary: [id number] [employer] / N/A     # Auth Visits: 11 through 8/6/23             Subjective: Pt reports pain R lateral ankle . 5/10. Reports L lateral ankle pain 0/10 today. Reports overall dong better. Continues to reports R cerv pain and states he is gong for an [employer] today for same. Pain:  R lateral ankle: .5/10           Objective:         7/13/23  Gait: toe out, wide based, ER both hips,  decreased pushoff at bilateral ankle. Posture:  Stands with wide based hips ER, slight knee and hip flexion, Flat feet with B STN pronation, L foot abduction;    Balance: SLS: R  sec, L 15 sec    Palpation: TTP distal to  R  lateral malleolus. MMT   R foot intrinsics: (3+/5): FDB, FDL, and lumbricals, FHL,  noted muscle cramping with MMT for all   L foot intrinsics: (4-/5): FDB, FDL, and lumbricals, FHL,     AROM: (* denotes performed with pain)  Foot/Ankle   DF in STN:  R 5; L 10  DF (gross) R 5; L 10  PF: R 45; L 45  Great toe ext: R wnl; L wnl      Strength/MMT: (* denotes performed with pain)  Foot/Ankle   DF: R 5/5; L 5/5  PF: R 5/5; L 5/5 (tested seated)  PF: SL heel raises: pain limits same on both feet          Accessory motion:   Ankle /foot accessory jt motion restricted as follows: TCJ distraction, post talar glides; STJ  eversion/lateral glide; .   AROM:  Hip Knee   Flexion: R 120; L 112  Abduction: R40;   L 40  ER: R 35; L 45  IR: R 25; L 30 Flexion: R 128; L 125  Extension: R 0; L 0         Strength/MMT:   Hip Knee Foot/Ankle   Flexion: R 5-/5; L 5/5  Abduction: R 4/5; L 5-/5  ER: R 4+/5; L 5-/5  IR R 5/5;    L 5/5    Flexion: R 5/5; L 5/5  Extension: R 5/5; L 5/5    DF: R 5/5; L 5/5  PF: R 4/5; L 4/5           Assessment[de-identified]  Pt has met most goals. Pt is indep with HEP. Pt is ready for D/C  from PT. Goals:  8-12 visits  Patient will demonstrate absence of significant right foot/ankle pain with ADL's and exercise program and fitness type activities by 8-12 sessions. MET  Patient will demonstrate signficant reduction in pain at ankle right at rest and with gait down to 2-3/10 at max . MET  Patient will achieve 5-/5 strength at right ankle and 5-/5 at right hip abd/ER. MET  Patient will demonstrate normalcy of right ankle hindfoot mobility. Progressing  Patient will report reduced pain at right foot/ankle with ADL's by greater than 50% of original max pain  MET  Patient will demonstrate independent HEP with good tolerance . MET  Patient will demonstrate right Ankle DF AROM to 12 degrees to allow for reduction in strain at right foot. Progressing      Post LEFS Score  Post LEFS Score: 58.75 % (7/13/2023  1:42 PM)    23.75 % improvement     Plan: D/C  PT. Pt to continue with HEP and f/u with MD as needed. Date: 7/10/2023  TX#: 10/11 Date:   7/13/23              TX#: 11/11 Date:                 TX#:  Date:                 TX#:  Date: Tx#:    R foot  - TCJ  distraction,  post talar glides; MWM for ankle DF;    - TCJ  distraction manip  - STJ distraction; eversion/lateral glide   -STJ mob: fig 8's   -STJ manip  -FMP ankle rocking  -FMP gastroc/soleus      -Gliders for B hip ER 10 sec x10;    -seated towel scrunches 20x (HEP also)  -long sitting: \"make a fist\" with your foot: 5 sec holds/10x (HEP also)      - Standing arch lifts 3-5 sec x10;     -clinic stairs: 6\" ascends and descents reciprocally 2 steps x5; without railing with ease.       -DL heel raises 2 x 10     -SL heel raises on R LE 10x; L LE 2x, stopped due to pain     -NAIDA bilat heel cord stretch at rail Level 4 for 20 sec x3     -partial lunges on step to promote L ankle DF 10x; (cues to avoid heel raising up). R foot  - TCJ  distraction,  post talar glides; MWM for ankle DF;    - TCJ  distraction manip  -STJ manip; Review of   -seated towel scrunches 20x (HEP also)  -long sitting: \"make a fist\" with your foot: 5 sec holds/10x (HEP also)      - Standing arch lifts 3-5 sec x10;      -clinic stairs: 6\" ascends and descents reciprocally 2 steps x5; without railing with ease. -DL heel raises 2 x 10      -NAIDA bilat heel cord stretch at rail Level 4 for 20 sec x3     -partial lunges on step to promote L ankle DF 10x; (cues to avoid heel raising up). -ROM and functional activities with the re assessment.   -tests and measures  -Reviewed HEP  -Reviewed self management                                                                                   HEP:  HEP:                           Charges: Tex3               Total Timed Treatment: 45 min              Total Treatment Time: 46 min  Previous HEP    -6/7/23: towel scrunches for foot intrinsic strengthening.   - Arch Lifting  - 2 x daily - 7 x weekly - 2 sets - 10 reps  - Seated Calf Stretch with Strap  - 1 x daily - 7 x weekly - 1 sets - 4 reps - 15 s hold  Access Code: [email or web address]  URL: [email or web address]  Date: 05/30/2023     - Arch Lifting  - 2 x daily - 7 x weekly - 2 sets - 10 reps  - Seated Calf Stretch with Strap  - 1 x daily - 7 x weekly - 1 sets - 4 reps - 15 s hold  - Gastroc Stretch on Wall  - 1 x daily - 5 x weekly - 1 sets - 3 reps - 20 secs hold  - Clamshell with Resistance (Mirrored)  - 1 x daily - 5 x weekly - 3 sets - 10 reps  - Seated Ankle Inversion with Anchored Resistance (Mirrored)  - 1 x daily - 7 x weekly - 3 sets - 10 reps  - Seated Ankle Eversion with Anchored Resistance  - 1 x daily - 5 x weekly - 3 sets - 10 reps     6/12/23; advance clams to BTB

## 2023-07-17 ENCOUNTER — TELEPHONE (OUTPATIENT)
Dept: PHYSICAL THERAPY | Facility: HOSPITAL | Age: 53
End: 2023-07-17

## 2023-07-17 ENCOUNTER — APPOINTMENT (OUTPATIENT)
Dept: PHYSICAL THERAPY | Facility: HOSPITAL | Age: 53
End: 2023-07-17
Attending: PODIATRIST
Payer: MEDICAID

## 2023-07-19 ENCOUNTER — APPOINTMENT (OUTPATIENT)
Dept: PHYSICAL THERAPY | Facility: HOSPITAL | Age: 53
End: 2023-07-19
Attending: PODIATRIST
Payer: MEDICAID

## 2023-07-19 ENCOUNTER — TELEPHONE (OUTPATIENT)
Dept: PHYSICAL THERAPY | Facility: HOSPITAL | Age: 53
End: 2023-07-19

## 2023-07-19 ENCOUNTER — TELEPHONE (OUTPATIENT)
Dept: INTERNAL MEDICINE CLINIC | Facility: CLINIC | Age: 53
End: 2023-07-19

## 2023-07-19 NOTE — TELEPHONE ENCOUNTER
Your information has been submitted to [employer]. [employer] is reviewing the PA request and you will receive an electronic response. You may check for the updated outcome later by reopening this request. The standard fax determination will also be sent to you directly. If you have any questions about your PA submission, contact [employer] at [phone].

## 2023-07-23 RX ORDER — PEN NEEDLE, DIABETIC 30 GX3/16"
1 NEEDLE, DISPOSABLE MISCELLANEOUS DAILY
Qty: 90 EACH | Refills: 0 | Status: SHIPPED | OUTPATIENT
Start: 2023-07-23

## 2023-07-23 RX ORDER — LIRAGLUTIDE 6 MG/ML
INJECTION SUBCUTANEOUS
Qty: 27 ML | Refills: 0 | Status: SHIPPED | OUTPATIENT
Start: 2023-07-23 | End: 2023-09-05

## 2023-07-23 NOTE — TELEPHONE ENCOUNTER
Shan Howell, [address] [facility] 7/19/2023 11:36 AM CDT    CAN'T WAIT FOR    ----- Message -----  From: Ashley Weaver  Sent: 7/19/2023 11:22 AM CDT  To: Yessenia Ott CMA  Subject: Question regarding COMP METABOLIC PANEL (14)     I appreciate the update.

## 2023-07-26 ENCOUNTER — TELEPHONE (OUTPATIENT)
Dept: PHYSICAL MEDICINE AND REHAB | Facility: CLINIC | Age: 53
End: 2023-07-26

## 2023-07-26 DIAGNOSIS — M54.16 LUMBAR RADICULOPATHY: ICD-10-CM

## 2023-07-26 RX ORDER — HYDROCODONE BITARTRATE AND ACETAMINOPHEN 10; 325 MG/1; MG/1
TABLET ORAL
Qty: 150 TABLET | Refills: 0 | OUTPATIENT
Start: 2023-07-26

## 2023-07-26 NOTE — TELEPHONE ENCOUNTER
Refill Request    Medication request: HYDROcodone-acetaminophen  MG Oral Tab. Take 1 tablet by mouth every 4-6 hours PRN pain (max 5 tabs in 24 hours). WK with Casey Zelaya MD  Due back to clinic per last office note:  Per Dr Travon Rouse will follow up in 3 months or sooner if needed. \"  NOV: Visit date not found      [place]: 2023 #150 tab (30 days)    Urine drug screen (if applicable): none  Pain contract:  on 05/15/2023    LOV plan (if weaning or changing medications): Per Dr Mallorie Carmona \"  He will continue with the Norco taking no more than 5 per day.  \"

## 2023-07-26 NOTE — TELEPHONE ENCOUNTER
Refill Request    Medication request: HYDROcodone-acetaminophen  MG Oral Tab. Take 1 tablet by mouth every 4-6 hours PRN pain (max 5 tabs in 24 hours). NGL: with Eunice Wells MD  Due back to clinic per last office note:  Per Dr Magdi Warren will follow up in 3 months or sooner if needed. \"  NOV: Visit date not found      [place]: 2023 #150 tab (30 days)    Urine drug screen (if applicable): none  Pain contract:  on 05/15/2023    LOV plan (if weaning or changing medications): Per Dr Jasvir Alejo \"  He will continue with the Norco taking no more than 5 per day.  \"      Duplicate request-refused

## 2023-07-26 NOTE — TELEPHONE ENCOUNTER
MARGE pt and he wanted a sooner appointment due to X ray results and also medication review. Scheduled patient on 08/02 in [place] with Dr Sisi Gill as pt was ok with scheduling in [place]. Pt also wanted to keep the appointment on 11/02. No further actions at this time.

## 2023-07-26 NOTE — TELEPHONE ENCOUNTER
Pt called asking to speak with a clinical member in regards to his pain he is feeling. He is worried he wont make it to his 11/2 apt.  Please give pt a callback

## 2023-07-27 ENCOUNTER — APPOINTMENT (OUTPATIENT)
Dept: PHYSICAL THERAPY | Facility: HOSPITAL | Age: 53
End: 2023-07-27
Attending: INTERNAL MEDICINE
Payer: MEDICAID

## 2023-07-31 ENCOUNTER — PATIENT MESSAGE (OUTPATIENT)
Dept: PHYSICAL MEDICINE AND REHAB | Facility: CLINIC | Age: 53
End: 2023-07-31

## 2023-08-01 NOTE — TELEPHONE ENCOUNTER
From: Veronica Zepeda  To: Miguel A Jarvis MD  Sent: 7/31/2023 4:29 PM CDT  Subject: Refill status please     [address] all is well. I was reaching out to in regards to my refill on the [employer], please advise.      Thank you kindly

## 2023-08-02 ENCOUNTER — OFFICE VISIT (OUTPATIENT)
Dept: PHYSICAL MEDICINE AND REHAB | Facility: CLINIC | Age: 53
End: 2023-08-02
Payer: MEDICAID

## 2023-08-02 ENCOUNTER — APPOINTMENT (OUTPATIENT)
Dept: PHYSICAL THERAPY | Facility: HOSPITAL | Age: 53
End: 2023-08-02
Attending: INTERNAL MEDICINE
Payer: MEDICAID

## 2023-08-02 VITALS — DIASTOLIC BLOOD PRESSURE: 88 MMHG | SYSTOLIC BLOOD PRESSURE: 138 MMHG | HEART RATE: 85 BPM | OXYGEN SATURATION: 96 %

## 2023-08-02 DIAGNOSIS — M50.90 CERVICAL DISC DISEASE: Primary | ICD-10-CM

## 2023-08-02 DIAGNOSIS — G56.21 ULNAR NEUROPATHY AT ELBOW OF RIGHT UPPER EXTREMITY: ICD-10-CM

## 2023-08-02 DIAGNOSIS — Q76.1 CERVICAL FUSION SYNDROME: ICD-10-CM

## 2023-08-02 DIAGNOSIS — Z98.1 S/P CERVICAL SPINAL FUSION: ICD-10-CM

## 2023-08-02 DIAGNOSIS — M48.02 CERVICAL STENOSIS OF SPINE: ICD-10-CM

## 2023-08-02 DIAGNOSIS — M54.12 CERVICAL RADICULOPATHY: ICD-10-CM

## 2023-08-02 DIAGNOSIS — G56.22 ULNAR NEUROPATHY AT ELBOW OF LEFT UPPER EXTREMITY: ICD-10-CM

## 2023-08-02 PROCEDURE — 3075F SYST BP GE 130 - 139MM HG: CPT | Performed by: PHYSICAL MEDICINE & REHABILITATION

## 2023-08-02 PROCEDURE — 3079F DIAST BP 80-89 MM HG: CPT | Performed by: PHYSICAL MEDICINE & REHABILITATION

## 2023-08-02 PROCEDURE — 99214 OFFICE O/P EST MOD 30 MIN: CPT | Performed by: PHYSICAL MEDICINE & REHABILITATION

## 2023-08-02 NOTE — PATIENT INSTRUCTIONS
Plan  He will continue with his chiropractor, Dr. John Coffman. If she feels that injections into the bilateral C1-2 joints would be helpful with her manipulations of the cervical spine, then I will do these injections to supplement her care. He will continue with the Norco for the pain taking no more than 5 per day. The patient will continue with his home exercise program.    He will follow up in 3 months or sooner if needed.

## 2023-08-02 NOTE — PROGRESS NOTES
Cervical Pain H & P    Chief Complaint:  Patient presents with: Follow - Up: LOV: 4/4/2023 Xr cervical spine. He has been taking the norco for the pain. Neck pain is a constant 9/10 and stiffness. Numbs on both arms. He has lost 15 pounds since the last timehe was here. Nursing note reviewed and verified. Patient was last seen on 4/4/2023. He did the PT at the hospital and he has been seeing Dr. David Gautam for the acupuncture and cervical spine adjustments. These help. He last saw her about one month ago. The PT helped with his range of motion in the neck and decreased the pain some. Sometime after he finished the PT, the pain increased. The pain is located behind the bilateral ears. He has been diagnosed with DM. HE has had a cervical spine x-rays and was told that he has a large spur. He has been trying to loose weight. Description of the Pain  The pain is located in the bilateral middle of the neck. The pain radiates to right > left shoulder, right > left upper lateral arm, right > left posterior forearm, right > left index finger, right > left middle finger, right > left ring finger, and right > left little finger. The pain will radiate to the posterior skull and top of the head bilaterally. The pain at its best is 4/10. The pain at its worst is 9/10. The pain is currently  9/10. The pain is described as a(n) sharp sensation. The patient reports numbness in the bilateral index finger, bilateral middle finger, bilateral ring finger, and bilateral little finger. The patient reports tingling in the bilateral index finger, bilateral middle finger, bilateral ring finger, and bilateral little finger. There is not weakness in bilateral hands and arms. The pain is worse looking down, sitting, and in the morning and evening. The pain is better  with the medications . He is taking 4-5 of the Norco 10/325 per day for the pain.        Past Medical History   Past Medical History:   Diagnosis Date Back problem     Chronic neck pain     Contusion of cervical cord (HCC)     Visual impairment     readers       Past Surgical History   Past Surgical History:   Procedure Laterality Date    CT CERVICAL SPINE      c3 & 4     HERNIA SURGERY  2005    hiatal hernia    KNEE SURGERY  2008    RIGHT ACL REPAIR       Family History   Family History   Problem Relation Age of Onset    Heart Disorder Paternal Grandmother     Diabetes Paternal Grandmother     Cancer Paternal Grandmother     No Known Problems Mother     No Known Problems Sister     No Known Problems Brother        Social History   Social History    Socioeconomic History      Marital status:       Spouse name: Not on file      Number of children: Not on file      Years of education: Not on file      Highest education level: Not on file    Occupational History      Not on file    Tobacco Use      Smoking status: Never      Smokeless tobacco: Never    Vaping Use      Vaping Use: Never used    Substance and Sexual Activity      Alcohol use: Not Currently        Alcohol/week: 1.0 standard drink of alcohol        Types: 1 Glasses of wine per week        Comment: rarely      Drug use: No      Sexual activity: Not on file    Other Topics      Concerns:        Caffeine Concern: Yes        Exercise: No        Seat Belt: Not Asked        Special Diet: Not Asked        Stress Concern: Not Asked        Weight Concern: Not Asked         Service: Not Asked        Blood Transfusions: Not Asked        Occupational Exposure: Not Asked        Hobby Hazards: Not Asked        Sleep Concern: Not Asked        Back Care: Not Asked        Bike Helmet: Not Asked        Self-Exams: Not Asked    Social History Narrative      The patient does not use an assistive device. .        The patient does live in a home with stairs.     Social Determinants of Health  Financial Resource Strain: Not on file  Food Insecurity: Not on file  Transportation Needs: Not on file  Physical Activity: Not on file  Stress: Not on file  Social Connections: Not on file  Housing Stability: Not on file    PE:  The patient does appear in his stated age in no distress. The patient is well groomed. Psychiatric:  The patient is alert and oriented x 3. The patient has a normal affect and mood. Respiratory:  No acute respiratory distress. Patient does not have a cough. HEENT:  Extraocular muscles are intact. There is no kern icterus. Pupils are equal, round, and reactive to light. No redness or discharge bilaterally. Skin:  There are no rashes or lesions. Lymph Nodes: The patient has no palpable submandibular, supraclavicular, and cervical lymph nodes. .    Vitals:   08/02/23  0915   BP: 138/88   Pulse: 85       Cervical Spine:    Posture: moderate chin forward superiorly rotated protracted shoulder posture. Shoulders: Level   Head: In neutral   Spinous Processes Palpations: Non-tender for all Spinous Processes   Z-Joints Palpations: Tender at  bilateral C1-2   Muscular Palpations: Tender at  bilateral pectoralis minor  bilateral scalene  bilateral coracoid process     Vascular upper extremity:   Right radial pulses: 2+   Left radial pulses: 2+      Neurological Upper Extremity:    Light Touch: Intact in Bilateral UE except:  Decreased in the bilateral index finger, bilateral middle finger, bilateral ring finger, and bilateral little finger. Pin Prick: Not tested. UE Muscle Strength: All Upper Extremity strength measurements 5/5 except:  ABP Right: 4+/5  FDI Right: 4+/5  ADM Right: 4+/5   Reflexes: absent Right triceps where it is 2+  Left triceps where it is 2+   Webb's sign Right: Negative   Webb's sigh Left: Negative     Radiology Imaging:  I reviewed with the patient his X-ray of the cervical spine from 7/13/2023. Assessment  1. C2-3 mild-mod central, C7-T1 mild central & left foraminal mild-mod bulging discs    2. C3-4 moderate central stenosis    3.  S/P cervical spinal fusion: C3-4 ACDF    4. right C8 radiculopathy    5. 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 Cervical fusion syndrome: C2-T1 due to AS    7. Ulnar neuropathy at elbow of right upper extremity: moderate sensory      Plan  He will continue with his chiropractor, Dr. Dedrcik Doyle. If she feels that injections into the bilateral C1-2 joints would be helpful with her manipulations of the cervical spine, then I will do these injections to supplement her care. He will continue with the Norco for the pain taking no more than 5 per day. The patient will continue with his home exercise program.    He will follow up in 3 months or sooner if needed. The patient understands and agrees with the stated plan. Je Chapman MD  8/2/2023

## 2023-08-03 ENCOUNTER — APPOINTMENT (OUTPATIENT)
Dept: PHYSICAL THERAPY | Facility: HOSPITAL | Age: 53
End: 2023-08-03
Attending: INTERNAL MEDICINE
Payer: MEDICAID

## 2023-08-10 ENCOUNTER — APPOINTMENT (OUTPATIENT)
Dept: PHYSICAL THERAPY | Facility: HOSPITAL | Age: 53
End: 2023-08-10
Attending: INTERNAL MEDICINE
Payer: MEDICAID

## 2023-08-17 ENCOUNTER — MED REC SCAN ONLY (OUTPATIENT)
Dept: PHYSICAL MEDICINE AND REHAB | Facility: CLINIC | Age: 53
End: 2023-08-17

## 2023-08-17 ENCOUNTER — APPOINTMENT (OUTPATIENT)
Dept: PHYSICAL THERAPY | Facility: HOSPITAL | Age: 53
End: 2023-08-17
Attending: INTERNAL MEDICINE
Payer: MEDICAID

## 2023-08-23 ENCOUNTER — TELEPHONE (OUTPATIENT)
Dept: GASTROENTEROLOGY | Facility: CLINIC | Age: 53
End: 2023-08-23

## 2023-08-23 NOTE — TELEPHONE ENCOUNTER
Dr. Yakelin Peck    Patient notified this RN that diabetic medications changed since we scheduled his procedure. He is scheduled for a colonoscopy and EGD with Dr. Amanda Moreno on 8/30/2023.     Please advise on all diabetic (oral & insulin) adjustment orders based on the following diet modifications prior to procedures:    Day before the procedures, patient will be on clear liquid diet only after breakfast.  (Anesthesia prefers medications that can slow gastric emptying to be held 7 days prior to procedure.)    Thank you  [facility] Clinical Staff

## 2023-08-23 NOTE — TELEPHONE ENCOUNTER
Patient contacted. He wanted to know his arrival time which was reviewed with patient. He told me that he is on victoza instead of trulicity and was told there was no way he could hold that for one week because it is controlling his blood sugars. I let him know trulicity is held like that because it slows gastric emptying so I would send a message to his PCP to see how long to hold Warren Enmanuel since medication changed since he was scheduled.

## 2023-08-24 ENCOUNTER — APPOINTMENT (OUTPATIENT)
Dept: PHYSICAL THERAPY | Facility: HOSPITAL | Age: 53
End: 2023-08-24
Attending: INTERNAL MEDICINE
Payer: MEDICAID

## 2023-08-24 NOTE — TELEPHONE ENCOUNTER
[address] [place] [address] RNs, please advise the patient to stop victoza 1 days before the procedure   Continue to hold the day of the procedure.   Thank you,   MT

## 2023-08-24 NOTE — TELEPHONE ENCOUNTER
Called patient, name/ verified. Pt stated he is on Victoza only, no other DM meds, he takes it daily. Instructions below discussed with pt in detail, he verbalized understanding, able to read back instructions. Prep instructions sent through [address] [place] [address] [address], also written victoza instructions there.

## 2023-08-28 DIAGNOSIS — M54.16 LUMBAR RADICULOPATHY: ICD-10-CM

## 2023-08-29 ENCOUNTER — TELEPHONE (OUTPATIENT)
Facility: CLINIC | Age: 53
End: 2023-08-29

## 2023-08-30 RX ORDER — HYDROCODONE BITARTRATE AND ACETAMINOPHEN 10; 325 MG/1; MG/1
TABLET ORAL
Qty: 150 TABLET | Refills: 0 | Status: SHIPPED | OUTPATIENT
Start: 2023-09-01

## 2023-08-31 ENCOUNTER — APPOINTMENT (OUTPATIENT)
Dept: PHYSICAL THERAPY | Facility: HOSPITAL | Age: 53
End: 2023-08-31
Attending: INTERNAL MEDICINE
Payer: MEDICAID

## 2023-09-20 ENCOUNTER — OFFICE VISIT (OUTPATIENT)
Dept: INTERNAL MEDICINE CLINIC | Facility: CLINIC | Age: 53
End: 2023-09-20
Payer: MEDICAID

## 2023-09-20 VITALS
BODY MASS INDEX: 37.24 KG/M2 | DIASTOLIC BLOOD PRESSURE: 82 MMHG | SYSTOLIC BLOOD PRESSURE: 134 MMHG | WEIGHT: 266 LBS | OXYGEN SATURATION: 90 % | HEART RATE: 86 BPM | HEIGHT: 71 IN

## 2023-09-20 DIAGNOSIS — N52.9 ERECTILE DYSFUNCTION, UNSPECIFIED ERECTILE DYSFUNCTION TYPE: ICD-10-CM

## 2023-09-20 DIAGNOSIS — R21 RASH: Primary | ICD-10-CM

## 2023-09-20 DIAGNOSIS — E11.8 CONTROLLED DIABETES MELLITUS TYPE 2 WITH COMPLICATIONS, UNSPECIFIED WHETHER LONG TERM INSULIN USE (HCC): ICD-10-CM

## 2023-09-20 DIAGNOSIS — Q64.4: ICD-10-CM

## 2023-09-20 DIAGNOSIS — R79.89 LOW TESTOSTERONE IN MALE: ICD-10-CM

## 2023-09-20 LAB
EST. AVERAGE GLUCOSE BLD GHB EST-MCNC: 114 MG/DL (ref 68–126)
HBA1C MFR BLD: 5.6 % (ref ?–5.7)

## 2023-09-20 PROCEDURE — 3008F BODY MASS INDEX DOCD: CPT | Performed by: INTERNAL MEDICINE

## 2023-09-20 PROCEDURE — 3044F HG A1C LEVEL LT 7.0%: CPT | Performed by: INTERNAL MEDICINE

## 2023-09-20 PROCEDURE — 83036 HEMOGLOBIN GLYCOSYLATED A1C: CPT | Performed by: INTERNAL MEDICINE

## 2023-09-20 PROCEDURE — 84410 TESTOSTERONE BIOAVAILABLE: CPT | Performed by: INTERNAL MEDICINE

## 2023-09-20 PROCEDURE — 99214 OFFICE O/P EST MOD 30 MIN: CPT | Performed by: INTERNAL MEDICINE

## 2023-09-20 PROCEDURE — 3075F SYST BP GE 130 - 139MM HG: CPT | Performed by: INTERNAL MEDICINE

## 2023-09-20 PROCEDURE — 3079F DIAST BP 80-89 MM HG: CPT | Performed by: INTERNAL MEDICINE

## 2023-09-20 PROCEDURE — 86695 HERPES SIMPLEX TYPE 1 TEST: CPT | Performed by: INTERNAL MEDICINE

## 2023-09-20 PROCEDURE — 86696 HERPES SIMPLEX TYPE 2 TEST: CPT | Performed by: INTERNAL MEDICINE

## 2023-09-20 RX ORDER — VALACYCLOVIR HYDROCHLORIDE 1 G/1
1000 TABLET, FILM COATED ORAL EVERY 12 HOURS SCHEDULED
Qty: 14 TABLET | Refills: 0 | Status: SHIPPED | OUTPATIENT
Start: 2023-09-20 | End: 2023-09-27

## 2023-09-20 RX ORDER — TADALAFIL 10 MG/1
10 TABLET ORAL
Qty: 20 TABLET | Refills: 1 | Status: SHIPPED | OUTPATIENT
Start: 2023-09-20

## 2023-09-20 RX ORDER — FAMOTIDINE 20 MG/1
20 TABLET, FILM COATED ORAL DAILY
Qty: 90 TABLET | Refills: 0 | Status: SHIPPED | OUTPATIENT
Start: 2023-09-20 | End: 2023-12-19

## 2023-09-21 ENCOUNTER — TELEPHONE (OUTPATIENT)
Dept: INTERNAL MEDICINE CLINIC | Facility: CLINIC | Age: 53
End: 2023-09-21

## 2023-09-21 DIAGNOSIS — R79.89 LOW TESTOSTERONE: Primary | ICD-10-CM

## 2023-09-21 NOTE — TELEPHONE ENCOUNTER
Bennie left a voicemail from the [facility]. It is in regards to needing a redraw.

## 2023-09-22 LAB
HSV 1 GLYCOPROTEIN G, IGG: POSITIVE
HSV 2 GLYCOPROTEIN G, IGG: NEGATIVE

## 2023-09-25 ENCOUNTER — PATIENT MESSAGE (OUTPATIENT)
Dept: INTERNAL MEDICINE CLINIC | Facility: CLINIC | Age: 53
End: 2023-09-25

## 2023-09-25 DIAGNOSIS — M54.2 NECK PAIN: ICD-10-CM

## 2023-09-25 DIAGNOSIS — E11.9 TYPE 2 DIABETES MELLITUS WITHOUT COMPLICATION, UNSPECIFIED WHETHER LONG TERM INSULIN USE (HCC): ICD-10-CM

## 2023-09-25 DIAGNOSIS — Z23 NEED FOR VACCINATION: ICD-10-CM

## 2023-09-25 DIAGNOSIS — E66.9 OBESITY (BMI 30-39.9): ICD-10-CM

## 2023-09-25 DIAGNOSIS — B35.1 ONYCHOMYCOSIS: ICD-10-CM

## 2023-09-25 DIAGNOSIS — M54.16 LUMBAR RADICULOPATHY: ICD-10-CM

## 2023-09-25 RX ORDER — LANCETS 30 GAUGE
EACH MISCELLANEOUS
Qty: 200 EACH | Refills: 1 | Status: SHIPPED | OUTPATIENT
Start: 2023-09-25

## 2023-09-25 RX ORDER — TIRZEPATIDE 2.5 MG/.5ML
2.5 INJECTION, SOLUTION SUBCUTANEOUS WEEKLY
Qty: 2 ML | Refills: 0 | Status: SHIPPED | OUTPATIENT
Start: 2023-09-25

## 2023-09-25 RX ORDER — LIRAGLUTIDE 6 MG/ML
1.8 INJECTION SUBCUTANEOUS DAILY
Qty: 27 ML | Refills: 0 | Status: SHIPPED | OUTPATIENT
Start: 2023-09-25

## 2023-09-25 RX ORDER — PEN NEEDLE, DIABETIC 30 GX3/16"
1 NEEDLE, DISPOSABLE MISCELLANEOUS DAILY
Qty: 90 EACH | Refills: 0 | Status: SHIPPED | OUTPATIENT
Start: 2023-09-25

## 2023-09-25 RX ORDER — BLOOD-GLUCOSE METER
KIT MISCELLANEOUS
Qty: 100 STRIP | Refills: 0 | Status: SHIPPED | OUTPATIENT
Start: 2023-09-25 | End: 2024-09-24

## 2023-09-25 RX ORDER — COVID-19 ANTIGEN TEST
KIT MISCELLANEOUS
Refills: 0 | OUTPATIENT
Start: 2023-09-25

## 2023-09-25 NOTE — TELEPHONE ENCOUNTER
Refill Request    Medication request:   HYDROcodone-acetaminophen  MG Oral Tab       LOV: 2023  RTC: 3 months  NOV: Visit date not found      [place] 20[place] #30 days    UDS: (if applicable): 8/3/8038  Pain contract:  5/15/2023    LOV plan (if weaning or changing medications):   He will continue with the Norco for the pain taking no more than 5 per day

## 2023-09-26 NOTE — TELEPHONE ENCOUNTER
Riaz Villaseñor 9/25/2023 2:42 PM CDT      ----- Message -----  From: Bam Molina  Sent: 9/25/2023 2:26 PM CDT  To: Racheal Avery Clinical Staff  Subject: Regarding Pearl Gian afternoon Doctor   [place] is all is well. For some reason my prescription Victoza was removed from my medication prescription to request a refill. I know you mentioned that we needed to try 3 different medication before getting Madeline approved. So my only option    in regards to requesting a refill was chuck. Just wanted to follow up if that makes sense.      Thank you kindly

## 2023-09-27 ENCOUNTER — TELEPHONE (OUTPATIENT)
Dept: INTERNAL MEDICINE CLINIC | Facility: CLINIC | Age: 53
End: 2023-09-27

## 2023-09-29 ENCOUNTER — PATIENT MESSAGE (OUTPATIENT)
Dept: PHYSICAL MEDICINE AND REHAB | Facility: CLINIC | Age: 53
End: 2023-09-29

## 2023-09-29 RX ORDER — HYDROCODONE BITARTRATE AND ACETAMINOPHEN 10; 325 MG/1; MG/1
TABLET ORAL
Qty: 150 TABLET | Refills: 0 | Status: SHIPPED | OUTPATIENT
Start: 2023-09-29

## 2023-09-29 NOTE — TELEPHONE ENCOUNTER
From: Bryon Monk  To: Isabel Mcgee  Sent: 9/29/2023 10:18 AM CDT  Subject: Flowing up on my refill request     Good morning   I sent a request for my prescription request on September 25th and is Friday the 29th please advise on the status of my request. FYI as you is Friday so I would like to have an answer before the end of the day please. The end of the month is this weekend and you guys are closed.      Thank you kindly

## 2023-10-03 ENCOUNTER — TELEPHONE (OUTPATIENT)
Dept: PHYSICAL MEDICINE AND REHAB | Facility: CLINIC | Age: 53
End: 2023-10-03

## 2023-10-03 ENCOUNTER — OFFICE VISIT (OUTPATIENT)
Dept: SURGERY | Facility: CLINIC | Age: 53
End: 2023-10-03

## 2023-10-03 VITALS — WEIGHT: 266 LBS | BODY MASS INDEX: 37 KG/M2

## 2023-10-03 DIAGNOSIS — R19.8 UMBILICUS DISCHARGE: Primary | ICD-10-CM

## 2023-10-03 PROCEDURE — 99202 OFFICE O/P NEW SF 15 MIN: CPT | Performed by: SURGERY

## 2023-10-03 NOTE — TELEPHONE ENCOUNTER
Patient came into the office, per patient, would like a lower dosage of HYDROcodone-acetaminophen  MG Oral Tab    As there is a shortage     I asked patient which pharmacy to send it too. Per patient will call pharmacies and let us know which pharmacy to send a refill too.

## 2023-10-05 DIAGNOSIS — M54.16 LUMBAR RADICULOPATHY: ICD-10-CM

## 2023-10-05 RX ORDER — HYDROCODONE BITARTRATE AND ACETAMINOPHEN 10; 325 MG/1; MG/1
TABLET ORAL
Qty: 150 TABLET | Refills: 0 | Status: CANCELLED | OUTPATIENT
Start: 2023-10-05

## 2023-10-24 ENCOUNTER — TELEPHONE (OUTPATIENT)
Dept: INTERNAL MEDICINE CLINIC | Facility: CLINIC | Age: 53
End: 2023-10-24

## 2023-10-24 DIAGNOSIS — B35.1 ONYCHOMYCOSIS: ICD-10-CM

## 2023-10-24 DIAGNOSIS — M54.2 NECK PAIN: ICD-10-CM

## 2023-10-24 DIAGNOSIS — Z23 NEED FOR VACCINATION: ICD-10-CM

## 2023-10-24 DIAGNOSIS — E11.9 TYPE 2 DIABETES MELLITUS WITHOUT COMPLICATION, UNSPECIFIED WHETHER LONG TERM INSULIN USE (HCC): ICD-10-CM

## 2023-10-24 DIAGNOSIS — E66.9 OBESITY (BMI 30-39.9): ICD-10-CM

## 2023-10-24 RX ORDER — LANCETS 30 GAUGE
EACH MISCELLANEOUS
Qty: 200 EACH | Refills: 1 | Status: SHIPPED | OUTPATIENT
Start: 2023-10-24

## 2023-10-24 RX ORDER — BLOOD-GLUCOSE METER
KIT MISCELLANEOUS
Qty: 100 STRIP | Refills: 0 | Status: SHIPPED | OUTPATIENT
Start: 2023-10-24 | End: 2024-10-23

## 2023-10-24 RX ORDER — LIRAGLUTIDE 6 MG/ML
1.8 INJECTION SUBCUTANEOUS DAILY
Qty: 27 ML | Refills: 0 | Status: SHIPPED | OUTPATIENT
Start: 2023-10-24

## 2023-10-24 RX ORDER — PEN NEEDLE, DIABETIC 30 GX3/16"
1 NEEDLE, DISPOSABLE MISCELLANEOUS DAILY
Qty: 90 EACH | Refills: 0 | Status: SHIPPED | OUTPATIENT
Start: 2023-10-24

## 2023-10-24 NOTE — TELEPHONE ENCOUNTER
Pharmacist from [facility] called the office. He said the scripts that were sent this morning, he has a questionon regarding the Victoza medication. Would like to speak to the medical assistant    Also, patient's insurance won't cover the Free Style meter, and supplies. Insurance will only cover the One Touch meter and supplies. If the medical assistant can please check he said with the doctor if it is okay to switch.

## 2023-10-24 NOTE — TELEPHONE ENCOUNTER
Future Appointment:none      Last Appointment:9/20/23      Last Refill:external report by patient       Medication Requested: FLOWFLEX COVID-19 AG HOME TEST In Vitro Kit

## 2023-10-24 NOTE — TELEPHONE ENCOUNTER
Never mind I called pharmacy     They took a verbal order     & regarding the Victoza  put in message to dispense generic but there is no generic for victoza     Informed him that was a mistake that he has been taking Victoza.      Voiced understanding

## 2023-10-25 RX ORDER — COVID-19 ANTIGEN TEST
4 KIT MISCELLANEOUS ONCE
Qty: 4 KIT | Refills: 1 | Status: SHIPPED | OUTPATIENT
Start: 2023-10-25 | End: 2023-10-25

## 2023-10-27 RX ORDER — HYDROCODONE BITARTRATE AND ACETAMINOPHEN 7.5; 325 MG/1; MG/1
TABLET ORAL
Qty: 150 TABLET | Refills: 0 | Status: SHIPPED | OUTPATIENT
Start: 2023-11-03

## 2023-10-27 NOTE — TELEPHONE ENCOUNTER
Refill Request    Medication request: HYDROcodone-acetaminophen 7.5-325 MG Oral Tab. Take 1 tablet by mouth every 4-6 hours PRN pain (max 5 tabs in 24 hours). LOV: 2023 with Eric Tovar M.D. Due back to clinic per last office note: Per Dr. Herrmann Coins: \"He will follow up in 3 months or sooner if needed. \"  Hernando Gemma: 2023 with Eric Tovar M.D. [place] 10/05/2023 #150  - Earliest fill date: 2023; order edited to reflect the earliest start time    Urine drug screen (if applicable):   Pain contract:  ON 05/15/2023  - PC needed added to NOV appt note. LOV plan (if weaning or changing medications): Per Dr. Herrmann Coins: \"He will continue with the Norco for the pain taking no more than 5 per day. \"

## 2023-10-30 ENCOUNTER — OFFICE VISIT (OUTPATIENT)
Dept: FAMILY MEDICINE CLINIC | Facility: CLINIC | Age: 53
End: 2023-10-30

## 2023-10-30 VITALS
SYSTOLIC BLOOD PRESSURE: 139 MMHG | DIASTOLIC BLOOD PRESSURE: 88 MMHG | OXYGEN SATURATION: 97 % | TEMPERATURE: 97 F | RESPIRATION RATE: 18 BRPM | HEART RATE: 78 BPM

## 2023-10-30 DIAGNOSIS — R51.9 LEFT-SIDED HEADACHE: Primary | ICD-10-CM

## 2023-11-02 ENCOUNTER — LAB ENCOUNTER (OUTPATIENT)
Dept: LAB | Facility: HOSPITAL | Age: 53
End: 2023-11-02
Attending: PHYSICAL MEDICINE & REHABILITATION
Payer: MEDICAID

## 2023-11-02 DIAGNOSIS — F11.90 OPIOID USE: ICD-10-CM

## 2023-11-02 PROBLEM — G44.86 CERVICOGENIC HEADACHE: Status: ACTIVE | Noted: 2023-11-02

## 2023-11-02 PROCEDURE — 80307 DRUG TEST PRSMV CHEM ANLYZR: CPT

## 2023-11-02 PROCEDURE — 80361 OPIATES 1 OR MORE: CPT

## 2023-11-02 NOTE — H&P (VIEW-ONLY)
Cervical Pain H & P    Chief Complaint:  Patient presents with:  Neck Pain: LOV: 8/2/23 Patient f/u bilateral neck pain that radiates into hand. Takes Norco 5-6 pills daily. Rates pain 7/10. Nursing note reviewed and verified. Patient was last seen on 8/2/2023. He did the chiropractic care with Dr. Jackie Harvey once since he was here which did help some. Then about 8 days ago, he developed left upper neck pain that radiated into the occipital and temporal regions. He found that if he pushes in the right suboccipital region, the pain in the head will be relieved. He went to the urgent care on 10/30/2023 and they recommended that he goes to the ED and have a MRI. He declined this. The pain is 6-7/10 now and it was a 10/10. This is a throbbing and sharp pain. He felt the left upper neck was swollen. On 10/30/2023, he did have some left arm pain which is now resolved and was only present for that day. He has tingling in all of his fingers. He will get swelling at the base of the left thumb in the wrist and this can be tender to palpation. He states that he did not  the Norco 7.5/325 that was called in on 10/27/2023.        Past Medical History   Past Medical History:   Diagnosis Date    Back problem     Chronic neck pain     Contusion of cervical cord (HCC)     Diabetes ([facility] [address]     Migraines A month ago it comes and goes every day    Went to emergency care clinic and suggested to get an MRI done because I've been dealing with a bad migraine for over a week straight today being a 10 from 1-10 10,being the worse    Visual impairment     readers       Past Surgical History   Past Surgical History:   Procedure Laterality Date    CT CERVICAL SPINE      c3 & 4     HERNIA SURGERY  2005    hiatal hernia    KNEE SURGERY  2008    RIGHT ACL REPAIR       Family History   Family History   Problem Relation Age of Onset    Heart Disorder Paternal Grandmother     Diabetes Paternal Grandmother     Cancer Paternal Grandmother     No Known Problems Mother     No Known Problems Sister     No Known Problems Brother        Social History   Social History    Socioeconomic History      Marital status:       Spouse name: Not on file      Number of children: Not on file      Years of education: Not on file      Highest education level: Not on file    Occupational History      Not on file    Tobacco Use      Smoking status: Never      Smokeless tobacco: Never    Vaping Use      Vaping Use: Never used    Substance and Sexual Activity      Alcohol use: Not Currently        Alcohol/week: 1.0 standard drink of alcohol        Comment: Did social      Drug use: Never      Sexual activity: Not on file    Other Topics      Concerns:        Caffeine Concern: Yes        Exercise: No        Seat Belt: Not Asked        Special Diet: Not Asked        Stress Concern: Not Asked        Weight Concern: Not Asked         Service: Not Asked        Blood Transfusions: Not Asked        Occupational Exposure: Not Asked        Hobby Hazards: Not Asked        Sleep Concern: Not Asked        Back Care: Not Asked        Bike Helmet: Not Asked        Self-Exams: Not Asked    Social History Narrative      The patient does not use an assistive device. .        The patient does live in a home with stairs. Social Determinants of Health  Financial Resource Strain: Not on file  Food Insecurity: Not on file  Transportation Needs: Not on file  Physical Activity: Not on file  Stress: Not on file  Social Connections: Not on file  Housing Stability: Not on file    PE:  The patient does appear in his stated age in no distress. The patient is well groomed. Psychiatric:  The patient is alert and oriented x 3. The patient has a normal affect and mood. Respiratory:  No acute respiratory distress. Patient does not have a cough. HEENT:  Extraocular muscles are intact. There is no kern icterus. Pupils are equal, round, and reactive to light.  No redness or discharge bilaterally. Skin:  There are no rashes or lesions. Lymph Nodes: The patient has no palpable submandibular, supraclavicular, and cervical lymph nodes. .    Vitals: There were no vitals filed for this visit. Cervical Spine:    Posture: moderate chin forward superiorly rotated protracted shoulder posture. Shoulders: Level   Head: In neutral   Spinous Processes Palpations: Non-tender for all Spinous Processes   Z-Joints Palpations: Tender at  left C1-2   Muscular Palpations: Non-tender to palpation. Vascular upper extremity:   Right radial pulses: 2+   Left radial pulses: 2+      Neurological Upper Extremity:    Light Touch: Intact in Bilateral upper extremities. Pin Prick: Not tested. UE Muscle Strength: All Upper Extremity strength measurements 5/5 except:  ABP Right: 4-/5  FDI Right: 4/5  ADM Right: 4/5  EIP Right: 4/5   Reflexes: 2+ In the bilateral upper extremities. Webb's sign Right: Negative   Webb's sigh Left: Negative     Assessment  1. Opioid use    2. S/P cervical spinal fusion: C3-4 ACDF    3. Cervicogenic headache on the left    4. C2-3 mild-mod central, C7-T1 mild central & left foraminal mild-mod bulging discs    5. C3-4 moderate central stenosis    6. Cervical fusion syndrome: C2-T1 due to AS    7. Arthropathy of cervical facet joint    8. Ankylosing spondylitis of multiple sites in spine ([facility] [address]    9. right C8 radiculopathy    10. Bilateral carpal tunnel syndrome: right moderate-severe, left moderate sensory & mild motor      Plan  I will do the left C1-2 z-joint injection under MAC. He will continue with the [address] [place] [address]/325 for the pain. He is taking 5 tablets per day. He was unable to wean down on them in the past, but he will try this again once he has had the injection. The patient will follow up in 3 months, but the patient will call me 2 weeks after having the injection to let me know how the injection worked.     The patient understands and agrees with the stated plan. Fide Alejo MD  11/2/2023

## 2023-11-03 LAB
AMPHET UR QL SCN: NEGATIVE
BARBITURATES UR QL SCN: NEGATIVE
BENZODIAZ UR QL SCN: NEGATIVE
CANNABINOIDS UR QL SCN: NEGATIVE
COCAINE UR QL: NEGATIVE
CREAT UR-SCNC: 115 MG/DL
MDMA UR QL SCN: NEGATIVE
METHADONE UR QL SCN: NEGATIVE
OXYCODONE UR QL SCN: NEGATIVE
PCP UR QL SCN: NEGATIVE

## 2023-11-08 ENCOUNTER — TELEPHONE (OUTPATIENT)
Dept: PHYSICAL MEDICINE AND REHAB | Facility: CLINIC | Age: 53
End: 2023-11-08

## 2023-11-08 NOTE — TELEPHONE ENCOUNTER
Initiated authorization for left C1-2 z-joint injections under MAC   procedure.  Dx code W49.465 facility endo with [address] [place] [address] CPT Code 81054   Reference # [id number]   Status Pending auth    Faxing clinicals to [phone]

## 2023-11-09 NOTE — TELEPHONE ENCOUNTER
authorization for left C1-2 z-joint injections under MAC   procedure.  Dx code F43.858 facility endo with [facility] CPT Code 29150   Reference # [id number]   Status-Approved

## 2023-11-15 ENCOUNTER — PATIENT MESSAGE (OUTPATIENT)
Dept: PHYSICAL MEDICINE AND REHAB | Facility: CLINIC | Age: 53
End: 2023-11-15

## 2023-11-16 ENCOUNTER — TELEPHONE (OUTPATIENT)
Dept: INTERNAL MEDICINE CLINIC | Facility: CLINIC | Age: 53
End: 2023-11-16

## 2023-11-16 DIAGNOSIS — R21 RASH: Primary | ICD-10-CM

## 2023-11-16 NOTE — TELEPHONE ENCOUNTER
Message routed to PCP for review. Spoke with pt he is requesting a referral for the skin doctor. Derm referral placed. Will continue current outpatient regimen per patient's pain management physician and PCP.

## 2023-11-16 NOTE — TELEPHONE ENCOUNTER
A Nurse from Dr Berto Mcgee's office called on behalf of the patient asking if the patient can hold off on Liraglutide (VICTOZA) 18 MG/3ML Subcutaneous Solution Pen-injector 7 days prior to the surgery. The medication interacts with the sedation. The patient told them that Dr Jose Dahl told him that he could not hold off on that medication.  Please advise     Ph. [phone]  Option 2  Then option 2 again for nurse    Fax [phone]

## 2023-11-21 ENCOUNTER — MED REC SCAN ONLY (OUTPATIENT)
Dept: PHYSICAL MEDICINE AND REHAB | Facility: CLINIC | Age: 53
End: 2023-11-21

## 2023-11-21 DIAGNOSIS — M54.2 NECK PAIN: ICD-10-CM

## 2023-11-21 DIAGNOSIS — E66.9 OBESITY (BMI 30-39.9): ICD-10-CM

## 2023-11-21 DIAGNOSIS — Z23 NEED FOR VACCINATION: ICD-10-CM

## 2023-11-21 DIAGNOSIS — E11.9 TYPE 2 DIABETES MELLITUS WITHOUT COMPLICATION, UNSPECIFIED WHETHER LONG TERM INSULIN USE (HCC): ICD-10-CM

## 2023-11-21 DIAGNOSIS — B35.1 ONYCHOMYCOSIS: ICD-10-CM

## 2023-11-22 ENCOUNTER — LAB ENCOUNTER (OUTPATIENT)
Dept: LAB | Facility: HOSPITAL | Age: 53
End: 2023-11-22
Attending: INTERNAL MEDICINE
Payer: MEDICAID

## 2023-11-22 DIAGNOSIS — R31.29 MICROHEMATURIA: Primary | ICD-10-CM

## 2023-11-22 DIAGNOSIS — R79.89 LOW TESTOSTERONE: ICD-10-CM

## 2023-11-22 PROCEDURE — 36415 COLL VENOUS BLD VENIPUNCTURE: CPT

## 2023-11-22 PROCEDURE — 84410 TESTOSTERONE BIOAVAILABLE: CPT

## 2023-11-22 PROCEDURE — 81015 MICROSCOPIC EXAM OF URINE: CPT | Performed by: NURSE PRACTITIONER

## 2023-11-24 ENCOUNTER — TELEPHONE (OUTPATIENT)
Dept: SURGERY | Facility: CLINIC | Age: 53
End: 2023-11-24

## 2023-11-24 NOTE — TELEPHONE ENCOUNTER
----- Message from Edwardo Chowdhury PA-C sent at 11/22/2023 10:49 AM CST -----  Hello Mr. Alma White,   Your urine continues to show microscopic hematuria. I saw your discussion with Victorino Mahoney from 4/10/2023. She discussed the workup for microscopic hematuria. I know it has been awhile since that visit. You will need to have a cystoscopy completed. Our office will call you to schedule that. You also need to have a CT urogram completed as well as a Urine cytology completed. I will order all of that as well. I know this is a lot to take in. Let me know if you want to schedule a visit to discuss. Or need me to call.      Annabelle Matthews PA-C.

## 2023-11-24 NOTE — TELEPHONE ENCOUNTER
I called pt verified name/ pt states he did read [employer]ot message below. I scheduled pt for office cystoscopy with Dr. Lena Sánchez for 24 11 am arrival ( I went over what pt will expect during the procedure and [facility] location) and reminded pt he needs to call and schedule central scheduling for ct scan and go to one of our out patient labs to submit urine for cytology pt is aware and understands.

## 2023-11-26 DIAGNOSIS — E11.9 NEWLY DIAGNOSED DIABETES (HCC): ICD-10-CM

## 2023-11-26 RX ORDER — PEN NEEDLE, DIABETIC 30 GX3/16"
1 NEEDLE, DISPOSABLE MISCELLANEOUS DAILY
Qty: 90 EACH | Refills: 0 | OUTPATIENT
Start: 2023-11-26

## 2023-11-26 RX ORDER — TIRZEPATIDE 2.5 MG/.5ML
2.5 INJECTION, SOLUTION SUBCUTANEOUS WEEKLY
Qty: 2 ML | Refills: 0 | OUTPATIENT
Start: 2023-11-26

## 2023-11-27 ENCOUNTER — MED REC SCAN ONLY (OUTPATIENT)
Dept: INTERNAL MEDICINE CLINIC | Facility: CLINIC | Age: 53
End: 2023-11-27

## 2023-11-27 RX ORDER — PEN NEEDLE, DIABETIC 30 GX3/16"
1 NEEDLE, DISPOSABLE MISCELLANEOUS DAILY
Qty: 90 EACH | Refills: 0 | Status: SHIPPED | OUTPATIENT
Start: 2023-11-27

## 2023-11-27 RX ORDER — BLOOD-GLUCOSE METER
EACH MISCELLANEOUS
Qty: 200 STRIP | Refills: 3 | Status: SHIPPED | OUTPATIENT
Start: 2023-11-27

## 2023-11-27 RX ORDER — TIRZEPATIDE 2.5 MG/.5ML
2.5 INJECTION, SOLUTION SUBCUTANEOUS WEEKLY
Qty: 2 ML | Refills: 0 | Status: SHIPPED | OUTPATIENT
Start: 2023-11-27

## 2023-11-27 RX ORDER — BLOOD-GLUCOSE METER
1 KIT MISCELLANEOUS 2 TIMES DAILY
Qty: 1 EACH | Refills: 3 | Status: SHIPPED | OUTPATIENT
Start: 2023-11-27 | End: 2024-11-26

## 2023-11-27 NOTE — TELEPHONE ENCOUNTER
MEDICATION REFILL REQUEST:    Future Appointment: NO FUTURE APPT     Last appointment: 2023    Medication requested:   Requested Prescriptions     Pending Prescriptions Disp Refills    Blood Glucose Monitoring Suppl (FREESTYLE LITE) Does not apply Device 1 each 3     Si Device by Other route 2 (two) times daily. Use as directed. Glucose Blood (ONETOUCH VERIO) In Vitro Strip 200 strip 3     Sig: Test blood sugar twice daily. Tirzepatide [facility]) 2.5 MG/0.5ML Subcutaneous Solution Pen-injector 2 mL 0     Sig: Inject 2.5 mg into the skin once a week. Insulin Pen Needle (PEN NEEDLES) 32G X 4 MM Does not apply Misc 90 each 0     Si each daily.          Last refill date:     Patient comment: Unfortunately, Victoza gives me bad headaches and not losing weight     Tirzepatide [facility]) 2.5 MG/0.5ML Subcutaneous Solution Pen-injector 2 mL 0 2023          Disp Refills Start End    Glucose Blood (FREESTYLE LITE TEST) In Vitro Strip 100 strip 0 10/24/2023 10/23/2024

## 2023-11-29 ENCOUNTER — PATIENT MESSAGE (OUTPATIENT)
Dept: INTERNAL MEDICINE CLINIC | Facility: CLINIC | Age: 53
End: 2023-11-29

## 2023-11-29 DIAGNOSIS — R79.89 LOW TESTOSTERONE IN MALE: Primary | ICD-10-CM

## 2023-11-29 LAB
SEX HORM BIND GLOB: 22.5 NMOL/L
TESTOST % FREE+WEAK BND: 27.4 %
TESTOST FREE+WEAK BND: 40.1 NG/DL
TESTOSTERONE TOT /MS: 146.5 NG/DL

## 2023-11-30 DIAGNOSIS — M79.673 PAIN OF FOOT, UNSPECIFIED LATERALITY: Primary | ICD-10-CM

## 2023-12-01 ENCOUNTER — PATIENT MESSAGE (OUTPATIENT)
Dept: PHYSICAL MEDICINE AND REHAB | Facility: CLINIC | Age: 53
End: 2023-12-01

## 2023-12-01 ENCOUNTER — TELEPHONE (OUTPATIENT)
Dept: INTERNAL MEDICINE CLINIC | Facility: CLINIC | Age: 53
End: 2023-12-01

## 2023-12-01 ENCOUNTER — APPOINTMENT (OUTPATIENT)
Dept: GENERAL RADIOLOGY | Facility: HOSPITAL | Age: 53
End: 2023-12-01
Attending: PHYSICAL MEDICINE & REHABILITATION
Payer: MEDICAID

## 2023-12-01 ENCOUNTER — ANESTHESIA (OUTPATIENT)
Dept: SURGERY | Facility: HOSPITAL | Age: 53
End: 2023-12-01
Payer: MEDICAID

## 2023-12-01 ENCOUNTER — HOSPITAL ENCOUNTER (OUTPATIENT)
Facility: HOSPITAL | Age: 53
Setting detail: HOSPITAL OUTPATIENT SURGERY
Discharge: HOME | End: 2023-12-01
Attending: PHYSICAL MEDICINE & REHABILITATION | Admitting: PHYSICAL MEDICINE & REHABILITATION
Payer: MEDICAID

## 2023-12-01 ENCOUNTER — ANESTHESIA EVENT (OUTPATIENT)
Dept: SURGERY | Facility: HOSPITAL | Age: 53
End: 2023-12-01
Payer: MEDICAID

## 2023-12-01 VITALS
TEMPERATURE: 98 F | SYSTOLIC BLOOD PRESSURE: 112 MMHG | HEART RATE: 57 BPM | OXYGEN SATURATION: 97 % | DIASTOLIC BLOOD PRESSURE: 82 MMHG | HEIGHT: 68 IN | BODY MASS INDEX: 38.95 KG/M2 | RESPIRATION RATE: 15 BRPM | WEIGHT: 257 LBS

## 2023-12-01 DIAGNOSIS — M54.16 LUMBAR RADICULOPATHY: ICD-10-CM

## 2023-12-01 LAB
GLUCOSE BLDC GLUCOMTR-MCNC: 84 MG/DL (ref 70–99)
GLUCOSE BLDC GLUCOMTR-MCNC: 96 MG/DL (ref 70–99)

## 2023-12-01 PROCEDURE — 3E0R33Z INTRODUCTION OF ANTI-INFLAMMATORY INTO SPINAL CANAL, PERCUTANEOUS APPROACH: ICD-10-PCS | Performed by: PHYSICAL MEDICINE & REHABILITATION

## 2023-12-01 PROCEDURE — 64490 INJ PARAVERT F JNT C/T 1 LEV: CPT | Performed by: PHYSICAL MEDICINE & REHABILITATION

## 2023-12-01 PROCEDURE — 3E0R3BZ INTRODUCTION OF ANESTHETIC AGENT INTO SPINAL CANAL, PERCUTANEOUS APPROACH: ICD-10-PCS | Performed by: PHYSICAL MEDICINE & REHABILITATION

## 2023-12-01 RX ORDER — MIDAZOLAM HYDROCHLORIDE 1 MG/ML
INJECTION INTRAMUSCULAR; INTRAVENOUS AS NEEDED
Status: DISCONTINUED | OUTPATIENT
Start: 2023-12-01 | End: 2023-12-01 | Stop reason: SURG

## 2023-12-01 RX ORDER — ONDANSETRON 2 MG/ML
4 INJECTION INTRAMUSCULAR; INTRAVENOUS EVERY 6 HOURS PRN
Status: DISCONTINUED | OUTPATIENT
Start: 2023-12-01 | End: 2023-12-01

## 2023-12-01 RX ORDER — MORPHINE SULFATE 4 MG/ML
4 INJECTION, SOLUTION INTRAMUSCULAR; INTRAVENOUS EVERY 10 MIN PRN
Status: DISCONTINUED | OUTPATIENT
Start: 2023-12-01 | End: 2023-12-01

## 2023-12-01 RX ORDER — MORPHINE SULFATE 4 MG/ML
2 INJECTION, SOLUTION INTRAMUSCULAR; INTRAVENOUS EVERY 10 MIN PRN
Status: DISCONTINUED | OUTPATIENT
Start: 2023-12-01 | End: 2023-12-01

## 2023-12-01 RX ORDER — HYDROMORPHONE HYDROCHLORIDE 1 MG/ML
0.4 INJECTION, SOLUTION INTRAMUSCULAR; INTRAVENOUS; SUBCUTANEOUS EVERY 5 MIN PRN
Status: DISCONTINUED | OUTPATIENT
Start: 2023-12-01 | End: 2023-12-01

## 2023-12-01 RX ORDER — DEXTROSE MONOHYDRATE 25 G/50ML
50 INJECTION, SOLUTION INTRAVENOUS
Status: DISCONTINUED | OUTPATIENT
Start: 2023-12-01 | End: 2023-12-01

## 2023-12-01 RX ORDER — HYDROMORPHONE HYDROCHLORIDE 1 MG/ML
0.2 INJECTION, SOLUTION INTRAMUSCULAR; INTRAVENOUS; SUBCUTANEOUS EVERY 5 MIN PRN
Status: DISCONTINUED | OUTPATIENT
Start: 2023-12-01 | End: 2023-12-01

## 2023-12-01 RX ORDER — MORPHINE SULFATE 10 MG/ML
6 INJECTION, SOLUTION INTRAMUSCULAR; INTRAVENOUS EVERY 10 MIN PRN
Status: DISCONTINUED | OUTPATIENT
Start: 2023-12-01 | End: 2023-12-01

## 2023-12-01 RX ORDER — NICOTINE POLACRILEX 4 MG
15 LOZENGE BUCCAL
Status: DISCONTINUED | OUTPATIENT
Start: 2023-12-01 | End: 2023-12-01

## 2023-12-01 RX ORDER — NICOTINE POLACRILEX 4 MG
30 LOZENGE BUCCAL
Status: DISCONTINUED | OUTPATIENT
Start: 2023-12-01 | End: 2023-12-01

## 2023-12-01 RX ORDER — METOCLOPRAMIDE HYDROCHLORIDE 5 MG/ML
10 INJECTION INTRAMUSCULAR; INTRAVENOUS ONCE
Status: COMPLETED | OUTPATIENT
Start: 2023-12-01 | End: 2023-12-01

## 2023-12-01 RX ORDER — FAMOTIDINE 20 MG/1
20 TABLET, FILM COATED ORAL ONCE
Status: COMPLETED | OUTPATIENT
Start: 2023-12-01 | End: 2023-12-01

## 2023-12-01 RX ORDER — SODIUM CHLORIDE, SODIUM LACTATE, POTASSIUM CHLORIDE, CALCIUM CHLORIDE 600; 310; 30; 20 MG/100ML; MG/100ML; MG/100ML; MG/100ML
INJECTION, SOLUTION INTRAVENOUS CONTINUOUS
Status: DISCONTINUED | OUTPATIENT
Start: 2023-12-01 | End: 2023-12-01

## 2023-12-01 RX ORDER — NALOXONE HYDROCHLORIDE 0.4 MG/ML
0.08 INJECTION, SOLUTION INTRAMUSCULAR; INTRAVENOUS; SUBCUTANEOUS AS NEEDED
Status: DISCONTINUED | OUTPATIENT
Start: 2023-12-01 | End: 2023-12-01

## 2023-12-01 RX ORDER — FAMOTIDINE 10 MG/ML
20 INJECTION, SOLUTION INTRAVENOUS ONCE
Status: COMPLETED | OUTPATIENT
Start: 2023-12-01 | End: 2023-12-01

## 2023-12-01 RX ORDER — LIDOCAINE HYDROCHLORIDE 10 MG/ML
INJECTION, SOLUTION EPIDURAL; INFILTRATION; INTRACAUDAL; PERINEURAL AS NEEDED
Status: DISCONTINUED | OUTPATIENT
Start: 2023-12-01 | End: 2023-12-01

## 2023-12-01 RX ORDER — TRIAMCINOLONE ACETONIDE 40 MG/ML
INJECTION, SUSPENSION INTRA-ARTICULAR; INTRAMUSCULAR AS NEEDED
Status: DISCONTINUED | OUTPATIENT
Start: 2023-12-01 | End: 2023-12-01

## 2023-12-01 RX ORDER — PROCHLORPERAZINE EDISYLATE 5 MG/ML
5 INJECTION INTRAMUSCULAR; INTRAVENOUS EVERY 8 HOURS PRN
Status: DISCONTINUED | OUTPATIENT
Start: 2023-12-01 | End: 2023-12-01

## 2023-12-01 RX ORDER — ACETAMINOPHEN 500 MG
1000 TABLET ORAL ONCE
Status: COMPLETED | OUTPATIENT
Start: 2023-12-01 | End: 2023-12-01

## 2023-12-01 RX ORDER — HYDROMORPHONE HYDROCHLORIDE 1 MG/ML
0.6 INJECTION, SOLUTION INTRAMUSCULAR; INTRAVENOUS; SUBCUTANEOUS EVERY 5 MIN PRN
Status: DISCONTINUED | OUTPATIENT
Start: 2023-12-01 | End: 2023-12-01

## 2023-12-01 RX ORDER — METOCLOPRAMIDE 10 MG/1
10 TABLET ORAL ONCE
Status: COMPLETED | OUTPATIENT
Start: 2023-12-01 | End: 2023-12-01

## 2023-12-01 RX ADMIN — MIDAZOLAM HYDROCHLORIDE 2 MG: 1 INJECTION INTRAMUSCULAR; INTRAVENOUS at 19:05:00

## 2023-12-01 NOTE — OPERATIVE REPORT
[facility] [address]    CERVICAL Z-JOINT/FACET INJECTION  NAME:  Henrietta Campbell    MR #:    [id number] :  1970     PHYSICIAN:  Mil Ibarra MD        Operative Report    DATE OF PROCEDURE: 2023   PREOPERATIVE DIAGNOSES: Problem   Arthropathy of Cervical Facet Joint   Cervical fusion syndrome: C2-T1 due to AS   S/P cervical spinal fusion: C3-4 ACDF      POSTOPERATIVE DIAGNOSES:   same   PROCEDURES: left C1-2 Z-Joint Injection done under fluoroscopic guidance with contrast enhancement. SURGEON: Mil Mcgee MD   ANESTHESIA: MAC   INDICATIONS:      OPERATIVE PROCEDURE:  Written consent was obtained from the patient. The patient was brought into the operating room and placed in the prone position on the fluoroscopy table with pillow underneath the chest and shoulders. The patient's skin was cleaned and draped in a normal sterile fashion. Using AP fluoroscopy, the left C1-2 z-joint was identified. Skin was anesthetized with 1% PF lidocaine without epinephrine overlying the joint. Then, a 3-1/2 inch, 22-gauge spinal needle was inserted and directed towards the left C1-2 z-joint(s). When they were engaged, Omnipaque-240 contrast was used to obtain a good arthrogram indicating correct needle placement. Then, aspiration was performed. No blood, fluid, or air was aspirated. Then, each joint was injected with a 1 cc solution of 0.5 cc of 1% PF lidocaine without epinephrine and 0.5 cc of 1 mg/cc of 40 mg of Kenalog/cc. Then, the needle was removed. The patient's skin was cleaned. Band-Aid was applied. The patient was transferred to the cart and into [facility]. The patient was given discharge instructions and will follow up in the clinic as scheduled. Throughout the whole procedure, the patient's pulse oximetry and vital signs were monitored and they remained completely stable.   Also, throughout the whole procedure, prior to injection of any medication, aspiration was performed. No blood, fluid, or air was aspirated at anytime.

## 2023-12-01 NOTE — TELEPHONE ENCOUNTER
Refill Request    Medication request:   HYDROcodone-acetaminophen  MG Oral Tab         LOV: 11/2/2023 Dottie Gilman MD   RTC: 3 months  NOV: 12/1/2023 Dottie Gilman MD      [place]: 11/3/2023 #30 days    UDS: (if applicable): 22/8/0725  Pain contract: Valid through 11/16/2024    LOV plan (if weaning or changing medications): He will continue with the [address] [place] [address],[address] for the pain. He is taking 5 tablets per day. He was unable to wean down on them in the past, but he will try this again once he has had the injection.

## 2023-12-01 NOTE — INTERVAL H&P NOTE
Pre-op Diagnosis: Cervical disc disease [M50.90]  Cervical fusion syndrome [Q76.1]  Cervical stenosis of spine [M48.02]    The above referenced H&P was reviewed by Jarrod Zazueta. Darlyn Reyes MD on 12/1/2023, the patient was examined and no significant changes have occurred in the patient's condition since the H&P was performed. I discussed with the patient and/or legal representative the potential benefits, risks and side effects of this procedure; the likelihood of the patient achieving goals; and potential problems that might occur during recuperation. I discussed reasonable alternatives to the procedure, including risks, benefits and side effects related to the alternatives and risks related to not receiving this procedure. We will proceed with procedure as planned.

## 2023-12-01 NOTE — TELEPHONE ENCOUNTER
Sp Liner (Key: [id number]) - [id number]    Mounjaro 2.5MG/0.5ML pen-injectors    Status: PA RequestCreated: November 27th, 2023 [phone]: December 1st, 2023    Indication per Provider:   Dm 2   obesity   Didn't tolerate metformin due to GI side effects
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
## 2023-12-02 NOTE — ANESTHESIA POSTPROCEDURE EVALUATION
Patient: Frantz Guerra    Procedure Summary       Date: 12/01/23 Room / Location: [address] [place] [address] MAIN OR 03 / [address] [place] [address] MAIN OR    Anesthesia Start: 1901 Anesthesia Stop: 1919    Procedure: Left C1-2 Z-joint injections (Left: Spine Cervical) Diagnosis:       Cervical disc disease      Cervical fusion syndrome      Cervical stenosis of spine      (Cervical disc disease [M50.90]Cervical fusion syndrome [O43. 1]Cervical stenosis of spine [M48.02])    Surgeons: Dharmesh Ferrari MD Anesthesiologist: Severiano Contes, MD    Anesthesia Type: MAC ASA Status: 2            Anesthesia Type: MAC    Vitals Value Taken Time   /86 12/01/23 1919   Temp 98F 12/01/23 1919   Pulse 79 12/01/23 1919   Resp 11 12/01/23 1919   SpO2 94 % 12/01/23 1919   Vitals shown include unfiled device data.     [address] [place] [address] AN Post Evaluation:   Patient Evaluated in PACU  Patient Participation: complete - patient participated  Level of Consciousness: sleepy but conscious  Pain Score: 0  Pain Management: adequate  Airway Patency:patent  Dental exam unchanged from preop  Yes    Cardiovascular Status: acceptable  Respiratory Status: acceptable  Postoperative Hydration acceptable      Cathy Chaudhari MD  12/1/2023 7:19 PM

## 2023-12-03 RX ORDER — HYDROCODONE BITARTRATE AND ACETAMINOPHEN 10; 325 MG/1; MG/1
TABLET ORAL
Qty: 150 TABLET | Refills: 0 | Status: SHIPPED | OUTPATIENT
Start: 2023-12-03

## 2023-12-04 ENCOUNTER — NURSE TRIAGE (OUTPATIENT)
Dept: INTERNAL MEDICINE CLINIC | Facility: CLINIC | Age: 53
End: 2023-12-04

## 2023-12-04 NOTE — TELEPHONE ENCOUNTER
Victoza is causing headache    Reason for Disposition   MODERATE pain (e.g., interferes with normal activities, limping) and present > 3 days    Answer Assessment - Initial Assessment Questions  1. ONSET: \"When did the pain start?\"       1 month  2. LOCATION: \"Where is the pain located? \"   (e.g., around nail, entire toe, at foot joint)       Left foot  2nd and 3rd toe  3. PAIN: \"How bad is the pain? \"    (Scale 1-10; or mild, moderate, severe)    -  MILD (1-3): doesn't interfere with normal activities     -  MODERATE (4-7): interferes with normal activities (e.g., work or school) or awakens from sleep, limping     -  SEVERE (8-10): excruciating pain, unable to do any normal activities, unable to walk      Moderate   4. APPEARANCE: \"What does the toe look like? \" (e.g., redness, swelling, bruising, pallor)      No   5. CAUSE: \"What do you think is causing the toe pain? \"      Ingrown nail   6. OTHER SYMPTOMS: \"Do you have any other symptoms? \" (e.g., leg pain, rash, fever, numbness)        7. PREGNANCY: \"Is there any chance you are pregnant? \" \"When was your last menstrual period? \"      No    Protocols used:  Toe Pain-A-[place]    Future Appointments   Date Time Provider Gonzalo Davis   12/7/2023  8:00 AM [facility] CT RM1 [facility] CT SCAN EM [facility]   12/11/2023  1:00 PM MD KANIKA Ley [address]   12/19/2023  5:30 PM [facility]   12/27/2023  2:00 PM Gonzalez Aldana MD [facility]   1/9/2024 11:30 AM [facility]   2/7/2024 12:30  [facility] ECCGIPRO None

## 2023-12-04 NOTE — TELEPHONE ENCOUNTER
Patient's Norco 7.5-325 rx was sent to patient's [facility] on 11/3/23. Nothing further needed at this time.

## 2023-12-06 ENCOUNTER — TELEPHONE (OUTPATIENT)
Dept: PHYSICAL MEDICINE AND REHAB | Facility: CLINIC | Age: 53
End: 2023-12-06
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
## 2023-12-06 NOTE — TELEPHONE ENCOUNTER
Patient had Left C1-2 Z-joint injections on 12/1/23. Patient stated a couple nights ago he started experiencing some numbness to the left side of his neck/face. Informed patient this can be a side effect from the injection. Patient will continue to monitor and will keep our office updated if this does not improve or worsens. Nothing further needed at this time.

## 2023-12-07 ENCOUNTER — HOSPITAL ENCOUNTER (OUTPATIENT)
Age: 53
Discharge: HOME | End: 2023-12-07
Payer: MEDICAID

## 2023-12-07 ENCOUNTER — APPOINTMENT (OUTPATIENT)
Dept: GENERAL RADIOLOGY | Age: 53
End: 2023-12-07
Attending: PHYSICIAN ASSISTANT
Payer: MEDICAID

## 2023-12-07 ENCOUNTER — HOSPITAL ENCOUNTER (OUTPATIENT)
Dept: CT IMAGING | Facility: HOSPITAL | Age: 53
Discharge: HOME | End: 2023-12-07
Attending: PHYSICIAN ASSISTANT
Payer: MEDICAID

## 2023-12-07 VITALS
SYSTOLIC BLOOD PRESSURE: 127 MMHG | DIASTOLIC BLOOD PRESSURE: 83 MMHG | RESPIRATION RATE: 18 BRPM | HEART RATE: 98 BPM | OXYGEN SATURATION: 96 % | TEMPERATURE: 98 F

## 2023-12-07 DIAGNOSIS — M25.532 WRIST PAIN, LEFT: Primary | ICD-10-CM

## 2023-12-07 DIAGNOSIS — R31.29 MICROHEMATURIA: ICD-10-CM

## 2023-12-07 LAB
CREAT BLD-MCNC: 1.1 MG/DL
EGFRCR SERPLBLD CKD-EPI 2021: 80 ML/MIN/1.73M2 (ref 60–?)

## 2023-12-07 PROCEDURE — 99213 OFFICE O/P EST LOW 20 MIN: CPT

## 2023-12-07 PROCEDURE — 74178 CT ABD&PLV WO CNTR FLWD CNTR: CPT | Performed by: PHYSICIAN ASSISTANT

## 2023-12-07 PROCEDURE — 82565 ASSAY OF CREATININE: CPT

## 2023-12-07 PROCEDURE — 73110 X-RAY EXAM OF WRIST: CPT | Performed by: PHYSICIAN ASSISTANT

## 2023-12-07 NOTE — ED INITIAL ASSESSMENT (HPI)
Patient reports left wrist pain for some time. Reports swelling to the area as well today. Denies injury/trauma.

## 2023-12-11 ENCOUNTER — OFFICE VISIT (OUTPATIENT)
Dept: INTERNAL MEDICINE CLINIC | Facility: CLINIC | Age: 53
End: 2023-12-11
Payer: MEDICAID

## 2023-12-11 VITALS
OXYGEN SATURATION: 96 % | SYSTOLIC BLOOD PRESSURE: 116 MMHG | DIASTOLIC BLOOD PRESSURE: 70 MMHG | HEART RATE: 78 BPM | HEIGHT: 68 IN | WEIGHT: 260 LBS | TEMPERATURE: 97 F | BODY MASS INDEX: 39.4 KG/M2

## 2023-12-11 DIAGNOSIS — M79.641 PAIN IN BOTH HANDS: ICD-10-CM

## 2023-12-11 DIAGNOSIS — M54.12 CERVICAL RADICULOPATHY: ICD-10-CM

## 2023-12-11 DIAGNOSIS — E11.8 CONTROLLED TYPE 2 DIABETES MELLITUS WITH COMPLICATION, WITHOUT LONG-TERM CURRENT USE OF INSULIN (HCC): ICD-10-CM

## 2023-12-11 DIAGNOSIS — M79.642 PAIN IN BOTH HANDS: ICD-10-CM

## 2023-12-11 DIAGNOSIS — M79.673 PAIN OF FOOT, UNSPECIFIED LATERALITY: Primary | ICD-10-CM

## 2023-12-11 DIAGNOSIS — M47.812 OSTEOARTHRITIS OF CERVICAL SPINE, UNSPECIFIED SPINAL OSTEOARTHRITIS COMPLICATION STATUS: ICD-10-CM

## 2023-12-11 LAB
ANION GAP SERPL CALC-SCNC: 5 MMOL/L (ref 0–18)
BUN BLD-MCNC: 16 MG/DL (ref 9–23)
BUN/CREAT SERPL: 18.6 (ref 10–20)
CALCIUM BLD-MCNC: 9.3 MG/DL (ref 8.7–10.4)
CHLORIDE SERPL-SCNC: 108 MMOL/L (ref 98–112)
CO2 SERPL-SCNC: 24 MMOL/L (ref 21–32)
CREAT BLD-MCNC: 0.86 MG/DL
EGFRCR SERPLBLD CKD-EPI 2021: 104 ML/MIN/1.73M2 (ref 60–?)
EST. AVERAGE GLUCOSE BLD GHB EST-MCNC: 134 MG/DL (ref 68–126)
FASTING STATUS PATIENT QL REPORTED: NO
GLUCOSE BLD-MCNC: 79 MG/DL (ref 70–99)
HBA1C MFR BLD: 6.3 % (ref ?–5.7)
OSMOLALITY SERPL CALC.SUM OF ELEC: 284 MOSM/KG (ref 275–295)
POTASSIUM SERPL-SCNC: 4.4 MMOL/L (ref 3.5–5.1)
RHEUMATOID FACT SERPL-ACNC: <10 IU/ML (ref ?–14)
SODIUM SERPL-SCNC: 137 MMOL/L (ref 136–145)
URATE SERPL-MCNC: 5.7 MG/DL

## 2023-12-11 PROCEDURE — 86200 CCP ANTIBODY: CPT | Performed by: INTERNAL MEDICINE

## 2023-12-11 PROCEDURE — 83036 HEMOGLOBIN GLYCOSYLATED A1C: CPT | Performed by: INTERNAL MEDICINE

## 2023-12-11 PROCEDURE — 3008F BODY MASS INDEX DOCD: CPT | Performed by: INTERNAL MEDICINE

## 2023-12-11 PROCEDURE — 3074F SYST BP LT 130 MM HG: CPT | Performed by: INTERNAL MEDICINE

## 2023-12-11 PROCEDURE — 80048 BASIC METABOLIC PNL TOTAL CA: CPT | Performed by: INTERNAL MEDICINE

## 2023-12-11 PROCEDURE — 84550 ASSAY OF BLOOD/URIC ACID: CPT | Performed by: INTERNAL MEDICINE

## 2023-12-11 PROCEDURE — 99214 OFFICE O/P EST MOD 30 MIN: CPT | Performed by: INTERNAL MEDICINE

## 2023-12-11 PROCEDURE — 3044F HG A1C LEVEL LT 7.0%: CPT | Performed by: INTERNAL MEDICINE

## 2023-12-11 PROCEDURE — 3078F DIAST BP <80 MM HG: CPT | Performed by: INTERNAL MEDICINE

## 2023-12-11 PROCEDURE — 86431 RHEUMATOID FACTOR QUANT: CPT | Performed by: INTERNAL MEDICINE

## 2023-12-11 RX ORDER — MELOXICAM 15 MG/1
15 TABLET ORAL DAILY
Qty: 7 TABLET | Refills: 0 | Status: SHIPPED | OUTPATIENT
Start: 2023-12-11 | End: 2023-12-18

## 2023-12-12 LAB — CCP IGG SERPL-ACNC: 1.6 U/ML (ref 0–6.9)

## 2023-12-17 DIAGNOSIS — R21 RASH: ICD-10-CM

## 2023-12-17 DIAGNOSIS — E11.8 CONTROLLED DIABETES MELLITUS TYPE 2 WITH COMPLICATIONS, UNSPECIFIED WHETHER LONG TERM INSULIN USE (HCC): ICD-10-CM

## 2023-12-17 DIAGNOSIS — R79.89 LOW TESTOSTERONE IN MALE: ICD-10-CM

## 2023-12-17 DIAGNOSIS — N52.9 ERECTILE DYSFUNCTION, UNSPECIFIED ERECTILE DYSFUNCTION TYPE: ICD-10-CM

## 2023-12-17 DIAGNOSIS — Q64.4: ICD-10-CM

## 2023-12-18 RX ORDER — FAMOTIDINE 20 MG/1
20 TABLET, FILM COATED ORAL DAILY
Qty: 90 TABLET | Refills: 0 | Status: SHIPPED | OUTPATIENT
Start: 2023-12-18

## 2023-12-19 ENCOUNTER — OFFICE VISIT (OUTPATIENT)
Dept: ENDOCRINOLOGY CLINIC | Facility: CLINIC | Age: 53
End: 2023-12-19

## 2023-12-19 VITALS
HEIGHT: 68 IN | SYSTOLIC BLOOD PRESSURE: 114 MMHG | WEIGHT: 266 LBS | BODY MASS INDEX: 40.32 KG/M2 | HEART RATE: 81 BPM | DIASTOLIC BLOOD PRESSURE: 76 MMHG

## 2023-12-19 DIAGNOSIS — D35.00 ADRENAL ADENOMA, UNSPECIFIED LATERALITY: Primary | ICD-10-CM

## 2023-12-19 DIAGNOSIS — E29.1 HYPOGONADISM IN MALE: ICD-10-CM

## 2023-12-19 DIAGNOSIS — Z13.9 SCREENING FOR CONDITION: ICD-10-CM

## 2023-12-19 PROCEDURE — 3078F DIAST BP <80 MM HG: CPT | Performed by: INTERNAL MEDICINE

## 2023-12-19 PROCEDURE — 3074F SYST BP LT 130 MM HG: CPT | Performed by: INTERNAL MEDICINE

## 2023-12-19 PROCEDURE — 3008F BODY MASS INDEX DOCD: CPT | Performed by: INTERNAL MEDICINE

## 2023-12-19 PROCEDURE — 99214 OFFICE O/P EST MOD 30 MIN: CPT | Performed by: INTERNAL MEDICINE

## 2023-12-19 NOTE — PROGRESS NOTES
Return Office Visit     CHIEF COMPLAINT:    Hypogonadism   Adrenal nodule    HISTORY OF PRESENT ILLNESS:  Ward Caraballo is a 48year old male who presents for follow up for for evaluation of an adrenal adenoma and hypogonadism     This was noted incidentally on CT scan in May 2019. Stable adenoma on CT in June 2020  Right sided adrenal nodule measuring 2.6 x 2 cm. Weight gain (central): Moon facies, buffalo hump: no  Recurrent infections: no  Muscle wasting: no  Bleeding/clotting disorders: no   Uncontrolled DM/HTN: no , he was diagnosed with DM, stable       Presenting symptoms:   fatigue, lack of sexual desire, erections do not sustain  No history of testicular trauma, ketoconzole or anti androgen use. No history of hypothalamic or pituitary tumors/ infiltrative disease, radiation to head and neck region, recent critical illness, head trauma, weight loss, glucocorticoid, anabolic steroid use, excessive alcohol use. No history of uncontrolled DM. No strokes/ HA  No h/o prostate cancer ( personal or family)  No h/o bleeding/ clotting disorders. No LEANN      He was on T replacement  150 mg q two weeks  However, stopped it about a year ago; no specific reason  He will like to resume  + Fatigue  + lack of erections        CURRENT MEDICATION:    Current Outpatient Medications   Medication Sig Dispense Refill    FAMOTIDINE 20 MG Oral Tab TAKE 1 TABLET BY MOUTH DAILY 90 tablet 0    HYDROcodone-acetaminophen  MG Oral Tab Take 1 tablet by mouth every 4-6 hours PRN pain (max 5 tabs in 24 hours). 150 tablet 0    Blood Glucose Monitoring Suppl (FREESTYLE LITE) Does not apply Device 1 Device by Other route 2 (two) times daily. Use as directed. 1 each 3    Glucose Blood (ONETOUCH VERIO) In Vitro Strip Test blood sugar twice daily. 200 strip 3    Tirzepatide (MOUNJARO) 2.5 MG/0.5ML Subcutaneous Solution Pen-injector Inject 2.5 mg into the skin once a week.  2 mL 0    Insulin Pen Needle (PEN NEEDLES) 32G X 4 MM Does not apply Misc 1 each daily. 90 each 0    HYDROcodone-acetaminophen 7.5-325 MG Oral Tab Take 1 tablet by mouth every 4-6 hours PRN pain (max 5 tabs in 24 hours). 150 tablet 0    Lancets Does not apply Misc Check bid 200 each 1    Glucose Blood (FREESTYLE LITE TEST) In Vitro Strip Check bid 100 strip 0    Liraglutide (VICTOZA) 18 MG/3ML Subcutaneous Solution Pen-injector Inject 1.8 mg into the skin daily. 27 mL 0    Tirzepatide (MOUNJARO) 2.5 MG/0.5ML Subcutaneous Solution Pen-injector Inject 2.5 mg into the skin once a week. 2 mL 0    Insulin Pen Needle (PEN NEEDLES) 32G X 4 MM Does not apply Misc 1 each daily. 90 each 0    metFORMIN  MG Oral Tablet 24 Hr       Ciclopirox 8 % External Solution Apply 1 Application topically nightly. 6 mL 0    Dulaglutide (TRULICITY) 1.90 EV/2.8SZ Subcutaneous Solution Pen-injector Inject 0.75 mg into the skin once a week. 6 mL 0    PEG 3350-KCl-Na Bicarb-NaCl (TRILYTE) 420 g Oral Recon Soln Take prep as directed by gastro office. May substitute with Trilyte/generic equivalent if needed. 1 each 0    meclizine 25 MG Oral Tab Take 1 tablet (25 mg total) by mouth 3 (three) times daily as needed. 30 tablet 0    multiple vitamin Oral Chew Tab Chew 1 tablet by mouth daily. ALLERGY:  No Known Allergies    PAST MEDICAL, SOCIAL AND FAMILY HISTORY:  See past medical history marked as reviewed. See past surgical history marked as reviewed. See past family history marked as reviewed. See past social history marked as reviewed.     ASSESSMENTS:     REVIEW OF SYSTEMS:  Constitutional: Negative for: weight change, fever, + fatigue, cold/heat intolerance  Eyes: Negative for:  Visual changes, proptosis, blurring  ENT: Negative for:  dysphagia, neck swelling, dysphonia  Respiratory: Negative for:  dyspnea, cough  Cardiovascular: Negative for:  chest pain, palpitations, orthopnea  GI: Negative for:  abdominal pain, nausea, vomiting, diarrhea, constipation, bleeding  Neurology: Negative for: headache, numbness, weakness  Genito-Urinary: Negative for: dysuria, frequency  Psychiatric: Negative for:  depression, anxiety  Hematology/Lymphatics: Negative for: bruising, lower extremity edema  Endocrine: Negative for: polyuria, polydypsia  Skin: Negative for: rash, blister, cellulitis,       PHYSICAL EXAM:     Vitals reviewed    General Appearance:  alert, well developed, in no acute distress  Head: Atraumatic  Eyes:  normal conjunctivae, sclera. , normal sclera and normal pupils  Throat/Neck: normal sound to voice. Normal hearing, normal speech  Respiratory:  Speaking in full sentences, non-labored. no increased work of breathing, no audible wheezing    Neuro: motor grossly intact, moving all extremities without difficulty  Psychiatric:  oriented to time, self, and place  Extremities: no obvious extremity swelling, no lesions      DATA:     Pertinent data reviewed    ASSESSMENT AND PLAN:    Patient is a 48year old male with    1. Right sided adrenal adenoma. I discussed the following:  - Adrenal gland structure and function  - Adrenal incidentaloma is a lesion that is over 1 cm in size  - All patients with an adrenal adenoma should be evaluated for the possibility of malignancy and subclinical hormonal hyperfunction        PLAN:  - cortisol and metanephrine normal in 6/2020, will get a metanephrine level and salivary cortisol level. Collection procedure explained, reading material and kit provided  - He does not have HTN, hence does not need renin/ jose  - CT abdomen 6/2020: Stable 2.6 cm right adrenal adenoma. CT in 12/2023 shows stable adenoma         2. Hypogonadism  Discussed Endocrine society guidelines for diagnosis of Hypogonadism. Discussed the changes that can be induced with testosterone therapy    Discussed the side effects of testosterone.  Patient understands that testosterone therapy can contribute to sleep apnea, cause acne and other skin reactions, stimulate non cancerous growth of prostate (benign prostate hyperplasia) and growth of existing prostate cancer, enlarge breasts, limit sperm production, cause testicular shrinkage, increase risk of a blood clot forming in a deep vein (deep vein thrombosis), which could break loose, travel through the bloodstream and lodge in the  lungs, blocking blood flow (pulmonary embolism), could adversely effect liver and lipids and increase the number of red blood cells, a condition called erythrocytosis. Edema, with or without congestive heart failure, may be a serious complication in patients with pre-existing cardiac, renal or hepatic disease. Also, there is a there is a possible increased risk of cardiovascular disease and strokes associated with testosterone use. Discussed possibility of infertility given lack of spermatogenesis. He does not have children and understands that being on T replacement might impair his ability to have children. Discussed regular monitoring of T levels and hematocrit/ PSA/ hepatic panel    PLAN:  - Will get labs as below  - Will send for T injections based on results             Patient verbalized a complete  understanding of all of the above and did not have any further questions. RTC in 6 months  Labs as below, Call for results. Orders Placed This Encounter   Procedures    PSA Total, Screen    Testosterone Total    Lipid Panel [E]    Liver Function Panel (7)    CBC, Platelet;  No Differential    LH (Luteinizing Hormone)    FSH    Metanephrines, Plasma Free    Salivary Cortisol         Caroline Mcgraw MD

## 2023-12-20 ENCOUNTER — HOSPITAL ENCOUNTER (OUTPATIENT)
Dept: GENERAL RADIOLOGY | Facility: HOSPITAL | Age: 53
Discharge: HOME | End: 2023-12-20
Attending: INTERNAL MEDICINE
Payer: MEDICAID

## 2023-12-20 ENCOUNTER — LAB ENCOUNTER (OUTPATIENT)
Dept: LAB | Facility: HOSPITAL | Age: 53
End: 2023-12-20
Attending: INTERNAL MEDICINE
Payer: MEDICAID

## 2023-12-20 DIAGNOSIS — R31.29 MICROHEMATURIA: ICD-10-CM

## 2023-12-20 DIAGNOSIS — E29.1 HYPOGONADISM IN MALE: ICD-10-CM

## 2023-12-20 DIAGNOSIS — M47.812 OSTEOARTHRITIS OF CERVICAL SPINE, UNSPECIFIED SPINAL OSTEOARTHRITIS COMPLICATION STATUS: ICD-10-CM

## 2023-12-20 DIAGNOSIS — D35.00 ADRENAL ADENOMA, UNSPECIFIED LATERALITY: ICD-10-CM

## 2023-12-20 DIAGNOSIS — M79.673 PAIN OF FOOT, UNSPECIFIED LATERALITY: ICD-10-CM

## 2023-12-20 DIAGNOSIS — Z13.9 SCREENING FOR CONDITION: ICD-10-CM

## 2023-12-20 DIAGNOSIS — M54.12 CERVICAL RADICULOPATHY: ICD-10-CM

## 2023-12-20 LAB
ALBUMIN SERPL-MCNC: 4.3 G/DL (ref 3.2–4.8)
ALP LIVER SERPL-CCNC: 58 U/L
ALT SERPL-CCNC: 24 U/L
AST SERPL-CCNC: 19 U/L (ref ?–34)
BILIRUB DIRECT SERPL-MCNC: 0.2 MG/DL (ref ?–0.3)
BILIRUB SERPL-MCNC: 0.6 MG/DL (ref 0.3–1.2)
CHOLEST SERPL-MCNC: 165 MG/DL (ref ?–200)
COMPLEXED PSA SERPL-MCNC: 0.74 NG/ML (ref ?–4)
DEPRECATED RDW RBC AUTO: 50 FL (ref 35.1–46.3)
ERYTHROCYTE [DISTWIDTH] IN BLOOD BY AUTOMATED COUNT: 14.6 % (ref 11–15)
FASTING PATIENT LIPID ANSWER: YES
FSH SERPL-ACNC: 4 MIU/ML
HCT VFR BLD AUTO: 45.8 %
HDLC SERPL-MCNC: 51 MG/DL (ref 40–59)
HGB BLD-MCNC: 15.1 G/DL
LDLC SERPL CALC-MCNC: 97 MG/DL (ref ?–100)
LH SERPL-ACNC: 1.5 MIU/ML
MCH RBC QN AUTO: 30.4 PG (ref 26–34)
MCHC RBC AUTO-ENTMCNC: 33 G/DL (ref 31–37)
MCV RBC AUTO: 92.3 FL
NON GYNE INTERPRETATION: NEGATIVE
NONHDLC SERPL-MCNC: 114 MG/DL (ref ?–130)
PLATELET # BLD AUTO: 241 10(3)UL (ref 150–450)
PROT SERPL-MCNC: 7.1 G/DL (ref 5.7–8.2)
RBC # BLD AUTO: 4.96 X10(6)UL
TESTOST SERPL-MCNC: 191.28 NG/DL
TRIGL SERPL-MCNC: 91 MG/DL (ref 30–149)
VLDLC SERPL CALC-MCNC: 15 MG/DL (ref 0–30)
WBC # BLD AUTO: 6.9 X10(3) UL (ref 4–11)

## 2023-12-20 PROCEDURE — 73630 X-RAY EXAM OF FOOT: CPT | Performed by: INTERNAL MEDICINE

## 2023-12-20 PROCEDURE — 82533 TOTAL CORTISOL: CPT

## 2023-12-20 PROCEDURE — 83835 ASSAY OF METANEPHRINES: CPT

## 2023-12-20 PROCEDURE — 85027 COMPLETE CBC AUTOMATED: CPT

## 2023-12-20 PROCEDURE — 80076 HEPATIC FUNCTION PANEL: CPT

## 2023-12-20 PROCEDURE — 83002 ASSAY OF GONADOTROPIN (LH): CPT

## 2023-12-20 PROCEDURE — 83001 ASSAY OF GONADOTROPIN (FSH): CPT

## 2023-12-20 PROCEDURE — 88108 CYTOPATH CONCENTRATE TECH: CPT

## 2023-12-20 PROCEDURE — 84403 ASSAY OF TOTAL TESTOSTERONE: CPT

## 2023-12-20 PROCEDURE — 80061 LIPID PANEL: CPT

## 2023-12-20 PROCEDURE — 36415 COLL VENOUS BLD VENIPUNCTURE: CPT

## 2023-12-21 ENCOUNTER — LAB ENCOUNTER (OUTPATIENT)
Dept: LAB | Facility: HOSPITAL | Age: 53
End: 2023-12-21
Attending: INTERNAL MEDICINE
Payer: MEDICAID

## 2023-12-21 DIAGNOSIS — D35.00 ADRENAL ADENOMA, UNSPECIFIED LATERALITY: ICD-10-CM

## 2023-12-22 ENCOUNTER — PATIENT MESSAGE (OUTPATIENT)
Dept: ENDOCRINOLOGY CLINIC | Facility: CLINIC | Age: 53
End: 2023-12-22

## 2023-12-22 DIAGNOSIS — E29.1 SECONDARY MALE HYPOGONADISM: Primary | ICD-10-CM

## 2023-12-22 RX ORDER — TESTOSTERONE CYPIONATE 200 MG/ML
125 INJECTION, SOLUTION INTRAMUSCULAR
Qty: 3.78 ML | Refills: 0 | Status: SHIPPED | OUTPATIENT
Start: 2023-12-22 | End: 2024-03-21

## 2023-12-22 RX ORDER — SYRINGE W-NEEDLE,DISPOSAB,3 ML 25GX5/8"
SYRINGE, EMPTY DISPOSABLE MISCELLANEOUS
Qty: 50 EACH | Refills: 0 | Status: SHIPPED | OUTPATIENT
Start: 2023-12-22

## 2023-12-23 NOTE — TELEPHONE ENCOUNTER
Dr. Miriam Youngblood,     Please see message from patient regarding lab question   RN addressed the needle inquiry. He was requesting for 1/2 inch needle, which may not be ideal for him. His BMI is 40.45 kg/m2.   Told him to let us know if he had issues using the 1 inch needle in the past.

## 2023-12-26 ENCOUNTER — TELEPHONE (OUTPATIENT)
Dept: ENDOCRINOLOGY CLINIC | Facility: CLINIC | Age: 53
End: 2023-12-26

## 2023-12-26 DIAGNOSIS — M54.16 LUMBAR RADICULOPATHY: ICD-10-CM

## 2023-12-26 RX ORDER — LIRAGLUTIDE 6 MG/ML
1.8 INJECTION SUBCUTANEOUS DAILY
Qty: 27 ML | Refills: 0 | Status: SHIPPED | OUTPATIENT
Start: 2023-12-26

## 2023-12-26 RX ORDER — HYDROCODONE BITARTRATE AND ACETAMINOPHEN 10; 325 MG/1; MG/1
TABLET ORAL
Qty: 150 TABLET | Refills: 0 | Status: SHIPPED | OUTPATIENT
Start: 2023-12-26

## 2023-12-26 NOTE — TELEPHONE ENCOUNTER
A refill request was received for:  Requested Prescriptions     Pending Prescriptions Disp Refills    Liraglutide (VICTOZA) 18 MG/3ML Subcutaneous Solution Pen-injector 27 mL 0     Sig: Inject 1.8 mg into the skin daily.      Last refill date:  10/24/23    Last office visit: 12/11/23      Future Appointments   Date Time Provider Gonzalo Davis   12/27/2023  2:00 PM Kumar Manning MD [facility]   12/28/2023  8:45 AM Rina Wesley DPM [facility] ORTHO Wo [id number]   1/9/2024 11:30 AM Krystal Mendez MD [facility]   2/7/2024 12:30 PM [facility], PROCEDURE [id number] None

## 2023-12-26 NOTE — TELEPHONE ENCOUNTER
Refill Request    Medication request: HYDROcodone-acetaminophen  MG Oral Tab. Take 1 tablet by mouth every 4-6 hours PRN pain (max 5 tabs in 24 hours). LOV:12/1/2023 Radhika Staples MD   Due back to clinic per last office note: The patient will follow up in 3 months, but the patient will call me 2 weeks after having the injection to let me know how the injection worked. NOV: Visit date not found      [place]: 12/3/2023 for 150 tab #30 days    Urine drug screen (if applicable): 92/9/7874  Pain contract: 11/16/2023    LOV plan (if weaning or changing medications): He will continue with the [address] [place] [address] for the pain. He is taking 5 tablets per day. He was unable to wean down on them in the past, but he will try this again once he has had the injection.

## 2023-12-26 NOTE — TELEPHONE ENCOUNTER
Pharmacy requesting prior auth for     testosterone cypionate 200 mg/mL Intramuscular Solution, Inject 0.63 mL (126 mg total) into the muscle every 14 (fourteen) days., Disp: 3.78 mL, Rfl: 0    KEY: [id number]

## 2023-12-27 ENCOUNTER — OFFICE VISIT (OUTPATIENT)
Dept: PULMONOLOGY | Facility: CLINIC | Age: 53
End: 2023-12-27
Payer: MEDICAID

## 2023-12-27 ENCOUNTER — TELEPHONE (OUTPATIENT)
Dept: ORTHOPEDICS CLINIC | Facility: CLINIC | Age: 53
End: 2023-12-27

## 2023-12-27 VITALS
DIASTOLIC BLOOD PRESSURE: 73 MMHG | SYSTOLIC BLOOD PRESSURE: 110 MMHG | RESPIRATION RATE: 14 BRPM | OXYGEN SATURATION: 97 % | HEIGHT: 71 IN | HEART RATE: 88 BPM | BODY MASS INDEX: 36.68 KG/M2 | WEIGHT: 262 LBS

## 2023-12-27 DIAGNOSIS — G47.33 OSA (OBSTRUCTIVE SLEEP APNEA): Primary | ICD-10-CM

## 2023-12-27 LAB — SALIVARY CORTISOL MS: 0.02 UG/DL

## 2023-12-27 PROCEDURE — 3074F SYST BP LT 130 MM HG: CPT | Performed by: INTERNAL MEDICINE

## 2023-12-27 PROCEDURE — 3008F BODY MASS INDEX DOCD: CPT | Performed by: INTERNAL MEDICINE

## 2023-12-27 PROCEDURE — 99204 OFFICE O/P NEW MOD 45 MIN: CPT | Performed by: INTERNAL MEDICINE

## 2023-12-27 PROCEDURE — 3078F DIAST BP <80 MM HG: CPT | Performed by: INTERNAL MEDICINE

## 2023-12-27 NOTE — H&P
[facility], [facility]    Pulmonary consult     Emery Matthews Patient Status:  Specimen    1970 MRN [id number]   Location [facility], [place] [address] [address] [facility] Attending No att. providers found   Hosp Day # 0 PCP Juana Gary MD     Date:  2023    History provided by:patient  HPI:     This is a very pleasant 51-year-old gentleman with history of obesity BMI 36, chronic back pain and neck pain, DM  Patient presented to my office for evaluation for possible LEANN  In bed 10 PM to 6 AM  Reported snoring and apnea during sleep  ESS 12  Mild nocturia  Daytime sleepiness and fatigue but no sleep attack or sleep paralysis  No restless leg symptoms  Otherwise denies any chest pain or shortness of breath or cough  No sinus pressure or nasal congestion      History     Past Medical History:   Diagnosis Date    Back problem     Chronic neck pain     Contusion of cervical cord ([address]     Diabetes ([address]     Migraines A month ago it comes and goes every day    Went to emergency care clinic and suggested to get an MRI done because I've been dealing with a bad migraine for over a week straight today being a 10 from 1-10 10,being the worse    Visual impairment     readers     Past Surgical History:   Procedure Laterality Date    CT CERVICAL SPINE      c3 & 4     HERNIA SURGERY      hiatal hernia    KNEE SURGERY  2008    RIGHT ACL REPAIR     Family History   Problem Relation Age of Onset    Heart Disorder Paternal Grandmother     Diabetes Paternal Grandmother     Cancer Paternal Grandmother     No Known Problems Mother     No Known Problems Sister     No Known Problems Brother      Social History:  Social History     Socioeconomic History    Marital status:    Tobacco Use    Smoking status: Never    Smokeless tobacco: Never   Vaping Use    Vaping Use: Never used   Substance and Sexual Activity    Alcohol use: Not Currently     Alcohol/week: 1.0 standard drink of alcohol     Comment: Did social    Drug use: Never   Other Topics Concern    Caffeine Concern Yes    Exercise No   Social History Narrative    The patient does not use an assistive device. .      The patient does live in a home with stairs. Allergies/Medications: Allergies: No Known Allergies  (Not in a hospital admission)      Review of Systems:     Constitutional:  Positive for fatigue. HENT: Negative. Eyes: Negative. Respiratory:  Positive for apnea. Negative for cough, shortness of breath, wheezing and stridor. Cardiovascular: Negative. Gastrointestinal: Negative. Genitourinary:  Positive for nocturia. Musculoskeletal:  Positive for back pain and joint pain. Skin: Negative. Neurological: Negative. Hematological: Negative. Psychiatric/Behavioral: Negative. Physical Exam:   Vital Signs:  Blood pressure 110/73, pulse 88, resp. rate 14, height 5' 11\" (1.803 m), weight 262 lb (118.8 kg), SpO2 97%. Physical Exam  Constitutional:       General: He is not in acute distress. Appearance: He is obese. He is not ill-appearing. HENT:      Head: Normocephalic and atraumatic. Right Ear: Tympanic membrane normal.      Nose: Nose normal.      Mouth/Throat:      Mouth: Mucous membranes are moist.      Comments: Upper airway class II Mallampati score  Eyes:      General: No scleral icterus. Extraocular Movements: Extraocular movements intact. Pupils: Pupils are equal, round, and reactive to light. Cardiovascular:      Rate and Rhythm: Normal rate. Pulses: Normal pulses. Heart sounds: No murmur heard. No gallop. Pulmonary:      Effort: Pulmonary effort is normal. No respiratory distress. Breath sounds: No stridor. No wheezing, rhonchi or rales. Abdominal:      General: Abdomen is flat. Bowel sounds are normal.      Tenderness: There is no guarding. Musculoskeletal:      Cervical back: Normal range of motion.       Right lower leg: No edema. Left lower leg: No edema. Skin:     General: Skin is dry. Neurological:      General: No focal deficit present. Mental Status: He is oriented to person, place, and time. Results:     Lab Results   Component Value Date    WBC 6.9 12/20/2023    HGB 15.1 12/20/2023    HCT 45.8 12/20/2023    .0 12/20/2023    CREATSERUM 0.86 12/11/2023    BUN 16 12/11/2023     12/11/2023    K 4.4 12/11/2023     12/11/2023    CO2 24.0 12/11/2023    GLU 79 12/11/2023    CA 9.3 12/11/2023    ALB 4.3 12/20/2023    ALKPHO 58 12/20/2023    BILT 0.6 12/20/2023    TP 7.1 12/20/2023    AST 19 12/20/2023    ALT 24 12/20/2023    TSH 2.080 01/17/2023    ESRML 11 12/29/2020    CRP 0.80 (H) 05/12/2021    MG 2.0 01/17/2023    TROP 0.00 07/18/2018    B12 712 04/04/2023       Assessment/Plan:     1-suspected LEANN  BMI 36  Upper airway class II Mallampati score  ESS 12   Underlying history of DM    Educated about LEANN and CPAP therapy  Order for home sleep study if positive to start auto CPAP  Diet and exercise and weight reduction  Avoid sedative and narcotic and alcohol  Avoid driving if sleepy  Avoid working in heavy machinery if sleepy  Keep regular sleep-wake cycles    Follow-up with me in 3 to 4 months          Vera Marsh MD  12/27/2023

## 2023-12-27 NOTE — TELEPHONE ENCOUNTER
HERNAN and also sent the following [employer] message:     Good Afternoon! I am reaching out regarding your upcoming appointment with . I noticed that your appointment note says you are coming in for pain of the right foot (toes) but I also noticed that your recent imaging was for the left foot. When I saw that your appointment was for a follow up, I checked your last office visit but that was for your right ankle. I just need clarification so that I can order imaging if needed.    Thank you,   Osmar Tate MA

## 2023-12-28 ENCOUNTER — OFFICE VISIT (OUTPATIENT)
Dept: ORTHOPEDICS CLINIC | Facility: CLINIC | Age: 53
End: 2023-12-28
Payer: MEDICAID

## 2023-12-28 VITALS — HEIGHT: 71 IN | WEIGHT: 262 LBS | BODY MASS INDEX: 36.68 KG/M2

## 2023-12-28 DIAGNOSIS — E11.8 CONTROLLED TYPE 2 DIABETES MELLITUS WITH COMPLICATION, WITHOUT LONG-TERM CURRENT USE OF INSULIN (HCC): ICD-10-CM

## 2023-12-28 DIAGNOSIS — M79.672 LEFT FOOT PAIN: Primary | ICD-10-CM

## 2023-12-28 DIAGNOSIS — M25.871 IMPINGEMENT SYNDROME OF RIGHT ANKLE: ICD-10-CM

## 2023-12-28 DIAGNOSIS — M20.5X2 MALLET TOE OF LEFT FOOT: ICD-10-CM

## 2023-12-28 DIAGNOSIS — R20.0 NUMBNESS OF TOES: ICD-10-CM

## 2023-12-28 PROCEDURE — 99214 OFFICE O/P EST MOD 30 MIN: CPT | Performed by: PODIATRIST

## 2023-12-28 PROCEDURE — 3008F BODY MASS INDEX DOCD: CPT | Performed by: PODIATRIST

## 2023-12-28 NOTE — PROGRESS NOTES
[facility] Progress Note    Subjective:     Pt is here with a new issue involving toes of left foot  Started out with numbness - toes and fingers  Only left foot toes   No burning or tinglling  Tips of 2nd and 3rd toes  Sock pressure hurts    He did take meloxicam and had xray per Dr Chelsie Stout      Hx of DM  Had been on gabapentin for neck issue - did not do well on it    Previous impingement pain right ankle - improved and only now and then bothers him        Objective:   Exam left foot  Foot warm , well perfused  Sensation to light touch of toes and tip of toes intact  Mild mallet toes with stance  Pain overlying 2nd and 3rd toe nails mild thickening and early callus forming at distal tuft  No ingrown nail growth    No pain today right ankle    Imaging: 3 views of left foot - heel spurs noted  Mallet toe deformities of 2,3 rd toes        Assessment/Plan:     Diagnoses and all orders for this visit:    Left foot pain    Controlled type 2 diabetes mellitus with complication, without long-term current use of insulin (HCC)    Impingement syndrome of right ankle    Numbness of toes    Mallet toe of left foot        Discussed how mallet toes can cause pressure and pain to tip of toes, even only at end of day. Discussed types of shoes that cause gripping of toes and a little more friction or flexible shoes that allow a lot of forefoot movement  Could also be early neuropathy - he has had some numbness of both fingers and toes    Butress pad dispensed - trying to change pressure on toes in gait    Follow up if not improving        Lynne Boggs. Rajat Johnson DPM  [facility]    Mesosphere speech recognition software was used to prepare this note. If a word or phrase is confusing, it is likely do to a failure of recognition. Please contact me with any questions or clarifications.

## 2023-12-28 NOTE — TELEPHONE ENCOUNTER
Medication PA Requested:   testosterone cypionate 200 mg/mL Intramuscular Solution                                                        CoverMyMeds Used:  Key: [id number]   Quantity: 3.78 mL  Day Supply:  84  Sig:    Inject 0.63 mL (126 mg total) into the muscle every 14 (fourteen) days.   DX Code:  E29.1                                   CPT code (if applicable):   Case Number/Pending Ref#:  Thanks

## 2023-12-29 ENCOUNTER — MED REC SCAN ONLY (OUTPATIENT)
Dept: ENDOCRINOLOGY CLINIC | Facility: CLINIC | Age: 53
End: 2023-12-29

## 2024-01-02 DIAGNOSIS — M54.16 LUMBAR RADICULOPATHY: ICD-10-CM

## 2024-01-02 NOTE — TELEPHONE ENCOUNTER
Refill Request    Medication request: HYDROcodone-acetaminophen  MG Oral Tab  Take 1 tablet by mouth every 4-6 hours PRN pain (max 5 tabs in 24 hours).     LOV:12/1/2023-inj, 11/2/23-LOV Juan A Mcgee MD   Due back to clinic per last office note:  \"follow up in 3 months \"  NOV: Visit date not found      [place]: 12/3/23 #150    Urine drug screen (if applicable): 11/2/23  Pain contract: Valid until 11/16/24    LOV plan (if weaning or changing medications): Per  at LOV: \"He will continue with the Norco 10/325 for the pain.  He is taking 5 tablets per day.  He was unable to wean down on them in the past, but he will try this again once he has had the injection. \"      Norco rx was already sent to [facility]'s pharmacy on 12/26/23.  Spoke with [facility] pharmacy who stated patient never filled this as they currently do not have this in stock. Rx cancelled with [facility].    Rx set back up to go to [facility]'s pharmacy.

## 2024-01-03 LAB
METANEPHRINE: 28.4 PG/ML
NORMETANEPHRINE: 40.3 PG/ML

## 2024-01-03 RX ORDER — HYDROCODONE BITARTRATE AND ACETAMINOPHEN 10; 325 MG/1; MG/1
TABLET ORAL
Qty: 150 TABLET | Refills: 0 | Status: SHIPPED | OUTPATIENT
Start: 2024-01-03

## 2024-01-06 NOTE — TELEPHONE ENCOUNTER
Received fax from [facility]. The request for Testosterone Cypionate 200mg/ml is approved 09/30/23 - 12/29/24   Case # [id number]  Sent to scanning.

## 2024-01-08 ENCOUNTER — TELEPHONE (OUTPATIENT)
Dept: SURGERY | Facility: CLINIC | Age: 54
End: 2024-01-08

## 2024-01-08 NOTE — TELEPHONE ENCOUNTER
Spoke with patient, he states he was in contact with COVID and will quarantine to be safe.     Assisted in rescheduling procedure. PT confirmed and verbalized understanding.

## 2024-01-19 DIAGNOSIS — E29.1 HYPOGONADISM IN MALE: Primary | ICD-10-CM

## 2024-01-26 ENCOUNTER — PROCEDURE (OUTPATIENT)
Dept: SURGERY | Facility: CLINIC | Age: 54
End: 2024-01-26
Payer: MEDICAID

## 2024-01-26 VITALS — SYSTOLIC BLOOD PRESSURE: 127 MMHG | HEART RATE: 80 BPM | DIASTOLIC BLOOD PRESSURE: 82 MMHG

## 2024-01-26 DIAGNOSIS — R31.29 MICROHEMATURIA: Primary | ICD-10-CM

## 2024-01-26 PROCEDURE — 99213 OFFICE O/P EST LOW 20 MIN: CPT | Performed by: UROLOGY

## 2024-01-26 PROCEDURE — 52000 CYSTOURETHROSCOPY: CPT | Performed by: UROLOGY

## 2024-01-26 PROCEDURE — 3074F SYST BP LT 130 MM HG: CPT | Performed by: UROLOGY

## 2024-01-26 PROCEDURE — 3079F DIAST BP 80-89 MM HG: CPT | Performed by: UROLOGY

## 2024-01-26 RX ORDER — CIPROFLOXACIN 500 MG/1
500 TABLET, FILM COATED ORAL ONCE
Status: COMPLETED | OUTPATIENT
Start: 2024-01-26 | End: 2024-01-26

## 2024-01-26 RX ADMIN — CIPROFLOXACIN 500 MG: 500 TABLET, FILM COATED ORAL at 11:52:00

## 2024-01-26 NOTE — PROGRESS NOTES
Ran Carter is a 53 year old male.    HPI:     Chief Complaint   Patient presents with    Hematuria     Pt presents for office cystoscopy       53-year-old male presents for office cystoscopy.  He was seen by nurse practitioner miguel angel April 10, 2023 for microhematuria.  Evaluation included a CT urogram December 7, 2023 in office urine cytology which was also unremarkable.  He states he had to leave the country unexpectedly to help with [place] members [place] he was sick and could not come sooner for his office cystoscopy.      HISTORY:  Past Medical History:   Diagnosis Date    Back problem     Chronic neck pain     Contusion of cervical cord (HCC)     Diabetes (HCC)     Migraines A month ago it comes and goes every day    Went to emergency care clinic and suggested to get an MRI done because I've been dealing with a bad migraine for over a week straight today being a 10 from 1-10 10,being the worse    Visual impairment     readers      Past Surgical History:   Procedure Laterality Date    CT CERVICAL SPINE      c3 & 4     HERNIA SURGERY  2005    hiatal hernia    KNEE SURGERY  2008    RIGHT ACL REPAIR      Family History   Problem Relation Age of Onset    Heart Disorder Paternal Grandmother     Diabetes Paternal Grandmother     Cancer Paternal Grandmother     No Known Problems Mother     No Known Problems Sister     No Known Problems Brother       Social History:   Social History     Socioeconomic History    Marital status:    Tobacco Use    Smoking status: Never    Smokeless tobacco: Never   Vaping Use    Vaping Use: Never used   Substance and Sexual Activity    Alcohol use: Not Currently     Alcohol/week: 1.0 standard drink of alcohol     Comment: Did social    Drug use: Never   Other Topics Concern    Caffeine Concern Yes    Exercise No   Social History Narrative    The patient does not use an assistive device..      The patient does live in a home with stairs.        Medications  (Active prior to today's visit):  Current Outpatient Medications   Medication Sig Dispense Refill    Liraglutide (VICTOZA) 18 MG/3ML Subcutaneous Solution Pen-injector Inject 1.8 mg into the skin daily. 27 mL 0    FAMOTIDINE 20 MG Oral Tab TAKE 1 TABLET BY MOUTH DAILY 90 tablet 0    HYDROcodone-acetaminophen  MG Oral Tab Take 1 tablet by mouth every 4-6 hours PRN pain (max 5 tabs in 24 hours). (Patient not taking: Reported on 1/26/2024) 150 tablet 0    Syringe/Needle, Disp, 27G X 1/2\" 1 ML Does not apply Misc Inject testosterone every two weeks (Patient not taking: Reported on 1/26/2024) 50 each 0    testosterone cypionate 200 mg/mL Intramuscular Solution Inject 0.63 mL (126 mg total) into the muscle every 14 (fourteen) days. (Patient not taking: Reported on 1/26/2024) 3.78 mL 0    Syringe 22G X 1\" 3 ML Does not apply Misc Inject testosterone every 2 weeks (Patient not taking: Reported on 1/26/2024) 50 each 0    Blood Glucose Monitoring Suppl (FREESTYLE LITE) Does not apply Device 1 Device by Other route 2 (two) times daily. Use as directed. (Patient not taking: Reported on 1/26/2024) 1 each 3    Glucose Blood (ONETOUCH VERIO) In Vitro Strip Test blood sugar twice daily. (Patient not taking: Reported on 1/26/2024) 200 strip 3    Tirzepatide (MOUNJARO) 2.5 MG/0.5ML Subcutaneous Solution Pen-injector Inject 2.5 mg into the skin once a week. (Patient not taking: Reported on 1/26/2024) 2 mL 0    Insulin Pen Needle (PEN NEEDLES) 32G X 4 MM Does not apply Misc 1 each daily. (Patient not taking: Reported on 1/26/2024) 90 each 0    HYDROcodone-acetaminophen 7.5-325 MG Oral Tab Take 1 tablet by mouth every 4-6 hours PRN pain (max 5 tabs in 24 hours). (Patient not taking: Reported on 1/26/2024) 150 tablet 0    Lancets Does not apply Misc Check bid (Patient not taking: Reported on 1/26/2024) 200 each 1    Glucose Blood (FREESTYLE LITE TEST) In Vitro Strip Check bid (Patient not taking: Reported on 1/26/2024) 100 strip 0     Tirzepatide (MOUNJARO) 2.5 MG/0.5ML Subcutaneous Solution Pen-injector Inject 2.5 mg into the skin once a week. (Patient not taking: Reported on 2024) 2 mL 0    Insulin Pen Needle (PEN NEEDLES) 32G X 4 MM Does not apply Misc 1 each daily. (Patient not taking: Reported on 2024) 90 each 0    metFORMIN  MG Oral Tablet 24 Hr  (Patient not taking: Reported on 2023)      Ciclopirox 8 % External Solution Apply 1 Application topically nightly. (Patient not taking: Reported on 2024) 6 mL 0    Dulaglutide (TRULICITY) 0.75 MG/0.5ML Subcutaneous Solution Pen-injector Inject 0.75 mg into the skin once a week. (Patient not taking: Reported on 2023) 6 mL 0    PEG 3350-KCl-Na Bicarb-NaCl (TRILYTE) 420 g Oral Recon Soln Take prep as directed by gastro office. May substitute with Trilyte/generic equivalent if needed. (Patient not taking: Reported on 2024) 1 each 0    meclizine 25 MG Oral Tab Take 1 tablet (25 mg total) by mouth 3 (three) times daily as needed. (Patient not taking: Reported on 2024) 30 tablet 0    multiple vitamin Oral Chew Tab Chew 1 tablet by mouth daily. (Patient not taking: Reported on 2024)         Allergies:  No Known Allergies      ROS:       PHYSICAL EXAM:   Ran Carter  : 1970  Referring Physician: No ref. provider found     Chief Complaint   Patient presents with    Hematuria     Pt presents for office cystoscopy           CYSTOSCOPY    Anesthesia:  2% lidocaine gel    Urethra: Normal  Prostate / Pelvic: Normal  Bladder: Normal.  No tumor, stone, diverticulum, or glomerulation  U.O's: Normal  Trabeculation: none      POST CYSTOSCOPY MEDICATIONS: sample one tablet Cipro 500mg given to patient    DIAGNOSIS:     PLAN: see below       ASSESSMENT/PLAN:   Assessment   Encounter Diagnosis   Name Primary?    Microhematuria Yes       Reviewed findings at length with patient.  Recommended follow-up in 6 months to recheck urine microscopically for  blood.  Patient will call if he has any problems or concerns.         Orders This Visit:  No orders of the defined types were placed in this encounter.      Meds This Visit:  Requested Prescriptions     Pending Prescriptions Disp Refills    ciprofloxacin (Cipro) tab 500 mg         Imaging & Referrals:  None     1/26/2024  Sisi Black MD

## 2024-01-29 DIAGNOSIS — M54.16 LUMBAR RADICULOPATHY: ICD-10-CM

## 2024-01-29 NOTE — TELEPHONE ENCOUNTER
Refill Request    Medication request: HYDROcodone-acetaminophen  MG Oral Tab. Take 1 tablet by mouth every 4-6 hours PRN pain (max 5 tabs in 24 hours).      LOV:12/1/2023 Juan A Mcgee MD   Due back to clinic per last office note:  The patient will follow up in 3 months, but the patient will call me 2 weeks after having the injection to let me know how the injection worked.   NOV: 2/13/2024 Juan A Mcgee MD      [place]: 1/3/2024 #150 (30 days)    Urine drug screen (if applicable): 11/2/2023  Pain contract: 11/16/2023    LOV plan (if weaning or changing medications): He will continue with the Norco 10/325 for the pain.

## 2024-01-30 RX ORDER — HYDROCODONE BITARTRATE AND ACETAMINOPHEN 10; 325 MG/1; MG/1
TABLET ORAL
Qty: 150 TABLET | Refills: 0 | Status: SHIPPED | OUTPATIENT
Start: 2024-01-30

## 2024-02-06 ENCOUNTER — TELEPHONE (OUTPATIENT)
Facility: CLINIC | Age: 54
End: 2024-02-06

## 2024-02-06 NOTE — TELEPHONE ENCOUNTER
Called patient, name/ verified.    Stated that nobody sent him prep instructions, informed pt that instructions were sent in [place] last August and he already read them. He already has the prep.     Walked through w/ pt to open again prep instructions in [employer] and I also discussed w/ him over the phone, he then read [employer],     Last read by Ran Carter at  1:50 PM on 2024     Stated he is on Victoza daily and took a dose today, he is not on trulicity, not on Mounjaro    He then put me on hold as he has another call.     Pt was back on the phone and stated she spoke w/ RN Elina, was told that he is good to go w/ procedure tomorrow.     Stated he was instructed by Elina to hold Victoza day of procedure which is tomorrow.     I clarified w/ Yani MATTHEWS. If pt is ok to proceed w/ procedure.

## 2024-02-06 NOTE — TELEPHONE ENCOUNTER
Call received from PAT RNAna Paula, pt's name/ verified.     Ana Paula stated pt called PAT, left message that pt needs prep and prep instructions.     He is scheduled for egd/ colonoscopy tomorrow w/ Dr. Means.     Prep instructions were sent and pt read,     Last read by Ran Carter at  9:43 PM on 2023.

## 2024-02-06 NOTE — TELEPHONE ENCOUNTER
Per Yani MATTHEWS, ok to proceed w/ procedure tomorrow.     Called pt back and informed he is good to go w/ procedure tomorrow.     Patient verbalized understanding, no further concerns, issues at this time.

## 2024-02-07 ENCOUNTER — ANESTHESIA EVENT (OUTPATIENT)
Dept: ENDOSCOPY | Facility: HOSPITAL | Age: 54
End: 2024-02-07
Payer: MEDICAID

## 2024-02-07 ENCOUNTER — HOSPITAL ENCOUNTER (OUTPATIENT)
Facility: HOSPITAL | Age: 54
Setting detail: HOSPITAL OUTPATIENT SURGERY
Discharge: HOME | End: 2024-02-07
Attending: INTERNAL MEDICINE | Admitting: INTERNAL MEDICINE
Payer: MEDICAID

## 2024-02-07 ENCOUNTER — ANESTHESIA (OUTPATIENT)
Dept: ENDOSCOPY | Facility: HOSPITAL | Age: 54
End: 2024-02-07
Payer: MEDICAID

## 2024-02-07 VITALS
BODY MASS INDEX: 37.38 KG/M2 | RESPIRATION RATE: 16 BRPM | TEMPERATURE: 99 F | DIASTOLIC BLOOD PRESSURE: 101 MMHG | OXYGEN SATURATION: 95 % | SYSTOLIC BLOOD PRESSURE: 124 MMHG | HEART RATE: 70 BPM | WEIGHT: 267 LBS | HEIGHT: 71 IN

## 2024-02-07 DIAGNOSIS — Z12.11 SCREENING FOR COLON CANCER: ICD-10-CM

## 2024-02-07 DIAGNOSIS — K21.9 GASTROESOPHAGEAL REFLUX DISEASE, UNSPECIFIED WHETHER ESOPHAGITIS PRESENT: ICD-10-CM

## 2024-02-07 DIAGNOSIS — R13.10 DYSPHAGIA, UNSPECIFIED TYPE: ICD-10-CM

## 2024-02-07 LAB — GLUCOSE BLDC GLUCOMTR-MCNC: 89 MG/DL (ref 70–99)

## 2024-02-07 PROCEDURE — 0DBL8ZX EXCISION OF TRANSVERSE COLON, VIA NATURAL OR ARTIFICIAL OPENING ENDOSCOPIC, DIAGNOSTIC: ICD-10-PCS | Performed by: INTERNAL MEDICINE

## 2024-02-07 PROCEDURE — 0DB58ZX EXCISION OF ESOPHAGUS, VIA NATURAL OR ARTIFICIAL OPENING ENDOSCOPIC, DIAGNOSTIC: ICD-10-PCS | Performed by: INTERNAL MEDICINE

## 2024-02-07 PROCEDURE — 0DBK8ZX EXCISION OF ASCENDING COLON, VIA NATURAL OR ARTIFICIAL OPENING ENDOSCOPIC, DIAGNOSTIC: ICD-10-PCS | Performed by: INTERNAL MEDICINE

## 2024-02-07 PROCEDURE — 88305 TISSUE EXAM BY PATHOLOGIST: CPT | Performed by: INTERNAL MEDICINE

## 2024-02-07 PROCEDURE — 88312 SPECIAL STAINS GROUP 1: CPT | Performed by: INTERNAL MEDICINE

## 2024-02-07 PROCEDURE — 0DB78ZX EXCISION OF STOMACH, PYLORUS, VIA NATURAL OR ARTIFICIAL OPENING ENDOSCOPIC, DIAGNOSTIC: ICD-10-PCS | Performed by: INTERNAL MEDICINE

## 2024-02-07 PROCEDURE — 0DBM8ZX EXCISION OF DESCENDING COLON, VIA NATURAL OR ARTIFICIAL OPENING ENDOSCOPIC, DIAGNOSTIC: ICD-10-PCS | Performed by: INTERNAL MEDICINE

## 2024-02-07 PROCEDURE — 82962 GLUCOSE BLOOD TEST: CPT

## 2024-02-07 RX ORDER — SODIUM CHLORIDE, SODIUM LACTATE, POTASSIUM CHLORIDE, CALCIUM CHLORIDE 600; 310; 30; 20 MG/100ML; MG/100ML; MG/100ML; MG/100ML
INJECTION, SOLUTION INTRAVENOUS CONTINUOUS
Status: DISCONTINUED | OUTPATIENT
Start: 2024-02-07 | End: 2024-02-07

## 2024-02-07 RX ORDER — NALOXONE HYDROCHLORIDE 0.4 MG/ML
0.08 INJECTION, SOLUTION INTRAMUSCULAR; INTRAVENOUS; SUBCUTANEOUS ONCE AS NEEDED
Status: DISCONTINUED | OUTPATIENT
Start: 2024-02-07 | End: 2024-02-07

## 2024-02-07 RX ORDER — PANTOPRAZOLE SODIUM 40 MG/1
40 TABLET, DELAYED RELEASE ORAL
Qty: 90 TABLET | Refills: 3 | Status: SHIPPED | OUTPATIENT
Start: 2024-02-07

## 2024-02-07 RX ORDER — LIDOCAINE HYDROCHLORIDE 10 MG/ML
INJECTION, SOLUTION EPIDURAL; INFILTRATION; INTRACAUDAL; PERINEURAL AS NEEDED
Status: DISCONTINUED | OUTPATIENT
Start: 2024-02-07 | End: 2024-02-07 | Stop reason: SURG

## 2024-02-07 RX ORDER — ONDANSETRON 2 MG/ML
4 INJECTION INTRAMUSCULAR; INTRAVENOUS ONCE AS NEEDED
Status: DISCONTINUED | OUTPATIENT
Start: 2024-02-07 | End: 2024-02-07

## 2024-02-07 RX ADMIN — SODIUM CHLORIDE, SODIUM LACTATE, POTASSIUM CHLORIDE, CALCIUM CHLORIDE: 600; 310; 30; 20 INJECTION, SOLUTION INTRAVENOUS at 13:45:00

## 2024-02-07 RX ADMIN — LIDOCAINE HYDROCHLORIDE 50 MG: 10 INJECTION, SOLUTION EPIDURAL; INFILTRATION; INTRACAUDAL; PERINEURAL at 12:44:00

## 2024-02-07 NOTE — H&P
History & Physical Examination    Patient Name: Ran Carter  MRN: [id number]  [facility]: [id number]  YOB: 1970    Diagnosis: GERD, esophageal dysphagia, CRC screening    Facility-Administered Medications Prior to Admission   Medication Dose Route Frequency Provider Last Rate Last Admin    [COMPLETED] ciprofloxacin (Cipro) tab 500 mg  500 mg Oral Once Sisi Black MD   500 mg at 24 1152     Medications Prior to Admission   Medication Sig Dispense Refill Last Dose    HYDROcodone-acetaminophen  MG Oral Tab Take 1 tablet by mouth every 4-6 hours PRN pain (max 5 tabs in 24 hours). 150 tablet 0 2024 at 1030    Liraglutide (VICTOZA) 18 MG/3ML Subcutaneous Solution Pen-injector Inject 1.8 mg into the skin daily. 27 mL 0 2024 at 0900    testosterone cypionate 200 mg/mL Intramuscular Solution Inject 0.63 mL (126 mg total) into the muscle every 14 (fourteen) days. 3.78 mL 0 2024    FAMOTIDINE 20 MG Oral Tab TAKE 1 TABLET BY MOUTH DAILY 90 tablet 0 2024 at 1000    [] Liraglutide (VICTOZA) 18 MG/3ML Subcutaneous Solution Pen-injector Inject 0.6 mg into the skin daily for 7 days, THEN 1.2 mg daily for 7 days, THEN 1.8 mg daily. 27 mL 0 2023    meclizine 25 MG Oral Tab Take 1 tablet (25 mg total) by mouth 3 (three) times daily as needed. (Patient not taking: Reported on 2024) 30 tablet 0     multiple vitamin Oral Chew Tab Chew 1 tablet by mouth daily. (Patient not taking: Reported on 2024)       Syringe/Needle, Disp, 27G X 1/2\" 1 ML Does not apply Misc Inject testosterone every two weeks 50 each 0     Syringe 22G X 1\" 3 ML Does not apply Misc Inject testosterone every 2 weeks (Patient not taking: Reported on 2024) 50 each 0     [] Meloxicam 15 MG Oral Tab Take 1 tablet (15 mg total) by mouth daily for 7 days. 7 tablet 0     Blood Glucose Monitoring Suppl (FREESTYLE LITE) Does not apply Device 1 Device by Other route 2 (two) times daily. Use as  directed. (Patient not taking: Reported on 1/26/2024) 1 each 3     Glucose Blood (ONETOUCH VERIO) In Vitro Strip Test blood sugar twice daily. (Patient not taking: Reported on 1/26/2024) 200 strip 3     Tirzepatide (MOUNJARO) 2.5 MG/0.5ML Subcutaneous Solution Pen-injector Inject 2.5 mg into the skin once a week. (Patient not taking: Reported on 1/26/2024) 2 mL 0     Insulin Pen Needle (PEN NEEDLES) 32G X 4 MM Does not apply Misc 1 each daily. (Patient not taking: Reported on 1/26/2024) 90 each 0     HYDROcodone-acetaminophen 7.5-325 MG Oral Tab Take 1 tablet by mouth every 4-6 hours PRN pain (max 5 tabs in 24 hours). (Patient not taking: Reported on 1/26/2024) 150 tablet 0     Lancets Does not apply Misc Check bid (Patient not taking: Reported on 1/26/2024) 200 each 1     Glucose Blood (FREESTYLE LITE TEST) In Vitro Strip Check bid (Patient not taking: Reported on 1/26/2024) 100 strip 0     Tirzepatide (MOUNJARO) 2.5 MG/0.5ML Subcutaneous Solution Pen-injector Inject 2.5 mg into the skin once a week. (Patient not taking: Reported on 1/26/2024) 2 mL 0     Insulin Pen Needle (PEN NEEDLES) 32G X 4 MM Does not apply Misc 1 each daily. (Patient not taking: Reported on 1/26/2024) 90 each 0     metFORMIN  MG Oral Tablet 24 Hr  (Patient not taking: Reported on 12/28/2023)       Ciclopirox 8 % External Solution Apply 1 Application topically nightly. (Patient not taking: Reported on 1/26/2024) 6 mL 0     Dulaglutide (TRULICITY) 0.75 MG/0.5ML Subcutaneous Solution Pen-injector Inject 0.75 mg into the skin once a week. (Patient not taking: Reported on 12/28/2023) 6 mL 0 Not Taking    PEG 3350-KCl-Na Bicarb-NaCl (TRILYTE) 420 g Oral Recon Soln Take prep as directed by gastro office. May substitute with Trilyte/generic equivalent if needed. (Patient not taking: Reported on 1/26/2024) 1 each 0      Current Facility-Administered Medications   Medication Dose Route Frequency    lactated ringers infusion   Intravenous  Continuous       Allergies: No Known Allergies    Past Medical History:   Diagnosis Date    Back problem     Chronic neck pain     Contusion of cervical cord (HCC)     Diabetes (HCC)     Migraines A month ago it comes and goes every day    Went to emergency care clinic and suggested to get an MRI done because I've been dealing with a bad migraine for over a week straight today being a 10 from 1-10 10,being the worse    Visual impairment     readers     Past Surgical History:   Procedure Laterality Date    CT CERVICAL SPINE      c3 & 4     HERNIA SURGERY  2005    hiatal hernia    KNEE SURGERY  2008    RIGHT ACL REPAIR     Family History   Problem Relation Age of Onset    Heart Disorder Paternal Grandmother     Diabetes Paternal Grandmother     Cancer Paternal Grandmother     No Known Problems Mother     No Known Problems Sister     No Known Problems Brother      Social History     Tobacco Use    Smoking status: Never    Smokeless tobacco: Never   Substance Use Topics    Alcohol use: Not Currently     Alcohol/week: 1.0 standard drink of alcohol     Comment: Did social         SYSTEM Check if Review is Normal Check if Physical Exam is Normal If not normal, please explain:   HEENT [X ] [ X]    NECK  [X ] [ X]    HEART [X ] [ X]    LUNGS [X ] [ X]    ABDOMEN [X ] [ X]    EXTREMITIES [X ] [ X]    OTHER        I have discussed the risks and benefits and alternatives of the procedure with the patient/family.  They understand and agree to proceed with plan of care.   I have reviewed the History and Physical done within the last 30 days.  Any changes noted above.    Amy Means MD  [facility] - Gastroenterology  2/7/2024

## 2024-02-07 NOTE — DISCHARGE INSTRUCTIONS
Home Care Instructions for Colonoscopy and/or Gastroscopy with Sedation    Diet:  - Resume your regular diet as tolerated unless otherwise instructed.  - Start with light meals to minimize bloating.  - Do not drink alcohol today.    Medication:  - If you have questions about resuming your normal medications, please contact your Primary Care Physician.    Activities:  - Take it easy today. Do not return to work today.  - Do not drive today.  - Do not operate any machinery today (including kitchen equipment).    Colonoscopy:  - You may notice some rectal \"spotting\" (a little blood on the toilet tissue) for a day or two after the exam. This is normal.  - If you experience any rectal bleeding (not spotting), persistent tenderness or sharp severe abdominal pains, oral temperature over 100 degrees Fahrenheit, light-headedness or dizziness, or any other problems, contact your doctor.    Gastroscopy:  - You may have a sore throat for 2-3 days following the exam. This is normal. Gargling with warm salt water (1/2 tsp salt to 1 glass warm water) or using throat lozenges will help.  - If you experience any sharp pain in your neck, abdomen or chest, vomiting of blood, oral temperature over 100 degrees Fahrenheit, light-headedness or dizziness, or any other problems, contact your doctor.    **If unable to reach your doctor, please go to the [facility] Emergency Room**    - Your referring physician will receive a full report of your examination.  - If you do not hear from your doctor's office within two weeks of your biopsy, please call them for your results.    You may be able to see your laboratory results in [employer] between 4 and 7 business days.  In some cases, your physician may not have viewed the results before they are released to [employer].  If you have questions regarding your results contact the physician who ordered the test/exam by phone or via [employer] by choosing \"Ask a Medical Question.\"
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
## 2024-02-07 NOTE — ANESTHESIA POSTPROCEDURE EVALUATION
Patient: Ran Carter    Procedure Summary       Date: 02/07/24 Room / Location: [facility] ENDOSCOPY 04 / EM ENDOSCOPY    Anesthesia Start: 1237 Anesthesia Stop:     Procedures:       COLONOSCOPY / ESOPHAGOGASTRODUODENOSCOPY      ESOPHAGOGASTRODUODENOSCOPY (EGD) Diagnosis:       Screening for colon cancer      Gastroesophageal reflux disease, unspecified whether esophagitis present      Dysphagia, unspecified type      (esophagitis/ Colon polyps, hemorrhoids, sigmoid colon erythema)    Surgeons: Amy Means MD Anesthesiologist: Tanvi Ayala CRNA    Anesthesia Type: general ASA Status: 2            Anesthesia Type: general    Vitals Value Taken Time   /91 02/07/24 1345   Temp  02/07/24 1345   Pulse 75 02/07/24 1345   Resp 23 02/07/24 1345   SpO2 97 % 02/07/24 1345       [facility] AN Post Evaluation:   Patient Evaluated in PACU  Patient Participation: complete - patient participated  Level of Consciousness: sleepy but conscious  Pain Score: 0  Pain Management: adequate  Airway Patency:patent  Dental exam unchanged from preop  Yes    Cardiovascular Status: blood pressure returned to baseline  Respiratory Status: room air  Postoperative Hydration acceptable      Tanvi Ayala CRNA  2/7/2024 1:45 PM

## 2024-02-07 NOTE — PRE-SEDATION ASSESSMENT
Physician Pre-Sedation Assessment    Pre-Sedation Assessment:    Sedation History: Previous Sedation with No Complications and Airway Assessed    Cardiac: normal S1, S2  Respiratory: breath sounds clear bilaterally   Abdomen: soft, BS (+), non-tender    ASA Classification: 2. Patient with mild systemic disease    Plan: IV Sedation

## 2024-02-07 NOTE — OPERATIVE REPORT
ESOPHAGOGASTRODUODENOSCOPY (EGD) & COLONOSCOPY REPORT    Ran Carter     1970 Age 53 year old   PCP Todd Rivas MD Endoscopist Amy Means MD     Date of procedure: 24    Procedure: EGD w/ cold biopsy & Colonoscopy w/ cold snare and cold biopsy    Pre-operative diagnosis: Esophageal dysphagia, GERD, CRC screening    Post-operative diagnosis: LA Class B esophagitis, gastritis, colon polyps, internal hemorrhoids, transverse colon granularity, sigmoid colon erythema    Medications: MAC    Withdrawal time: 34 minutes    Complications: none    Procedure: Informed consent was obtained from the patient after the risks of the procedure were discussed, including but not limited to bleeding, perforation, aspiration, infection, or possibility of a missed lesion. We discussed the risks/benefits and alternatives to this procedure, as well as the planned sedation. EGD procedure: The patient was placed in the left lateral decubitus position and begun on continuous blood pressure pulse oximetry and EKG monitoring and this was maintained throughout the procedure. Once an adequate level of sedation was obtained a bite block was placed. Then the lubricated tip of the [id number] diagnostic video upper endoscope was inserted and advanced using direct visualization into the posterior pharynx and ultimately into the esophagus. Colonoscopy procedure: Once an adequate level of sedation was obtained a digital rectal exam was completed. Then the lubricated tip of the [id number] diagnostic video colonoscope was inserted and advanced without difficulty to the cecum using the CO2 insufflation technique. The cecum was identified by localizing the trifold, the appendix and the ileocecal valve. A routine second examination of the cecum/ascending colon was performed. Withdrawal was begun with thorough washing and careful examination of the colonic walls and folds. Photodocumentation was obtained. The  bowel prep was good. Views of the colon were good with washing. I then carefully withdrew the instrument from the patient who tolerated the procedure well.     Complications: None    EGD findings:      1. Esophagus: The squamocolumnar junction was noted at 38 cm and appeared irregular due to LA Class B esophagitis that extended up to 32 cm from the incisors and was sampled with cold forceps biopsies. The diaphragmatic pinch was noted noted at 38 cm from the incisors. The esophageal mucosa appeared otherwise unremarkable.   2. Stomach: The stomach distended normally. Normal rugal folds were seen. The pylorus was patent. The gastric mucosa appeared moderate gastritis and cold forceps biopsies were taken of the antrum, incisura, and body. Retroflexion revealed a normal fundus and a non-patulous cardia.   3. Duodenum: The duodenal mucosa appeared normal in the 1st and 2nd portion of the duodenum.     Colonoscopy findings:    1. 8 polyp(s) noted as follows:      A. There were two sessile ascending colon polyps measuring 4-5 mm that were completely removed by cold snare polypectomy and retrieved.        B. There were three sessile ascending colon polyps measuring 2 mm that were completely removed by cold forceps biopsies and retrieved.      C. There were two sessile transverse colon polyps measuring 1-2 mm that were completely removed by cold forceps biopsies and retrieved.      D. There was a 2 mm sessile descending colon polyp measuring 2 mm that was completely removed by cold forceps biopsies and retrieved.    2. Diverticulosis: none.  3. Terminal ileum: the visualized mucosa appeared normal.  4. A retroflexed view of the rectum revealed small internal hemorrhoids.  5. There was mild transverse colon granularity that was sampled with cold forceps biopsies.   6. There was mild, patchy sigmoid colon erythema that was sampled with cold forceps biopsies..  7. Otherwise, the colonic mucosal appeared healthy with preserved  vascular pattern.   8. BRIDGER: normal rectal tone, no masses palpated.     Impression:  LA Class B esophagitis. Biopsied.  Moderate gastritis. Biopsied.   Transverse colon granularity. Biopsied.  Mild, patchy sigmoid colon erythema. Biopsied.   8 small colon polyps, resected and retrieved.  Small internal hemorrhoids.      Recommend:  Await pathology. The interval for the next colonoscopy will be determined after reviewing pathology (likely 3 years). If new signs or symptoms develop, colonoscopy may need to be repeated sooner.   High fiber diet.  Monitor for blood in the stool. If having more than just tinge of blood, call office or go to the ER.  Start pantoprazole 40 mg daily.  Follow up in GI clinic in ~1-2 months.     >>>If tissue was obtained and you have not received your pathology results either by phone or letter within 2 weeks, please call our office at [phone].    Specimens: stomach, esophagus, ascending colon polyps x 5, transverse colon polyps x 2, transverse colon granularity, descending colon polyp, sigmoid colon erythema    Blood loss: <1 ml

## 2024-02-08 ENCOUNTER — OFFICE VISIT (OUTPATIENT)
Dept: PHYSICAL MEDICINE AND REHAB | Facility: CLINIC | Age: 54
End: 2024-02-08
Payer: MEDICAID

## 2024-02-08 ENCOUNTER — TELEPHONE (OUTPATIENT)
Dept: PHYSICAL THERAPY | Facility: HOSPITAL | Age: 54
End: 2024-02-08

## 2024-02-08 ENCOUNTER — TELEPHONE (OUTPATIENT)
Dept: GASTROENTEROLOGY | Facility: CLINIC | Age: 54
End: 2024-02-08

## 2024-02-08 VITALS — WEIGHT: 267 LBS | BODY MASS INDEX: 37.38 KG/M2 | HEIGHT: 71 IN

## 2024-02-08 DIAGNOSIS — E11.8 CONTROLLED DIABETES MELLITUS TYPE 2 WITH COMPLICATIONS, UNSPECIFIED WHETHER LONG TERM INSULIN USE (HCC): ICD-10-CM

## 2024-02-08 DIAGNOSIS — R42 VERTIGO: ICD-10-CM

## 2024-02-08 DIAGNOSIS — M50.90 CERVICAL DISC DISEASE: ICD-10-CM

## 2024-02-08 DIAGNOSIS — M25.532 ACUTE PAIN OF LEFT WRIST: ICD-10-CM

## 2024-02-08 DIAGNOSIS — M48.02 CERVICAL STENOSIS OF SPINE: ICD-10-CM

## 2024-02-08 DIAGNOSIS — G56.21 ULNAR NEUROPATHY AT ELBOW OF RIGHT UPPER EXTREMITY: ICD-10-CM

## 2024-02-08 DIAGNOSIS — Q76.1 CERVICAL FUSION SYNDROME: ICD-10-CM

## 2024-02-08 DIAGNOSIS — Z98.1 S/P CERVICAL SPINAL FUSION: ICD-10-CM

## 2024-02-08 DIAGNOSIS — M45.0 ANKYLOSING SPONDYLITIS OF MULTIPLE SITES IN SPINE (HCC): Primary | ICD-10-CM

## 2024-02-08 DIAGNOSIS — G56.22 ULNAR NEUROPATHY AT ELBOW OF LEFT UPPER EXTREMITY: ICD-10-CM

## 2024-02-08 DIAGNOSIS — M11.20 CALCIUM PYROPHOSPHATE DEPOSITION DISEASE (CPPD): ICD-10-CM

## 2024-02-08 DIAGNOSIS — G56.03 BILATERAL CARPAL TUNNEL SYNDROME: ICD-10-CM

## 2024-02-08 PROCEDURE — 99214 OFFICE O/P EST MOD 30 MIN: CPT | Performed by: PHYSICAL MEDICINE & REHABILITATION

## 2024-02-08 RX ORDER — NAPROXEN 500 MG/1
500 TABLET ORAL 2 TIMES DAILY WITH MEALS
Qty: 60 TABLET | Refills: 2 | Status: SHIPPED | OUTPATIENT
Start: 2024-02-08 | End: 2025-02-07

## 2024-02-08 NOTE — PATIENT INSTRUCTIONS
Plan  He will take the Naprosyn 500 mg 2 times a day for 2 weeks to see if this will help the left wrist pain.    He will do OT for th left wrist pain.    He will do PT for the neck pain.    He will wean the Norco to one every 6 hours for the next month.    He will let me know in one month how he is doing with the lower dose of the Norco and the therapies.    He will follow up in 2-3 months or sooner if needed.

## 2024-02-09 ENCOUNTER — TELEPHONE (OUTPATIENT)
Facility: CLINIC | Age: 54
End: 2024-02-09

## 2024-02-09 DIAGNOSIS — K20.0 ESOPHAGITIS, EOSINOPHILIC: ICD-10-CM

## 2024-02-09 DIAGNOSIS — Z87.19 PERSONAL HISTORY OF UNSPECIFIED DIGESTIVE DISEASE: Primary | ICD-10-CM

## 2024-02-09 NOTE — TELEPHONE ENCOUNTER
----- Message from Amy Means MD sent at 2/8/2024  5:51 PM CST -----  Landen Borja  I reviewed the pathology from the pt's EGD and colonoscopy. The biopsies from the EGD showed moderate to severe inflammation in the esophagus with ulceration. This warrants a repeat EGD to ensure healing once she's been on the pantoprazole medication for 8-12 weeks. I prescribed it yesterday after her EGD.      Her colon polyps were benign.  The area of granularity in the middle of her colon looks unremarkable, and the area of redness in her left colon looks unremarkable as well.  I recommend a repeat colonoscopy in 3 years for surveillance.      I recommend an EGD in 8-12 weeks to ensure healing of the severe esophagitis, avoiding all NSAIDs, including Naproxen (which looks like was just prescribed today), taking pantoprazole 40 mg daily, and starting lifestyle modifications to control GERD.     Thanks! SS

## 2024-02-12 ENCOUNTER — TELEPHONE (OUTPATIENT)
Facility: CLINIC | Age: 54
End: 2024-02-12

## 2024-02-12 NOTE — TELEPHONE ENCOUNTER
Will need repeat cln in 3 years.      Nursing: Place order for recall colonoscopy in 3 year.      Huong Pepe PA-C

## 2024-02-12 NOTE — TELEPHONE ENCOUNTER
GI ,     Please take note of DM meds to be held prior to EGD once scheduled per Huong below and GI guideline.     Please let us know if there is anything that we need to do/instruct pre-procedure.    Thank you.

## 2024-02-12 NOTE — TELEPHONE ENCOUNTER
Health maintenance updated.    Last colonoscopy done 2/7/24 by Dr. Means    3 year recall placed into [facility]    Next due on 2/7/2027  per Huong

## 2024-02-12 NOTE — TELEPHONE ENCOUNTER
Called and discussed results with patient.     He has started PPI and feeling better. Advised to continue medication and plan for repeat EGD in in 3 months.     Will need repeat cln in 3 years.     Nursing: Place order for recall colonoscopy in 3 year.     Schedulers: Please assist with rescheduling EGD with Dr. Means in 3 months. Dx: esophagitis, ulcers. With MAC.   Patient only on Victoza. Hold one week prior to procedure.     Huong Pepe PA-C

## 2024-02-14 ENCOUNTER — LAB ENCOUNTER (OUTPATIENT)
Dept: LAB | Facility: HOSPITAL | Age: 54
End: 2024-02-14
Attending: INTERNAL MEDICINE
Payer: MEDICAID

## 2024-02-14 DIAGNOSIS — E29.1 SECONDARY MALE HYPOGONADISM: ICD-10-CM

## 2024-02-14 LAB
CORTIS SERPL-MCNC: 17 UG/DL
PROLACTIN SERPL-MCNC: 17 NG/ML
TESTOST SERPL-MCNC: 320.18 NG/DL
TSI SER-ACNC: 2.63 MIU/ML (ref 0.55–4.78)

## 2024-02-14 PROCEDURE — 82024 ASSAY OF ACTH: CPT

## 2024-02-14 PROCEDURE — 82533 TOTAL CORTISOL: CPT

## 2024-02-14 PROCEDURE — 84403 ASSAY OF TOTAL TESTOSTERONE: CPT

## 2024-02-14 PROCEDURE — 84443 ASSAY THYROID STIM HORMONE: CPT

## 2024-02-14 PROCEDURE — 36415 COLL VENOUS BLD VENIPUNCTURE: CPT

## 2024-02-14 PROCEDURE — 84146 ASSAY OF PROLACTIN: CPT

## 2024-02-14 PROCEDURE — 84305 ASSAY OF SOMATOMEDIN: CPT

## 2024-02-14 NOTE — TELEPHONE ENCOUNTER
Scheduled for:  Egd 32309  Provider Name:     Date:  07/10/2024  Location:  [facility]   Sedation:  Mac  Time:  1:15pm (pt is aware to arrive at 12:15pm     Prep:  Npo  Meds/Allergies Reconciled?:  Yes    Diagnosis with codes:  esophagitis,K20.0 ulcers Z87.19  Was patient informed to call insurance with codes (Y/N): Yes     Referral sent?:Referral was sent at the time of electronic surgical scheduling.    [facility] or [facility] notified?:  I sent an electronic request to Endo Scheduling and received a confirmation today.       Medication Orders:  Patient only on Victoza. Hold one week prior to procedure.   Misc Orders:  None     Further instructions given by staff: I discussed the prep instructions with the patient which he verbally understood and is aware that I will send the instructions today.via [employer]

## 2024-02-15 LAB — ACTH: 20.1 PG/ML

## 2024-02-16 LAB
IGF I: 120 NG/ML
IGF-1, Z SCORE: -0.6 S.D.

## 2024-02-22 RX ORDER — PEN NEEDLE, DIABETIC 30 GX3/16"
1 NEEDLE, DISPOSABLE MISCELLANEOUS DAILY
Qty: 90 EACH | Refills: 0 | Status: SHIPPED | OUTPATIENT
Start: 2024-02-22

## 2024-02-28 ENCOUNTER — OFFICE VISIT (OUTPATIENT)
Dept: OCCUPATIONAL MEDICINE | Facility: HOSPITAL | Age: 54
End: 2024-02-28
Attending: PHYSICAL MEDICINE & REHABILITATION
Payer: MEDICAID

## 2024-02-28 ENCOUNTER — OFFICE VISIT (OUTPATIENT)
Dept: PHYSICAL THERAPY | Age: 54
End: 2024-02-28
Attending: PHYSICAL MEDICINE & REHABILITATION
Payer: MEDICAID

## 2024-02-28 DIAGNOSIS — G56.21 ULNAR NEUROPATHY AT ELBOW OF RIGHT UPPER EXTREMITY: ICD-10-CM

## 2024-02-28 DIAGNOSIS — M54.16 LUMBAR RADICULOPATHY: ICD-10-CM

## 2024-02-28 DIAGNOSIS — M48.02 CERVICAL STENOSIS OF SPINE: ICD-10-CM

## 2024-02-28 DIAGNOSIS — G56.22 ULNAR NEUROPATHY AT ELBOW OF LEFT UPPER EXTREMITY: ICD-10-CM

## 2024-02-28 DIAGNOSIS — E11.8 CONTROLLED DIABETES MELLITUS TYPE 2 WITH COMPLICATIONS, UNSPECIFIED WHETHER LONG TERM INSULIN USE (HCC): ICD-10-CM

## 2024-02-28 DIAGNOSIS — G56.03 BILATERAL CARPAL TUNNEL SYNDROME: ICD-10-CM

## 2024-02-28 DIAGNOSIS — M11.20 CALCIUM PYROPHOSPHATE DEPOSITION DISEASE (CPPD): Primary | ICD-10-CM

## 2024-02-28 DIAGNOSIS — M11.20 CALCIUM PYROPHOSPHATE DEPOSITION DISEASE (CPPD): ICD-10-CM

## 2024-02-28 DIAGNOSIS — Z98.1 S/P CERVICAL SPINAL FUSION: ICD-10-CM

## 2024-02-28 DIAGNOSIS — Q76.1 CERVICAL FUSION SYNDROME: ICD-10-CM

## 2024-02-28 DIAGNOSIS — M25.532 ACUTE PAIN OF LEFT WRIST: ICD-10-CM

## 2024-02-28 DIAGNOSIS — R42 VERTIGO: ICD-10-CM

## 2024-02-28 DIAGNOSIS — M50.90 CERVICAL DISC DISEASE: ICD-10-CM

## 2024-02-28 DIAGNOSIS — M45.0 ANKYLOSING SPONDYLITIS OF MULTIPLE SITES IN SPINE (HCC): Primary | ICD-10-CM

## 2024-02-28 PROCEDURE — 97162 PT EVAL MOD COMPLEX 30 MIN: CPT

## 2024-02-28 PROCEDURE — 97140 MANUAL THERAPY 1/> REGIONS: CPT

## 2024-02-28 PROCEDURE — 97165 OT EVAL LOW COMPLEX 30 MIN: CPT
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
## 2024-03-01 ENCOUNTER — PATIENT MESSAGE (OUTPATIENT)
Dept: PHYSICAL MEDICINE AND REHAB | Facility: CLINIC | Age: 54
End: 2024-03-01

## 2024-03-03 DIAGNOSIS — E29.1 HYPOGONADISM IN MALE: Primary | ICD-10-CM

## 2024-03-04 ENCOUNTER — MED REC SCAN ONLY (OUTPATIENT)
Facility: CLINIC | Age: 54
End: 2024-03-04

## 2024-03-04 ENCOUNTER — OFFICE VISIT (OUTPATIENT)
Dept: PHYSICAL THERAPY | Age: 54
End: 2024-03-04
Attending: PHYSICAL MEDICINE & REHABILITATION
Payer: MEDICAID

## 2024-03-04 PROCEDURE — 97140 MANUAL THERAPY 1/> REGIONS: CPT

## 2024-03-04 PROCEDURE — 97110 THERAPEUTIC EXERCISES: CPT

## 2024-03-04 RX ORDER — HYDROCODONE BITARTRATE AND ACETAMINOPHEN 10; 325 MG/1; MG/1
TABLET ORAL
Qty: 150 TABLET | Refills: 0 | Status: SHIPPED | OUTPATIENT
Start: 2024-03-04

## 2024-03-04 RX ORDER — TESTOSTERONE CYPIONATE 200 MG/ML
125 INJECTION, SOLUTION INTRAMUSCULAR
Qty: 3.78 ML | Refills: 0 | Status: SHIPPED | OUTPATIENT
Start: 2024-03-04 | End: 2024-06-02

## 2024-03-04 NOTE — TELEPHONE ENCOUNTER
LOV:12/19/23    RTC:6months    FU:No F/U    Pending Monthly Supply: order pending, approve if appropriate.
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
## 2024-03-04 NOTE — TELEPHONE ENCOUNTER
Requested Prescriptions     Pending Prescriptions Disp Refills    pantoprazole 40 MG Oral Tab EC 90 tablet 3     Sig: Take 1 tablet (40 mg total) by mouth every morning before breakfast.      Lr:  2/7/2024                 Qty:90 tablet         Refills:3  Lov:   5/26/2023

## 2024-03-05 RX ORDER — PANTOPRAZOLE SODIUM 40 MG/1
40 TABLET, DELAYED RELEASE ORAL
Qty: 90 TABLET | Refills: 3 | Status: SHIPPED | OUTPATIENT
Start: 2024-03-05

## 2024-03-06 ENCOUNTER — OFFICE VISIT (OUTPATIENT)
Dept: OCCUPATIONAL MEDICINE | Facility: HOSPITAL | Age: 54
End: 2024-03-06
Attending: PHYSICAL MEDICINE & REHABILITATION
Payer: MEDICAID

## 2024-03-06 ENCOUNTER — OFFICE VISIT (OUTPATIENT)
Dept: PHYSICAL THERAPY | Age: 54
End: 2024-03-06
Attending: PHYSICAL MEDICINE & REHABILITATION
Payer: MEDICAID

## 2024-03-06 PROCEDURE — 97140 MANUAL THERAPY 1/> REGIONS: CPT

## 2024-03-06 PROCEDURE — 97110 THERAPEUTIC EXERCISES: CPT

## 2024-03-06 NOTE — PROGRESS NOTES
Diagnosis:   Calcium pyrophosphate deposition disease (CPPD) (M11.20)  Acute pain of left wrist (M25.532)      Referring Provider: Everardo  Date of Evaluation:    2/28/2024    Precautions:  DM Next MD visit:   none scheduled  Date of Surgery: n/a     Doctor's Script instructions: Improve left wrist and hand range of motion and strength. Decrease inflammation and use modalities as needed. HEP     Insurance Primary/Secondary: BCBS OUT OF STATE / N/A     # Auth Visits: 8 visits, 2/28-4/28            Subjective: \"Today is a good day.\"    Pain: 0/10      Objective: See exercises flowsheet below for exercises performed.       Assessment: Pt session began with moist heat applied to L wrist followed by STM to thenar eminence and volar wrist. OT trained and demo'd rubber band exercises to patient, pt returned demo with min v/c until able to display independence. Pt verbalized some thumb pain with lumbrical squeezes therefore black web graded down to blue web.        Goals: (to be met in 10 visits)  Pt will be independent and compliant with comprehensive HEP to maintain progress achieved in OT  Pt will verbalized 3 joint protection techniques to utilize during iadls with min v/c  Pt will increase their (L)  strength by 10# for ease of carrying groceries  Pt will decrease their QuickDASH score by 10% in order to demo improvements in functional independence.    Plan: follow up on new HEP, train in isometric wrist strengthening  Date 2/28/24  3/6/2024                Visit #   2/10                                  Evaluation X                Manual                   STM to L  5 mins                                                       Ther ex                   Forearm flexor and extensor stretch  X5, 20 seconds               Rubber band ex digital extension, thumb extension, and abduction  x10, 2 sets               1.5# dowel sup/pronation   X10, 2 sets               Tennis ball CMC thumb tip    X20, 2 sets                15# green flexbar    x20, 2 sets  -bilateral/ipsilateral sup/pronation  -wrist stability \"T\"s  -galina's twist               Black web   - and twist  X10, 2 sets               Blue Web   Lumbrical squeezes x10, 2 sets               HEP instruction                                        Therapeutic Activity                                       Neuromuscular Re-education                                                             Modalities                    Moist Heat    3 mins                                      HEP:  Assignment date:   Rubber band digital extension, thumb extension, and abduction 3/6/2024                   Charges: TE 3 (42)       Total Timed Treatment: 42 min  Total Treatment Time: 45 min

## 2024-03-06 NOTE — PROGRESS NOTES
Diagnosis:     Expected by:    Associated DX:  Ankylosing spondylitis of multiple sites in spine (HCC) (M45.0)  Cervical disc disease (M50.90)  Cervical stenosis of spine (M48.02)  Cervical fusion syndrome (Q76.1)  S/P cervical spinal fusion (Z98.1)      Referring Provider: Everardo  Date of Evaluation:    2/28/24    Precautions:  None Next MD visit:   none scheduled  Date of Surgery: n/a   Insurance Primary/Secondary: BLUE CROSS MEDICAID / N/A     # Auth Visits: 10auth exp 4/28/24            Subjective: Pt reports yesterday had a lot of pain, but today is feeling better. Stretched out last night. Feels better today. No headache. Reports felt easier driving bike home after last session with increased ease of turning head.     Pain: 3-4/10      Objective:   Greatest restrictions into L rotation and R sidebend      Assessment: Continued with manual interventions to address joint and soft tissue restrictions. Initiated scapular strengthening. Cueing required for neutral head/neck positioning. Pt with improved gross ROM by end of session.       Goals:   Goals: (to be met in 8-12 visits)   Pt will verbalize and demo compliance with home program at least 75% of the time for independent management of symptoms - in progress  Pt will demo improved cervical AROM by at least 25% for all deficit motions to be able to safely turn head while driving/riding motorcycle - in progress  Pt will demo improved BUE strength by at least 1 full muscle grade for all deficit motions to be able to safely lift/carry for functional mobility in progress  Pt will be able to safely return to exercise to maintain strength and stability for recreation and to decrease future risk for reinjury - in progress    Plan: Continue with manual interventions focused on improving cervical AROM. Initiate scapular strengthening  Date: 3/4/2024  TX#: 2/10 Date:  3/6/24               TX#: 3/10 Date:                 TX#: 4/ Date:                 TX#: 5/ Date:   Tx#:  6/   Manual:29mins  Supine: suboccipital release, STM B cervical paraspinals, sideglide mobilizations throughout with exception of C4/5 fusion, gentle manual traction, OA distraction  Supine: MET for improved AA rotation focus on L rotation  Seated: 1st rib mob R/L Manual: 24mins  Supine: suboccipital release, STM B cervical paraspinals, sideglide mobilizations throughout with exception of C4/5 fusion, gentle manual traction, OA distraction L  Seated: 1st rib mob R/L, STM B UTs      TherEx: 12mins  Supine: passive stretching into cervical sidebend and flexion  Supine DNF 10x  Sidely thoracic rotations 10x R/L  Seated: self 1st rib mob with sheet therEx: 16mins  Supine DNF [id number]  Sidely thoracic rotations 10x R/L  Seated flexion with self OP [id number]  Seated thoracic extensions over chair 10x  Cable:    - Rows 15# handles 20x   - Low rows 15# handles 20x                    HEP:   2/28:  sidely thoracic rotations BLEs flexed   3/4: seated 1st rib mob with sheet  3/6: seated cervical flex, seated thoracic ext over chair, may perform rows/low rows at gym at cable    Charges: TherEx x1, Man x2       Total Timed Treatment: 40 min  Total Treatment Time: 40 min

## 2024-03-08 ENCOUNTER — OFFICE VISIT (OUTPATIENT)
Dept: OCCUPATIONAL MEDICINE | Facility: HOSPITAL | Age: 54
End: 2024-03-08
Attending: PHYSICAL MEDICINE & REHABILITATION
Payer: MEDICAID

## 2024-03-08 PROCEDURE — 97110 THERAPEUTIC EXERCISES: CPT

## 2024-03-08 NOTE — PROGRESS NOTES
Diagnosis:   Calcium pyrophosphate deposition disease (CPPD) (M11.20)  Acute pain of left wrist (M25.532)      Referring Provider: Everardo  Date of Evaluation:    2/28/2024    Precautions:  DM Next MD visit:   none scheduled  Date of Surgery: n/a     Doctor's Script instructions: Improve left wrist and hand range of motion and strength. Decrease inflammation and use modalities as needed. HEP     Insurance Primary/Secondary: [employer] OUT OF STATE / N/A     # Auth Visits: 8 visits, 2/28-4/28            Subjective: Pt states yesterday he was having some pain. Pt cannot identify why or any activities that caused it.    Pain: 3/10 in volar wrist      Objective: See exercises flowsheet below for exercises performed.       Assessment: Pt session began with moist heat applied to L wrist followed by STM to thenar eminence and volar wrist. Pt verbalized improved comfort post manual work. OT trained and demo'd isometric wrist strengthening exercises to patient, pt returned demo with min v/c until able to display independence.  Pt required v/c to slow ling of exercises for optimal benefit.        Goals: (to be met in 10 visits)  Pt will be independent and compliant with comprehensive HEP to maintain progress achieved in OT  Pt will verbalized 3 joint protection techniques to utilize during iadls with min v/c  Pt will increase their (L)  strength by 10# for ease of carrying groceries  Pt will decrease their QuickDASH score by 10% in order to demo improvements in functional independence.    Plan: follow up on isometric wrist strengthening  Date 2/28/24  3/6/2024   3/8/24              Visit #   2/10  3/10                                Evaluation X                Manual                   STM to L wrist  5 mins 5 mins                                                     Ther ex                   Forearm flexor and extensor stretch  X5, 20 seconds X5, 20 seconds             Rubber band ex digital extension, thumb extension, and  abduction  x10, 2 sets               1.5# dowel sup/pronation   X10, 2 sets X10, 2 sets             Tennis ball CMC thumb tip    X20, 2 sets               15# green flexbar    x20, 2 sets  -bilateral/ipsilateral sup/pronation  \"T\"s  -galina's twist Green Flexbar 15#   -Wrist stability \"T\" exercise x10             Black web   - and twist  X10, 2 sets - and twist  X10, 2 sets             Blue Web   Lumbrical squeezes x10, 2 sets Green Putty Ex: -tip pinch x20 each hand             HEP instruction      Isometric wrist strengthening x10, 10 secs                                  Therapeutic Activity                                       Neuromuscular Re-education                                                             Modalities                    Moist Heat    3 mins  3 mins                                    HEP:  Assignment date:   Rubber band digital extension, thumb extension, and abduction 3/6/2024                   Charges: TE 3 (42)       Total Timed Treatment: 42 min  Total Treatment Time: 45 min

## 2024-03-11 ENCOUNTER — APPOINTMENT (OUTPATIENT)
Dept: OCCUPATIONAL MEDICINE | Facility: HOSPITAL | Age: 54
End: 2024-03-11
Attending: PHYSICAL MEDICINE & REHABILITATION
Payer: MEDICAID

## 2024-03-11 ENCOUNTER — OFFICE VISIT (OUTPATIENT)
Dept: PHYSICAL THERAPY | Age: 54
End: 2024-03-11
Attending: INTERNAL MEDICINE
Payer: MEDICAID

## 2024-03-11 PROCEDURE — 97140 MANUAL THERAPY 1/> REGIONS: CPT

## 2024-03-11 PROCEDURE — 97110 THERAPEUTIC EXERCISES: CPT

## 2024-03-11 NOTE — PROGRESS NOTES
Diagnosis:     Expected by:    Associated DX:  Ankylosing spondylitis of multiple sites in spine (HCC) (M45.0)  Cervical disc disease (M50.90)  Cervical stenosis of spine (M48.02)  Cervical fusion syndrome (Q76.1)  S/P cervical spinal fusion (Z98.1)      Referring Provider: Everardo  Date of Evaluation:    2/28/24    Precautions:  None Next MD visit:   none scheduled  Date of Surgery: n/a   Insurance Primary/Secondary: BL[employer]OSS MEDICAID / N/A     # Auth Visits: 10auth exp 4/28/24            Subjective: Pt reports has been in a lot of pain. His neck has been cramping which made it difficult to sleep.    Pain: 5/10      Objective:   Not assessed this session  Greatest restrictions into L rotation and R sidebend      Assessment: Pt with improved thoracic mobility. Continued moderate restrictions at cervical spine. Pt continues to respond well to manual interventions. Reported decrease in pain by end of session.       Goals:   Goals: (to be met in 8-12 visits)   Pt will verbalize and demo compliance with home program at least 75% of the time for independent management of symptoms - in progress  Pt will demo improved cervical AROM by at least 25% for all deficit motions to be able to safely turn head while driving/riding motorcycle - in progress  Pt will demo improved BUE strength by at least 1 full muscle grade for all deficit motions to be able to safely lift/carry for functional mobility in progress  Pt will be able to safely return to exercise to maintain strength and stability for recreation and to decrease future risk for reinjury - in progress    Plan: Continue with manual interventions focused on improving cervical AROM. Progress scapular strengthening.  Date: 3/4/2024  TX#: 2/10 Date:  3/6/24               TX#: 3/10 Date: 3/11/24                TX#: 4/10 Date:                 TX#: 5/ Date:   Tx#: 6/   Manual:29mins  Supine: suboccipital release, STM B cervical paraspinals, sideglide mobilizations throughout with  exception of C4/5 fusion, gentle manual traction, OA distraction  Supine: MET for improved AA rotation focus on L rotation  Seated: 1st rib mob R/L Manual: 24mins  Supine: suboccipital release, STM B cervical paraspinals, sideglide mobilizations throughout with exception of C4/5 fusion, gentle manual traction, OA distraction L  Seated: 1st rib mob R/L, STM B UTs Manual: 25mins  Prone: PA thoracic spine, STM B thoracic paraspinals  Supine: suboccipital release, STM B cervical paraspinals, sideglide mobilizations throughout with exception of C4/5 fusion, gentle manual traction, OA distraction L     TherEx: 12mins  Supine: passive stretching into cervical sidebend and flexion  Supine DNF 10x  Sidely thoracic rotations 10x R/L  Seated: self 1st rib mob with sheet therEx: 16mins  Supine DNF [id number]  Sidely thoracic rotations 10x R/L  Seated flexion with self OP [id number]  Seated thoracic extensions over chair 10x  Cable:    - Rows 15# handles 20x   - Low rows 15# handles 20x therEx: 16mins  DNF: [id number]  Sidely thoraicc rotations 10x R/L  Prone low trap forearm lift 2x10 R/L  Cable:  Standing rows 15# handles 20x  Standing low rows 15# 20x                   HEP:   2/28:  sidely thoracic rotations BLEs flexed   3/4: seated 1st rib mob with sheet  3/6: seated cervical flex, seated thoracic ext over chair, may perform rows/low rows at gym at [employer]  3/11: prone low trap lift off    Charges: TherEx x1, Man x2       Total Timed Treatment: 41 min  Total Treatment Time: 41 min

## 2024-03-13 ENCOUNTER — OFFICE VISIT (OUTPATIENT)
Dept: PHYSICAL THERAPY | Age: 54
End: 2024-03-13
Attending: PHYSICAL MEDICINE & REHABILITATION
Payer: MEDICAID

## 2024-03-13 ENCOUNTER — OFFICE VISIT (OUTPATIENT)
Dept: OCCUPATIONAL MEDICINE | Facility: HOSPITAL | Age: 54
End: 2024-03-13
Attending: PHYSICAL MEDICINE & REHABILITATION
Payer: MEDICAID

## 2024-03-13 PROCEDURE — 97110 THERAPEUTIC EXERCISES: CPT

## 2024-03-13 PROCEDURE — 97140 MANUAL THERAPY 1/> REGIONS: CPT

## 2024-03-13 NOTE — PROGRESS NOTES
Diagnosis:   Calcium pyrophosphate deposition disease (CPPD) (M11.20)  Acute pain of left wrist (M25.532)      Referring Provider: Everardo  Date of Evaluation:    2/28/2024    Precautions:  DM Next MD visit:   none scheduled  Date of Surgery: n/a     Doctor's Script instructions: Improve left wrist and hand range of motion and strength. Decrease inflammation and use modalities as needed. HEP     Insurance Primary/Secondary: BCBS OUT OF STATE / N/A     # Auth Visits: 8 visits, 2/28-4/28            Subjective: Pt states consistent pain. Pt brought brace today. Pt verbalized compliance with newly assigned HEP. Pt verbalized need to leave 15 mins early d/t conflict in schedule.    Pain: 2-3/10 in volar wrist      Objective: See exercises flowsheet below for exercises performed.       Assessment: Pt session began with moist heat applied to L wrist followed by STM to thenar eminence and volar wrist. Pt verbalized improved comfort post manual work. OT evaluated pt's night time orthosis d/t pt's verbalization of discomfort. OT recommended more padded orthosis and provided pictures for desired purchase. Pt demo'd good carry over of his newly added HEP as evidenced by min v/c for accuracy.       Goals: (to be met in 10 visits)  Pt will be independent and compliant with comprehensive HEP to maintain progress achieved in OT  Pt will verbalized 3 joint protection techniques to utilize during iadls with min v/c  Pt will increase their (L)  strength by 10# for ease of carrying groceries  Pt will decrease their QuickDASH score by 10% in order to demo improvements in functional independence.    Plan: Consider adding tendon glides to HEP  Date 2/28/24  3/6/2024   3/8/24   3/13/2024            Visit #   2/10  3/10  4/10                              Evaluation X                Manual                   STM to L wrist  5 mins 5 mins 5 mins                                                   Ther ex                   Forearm flexor and  extensor stretch  X5, 20 seconds X5, 20 seconds Tendon glides x10            Rubber band ex digital extension, thumb extension, and abduction  x10, 2 sets               1.5# dowel sup/pronation   X10, 2 sets X10, 2 sets  X10, 3 sets           Tennis ball CMC thumb tip    X20, 2 sets               15# green flexbar    x20, 2 sets  -bilateral/ipsilateral sup/pronation  \"T\"s  -galina's twist Green Flexbar 15#   -Wrist stability \"T\" exercise x10 Green Flexbar 15#   -Wrist stability \"T\" exercise x20           Black web   - and twist  X10, 2 sets - and twist  X10, 2 sets                 Isometric Wrist Ex: x5 15 secs  -extension, flexion, radial/ulnar deviation       Blue Web   Lumbrical squeezes x10, 2 sets Green Putty Ex: -tip pinch x20 each hand             HEP instruction      Isometric wrist strengthening x10, 10 secs                                  Therapeutic Activity                                       Neuromuscular Re-education                                                             Modalities                    Moist Heat    3 mins  3 mins  3 mins                                  HEP:  Assignment date:   Rubber band digital extension, thumb extension, and abduction 3/6/2024    Isometric ulnar deviation, radial deviation, wrist extension 3/8/24              Charges: TE 2 (10)       Total Timed Treatment: 27 min  Total Treatment Time: 30 min

## 2024-03-13 NOTE — PROGRESS NOTES
Diagnosis:     Expected by:    Associated DX:  Ankylosing spondylitis of multiple sites in spine (HCC) (M45.0)  Cervical disc disease (M50.90)  Cervical stenosis of spine (M48.02)  Cervical fusion syndrome (Q76.1)  S/P cervical spinal fusion (Z98.1)      Referring Provider: Everardo  Date of Evaluation:    2/28/24    Precautions:  None Next MD visit:   none scheduled  Date of Surgery: n/a   Insurance Primary/Secondary: BLUE CROSS MEDICAID / N/A     # Auth Visits: 10auth exp 4/28/24            Subjective: Pt reports has been in a lot of pain. His neck has been cramping which made it difficult to sleep. He has noticed turning his head while driving has gotten easier.     Pain: 4/10      Objective:   Cervical AROM: (* denotes performed with pain)  Flexion: 38  Extension: 41  Sidebending: R 11; L 22  Rotation: R 45; L 40    Not assessed this session  Greatest restrictions into L rotation and R sidebend      Assessment: Pt resents with improved cervical AROM as compared to initial. Improved soft tissue mobility at cervical musculature on palpation. Progressed mobility and strengthening. Pt reported improved symptoms by end of session.       Goals:   Goals: (to be met in 8-12 visits)   Pt will verbalize and demo compliance with home program at least 75% of the time for independent management of symptoms - in progress  Pt will demo improved cervical AROM by at least 25% for all deficit motions to be able to safely turn head while driving/riding motorcycle - improving  Pt will demo improved BUE strength by at least 1 full muscle grade for all deficit motions to be able to safely lift/carry for functional mobility in progress  Pt will be able to safely return to exercise to maintain strength and stability for recreation and to decrease future risk for reinjury - in progress    Plan: Continue with manual interventions focused on improving cervical AROM. Progress scapular strengthening.  Date: 3/4/2024  TX#: 2/10 Date:  3/6/24                TX#: 3/10 Date: 3/11/24                TX#: 4/10 Date:  3/13/24               TX#: 5/10 Date:   Tx#: 6/   Manual:29mins  Supine: suboccipital release, STM B cervical paraspinals, sideglide mobilizations throughout with exception of C4/5 fusion, gentle manual traction, OA distraction  Supine: MET for improved AA rotation focus on L rotation  Seated: 1st rib mob R/L Manual: 24mins  Supine: suboccipital release, STM B cervical paraspinals, sideglide mobilizations throughout with exception of C4/5 fusion, gentle manual traction, OA distraction L  Seated: 1st rib mob R/L, STM B UTs Manual: 25mins  Prone: PA thoracic spine, STM B thoracic paraspinals  Supine: suboccipital release, STM B cervical paraspinals, sideglide mobilizations throughout with exception of C4/5 fusion, gentle manual traction, OA distraction L Manual: 14mins  Supine: suboccipital release, STM B cervical paraspinals, sideglide mobilizations throughout with exception of C4/5 fusion, gentle manual traction, OA distraction L    TherEx: 12mins  Supine: passive stretching into cervical sidebend and flexion  Supine DNF 10x  Sidely thoracic rotations 10x R/L  Seated: self 1st rib mob with sheet therEx: 16mins  Supine DNF [id number]  Sidely thoracic rotations 10x R/L  Seated flexion with self OP [id number]  Seated thoracic extensions over chair 10x  Cable:    - Rows 15# handles 20x   - Low rows 15# handles 20x therEx: 16mins  DNF: [id number]  Sidely thoraicc rotations 10x R/L  Prone low trap forearm lift 2x10 R/L  Cable:  Standing rows 15# handles 20x  Standing low rows 15# 20x Therex: 27mins  DNF [id number]  Supine DNF + cervical rotations 10x  Sidely throacic rotations 10x R/L  Seated cervical rotation wiht towel assist at AA  Seated DARIEN BlueTB [id number]  Standing rows cable 15# handles [id number]  Standing low rows 15# [id number]  Standing horiz abd 5# 2x10 R/L  Standing shoulder adduction  15# handle 2x10 R/L                  HEP:   2/28:  sidely thoracic  rotations BLEs flexed   3/4: seated 1st rib mob with sheet  3/6: seated cervical flex, seated thoracic ext over chair, may perform rows/low rows at gym at cable  3/11: prone low trap lift off  3/13: cervical rotations towelDARIEN BlueTB     Charges: TherEx x2, Man x1       Total Timed Treatment: 41 min  Total Treatment Time: 41 min

## 2024-03-14 ENCOUNTER — APPOINTMENT (OUTPATIENT)
Dept: OCCUPATIONAL MEDICINE | Facility: HOSPITAL | Age: 54
End: 2024-03-14
Attending: PHYSICAL MEDICINE & REHABILITATION
Payer: MEDICAID

## 2024-03-18 ENCOUNTER — OFFICE VISIT (OUTPATIENT)
Dept: PHYSICAL THERAPY | Age: 54
End: 2024-03-18
Attending: PHYSICAL MEDICINE & REHABILITATION
Payer: MEDICAID

## 2024-03-18 PROCEDURE — 97110 THERAPEUTIC EXERCISES: CPT

## 2024-03-18 PROCEDURE — 97140 MANUAL THERAPY 1/> REGIONS: CPT

## 2024-03-18 NOTE — PROGRESS NOTES
Diagnosis:   Calcium pyrophosphate deposition disease (CPPD) (M11.20)  Acute pain of left wrist (M25.532)      Referring Provider: Everardo  Date of Evaluation:    2/28/2024    Precautions:  DM Next MD visit:   none scheduled  Date of Surgery: n/a     Doctor's Script instructions: Improve left wrist and hand range of motion and strength. Decrease inflammation and use modalities as needed. HEP     Insurance Primary/Secondary: BCBS OUT OF STATE / N/A     # Auth Visits: 8 visits, 2/28-4/28            Subjective: Pt states having some numbness in both hands. Pt states this numbness occurs from time to time.    Pain: 2/10 in volar wrist      Objective: See exercises flowsheet below for exercises performed.       Assessment: Pt session began with moist heat applied to L wrist followed by STM to thenar eminence and volar wrist. Pt demo'd fair comprehension of tendon glides today. OT continued training until independent in tendon glides. Tendon glides added to HEP. Pt more tender today to touch over scaphoid.        Goals: (to be met in 10 visits)  Pt will be independent and compliant with comprehensive HEP to maintain progress achieved in OT  Pt will verbalized 3 joint protection techniques to utilize during iadls with min v/c  Pt will increase their (L)  strength by 10# for ease of carrying groceries  Pt will decrease their QuickDASH score by 10% in order to demo improvements in functional independence.    Plan: Follow up on tendon glides, consider adding green band exercises to HEP  Date 2/28/24  3/6/2024   3/8/24   3/13/2024   3/19/2024          Visit #   2/10  3/10  4/10  5/10                            Evaluation X                Manual                   STM to L wrist  5 mins 5 mins 5 mins  5 mins                                                 Ther ex                   Forearm flexor and extensor stretch  X5, 20 seconds X5, 20 seconds Tendon glides x10  Tendon glides x10          Rubber band ex digital extension,  thumb extension, and abduction  x10, 2 sets               1.5# dowel sup/pronation   X10, 2 sets X10, 2 sets  X10, 3 sets X10, 3 sets         Tennis ball CMC thumb tip    X20, 2 sets               15# green flexbar    x20, 2 sets  -bilateral/ipsilateral sup/pronation  \"T\"s  -galina's twist Green Flexbar 15#   -Wrist stability \"T\" exercise x10 Green Flexbar 15#   -Wrist stability \"T\" exercise x20 Green Elastic band Ex: x10, 2 sets wrist extension, flexion, radial/ulnar deviation         Black web   - and twist  X10, 2 sets - and twist  X10, 2 sets    and twist  X12, 2 sets             Isometric Wrist Ex: x5 15 secs  -extension, flexion, radial/ulnar deviation Isometric Wrist Ex: x5 15 secs  -extension, flexion, radial/ulnar deviation      Blue Web   Lumbrical squeezes x10, 2 sets Green Putty Ex: -tip pinch x20 each hand   Green Putty Ex: -, digital extension,  tip pinch x20 each hand         HEP instruction      Isometric wrist strengthening x10, 10 secs                                  Therapeutic Activity                                       Neuromuscular Re-education                                                             Modalities                    Moist Heat    3 mins  3 mins  3 mins  3 mins                                HEP:  Assignment date:   Rubber band digital extension, thumb extension, and abduction 3/6/2024    Isometric ulnar deviation, radial deviation, wrist extension 3/8/24   Tendons Glides x3 a day 3/19/24           Charges: TE 3 (39)    Total Timed Treatment: 39 min  Total Treatment Time: 42 min

## 2024-03-18 NOTE — PROGRESS NOTES
Diagnosis:     Expected by:    Associated DX:  Ankylosing spondylitis of multiple sites in spine (HCC) (M45.0)  Cervical disc disease (M50.90)  Cervical stenosis of spine (M48.02)  Cervical fusion syndrome (Q76.1)  S/P cervical spinal fusion (Z98.1)      Referring Provider: Everardo  Date of Evaluation:    2/28/24    Precautions:  None Next MD visit:   none scheduled  Date of Surgery: n/a   Insurance Primary/Secondary: [employer]OSS MEDICAID / N/A     # Auth Visits: 10auth exp 4/28/24            Subjective: Pt reports feeling better today. Has some pain, but not as bad as last session. No headache currently. Has not yet gone to gym. Doing exercises intermittently.    Pain: 4/10      Objective:   Not measured this session  Cervical AROM: (* denotes performed with pain)  Flexion: 38  Extension: 41  Sidebending: R 11; L 22  Rotation: R 45; L 40    Not assessed this session  Greatest restrictions into L rotation and R sidebend      Assessment: Pt with continued soft tissue and joint restrictions although improvements noted. Added focus on strengthening/stability. Cueing required for neutral head/neck.      Goals:   Goals: (to be met in 8-12 visits)   Pt will verbalize and demo compliance with home program at least 75% of the time for independent management of symptoms - in progress  Pt will demo improved cervical AROM by at least 25% for all deficit motions to be able to safely turn head while driving/riding motorcycle - improving  Pt will demo improved BUE strength by at least 1 full muscle grade for all deficit motions to be able to safely lift/carry for functional mobility in progress  Pt will be able to safely return to exercise to maintain strength and stability for recreation and to decrease future risk for reinjury - in progress    Plan: Continue with manual interventions focused on improving cervical AROM. Progress scapular strengthening.  Date: 3/4/2024  TX#: 2/10 Date:  3/6/24               TX#: 3/10 Date: 3/11/24                 TX#: 4/10 Date:  3/13/24               TX#: 5/10 Date: 3/18/24  Tx#: 6/10   Manual:29mins  Supine: suboccipital release, STM B cervical paraspinals, sideglide mobilizations throughout with exception of C4/5 fusion, gentle manual traction, OA distraction  Supine: MET for improved AA rotation focus on L rotation  Seated: 1st rib mob R/L Manual: 24mins  Supine: suboccipital release, STM B cervical paraspinals, sideglide mobilizations throughout with exception of C4/5 fusion, gentle manual traction, OA distraction L  Seated: 1st rib mob R/L, STM B UTs Manual: 25mins  Prone: PA thoracic spine, STM B thoracic paraspinals  Supine: suboccipital release, STM B cervical paraspinals, sideglide mobilizations throughout with exception of C4/5 fusion, gentle manual traction, OA distraction L Manual: 14mins  Supine: suboccipital release, STM B cervical paraspinals, sideglide mobilizations throughout with exception of C4/5 fusion, gentle manual traction, OA distraction L Manual: 15mins  Supine: suboccipital release, STM B cervical paraspinals, sideglide mobilizations throughout with exception of C4/5 fusion, gentle manual traction, OA distraction L, 1st rib mob   TherEx: 12mins  Supine: passive stretching into cervical sidebend and flexion  Supine DNF 10x  Sidely thoracic rotations 10x R/L  Seated: self 1st rib mob with sheet therEx: 16mins  Supine DNF [id number]  Sidely thoracic rotations 10x R/L  Seated flexion with self OP [id number]  Seated thoracic extensions over chair 10x  Cable:    - Rows 15# handles 20x   - Low rows 15# handles 20x therEx: 16mins  DNF: [id number]  Sidely thoraicc rotations 10x R/L  Prone low trap forearm lift 2x10 R/L  Cable:  Standing rows 15# handles 20x  Standing low rows 15# 20x Therex: 27mins  DNF [id number]  Supine DNF + cervical rotations 10x  Sidely throacic rotations 10x R/L  Seated cervical rotation wiht towel assist at AA  Seated DARIEN [employer] [id number]  Standing rows cable 15# handles  [id number]  Standing low rows 15# [id number]  Standing horiz abd 5# 2x10 R/L  Standing shoulder adduction  15# handle 2x10 R/L TherEx: 26mins  PROM c/s all directions   Supine DNF [id number]  Sidely thoracic rotations 10x R/L  Seated cervical rotation with towel assist 10x R/L  Seated DARIEN blueTB [id number]  Back to wall sustained hold wall emerita 10x  Standing lateral walk redTB 2x10 wiht scap retract  Standing low rows 15# 20x  Standing rows 15# 20x  Standing shoulder adduction 15# 20x  Standing horiz abd5# 20x R/L  Overhead lateral stretch between ex's.   Standing doorway stretch [id number]  Seated cervical flexion stretch [id number]  Seated cervical extension to thoracic extension over seat 10x                 HEP:   2/28:  sidely thoracic rotations BLEs flexed   3/4: seated 1st rib mob with sheet  3/6: seated cervical flex, seated thoracic ext over chair, may perform rows/low rows at gym at [employer]  3/11: prone low trap lift off  3/13: cervical rotations towel, DARIEN BlueTB   3/18: lateral walk redTB    Charges: TherEx x2, Man x1       Total Timed Treatment: 41 min  Total Treatment Time: 41 min

## 2024-03-19 ENCOUNTER — OFFICE VISIT (OUTPATIENT)
Dept: OCCUPATIONAL MEDICINE | Facility: HOSPITAL | Age: 54
End: 2024-03-19
Attending: PHYSICAL MEDICINE & REHABILITATION
Payer: MEDICAID

## 2024-03-19 PROCEDURE — 97110 THERAPEUTIC EXERCISES: CPT

## 2024-03-20 ENCOUNTER — OFFICE VISIT (OUTPATIENT)
Dept: PHYSICAL THERAPY | Age: 54
End: 2024-03-20
Attending: PHYSICAL MEDICINE & REHABILITATION
Payer: MEDICAID

## 2024-03-20 PROCEDURE — 97140 MANUAL THERAPY 1/> REGIONS: CPT

## 2024-03-20 PROCEDURE — 97110 THERAPEUTIC EXERCISES: CPT

## 2024-03-20 NOTE — PROGRESS NOTES
Diagnosis:     Expected by:    Associated DX:  Ankylosing spondylitis of multiple sites in spine (HCC) (M45.0)  Cervical disc disease (M50.90)  Cervical stenosis of spine (M48.02)  Cervical fusion syndrome (Q76.1)  S/P cervical spinal fusion (Z98.1)      Referring Provider: Everardo  Date of Evaluation:    2/28/24    Precautions:  None Next MD visit:   none scheduled  Date of Surgery: n/a   Insurance Primary/Secondary: BLUE CROSS MEDICAID / N/A     # Auth Visits: 10auth exp 4/28/24            Subjective: Pt reports yesterday woke up with a lot of numbness in both his hands. Plans on scheduling with orthopedic    Pain: 4/10      Objective:   Not measured this session  Cervical AROM: (* denotes performed with pain)  Flexion: 38  Extension: 41  Sidebending: R 11; L 22  Rotation: R 45; L 40    Not assessed this session  Greatest restrictions into L rotation and R sidebend      Assessment: Continued with manual interventions focused on improving cervical ROM. Incorporated UBE for added mobility and cardio.      Goals:   Goals: (to be met in 8-12 visits)   Pt will verbalize and demo compliance with home program at least 75% of the time for independent management of symptoms - in progress  Pt will demo improved cervical AROM by at least 25% for all deficit motions to be able to safely turn head while driving/riding motorcycle - improving  Pt will demo improved BUE strength by at least 1 full muscle grade for all deficit motions to be able to safely lift/carry for functional mobility in progress  Pt will be able to safely return to exercise to maintain strength and stability for recreation and to decrease future risk for reinjury - in progress    Plan: Pt to return to gym. Continue with manual interventions focused on improving cervical AROM. Progress scapular strengthening.  Date: 3/20/2024  TX#: 7/10 Date:  3/6/24               TX#: 3/10 Date: 3/11/24                TX#: 4/10 Date:  3/13/24               TX#: 5/10 Date:  3/18/24  Tx#: 6/10   Manual:25mins  Supine: suboccipital release, STM B cervical paraspinals, sideglide mobilizations throughout with exception of C4/5 fusion, gentle manual traction Manual: 24mins  Supine: suboccipital release, STM B cervical paraspinals, sideglide mobilizations throughout with exception of C4/5 fusion, gentle manual traction, OA distraction L  Seated: 1st rib mob R/L, STM B UTs Manual: 25mins  Prone: PA thoracic spine, STM B thoracic paraspinals  Supine: suboccipital release, STM B cervical paraspinals, sideglide mobilizations throughout with exception of C4/5 fusion, gentle manual traction, OA distraction L Manual: 14mins  Supine: suboccipital release, STM B cervical paraspinals, sideglide mobilizations throughout with exception of C4/5 fusion, gentle manual traction, OA distraction L Manual: 15mins  Supine: suboccipital release, STM B cervical paraspinals, sideglide mobilizations throughout with exception of C4/5 fusion, gentle manual traction, OA distraction L, 1st rib mob   TherEx: 19 mins  Sidely thoracic rotations 10x R/L  Seated cervical extension + thoracic extension over chair 10x  Seated cervical rotation with towel assist 10x ea dir  Seated cervical flexion with gentle OP 10x  UBE lvl 4.0 3mins fwd, 3mins bwd  Cable rows 20# handles 20x  Overhead lat stretch  Cable low rows 20# handles 20x therEx: 16mins  Supine DNF [id number]  Sidely thoracic rotations 10x R/L  Seated flexion with self OP [id number]  Seated thoracic extensions over chair 10x  Cable:    - Rows 15# handles 20x   - Low rows 15# handles 20x therEx: 16mins  DNF: [id number]  Sidely thoraicc rotations 10x R/L  Prone low trap forearm lift 2x10 R/L  Cable:  Standing rows 15# handles 20x  Standing low rows 15# 20x Therex: 27mins  DNF [id number]  Supine DNF + cervical rotations 10x  Sidely throacic rotations 10x R/L  Seated cervical rotation wiht towel assist at AA  Seated DARIEN BlueTB [id number]  Standing rows cable 15# handles  [id number]  Standing low rows 15# [id number]  Standing horiz abd 5# 2x10 R/L  Standing shoulder adduction  15# handle 2x10 R/L TherEx: 26mins  PROM c/s all directions   Supine DNF [id number]  Sidely thoracic rotations 10x R/L  Seated cervical rotation with towel assist 10x R/L  Seated DARIEN blueTB [id number]  Back to wall sustained hold wall emerita 10x  Standing lateral walk redTB 2x10 wiht scap retract  Standing low rows 15# 20x  Standing rows 15# 20x  Standing shoulder adduction 15# 20x  Standing horiz abd5# 20x R/L  Overhead lateral stretch between ex's.   Standing doorway stretch [id number]  Seated cervical flexion stretch [id number]  Seated cervical extension to thoracic extension over seat 10x                 HEP:   2/28:  sidely thoracic rotations BLEs flexed   3/4: seated 1st rib mob with sheet  3/6: seated cervical flex, seated thoracic ext over chair, may perform rows/low rows at gym at cable  3/11: prone low trap lift off  3/13: cervical rotations towel, DARIEN BlueTB   3/18: lateral walk redTB    Charges: TherEx x1, Man x2      Total Timed Treatment: 44 min  Total Treatment Time: 44 min

## 2024-03-20 NOTE — PROGRESS NOTES
Diagnosis:   Calcium pyrophosphate deposition disease (CPPD) (M11.20)  Acute pain of left wrist (M25.532)      Referring Provider: Everardo  Date of Evaluation:    2/28/2024    Precautions:  DM Next MD visit:   none scheduled  Date of Surgery: n/a     Doctor's Script instructions: Improve left wrist and hand range of motion and strength. Decrease inflammation and use modalities as needed. HEP     Insurance Primary/Secondary: BCBS OUT OF STATE / N/A     # Auth Visits: 8 visits, 2/28-4/28            Subjective: Pt states reports feeling better today. No pain present.     Pain: 0/10 in volar wrist      Objective: See exercises flowsheet below for exercises performed.       Assessment: Pt session began with moist heat applied to L wrist followed by STM to thenar eminence and volar wrist. OT continued training and demo'ing elastic band wrist exercises to patient, pt returned demo with min v/c until able to display independence. Blue elastic band exercises added to HEP.     Goals: (to be met in 10 visits)  Pt will be independent and compliant with comprehensive HEP to maintain progress achieved in OT  Pt will verbalized 3 joint protection techniques to utilize during iadls with min v/c  Pt will increase their (L)  strength by 10# for ease of carrying groceries  Pt will decrease their QuickDASH score by 10% in order to demo improvements in functional independence.    Plan: Follow up on blue band exercises to HEP  Date 2/28/24  3/6/2024   3/8/24   3/13/2024   3/19/2024   3/21/2024       Visit #   2/8  3/8  4/8  5/8  6/8                          Evaluation X                Manual                   STM to L wrist  5 mins 5 mins 5 mins  5 mins  5 mins                                               Ther ex                   Forearm flexor and extensor stretch  X5, 20 seconds X5, 20 seconds Tendon glides x10  Tendon glides x10   Tendon glides x10        Rubber band ex digital extension, thumb extension, and abduction  x10, 2  sets               1.5# dowel sup/pronation   X10, 2 sets X10, 2 sets  X10, 3 sets X10, 3 sets  X10, 3 sets       Tennis ball CMC thumb tip    X20, 2 sets       4# suspended on dowel with rope, roll ups x5       15# green flexbar    x20, 2 sets  -bilateral/ipsilateral sup/pronation  \"T\"s  -galina's twist Green Flexbar 15#   -Wrist stability \"T\" exercise x10 Green Flexbar 15#   -Wrist stability \"T\" exercise x20 Green Elastic band Ex: x10, 2 sets wrist extension, flexion, radial/ulnar deviation Blue Elastic band Ex: x10, 2 sets wrist extension, flexion, radial/ulnar deviation       Black web   - and twist  X10, 2 sets - and twist  X10, 2 sets    and twist  X12, 2 sets             Isometric Wrist Ex: x5 15 secs  -extension, flexion, radial/ulnar deviation Isometric Wrist Ex: x5 15 secs  -extension, flexion, radial/ulnar deviation 50# gripper large peg removal (5 mins)     Blue Web   Lumbrical squeezes x10, 2 sets Green Putty Ex: -tip pinch x20 each hand   Green Putty Ex: -, digital extension,  tip pinch x20 each hand Green Putty Ex: -digital adduction x20 each hand       HEP instruction      Isometric wrist strengthening x10, 10 secs                                  Therapeutic Activity                                       Neuromuscular Re-education                                                             Modalities                    Moist Heat    3 mins  3 mins  3 mins  3 mins  3 mins                              HEP:  Assignment date:   Rubber band digital extension, thumb extension, and abduction 3/6/2024    Isometric ulnar deviation, radial deviation, wrist extension 3/8/24   Tendons Glides x3 a day 3/19/24    Blue Elastic band ex: wrist flexion, extension, radial, ulnar deviation 3/21/2024          Charges: TE 3 (42)    Total Timed Treatment: 42 min  Total Treatment Time: 45 min

## 2024-03-21 ENCOUNTER — OFFICE VISIT (OUTPATIENT)
Dept: OCCUPATIONAL MEDICINE | Facility: HOSPITAL | Age: 54
End: 2024-03-21
Attending: PHYSICAL MEDICINE & REHABILITATION
Payer: MEDICAID

## 2024-03-21 PROCEDURE — 97110 THERAPEUTIC EXERCISES: CPT

## 2024-03-25 DIAGNOSIS — E29.1 HYPOGONADISM IN MALE: ICD-10-CM

## 2024-03-25 DIAGNOSIS — M54.16 LUMBAR RADICULOPATHY: ICD-10-CM

## 2024-03-25 RX ORDER — TESTOSTERONE CYPIONATE 200 MG/ML
125 INJECTION, SOLUTION INTRAMUSCULAR
Qty: 3.78 ML | Refills: 0 | Status: SHIPPED | OUTPATIENT
Start: 2024-03-25 | End: 2024-06-23

## 2024-03-25 NOTE — TELEPHONE ENCOUNTER
Refill Request    Medication request: HYDROcodone-acetaminophen  MG Oral Tab Take 1 tablet by mouth every 4-6 hours PRN pain (max 5 tabs in 24 hours).     LOV:2/8/2024 Juan A Mcgee MD   Due back to clinic per last office note:  \"follow up in 2-3 months \"  NOV: 8/7/2024 Juan A Mcgee [place]      [place] 3/4/24 #150- 30 day supply    Urine drug screen (if applicable): 11/2/23  Pain contract: Valid until 11/16/24    LOV plan (if weaning or changing medications): Per  at LOV: \"He will wean the Norco to one every 6 hours for the next month.     He will let me know in one month how he is doing with the lower dose of the Norco and the therapies.\"      Message sent to patient via [employer] to see how he has been doing weaning down Norco to 1 tab every 6 hours.     Awaiting patient response.

## 2024-03-25 NOTE — PROGRESS NOTES
Diagnosis:   Calcium pyrophosphate deposition disease (CPPD) (M11.20)  Acute pain of left wrist (M25.532)      Referring Provider: Everardo  Date of Evaluation:    2/28/2024    Precautions:  DM Next MD visit:   none scheduled  Date of Surgery: n/a     Doctor's Script instructions: Improve left wrist and hand range of motion and strength. Decrease inflammation and use modalities as needed. HEP     Insurance Primary/Secondary: BCBS OUT OF STATE / N/A     # Auth Visits: 8 visits, 2/28-4/28            Subjective: Pt states reports feeling better today. No pain present.     Pain: 0/10 in volar wrist      Objective: See exercises flowsheet below for exercises performed.       Assessment: Pt session began with moist heat applied to L wrist followed by STM to thenar eminence and volar wrist. OT continued training and demo'ing elastic band wrist exercises to patient, pt returned demo with min v/c until able to display independence. Blue elastic band exercises added to HEP.     Goals: (to be met in 10 visits)  Pt will be independent and compliant with comprehensive HEP to maintain progress achieved in OT  Pt will verbalized 3 joint protection techniques to utilize during iadls with min v/c  Pt will increase their (L)  strength by 10# for ease of carrying groceries  Pt will decrease their QuickDASH score by 10% in order to demo improvements in functional independence.    Plan: Follow up on blue band exercises to HEP  Date 2/28/24  3/6/2024   3/8/24   3/13/2024   3/19/2024   3/21/2024 3/26/2024      Visit #   2/8  3/8  4/8  5/8  6/8                          Evaluation X                Manual                   STM to L wrist  5 mins 5 mins 5 mins  5 mins  5 mins  5 mins                                             Ther ex                   Forearm flexor and extensor stretch  X5, 20 seconds X5, 20 seconds Tendon glides x10  Tendon glides x10   Tendon glides x10        Rubber band ex digital extension, thumb extension, and  abduction  x10, 2 sets               1.5# dowel sup/pronation   X10, 2 sets X10, 2 sets  X10, 3 sets X10, 3 sets  X10, 3 sets       Tennis ball CMC thumb tip    X20, 2 sets       4# suspended on dowel with rope, roll ups x5 4# suspended on dowel with rope, roll ups x5     15# green flexbar    x20, 2 sets  -bilateral/ipsilateral sup/pronation  \"T\"s  -galina's twist Green Flexbar 15#   -Wrist stability \"T\" exercise x10 Green Flexbar 15#   -Wrist stability \"T\" exercise x20 Green Elastic band Ex: x10, 2 sets wrist extension, flexion, radial/ulnar deviation Blue Elastic band Ex: x10, 2 sets wrist extension, flexion, radial/ulnar deviation  3# wt wrist Ex: x10, 2 sets wrist extension, flexion, radial/ulnar deviation     Black web   - and twist  X10, 2 sets - and twist  X10, 2 sets    and twist  X12, 2 sets   4# ball suspension holds with shoulder at 90 degrees x10, 10 sec holds         Isometric Wrist Ex: x5 15 secs  -extension, flexion, radial/ulnar deviation Isometric Wrist Ex: x5 15 secs  -extension, flexion, radial/ulnar deviation 50# gripper large peg removal (5 mins)     Blue Web   Lumbrical squeezes x10, 2 sets Green Putty Ex: -tip pinch x20 each hand   Green Putty Ex: -, digital extension,  tip pinch x20 each hand Green Putty Ex: -digital adduction x20 each hand Green Putty Ex:   -digital adduction, tip pinch, weight bearing, sup/pronation twist into putty   x20 each hand     HEP instruction      Isometric wrist strengthening x10, 10 secs                                  Therapeutic Activity                                       Neuromuscular Re-education                                                             Modalities                    Moist Heat    3 mins  3 mins  3 mins  3 mins  3 mins                              HEP:  Assignment date:   Rubber band digital extension, thumb extension, and abduction 3/6/2024    Isometric ulnar deviation, radial deviation, wrist extension 3/8/24    Tendons Glides x3 a day 3/19/24    Blue Elastic band ex: wrist flexion, extension, radial, ulnar deviation 3/21/2024          Charges: TE 3 (42)    Total Timed Treatment: 42 min  Total Treatment Time: 45 min

## 2024-03-26 ENCOUNTER — APPOINTMENT (OUTPATIENT)
Dept: OCCUPATIONAL MEDICINE | Facility: HOSPITAL | Age: 54
End: 2024-03-26
Attending: PHYSICAL MEDICINE & REHABILITATION
Payer: MEDICAID

## 2024-03-26 ENCOUNTER — TELEPHONE (OUTPATIENT)
Dept: PHYSICAL THERAPY | Facility: HOSPITAL | Age: 54
End: 2024-03-26

## 2024-03-27 ENCOUNTER — OFFICE VISIT (OUTPATIENT)
Dept: PHYSICAL THERAPY | Age: 54
End: 2024-03-27
Attending: PHYSICAL MEDICINE & REHABILITATION
Payer: MEDICAID

## 2024-03-27 PROCEDURE — 97140 MANUAL THERAPY 1/> REGIONS: CPT

## 2024-03-27 PROCEDURE — 97110 THERAPEUTIC EXERCISES: CPT

## 2024-03-27 RX ORDER — HYDROCODONE BITARTRATE AND ACETAMINOPHEN 10; 325 MG/1; MG/1
TABLET ORAL
Qty: 150 TABLET | Refills: 0 | Status: SHIPPED | OUTPATIENT
Start: 2024-04-03

## 2024-03-27 NOTE — PROGRESS NOTES
Diagnosis:     Expected by:    Associated DX:  Ankylosing spondylitis of multiple sites in spine (HCC) (M45.0)  Cervical disc disease (M50.90)  Cervical stenosis of spine (M48.02)  Cervical fusion syndrome (Q76.1)  S/P cervical spinal fusion (Z98.1)      Referring Provider: Everardo  Date of Evaluation:    2/28/24    Precautions:  None Next MD visit:   none scheduled  Date of Surgery: n/a   Insurance Primary/Secondary: BLUE CROSS MEDICAID / N/A     # Auth Visits: 10auth exp 4/28/24            Subjective: Pt reports MD decreased his pain medications a couple weeks ago and can feel the effects of it now; he has been in a lot of pain.  Has not gone to the gym for fear of making things worse. Has done some of his exercises.    Pain: 6/10      Objective:   Not measured this session  Cervical AROM: (* denotes performed with pain)  Flexion: 38  Extension: 41  Sidebending: R 11; L 22  Rotation: R 45; L 40    Not assessed this session  Greatest restrictions into L rotation and R sidebend      Assessment: Pt with moderate soft tissue and joint restrictions this session consistent with reported inc in pain. Significant improvement in mobility by end of session.       Goals:   Goals: (to be met in 8-12 visits)   Pt will verbalize and demo compliance with home program at least 75% of the time for independent management of symptoms - in progress  Pt will demo improved cervical AROM by at least 25% for all deficit motions to be able to safely turn head while driving/riding motorcycle - improving  Pt will demo improved BUE strength by at least 1 full muscle grade for all deficit motions to be able to safely lift/carry for functional mobility in progress  Pt will be able to safely return to exercise to maintain strength and stability for recreation and to decrease future risk for reinjury - in progress    Plan: Pt to return to gym. Continue with manual interventions focused on improving cervical AROM. Progress scapular strengthening  and cardiovascular endurance  Date: 3/20/2024  TX#: 7/10 Date:  3/27/24               TX#: 8/10 Date: 3/11/24                TX#: 4/10 Date:  3/13/24               TX#: 5/10 Date: 3/18/24  Tx#: 6/10   Manual:25mins  Supine: suboccipital release, STM B cervical paraspinals, sideglide mobilizations throughout with exception of C4/5 fusion, gentle manual traction Manual: 27mins  Supine: suboccipital release, STM B cervical paraspinals, sideglide mobilizations throughout with exception of C4/5 fusion, gentle manual traction, OA distraction L  Seated: 1st rib mob R/L, facet gapping C7 Manual: 25mins  Prone: PA thoracic spine, STM B thoracic paraspinals  Supine: suboccipital release, STM B cervical paraspinals, sideglide mobilizations throughout with exception of C4/5 fusion, gentle manual traction, OA distraction L Manual: 14mins  Supine: suboccipital release, STM B cervical paraspinals, sideglide mobilizations throughout with exception of C4/5 fusion, gentle manual traction, OA distraction L Manual: 15mins  Supine: suboccipital release, STM B cervical paraspinals, sideglide mobilizations throughout with exception of C4/5 fusion, gentle manual traction, OA distraction L, 1st rib mob   TherEx: 19 mins  Sidely thoracic rotations 10x R/L  Seated cervical extension + thoracic extension over chair 10x  Seated cervical rotation with towel assist 10x ea dir  Seated cervical flexion with gentle OP 10x  UBE lvl 4.0 3mins fwd, 3mins bwd  Cable rows 20# handles 20x  Overhead lat stretch  Cable low rows 20# handles 20x therEx: 18mins  Supine passive stretching c/s flex and sidebend, UT, levator  Sidely thoracic rotations 10x R/L  Seated flexion with self OP [id number]  Seated thoracic extensions over chair 10x  UBE lvl 3.0 10mins bwd  Overhead stretch at cable bar  Cable:    - Rows 20# handles 20x   - Low rows 20# handles 20x   - standing shoulder adduction 15# 20x R/L therEx: 16mins  DNF: [id number]  Sidely thoraicc rotations 10x  R/L  Prone low trap forearm lift 2x10 R/L  Cable:  Standing rows 15# handles 20x  Standing low rows 15# 20x Therex: 27mins  DNF [id number]  Supine DNF + cervical rotations 10x  Sidely throacic rotations 10x R/L  Seated cervical rotation wiht towel assist at AA  Seated DARIEN BlueTB [id number]  Standing rows cable 15# handles [id number]  Standing low rows 15# [id number]  Standing horiz abd 5# 2x10 R/L  Standing shoulder adduction  15# handle 2x10 R/L TherEx: 26mins  PROM c/s all directions   Supine DNF [id number]  Sidely thoracic rotations 10x R/L  Seated cervical rotation with towel assist 10x R/L  Seated DARIEN blueTB [id number]  Back to wall sustained hold wall emerita 10x  Standing lateral walk redTB 2x10 wiht scap retract  Standing low rows 15# 20x  Standing rows 15# 20x  Standing shoulder adduction 15# 20x  Standing horiz abd5# 20x R/L  Overhead lateral stretch between ex's.   Standing doorway stretch [id number]  Seated cervical flexion stretch [id number]  Seated cervical extension to thoracic extension over seat 10x                 HEP:   2/28:  sidely thoracic rotations BLEs flexed   3/4: seated 1st rib mob with sheet  3/6: seated cervical flex, seated thoracic ext over chair, may perform rows/low rows at gym at cable  3/11: prone low trap lift off  3/13: cervical rotations towel, DARIEN BlueTB   3/18: lateral walk redTB    Charges: TherEx x1, Man x2      Total Timed Treatment: 45 min  Total Treatment Time: 45 min

## 2024-03-27 NOTE — PROGRESS NOTES
Diagnosis:   Calcium pyrophosphate deposition disease (CPPD) (M11.20)  Acute pain of left wrist (M25.532)      Referring Provider: Everardo  Date of Evaluation:    2/28/2024    Precautions:  DM Next MD visit:   none scheduled  Date of Surgery: n/a     Doctor's Script instructions: Improve left wrist and hand range of motion and strength. Decrease inflammation and use modalities as needed. HEP     Insurance Primary/Secondary: BCBS OUT OF STATE / N/A     # Auth Visits: 8 visits, 2/28-4/28            Subjective: Pt states having more pain today. Pt reports some swelling impacting the blue band exercise compliance.    Pain: 4/10 in volar wrist      Objective: See exercises flowsheet below for exercises performed.       Assessment: Pt session began with moist heat applied to L wrist followed by STM to thenar eminence and volar wrist. OT provided thorough education with hands on demonstrations of joint protection techniques including increasing diameter of objects pt utilizes and using multiple large joints for activities rather than straining small joints. Pt verbalized understanding and identified areas in which he can apply throughout his daily routine. Pt able to tolerate 5# wt for wrist resisted flex/extension.      Goals: (to be met in 10 visits)  Pt will be independent and compliant with comprehensive HEP to maintain progress achieved in OT  Pt will verbalized 3 joint protection techniques to utilize during iadls with min v/c  Pt will increase their (L)  strength by 10# for ease of carrying groceries  Pt will decrease their QuickDASH score by 10% in order to demo improvements in functional independence.    Plan: Discharge pt pending goals  Date 2/28/24  3/6/2024   3/8/24   3/13/2024   3/19/2024   3/21/2024 3/28/2024     Visit #   2/8  3/8  4/8  5/8  6/8  7/8                        Evaluation X                Manual                   STM to L wrist  5 mins 5 mins 5 mins  5 mins  5 mins  STM to L volar wrist with  PROM into flex/extension8 mins                                             Ther ex                   Forearm flexor and extensor stretch  X5, 20 seconds X5, 20 seconds Tendon glides x10  Tendon glides x10   Tendon glides x10        Rubber band ex digital extension, thumb extension, and abduction  x10, 2 sets               1.5# dowel sup/pronation   X10, 2 sets X10, 2 sets  X10, 3 sets X10, 3 sets  X10, 3 sets       Tennis ball CMC thumb tip    X20, 2 sets       4# suspended on dowel with rope, roll ups x5 5# suspended on dowel with rope, roll ups x5     15# green flexbar    x20, 2 sets  -bilateral/ipsilateral sup/pronation  \"T\"s  -galina's twist Green Flexbar 15#   -Wrist stability \"T\" exercise x10 Green Flexbar 15#   -Wrist stability \"T\" exercise x20 Green Elastic band Ex: x10, 2 sets wrist extension, flexion, radial/ulnar deviation Blue Elastic band Ex: x10, 2 sets wrist extension, flexion, radial/ulnar deviation 3# wt wrist Ex: x10, 2 sets wrist extension, flexion, radial/ulnar deviation     Black web   - and twist  X10, 2 sets - and twist  X10, 2 sets    and twist  X12, 2 sets   4# ball suspension holds with shoulder at 90 degrees x10, 10 sec holds         Isometric Wrist Ex: x5 15 secs  -extension, flexion, radial/ulnar deviation Isometric Wrist Ex: x5 15 secs  -extension, flexion, radial/ulnar deviation 50# gripper large peg removal (5 mins)     Blue Web   Lumbrical squeezes x10, 2 sets Green Putty Ex: -tip pinch x20 each hand   Green Putty Ex: -, digital extension,  tip pinch x20 each hand Green Putty Ex: -digital adduction x20 each hand Green Putty Ex:  -digital adduction, tip pinch, weight bearing, sup/pronation twist into putty   x20 each hand     HEP instruction      Isometric wrist strengthening x10, 10 secs                                  Therapeutic Activity                                 Joint protection technique education (5 minutes)     Neuromuscular Re-education                                                              Modalities                    Moist Heat    3 mins  3 mins  3 mins  3 mins  3 mins  3 mins                            HEP:  Assignment date:   Rubber band digital extension, thumb extension, and abduction 3/6/2024    Isometric ulnar deviation, radial deviation, wrist extension 3/8/24   Tendons Glides x3 a day 3/19/24    Blue Elastic band ex: wrist flexion, extension, radial, ulnar deviation 3/21/2024          Charges: MAN 1 (8), TE 2 (35)    Total Timed Treatment: 43 min  Total Treatment Time: 43 min

## 2024-03-27 NOTE — TELEPHONE ENCOUNTER
Per patient update 3/26/24: \"I’ve tried taking 1 every six hrs as Doctor Everardo requested. But immediately, now I’ve been waking up and getting migraines in the mornings and during the day from the pain on my neck. Sometimes, I have to cancel the day so I can stay home and deal with the pain. So let’s see how I do this month. As far as therapy is helping with the movement of my neck. The OT is also helping a bit.\"    Patient update and rx request forwarded to  for review/approval.

## 2024-03-28 ENCOUNTER — OFFICE VISIT (OUTPATIENT)
Dept: OCCUPATIONAL MEDICINE | Facility: HOSPITAL | Age: 54
End: 2024-03-28
Attending: PHYSICAL MEDICINE & REHABILITATION
Payer: MEDICAID

## 2024-03-28 PROCEDURE — 97140 MANUAL THERAPY 1/> REGIONS: CPT

## 2024-03-28 PROCEDURE — 97110 THERAPEUTIC EXERCISES: CPT

## 2024-04-01 ENCOUNTER — OFFICE VISIT (OUTPATIENT)
Dept: OCCUPATIONAL MEDICINE | Facility: HOSPITAL | Age: 54
End: 2024-04-01
Attending: PHYSICAL MEDICINE & REHABILITATION
Payer: MEDICAID

## 2024-04-01 PROCEDURE — 97110 THERAPEUTIC EXERCISES: CPT

## 2024-04-01 PROCEDURE — 97140 MANUAL THERAPY 1/> REGIONS: CPT

## 2024-04-01 NOTE — PROGRESS NOTES
Diagnosis:   Calcium pyrophosphate deposition disease (CPPD) (M11.20)  Acute pain of left wrist (M25.532)      Referring Provider: Everardo  Date of Evaluation:    2/28/2024    Precautions:  DM Next MD visit:   none scheduled  Date of Surgery: n/a     Doctor's Script instructions: Improve left wrist and hand range of motion and strength. Decrease inflammation and use modalities as needed. HEP     Insurance Primary/Secondary: [employer] OUT OF STATE / N/A     # Auth Visits: 8 visits, 2/28-4/28              Discharge Summary  Pt has attended 8 visits in Occupational Therapy.     Subjective: Pt reports some improvements, the hand pain comes and goes.    Assessment: Pt has been seen for 8 skilled OT visits in which he has worked towards increasing his L hand strength and gain knowledge in joint protection techniques and modifications to activities involving the wrist. Pt has demonstrated independence in his HEP as evidenced by no v/c. Pt's  strength has improved by 2#, however, he did not meet his goal. OT reiterated the importance of continuation of his HEP to maintain strength gains, pt verbalized understanding. At this time pt met 3/4 goals. OT and pt discussed and agreed upon discharge at this time. Pt is discharged from OT caseload.      Objective:     Strength (lbs) Left Average Eval Left Average Today   : 54 56       Goals:   Pt will be independent and compliant with comprehensive HEP to maintain progress achieved in OT-MET   Pt will verbalized 3 joint protection techniques to utilize during iadls with min v/c-MET   Pt will increase their (L)  strength by 10# for ease of carrying groceries-NOT MET  Pt will decrease their QuickDASH score by 10% in order to demo improvements in functional independence.-MET       Plan: Discharge pt pending goals    Patient/Family/Caregiver was advised of these findings, precautions, and treatment options and has agreed to actively participate in planning and for this course of  care.    Thank you for your referral. If you have any questions, please contact me at Dept: [phone].    Sincerely,  Electronically signed by therapist: Nicole Yanez OT    Physician's certification required: No  Please co-sign or sign and return this letter via fax as soon as possible to [phone].   I certify the need for these services furnished under this plan of treatment and while under my care.    X___________________________________________________ Date____________________    Certification From: 4/1/2024  To:6/30/2024            Treatment Note    Subjective: See discharge note    Pain: 4/10 in volar wrist      Objective: See exercises flowsheet below for exercises performed.       Assessment: See discharge note    Goals: (to be met in 10 visits)  Pt will be independent and compliant with comprehensive HEP to maintain progress achieved in OT  Pt will verbalized 3 joint protection techniques to utilize during iadls with min v/c  Pt will increase their (L)  strength by 10# for ease of carrying groceries  Pt will decrease their QuickDASH score by 10% in order to demo improvements in functional independence.    Plan: Discharge pt   Date 2/28/24  3/6/2024   3/8/24   3/13/2024   3/19/2024   3/21/2024 3/28/2024  4/1/2024    Visit #   2/8  3/8  4/8  5/8  6/8  7/8 8/8                       Evaluation X                Manual                   STM to L wrist  5 mins 5 mins 5 mins  5 mins  5 mins STM to L volar wrist with PROM into flex/extension8 mins STM to L volar wrist with PROM into flex/extension 8 mins                                           Ther ex                   Forearm flexor and extensor stretch  X5, 20 seconds X5, 20 seconds Tendon glides x10  Tendon glides x10   Tendon glides x10    Tendon glides x10    Rubber band ex digital extension, thumb extension, and abduction  x10, 2 sets            x10, 2 sets   1.5# dowel sup/pronation   X10, 2 sets X10, 2 sets  X10, 3 sets X10, 3 sets  X10, 3 sets        Tennis ball CMC thumb tip    X20, 2 sets       4# suspended on dowel with rope, roll ups x5 5# suspended on dowel with rope, roll ups x5 Green Flexbar 15#   -bilateral sup/pronation x20   15# green flexbar    x20, 2 sets  -bilateral/ipsilateral sup/pronation  \"T\"s  -galina's twist Green Flexbar 15#   -Wrist stability \"T\" exercise x10 Green Flexbar 15#   -Wrist stability \"T\" exercise x20 Green Elastic band Ex: x10, 2 sets wrist extension, flexion, radial/ulnar deviation Blue Elastic band Ex: x10, 2 sets wrist extension, flexion, radial/ulnar deviation 3# wt wrist Ex: x10, 2 sets wrist extension, flexion, radial/ulnar deviation Black Elastic band Ex: x10, 2 sets wrist extension, flexion, radial/ulnar deviation   Black web   - and twist  X10, 2 sets - and twist  X10, 2 sets    and twist  X12, 2 sets   4# ball suspension holds with shoulder at 90 degrees x10, 10 sec holds         Isometric Wrist Ex: x5 15 secs  -extension, flexion, radial/ulnar deviation Isometric Wrist Ex: x5 15 secs  -extension, flexion, radial/ulnar deviation 50# gripper large peg removal (5 mins)  Isometric Wrist Ex: x5 20 secs  -extension, flexion, radial/ulnar deviation   Blue Web   Lumbrical squeezes x10, 2 sets Green Putty Ex: -tip pinch x20 each hand   Green Putty Ex: -, digital extension,  tip pinch x20 each hand Green Putty Ex: -digital adduction x20 each hand Green Putty Ex:  -digital adduction, tip pinch, weight bearing, sup/pronation twist into putty   x20 each hand     HEP instruction      Isometric wrist strengthening x10, 10 secs                                  Therapeutic Activity                                 Joint protection technique education (5 minutes)     Neuromuscular Re-education                                                             Modalities                    Moist Heat    3 mins  3 mins  3 mins  3 mins  3 mins  3 mins  3 mins                          HEP:  Assignment date:   Rubber band  digital extension, thumb extension, and abduction 3/6/2024    Isometric ulnar deviation, radial deviation, wrist extension 3/8/24   Tendons Glides x3 a day 3/19/24    Blue Elastic band ex: wrist flexion, extension, radial, ulnar deviation (updated to black band 4/1/2024)  3/21/2024          Charges: MAN 1 (8), TE 2 (32)    Total Timed Treatment: 40 min  Total Treatment Time: 45 min

## 2024-04-03 ENCOUNTER — OFFICE VISIT (OUTPATIENT)
Dept: PHYSICAL THERAPY | Age: 54
End: 2024-04-03
Attending: PHYSICAL MEDICINE & REHABILITATION
Payer: MEDICAID

## 2024-04-03 PROCEDURE — 97140 MANUAL THERAPY 1/> REGIONS: CPT

## 2024-04-03 PROCEDURE — 97110 THERAPEUTIC EXERCISES: CPT

## 2024-04-03 NOTE — PROGRESS NOTES
Diagnosis:     Expected by:    Associated DX:  Ankylosing spondylitis of multiple sites in spine (HCC) (M45.0)  Cervical disc disease (M50.90)  Cervical stenosis of spine (M48.02)  Cervical fusion syndrome (Q76.1)  S/P cervical spinal fusion (Z98.1)      Referring Provider: Everardo  Date of Evaluation:    2/28/24    Precautions:  None Next MD visit:   none scheduled  Date of Surgery: n/a   Insurance Primary/Secondary: [employer]ICA[employer] / N/A     # Auth Visits: 10auth exp 4/28/24            Subjective: Pt neck hurting today. Thinks the weather is contributing. Overall he feels improvement in mobility since starting therapy although pain and stiffness remain. Pt reports did get to the gym a couple times and has been ok.     Pain: 6/10      Objective:   Not measured this session  Cervical AROM: (* denotes performed with pain)  Flexion: 38  Extension: 41  Sidebending: R 11; L 22  Rotation: R 45; L 40    Not assessed this session  Greatest restrictions into L rotation and R sidebend      Assessment: Pt presents with continued moderate soft tissue and joint restrictions. Notable improvement following manual interventions. Overall pt improving with mobility although pain remains.       Goals:   Goals: (to be met in 8-12 visits)   Pt will verbalize and demo compliance with home program at least 75% of the time for independent management of symptoms - in progress  Pt will demo improved cervical AROM by at least 25% for all deficit motions to be able to safely turn head while driving/riding motorcycle - improving  Pt will demo improved BUE strength by at least 1 full muscle grade for all deficit motions to be able to safely lift/carry for functional mobility in progress  Pt will be able to safely return to exercise to maintain strength and stability for recreation and to decrease future risk for reinjury - in progress    Plan: Reassess and PN for continuation  Date: 3/20/2024  TX#: 7/10 Date:  3/27/24               TX#: 8/10  Date: 4/3/24                TX#: 9/10 Date:  3/13/24               TX#: 5/10 Date: 3/18/24  Tx#: 6/10   Manual:25mins  Supine: suboccipital release, STM B cervical paraspinals, sideglide mobilizations throughout with exception of C4/5 fusion, gentle manual traction Manual: 27mins  Supine: suboccipital release, STM B cervical paraspinals, sideglide mobilizations throughout with exception of C4/5 fusion, gentle manual traction, OA distraction L  Seated: 1st rib mob R/L, facet gapping C7 Manual: 25mins  Supine: suboccipital release, STM B cervical paraspinals, sideglide mobilizations throughout with exception of C4/5 fusion, gentle manual traction, OA distraction L, TP release L levator Manual: 14mins  Supine: suboccipital release, STM B cervical paraspinals, sideglide mobilizations throughout with exception of C4/5 fusion, gentle manual traction, OA distraction L Manual: 15mins  Supine: suboccipital release, STM B cervical paraspinals, sideglide mobilizations throughout with exception of C4/5 fusion, gentle manual traction, OA distraction L, 1st rib mob   TherEx: 19 mins  Sidely thoracic rotations 10x R/L  Seated cervical extension + thoracic extension over chair 10x  Seated cervical rotation with towel assist 10x ea dir  Seated cervical flexion with gentle OP 10x  UBE lvl 4.0 3mins fwd, 3mins bwd  Cable rows 20# handles 20x  Overhead lat stretch  Cable low rows 20# handles 20x therEx: 18mins  Supine passive stretching c/s flex and sidebend, UT, levator  Sidely thoracic rotations 10x R/L  Seated flexion with self OP [id number]  Seated thoracic extensions over chair 10x  UBE lvl 3.0 10mins bwd  Overhead stretch at cable bar  Cable:    - Rows 20# handles 20x   - Low rows 20# handles 20x   - standing shoulder adduction 15# 20x R/L therEx: 20mins  Sidely thoracic rotations 10x R/L  Seated cervical rotation with towel assist 10x  Seated cervical extension over towel  Seated cervical flexion 10x  Doorway pec stretch low and  mid range  Cable:  Standing rows 20# handles 20x  Standing low rows 20# 20x  Standing horiz abd 5# 20x R/L  Standing shoulder adduction 15# 20x R/L  UBE lvl 3.0 5mins fwd, 5mins bwd   Therex: 27mins  DNF [id number]  Supine DNF + cervical rotations 10x  Sidely throacic rotations 10x R/L  Seated cervical rotation wiht towel assist at AA  Seated DARIEN BlueTB [id number]  Standing rows cable 15# handles [id number]  Standing low rows 15# [id number]  Standing horiz abd 5# 2x10 R/L  Standing shoulder adduction  15# handle 2x10 R/L TherEx: 26mins  PROM c/s all directions   Supine DNF [id number]  Sidely thoracic rotations 10x R/L  Seated cervical rotation with towel assist 10x R/L  Seated DARIEN blueTB [id number]  Back to wall sustained hold wall emerita 10x  Standing lateral walk redTB 2x10 wiht scap retract  Standing low rows 15# 20x  Standing rows 15# 20x  Standing shoulder adduction 15# 20x  Standing horiz abd5# 20x R/L  Overhead lateral stretch between ex's.   Standing doorway stretch [id number]  Seated cervical flexion stretch [id number]  Seated cervical extension to thoracic extension over seat 10x                 HEP:   2/28:  sidely thoracic rotations BLEs flexed   3/4: seated 1st rib mob with sheet  3/6: seated cervical flex, seated thoracic ext over chair, may perform rows/low rows at gym at [place]  3/11: prone low trap lift off  3/13: cervical rotations towel, DARIEN BlueTB   3/18: lateral walk redTB    Charges: TherEx x1, Man x2      Total Timed Treatment: 45 min  Total Treatment Time: 45 min

## 2024-04-04 NOTE — TELEPHONE ENCOUNTER
Requested Prescriptions     Pending Prescriptions Disp Refills    pantoprazole 40 MG Oral Tab EC 90 tablet 3     Sig: Take 1 tablet (40 mg total) by mouth every morning before breakfast.      Lr: 3/5/2024            Qty:90 tablet           Refills:3  Lov: 5/23/2023  Had Colon done in 2/7/2024

## 2024-04-05 ENCOUNTER — LAB ENCOUNTER (OUTPATIENT)
Dept: LAB | Facility: HOSPITAL | Age: 54
End: 2024-04-05
Attending: INTERNAL MEDICINE
Payer: MEDICAID

## 2024-04-05 ENCOUNTER — OFFICE VISIT (OUTPATIENT)
Dept: ENDOCRINOLOGY CLINIC | Facility: CLINIC | Age: 54
End: 2024-04-05

## 2024-04-05 VITALS
HEIGHT: 71 IN | WEIGHT: 281.19 LBS | HEART RATE: 80 BPM | DIASTOLIC BLOOD PRESSURE: 92 MMHG | SYSTOLIC BLOOD PRESSURE: 132 MMHG | BODY MASS INDEX: 39.37 KG/M2

## 2024-04-05 DIAGNOSIS — E29.1 HYPOGONADISM IN MALE: ICD-10-CM

## 2024-04-05 DIAGNOSIS — E29.1 HYPOGONADISM IN MALE: Primary | ICD-10-CM

## 2024-04-05 LAB — TESTOST SERPL-MCNC: 222.07 NG/DL

## 2024-04-05 PROCEDURE — 36415 COLL VENOUS BLD VENIPUNCTURE: CPT

## 2024-04-05 PROCEDURE — 84403 ASSAY OF TOTAL TESTOSTERONE: CPT

## 2024-04-05 PROCEDURE — 99213 OFFICE O/P EST LOW 20 MIN: CPT | Performed by: INTERNAL MEDICINE

## 2024-04-05 RX ORDER — TESTOSTERONE CYPIONATE 200 MG/ML
150 INJECTION, SOLUTION INTRAMUSCULAR
Qty: 4.5 ML | Refills: 0 | Status: SHIPPED | OUTPATIENT
Start: 2024-04-05 | End: 2024-07-04

## 2024-04-05 NOTE — PROGRESS NOTES
Return Office Visit     CHIEF COMPLAINT:    Hypogonadism   Adrenal nodule    HISTORY OF PRESENT ILLNESS:  Ran Carter is a 53 year old male who presents for follow up for for evaluation of an adrenal adenoma and hypogonadism     This was noted incidentally on CT scan in May 2019. Stable adenoma on CT in June 2020  Right sided adrenal nodule measuring 2.6 x 2 cm.       Weight gain (central): Moon facies, buffalo hump: no  Recurrent infections: no  Muscle wasting: no  Bleeding/clotting disorders: no   Uncontrolled DM/HTN: no , he was diagnosed with DM, stable       Presenting symptoms:   fatigue, lack of sexual desire, erections do not sustain  No history of testicular trauma, ketoconzole or anti androgen use.     No history of hypothalamic or pituitary tumors/ infiltrative disease, radiation to head and neck region, recent critical illness, head trauma, weight loss, glucocorticoid, anabolic steroid use, excessive alcohol use. No history of uncontrolled DM.      No strokes/ HA  No h/o prostate cancer ( personal or family)  No h/o bleeding/ clotting disorders.   No LEANN      He was on T replacement  150 mg q two weeks  However, stopped it about a year ago; no specific reason  He  resumed T 126 mg q 14 days around Feb 2024  + Fatigue--. Slightly better   + lack of erections--. Slightly better           CURRENT MEDICATION:    Current Outpatient Medications   Medication Sig Dispense Refill    testosterone cypionate 200 mg/mL Intramuscular Solution Inject 0.75 mL (150 mg total) into the muscle every 14 (fourteen) days. 4.5 mL 0    HYDROcodone-acetaminophen  MG Oral Tab Take 1 tablet by mouth every 4-6 hours PRN pain (max 5 tabs in 24 hours). 150 tablet 0    pantoprazole 40 MG Oral Tab EC Take 1 tablet (40 mg total) by mouth every morning before breakfast. 90 tablet 3    Insulin Pen Needle (PEN NEEDLES) 32G X 4 MM Does not apply Misc 1 each daily. 90 each 0    naproxen 500 MG Oral Tab EC Take 1  tablet (500 mg total) by mouth 2 (two) times daily with meals. 60 tablet 2    Syringe/Needle, Disp, 27G X 1/2\" 1 ML Does not apply Misc Inject testosterone every two weeks 50 each 0    Liraglutide (VICTOZA) 18 MG/3ML Subcutaneous Solution Pen-injector Inject 1.8 mg into the skin daily. 27 mL 0    Syringe 22G X 1\" 3 ML Does not apply Misc Inject testosterone every 2 weeks (Patient not taking: Reported on 1/26/2024) 50 each 0    Blood Glucose Monitoring Suppl (FREESTYLE LITE) Does not apply Device 1 Device by Other route 2 (two) times daily. Use as directed. (Patient not taking: Reported on 1/26/2024) 1 each 3    Glucose Blood (ONETOUCH VERIO) In Vitro Strip Test blood sugar twice daily. (Patient not taking: Reported on 1/26/2024) 200 strip 3    HYDROcodone-acetaminophen 7.5-325 MG Oral Tab Take 1 tablet by mouth every 4-6 hours PRN pain (max 5 tabs in 24 hours). (Patient not taking: Reported on 1/26/2024) 150 tablet 0    Lancets Does not apply Misc Check bid (Patient not taking: Reported on 1/26/2024) 200 each 1    Glucose Blood (FREESTYLE LITE TEST) In Vitro Strip Check bid (Patient not taking: Reported on 1/26/2024) 100 strip 0    Insulin Pen Needle (PEN NEEDLES) 32G X 4 MM Does not apply Misc 1 each daily. (Patient not taking: Reported on 1/26/2024) 90 each 0    metFORMIN  MG Oral Tablet 24 Hr  (Patient not taking: Reported on 12/28/2023)      Dulaglutide (TRULICITY) 0.75 MG/0.5ML Subcutaneous Solution Pen-injector Inject 0.75 mg into the skin once a week. (Patient not taking: Reported on 12/28/2023) 6 mL 0    meclizine 25 MG Oral Tab Take 1 tablet (25 mg total) by mouth 3 (three) times daily as needed. (Patient not taking: Reported on 1/26/2024) 30 tablet 0    multiple vitamin Oral Chew Tab Chew 1 tablet by mouth daily. (Patient not taking: Reported on 1/26/2024)           ALLERGY:  No Known Allergies    PAST MEDICAL, SOCIAL AND FAMILY HISTORY:  See past medical history marked as reviewed.  See past  surgical history marked as reviewed.  See past family history marked as reviewed.  See past social history marked as reviewed.    ASSESSMENTS:     REVIEW OF SYSTEMS:  Constitutional: Negative for:  fever, + fatigue, cold/heat intolerance, + weight gain   Eyes: Negative for:  Visual changes, proptosis, blurring  ENT: Negative for:  dysphagia, neck swelling, dysphonia  Respiratory: Negative for:  dyspnea, cough  Cardiovascular: Negative for:  chest pain, palpitations, orthopnea  GI: Negative for:  abdominal pain, nausea, vomiting, diarrhea, constipation, bleeding  Neurology: Negative for: headache, numbness, weakness  Genito-Urinary: Negative for: dysuria, frequency  Psychiatric: Negative for:  depression, anxiety  Hematology/Lymphatics: Negative for: bruising, lower extremity edema  Endocrine: Negative for: polyuria, polydypsia  Skin: Negative for: rash, blister, cellulitis,       PHYSICAL EXAM:     Vitals reviewed    General Appearance:  alert, well developed, in no acute distress  Head: Atraumatic  Eyes:  normal conjunctivae, sclera., normal sclera and normal pupils  Throat/Neck: normal sound to voice. Normal hearing, normal speech  Respiratory:  Speaking in full sentences, non-labored. no increased work of breathing, no audible wheezing    Neuro: motor grossly intact, moving all extremities without difficulty  Psychiatric:  oriented to time, self, and place  Extremities: no obvious extremity swelling, no lesions      DATA:     Pertinent data reviewed    ASSESSMENT AND PLAN:    Patient is a 53 year old male with    1. Right sided adrenal adenoma.      I discussed the following:  - Adrenal gland structure and function  - Adrenal incidentaloma is a lesion that is over 1 cm in size  - All patients with an adrenal adenoma should be evaluated for the possibility of malignancy and subclinical hormonal hyperfunction        PLAN:  - cortisol and metanephrine normal in 2023  - He does not have HTN, hence does not need  renin/ jose  - CT abdomen 6/2020: Stable 2.6 cm right adrenal adenoma.  CT in 12/2023 shows stable adenoma         2. Hypogonadism  Discussed Endocrine society guidelines for diagnosis of Hypogonadism.     Discussed the changes that can be induced with testosterone therapy    Discussed the side effects of testosterone. Patient understands that testosterone therapy can contribute to sleep apnea, cause acne and other skin reactions, stimulate non cancerous growth of prostate (benign prostate hyperplasia) and growth of existing prostate cancer, enlarge breasts, limit sperm production, cause testicular shrinkage, increase risk of a blood clot forming in a deep vein (deep vein thrombosis), which could break loose, travel through the bloodstream and lodge in the  lungs, blocking blood flow (pulmonary embolism), could adversely effect liver and lipids and increase the number of red blood cells, a condition called erythrocytosis. Edema, with or without congestive heart failure, may be a serious complication in patients with pre-existing cardiac, renal or hepatic disease. Also, there is a there is a possible increased risk of cardiovascular disease and strokes associated with testosterone use.    Discussed possibility of infertility given lack of spermatogenesis.   He does not have children and understands that being on T replacement might impair his ability to have children.    Discussed regular monitoring of T levels and hematocrit/ PSA/ hepatic panel    PLAN:  - T is on the lower side, confirms this is a trough level  - Will increase T to 150 mg q 2 weeks  - Repeat labs as below midway between 5th and 6th T injection              Patient verbalized a complete  understanding of all of the above and did not have any further questions.         RTC in 6 months  Labs as below, Call for results.            Orders Placed This Encounter   Procedures    Testosterone Total    PSA Total, Screen    Liver Function Panel (7)     Hematocrit    Lipid Panel [E]         Consuelo Guadarrama MD

## 2024-04-08 ENCOUNTER — OFFICE VISIT (OUTPATIENT)
Dept: INTERNAL MEDICINE CLINIC | Facility: CLINIC | Age: 54
End: 2024-04-08
Payer: MEDICAID

## 2024-04-08 VITALS
HEART RATE: 83 BPM | SYSTOLIC BLOOD PRESSURE: 130 MMHG | HEIGHT: 71 IN | DIASTOLIC BLOOD PRESSURE: 92 MMHG | BODY MASS INDEX: 39.09 KG/M2 | WEIGHT: 279.19 LBS | OXYGEN SATURATION: 97 %

## 2024-04-08 DIAGNOSIS — E11.9 CONTROLLED TYPE 2 DIABETES MELLITUS WITHOUT COMPLICATION, WITHOUT LONG-TERM CURRENT USE OF INSULIN (HCC): Primary | ICD-10-CM

## 2024-04-08 DIAGNOSIS — Z23 NEED FOR VACCINATION: ICD-10-CM

## 2024-04-08 DIAGNOSIS — I10 PRIMARY HYPERTENSION: ICD-10-CM

## 2024-04-08 DIAGNOSIS — M25.532 LEFT WRIST PAIN: ICD-10-CM

## 2024-04-08 DIAGNOSIS — R21 RASH: ICD-10-CM

## 2024-04-08 PROBLEM — E11.65 UNCONTROLLED TYPE 2 DIABETES MELLITUS WITH HYPERGLYCEMIA (HCC): Status: ACTIVE | Noted: 2024-04-08

## 2024-04-08 LAB
EST. AVERAGE GLUCOSE BLD GHB EST-MCNC: 114 MG/DL (ref 68–126)
HBA1C MFR BLD: 5.6 % (ref ?–5.7)

## 2024-04-08 PROCEDURE — 90471 IMMUNIZATION ADMIN: CPT | Performed by: INTERNAL MEDICINE

## 2024-04-08 PROCEDURE — 83036 HEMOGLOBIN GLYCOSYLATED A1C: CPT | Performed by: INTERNAL MEDICINE

## 2024-04-08 PROCEDURE — 99214 OFFICE O/P EST MOD 30 MIN: CPT | Performed by: INTERNAL MEDICINE

## 2024-04-08 PROCEDURE — 90677 PCV20 VACCINE IM: CPT | Performed by: INTERNAL MEDICINE

## 2024-04-08 RX ORDER — TADALAFIL 10 MG/1
10 TABLET ORAL
Qty: 30 TABLET | Refills: 0 | Status: SHIPPED | OUTPATIENT
Start: 2024-04-08 | End: 2024-05-08

## 2024-04-08 RX ORDER — LOSARTAN POTASSIUM 50 MG/1
50 TABLET ORAL DAILY
Qty: 90 TABLET | Refills: 0 | Status: SHIPPED | OUTPATIENT
Start: 2024-04-08 | End: 2024-07-07

## 2024-04-08 RX ORDER — AMOXICILLIN 500 MG/1
CAPSULE ORAL
COMMUNITY
Start: 2024-03-27

## 2024-04-08 RX ORDER — SEMAGLUTIDE 0.68 MG/ML
0.5 INJECTION, SOLUTION SUBCUTANEOUS WEEKLY
Qty: 1 EACH | Refills: 12 | Status: SHIPPED | OUTPATIENT
Start: 2024-04-08

## 2024-04-08 RX ORDER — COVID-19 ANTIGEN TEST
KIT MISCELLANEOUS
COMMUNITY
Start: 2023-10-25

## 2024-04-08 RX ORDER — IBUPROFEN 600 MG/1
TABLET ORAL
COMMUNITY
Start: 2024-03-27
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
## 2024-04-10 ENCOUNTER — OFFICE VISIT (OUTPATIENT)
Dept: PHYSICAL THERAPY | Age: 54
End: 2024-04-10
Attending: PHYSICAL MEDICINE & REHABILITATION
Payer: MEDICAID

## 2024-04-10 PROCEDURE — 97110 THERAPEUTIC EXERCISES: CPT

## 2024-04-10 PROCEDURE — 97140 MANUAL THERAPY 1/> REGIONS: CPT
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
## 2024-04-11 ENCOUNTER — TELEPHONE (OUTPATIENT)
Dept: INTERNAL MEDICINE CLINIC | Facility: CLINIC | Age: 54
End: 2024-04-11

## 2024-04-11 NOTE — TELEPHONE ENCOUNTER
PRIOR AUTHORIZATION     Medication Name:     semaglutide (OZEMPIC, 0.25 OR 0.5 MG/DOSE,) 2 MG/3ML Subcutaneous Solution Pen-injector     Indication per provider:  Dm 2   obesity   Didn't tolerate metformin due to GI side effects   Used Victoza didn't work   Tried trulicity had side effects

## 2024-04-12 ENCOUNTER — TELEPHONE (OUTPATIENT)
Dept: ORTHOPEDICS CLINIC | Facility: CLINIC | Age: 54
End: 2024-04-12

## 2024-04-12 NOTE — TELEPHONE ENCOUNTER
Patient is scheduled for left wrist pain. Please advise if imaging is needed.     Future Appointments   Date Time Provider Department Center   4/17/2024  8:45 AM Bousson, Shellice, PT [facility] [place]   4/24/2024  8:45 AM Bousson, Shellice, PT [facility] [place]   4/30/2024  8:30 AM Ivan Looney MD [facility]   5/1/2024  4:30 PM Bousson, Shellice, PT [facility] [place]   5/8/2024 11:45 AM Bousson, Shellice, PT [facility] [place]   5/14/2024  8:45 AM Blake Orr MD [facility]   5/15/2024 10:15 AM Bousson, Shellice, PT [facility] [place]   5/29/2024 10:15 AM Bousson, Shellice, PT [facility] [place]   6/10/2024 12:30 PM Guanaco Villela MD [facility] [id number]   7/10/2024  1:15 PM SOFYA, PROCEDURE [id number] None   7/23/2024  1:00 PM [facility] [address]   7/29/2024 11:10 AM Sisi Black MD [facility]   8/7/2024 12:30 PM Juan A Mcgee MD PM[facility] [address]

## 2024-04-17 ENCOUNTER — OFFICE VISIT (OUTPATIENT)
Dept: PHYSICAL THERAPY | Age: 54
End: 2024-04-17
Attending: PHYSICAL MEDICINE & REHABILITATION
Payer: MEDICAID

## 2024-04-17 ENCOUNTER — PATIENT MESSAGE (OUTPATIENT)
Dept: INTERNAL MEDICINE CLINIC | Facility: CLINIC | Age: 54
End: 2024-04-17

## 2024-04-17 PROCEDURE — 97140 MANUAL THERAPY 1/> REGIONS: CPT

## 2024-04-17 PROCEDURE — 97110 THERAPEUTIC EXERCISES: CPT

## 2024-04-17 NOTE — PROGRESS NOTES
Diagnosis:     Expected by:    Associated DX:  Ankylosing spondylitis of multiple sites in spine (HCC) (M45.0)  Cervical disc disease (M50.90)  Cervical stenosis of spine (M48.02)  Cervical fusion syndrome (Q76.1)  S/P cervical spinal fusion (Z98.1)      Referring Provider: Everardo  Date of Evaluation:    2/28/24    Precautions:  None Next MD visit:   none scheduled  Date of Surgery: n/a   Insurance Primary/Secondary: BLUE CROSS MEDICAID / N/A     # Auth Visits: 10auth exp 4/28/24            Subjective: Pt reports feeling alright. Neck was bothering him a lot yesterday. Admits has not been going to gym.     Pain: 4-5/10        Assessment: Pt with improved soft tissue mobility at cervical musculature although greater restrictions remain on L vs R and continued deficits to joint mobility. Continued wiht mobility and strengthening. With with improved ease of cervical mobility.     Objective:   Observation/Posture: fwd head/neck, rounded shoulders, hyperkyphotic posture     Cervical AROM: (* denotes performed with pain)  Flexion: 28  Extension: 49  Sidebending: R 12; L 24  Rotation: R 245; L 4     Accessory motion: decreased lower cervical segmental mobility greatest into flexion and R>L sideglide, dec upper cervical flex and R lateral flex, dec thoracic PA  Palpation: moderate restrictions at suboccipitals, B cervical paraspinals, UTs, thoracic paraspinals     Strength: (* denotes performed with pain)  UE/Scapular   Shoulder Flex: R 4+/5, L 4+/5  Shoulder ABD (C5): R 5/5, L 5/5  Biceps (C6): R 5/5, L 5/5  Wrist ext (C6): R 5/5, L 5/5  Triceps (C7): R 5/5, L 5/5  Wrist Flex (C7): R 5/5, L 4-/5  ER: R: 5/5; L: 5/5  IR: 5/5 B     Rhomboids: R 4/5, L 5/5  Mid trap: R 4/5; L 5/5  Low trap: R 4-/5; L 5/5  *cervical compensation      Flexibility:   UE/Scapular   Upper Trap: R dec; L dec  Levator Scap: R dec; L dec  Pec Major: R dec; L dec  Scalenes: R dec, L dec      Special tests:   Cervical traction: better         Goals:    Goals: (to be met in 8-12 visits)   Pt will verbalize and demo compliance with home program at least 75% of the time for independent management of symptoms - pt compliant  Pt will demo improved cervical AROM by at least 25% for all deficit motions to be able to safely turn head while driving/riding motorcycle - improving  Pt will demo improved BUE strength by at least 1 full muscle grade for all deficit motions to be able to safely lift/carry for functional mobility - partially met  Pt will be able to safely return to exercise to maintain strength and stability for recreation and to decrease future risk for reinjury - in progress    Post Neck Disability Index Score  Post Score: 50 % (4/10/2024  9:10 AM)    0 % improvement      Plan: Continue with mobility and strengthening as tolerated  Date: 3/20/2024  TX#: 7/10 Date:  3/27/24               TX#: 8/10 Date: 4/3/24                TX#: 9/10 Date:  4/10/24               TX#: 10/10 Date: 4/17/24  Tx#: 11/16   Manual:25mins  Supine: suboccipital release, STM B cervical paraspinals, sideglide mobilizations throughout with exception of C4/5 fusion, gentle manual traction Manual: 27mins  Supine: suboccipital release, STM B cervical paraspinals, sideglide mobilizations throughout with exception of C4/5 fusion, gentle manual traction, OA distraction L  Seated: 1st rib mob R/L, facet gapping C7 Manual: 25mins  Supine: suboccipital release, STM B cervical paraspinals, sideglide mobilizations throughout with exception of C4/5 fusion, gentle manual traction, OA distraction L, TP release L levator Manual: 18mins  Supine: suboccipital release, STM B cervical paraspinals, sideglide mobilizations throughout with exception of C4/5 fusion, gentle manual traction, OA distraction L Manual: 12mins  Supine: suboccipital release, STM B cervical paraspinals, sideglide mobilizations throughout with exception of C4/5 fusion, gentle manual traction, OA distraction L, 1st rib mob   TherEx: 19  mins  Sidely thoracic rotations 10x R/L  Seated cervical extension + thoracic extension over chair 10x  Seated cervical rotation with towel assist 10x ea dir  Seated cervical flexion with gentle OP 10x  UBE lvl 4.0 3mins fwd, 3mins bwd  Cable rows 20# handles 20x  Overhead lat stretch  Cable low rows 20# handles 20x therEx: 18mins  Supine passive stretching c/s flex and sidebend, UT, levator  Sidely thoracic rotations 10x R/L  Seated flexion with self OP [id number]  Seated thoracic extensions over chair 10x  UBE lvl 3.0 10mins bwd  Overhead stretch at cable bar  Cable:    - Rows 20# handles 20x   - Low rows 20# handles 20x   - standing shoulder adduction 15# 20x R/L therEx: 20mins  Sidely thoracic rotations 10x R/L  Seated cervical rotation with towel assist 10x  Seated cervical extension over towel  Seated cervical flexion 10x  Doorway pec stretch low and mid range  Cable:  Standing rows 20# handles 20x  Standing low rows 20# 20x  Standing horiz abd 5# 20x R/L  Standing shoulder adduction 15# 20x R/L  UBE lvl 3.0 5mins fwd, 5mins bwd   Therex: 25mins  Reassessment testing  Supine passive stretching B UTs, into cervical flexion  DNF [id number]  Sidely throacic rotations 10x R/L  Standing DARIEN BlueTB [id number]  Standing horiz abd palms up/palms down blueTB 10x ea  Standing rows cable 20# handles [id number]  Standing low rows 20# [id number]  Standing horiz abd 5# 20x R/L  Standing shoulder adduction  20# handle 20x R/L TherEx: 24mins  PROM c/s all directions   Sidely thoracic rotations  Seated cervical extension over chair 10x   Seated cervical rotation over towel 10x   Seated DARIEN blueTB [id number]  Seated horiz abd blackTB palms up/palms down 20x ea  Standing rows 25# 20x   Standing low rows 25# 20x  D2 flex/ext 15# cable 20x R/L  D1 flex/ext 15# 20x R/L  Overhead stretches                  HEP:   2/28:  sidely thoracic rotations BLEs flexed   3/4: seated 1st rib mob with sheet  3/6: seated cervical flex, seated thoracic ext over  chair, may perform rows/low rows at gym at [employer]  3/11: prone low trap lift off  3/13: cervical rotations towel, DARIEN BlueTB   3/18: lateral walk redTB  4/17: blackTB DARIEN and horiz abd    Charges: TherEx x2, Man x1      Total Timed Treatment: 36 min  Total Treatment Time: 36 min

## 2024-04-22 ENCOUNTER — LAB ENCOUNTER (OUTPATIENT)
Dept: LAB | Age: 54
End: 2024-04-22
Attending: INTERNAL MEDICINE
Payer: MEDICAID

## 2024-04-22 ENCOUNTER — OFFICE VISIT (OUTPATIENT)
Dept: ORTHOPEDICS CLINIC | Facility: CLINIC | Age: 54
End: 2024-04-22
Payer: MEDICAID

## 2024-04-22 VITALS — BODY MASS INDEX: 39.06 KG/M2 | WEIGHT: 279 LBS | HEIGHT: 71 IN

## 2024-04-22 DIAGNOSIS — G56.22 CUBITAL TUNNEL SYNDROME ON LEFT: ICD-10-CM

## 2024-04-22 DIAGNOSIS — G56.03 BILATERAL CARPAL TUNNEL SYNDROME: Primary | ICD-10-CM

## 2024-04-22 DIAGNOSIS — E29.1 HYPOGONADISM IN MALE: ICD-10-CM

## 2024-04-22 DIAGNOSIS — M77.8 WRIST TENDONITIS: ICD-10-CM

## 2024-04-22 LAB
ALBUMIN SERPL-MCNC: 3.9 G/DL (ref 3.4–5)
ALP LIVER SERPL-CCNC: 72 U/L
ALT SERPL-CCNC: 39 U/L
AST SERPL-CCNC: 23 U/L (ref 15–37)
BILIRUB DIRECT SERPL-MCNC: 0.1 MG/DL (ref 0–0.2)
BILIRUB SERPL-MCNC: 0.4 MG/DL (ref 0.1–2)
CHOLEST SERPL-MCNC: 182 MG/DL (ref ?–200)
COMPLEXED PSA SERPL-MCNC: 0.64 NG/ML (ref ?–4)
FASTING PATIENT LIPID ANSWER: YES
HCT VFR BLD AUTO: 52.3 %
HDLC SERPL-MCNC: 46 MG/DL (ref 40–59)
LDLC SERPL CALC-MCNC: 119 MG/DL (ref ?–100)
NONHDLC SERPL-MCNC: 136 MG/DL (ref ?–130)
PROT SERPL-MCNC: 8.2 G/DL (ref 6.4–8.2)
TESTOST SERPL-MCNC: 182.94 NG/DL
TRIGL SERPL-MCNC: 90 MG/DL (ref 30–149)
VLDLC SERPL CALC-MCNC: 16 MG/DL (ref 0–30)

## 2024-04-22 PROCEDURE — 84403 ASSAY OF TOTAL TESTOSTERONE: CPT

## 2024-04-22 PROCEDURE — 80076 HEPATIC FUNCTION PANEL: CPT

## 2024-04-22 PROCEDURE — 80061 LIPID PANEL: CPT

## 2024-04-22 PROCEDURE — 85014 HEMATOCRIT: CPT

## 2024-04-22 PROCEDURE — 99205 OFFICE O/P NEW HI 60 MIN: CPT | Performed by: ORTHOPAEDIC SURGERY

## 2024-04-22 NOTE — H&P
Clinic Note     Assessment/Plan:  53 year old male    Left Carpal Tunnel Syndrome- We recommend patient to obtain another EMG/NCV test.   Right Carpal Tunnel Syndrome- We recommend patient to obtain another EMG/NCV test.   Left Cubital Tunnel Syndrome-We recommend patient to obtain another EMG/NCV test.   Right Cubital Tunnel Syndrome- We recommend patient to obtain another EMG/NCV test.   H/o cervical spine fusion surgery.  Left wrist FCR tendonitis-  We elected to order a MRI and Fitted him with brace during office visit today. Discussed plan with patient- he agreed. All questions were answered to patients satisfaction.  Chondrocalcinosis of the left wrist is noted-asymptomatic.  X-ray findings are incidental    Follow Up: 6-8 weeks, After EMG/NCV & MRI     Diagnostic Studies:     XR Left wrist 3 views: No fractures, dislocations, or osseous abnormalities.  There is calcification of the TFC.  Mild degenerative changes of the thumb CMC joint.     Physical Exam:     Ht 5' 11\" (1.803 m)   Wt 279 lb (126.6 kg)   BMI 38.91 kg/m²     Constitutional: NAD. AOx3. Well-developed and Well-nourished.   Psychiatric: Normal mood/ affect/ behavior. Judgment and thought content normal.     Left Upper Extremity:     Inspection    Skin intact. No skin lesions. No obvious mass visualized.    Palpation    TTP minimal at the CMC joint more significant along the FCR tendon      ROM    Full composite fist.  Wrist, finger and elbow motion is normal.     Neurovascular    Normal sensation in the radial nerve distribution and abnormal within median & ulnar nerve distrubution.     Numbness:   Left Pinky/hand: 30%   Right thumb/hand: 20%     (+) Phdurkan on both sides, (-) Elbow flexion test, ? numbness in dorsal sensory ulnar nerve distrubution bilaterally. No weakness GUILLAUME or APB bilaterally.    Normally perfused hand(s).     Special    Pain with resisted wrist flexion at FCR.          CC: Left wrist pain    HPI: This 53 year old RHD  male presents with left wrist pain. He mentions numbness and tingling sensation predominantly during the nighttime. He complains the bone often pops out. He had a EMG/NCV done in 2022 by .     Onset: 11/23  Pain Character: Sharp  Pain Level: Moderate  Pain @ Night: No    Treatments Tried: Occupational therapy    Occupation: Self-employed    History/Other:   Past Medical History:    Back problem    Chronic neck pain    Contusion of cervical cord (HCC)    Diabetes (HCC)    Migraines    Went to emergency care clinic and suggested to get an MRI done because I've been dealing with a bad migraine for over a week straight today being a 10 from 1-10 10,being the worse    Primary hypertension    Visual impairment    readers     Past Surgical History:   Procedure Laterality Date    Colonoscopy N/A 2/7/2024    Procedure: COLONOSCOPY;  Surgeon: Amy Means MD;  Location: [facility] ENDOSCOPY    Ct cervical spine      c3 & 4     Hernia surgery  2005    hiatal hernia    Knee surgery  2008    RIGHT ACL REPAIR     Current Outpatient Medications   Medication Sig Dispense Refill    losartan 50 MG Oral Tab Take 1 tablet (50 mg total) by mouth daily. 90 tablet 0    semaglutide (OZEMPIC, 0.25 OR 0.5 MG/DOSE,) 2 MG/3ML Subcutaneous Solution Pen-injector Inject 0.5 mg into the skin once a week. 1 each 12    Tadalafil 10 MG Oral Tab Take 1 tablet (10 mg total) by mouth daily as needed for Erectile Dysfunction. 30 tablet 0    hydrocortisone 2.5 % External Cream Apply 1 Application topically 2 (two) times daily for 14 days. 20 g 0    HYDROcodone-acetaminophen  MG Oral Tab Take 1 tablet by mouth every 4-6 hours PRN pain (max 5 tabs in 24 hours). 150 tablet 0    pantoprazole 40 MG Oral Tab EC Take 1 tablet (40 mg total) by mouth every morning before breakfast. 90 tablet 3    Liraglutide (VICTOZA) 18 MG/3ML Subcutaneous Solution Pen-injector Inject 1.8 mg into the skin daily. 27 mL 0    meclizine 25 MG Oral Tab Take 1 tablet (25 mg  total) by mouth 3 (three) times daily as needed. 30 tablet 0    amoxicillin 500 MG Oral Cap  (Patient not taking: Reported on 4/8/2024)      FLOWFLEX COVID-19 AG HOME TEST In Vitro Kit  (Patient not taking: Reported on 4/8/2024)       MG Oral Tab  (Patient not taking: Reported on 4/8/2024)      testosterone cypionate 200 mg/mL Intramuscular Solution Inject 0.75 mL (150 mg total) into the muscle every 14 (fourteen) days. (Patient not taking: Reported on 4/8/2024) 4.5 mL 0    Insulin Pen Needle (PEN NEEDLES) 32G X 4 MM Does not apply Misc 1 each daily. (Patient not taking: Reported on 4/8/2024) 90 each 0    naproxen 500 MG Oral Tab EC Take 1 tablet (500 mg total) by mouth 2 (two) times daily with meals. (Patient not taking: Reported on 4/8/2024) 60 tablet 2    Syringe/Needle, Disp, 27G X 1/2\" 1 ML Does not apply Misc Inject testosterone every two weeks (Patient not taking: Reported on 4/8/2024) 50 each 0    Syringe 22G X 1\" 3 ML Does not apply Misc Inject testosterone every 2 weeks (Patient not taking: Reported on 1/26/2024) 50 each 0    Blood Glucose Monitoring Suppl (FREESTYLE LITE) Does not apply Device 1 Device by Other route 2 (two) times daily. Use as directed. (Patient not taking: Reported on 1/26/2024) 1 each 3    Glucose Blood (ONETOUCH VERIO) In Vitro Strip Test blood sugar twice daily. (Patient not taking: Reported on 1/26/2024) 200 strip 3    HYDROcodone-acetaminophen 7.5-325 MG Oral Tab Take 1 tablet by mouth every 4-6 hours PRN pain (max 5 tabs in 24 hours). (Patient not taking: Reported on 1/26/2024) 150 tablet 0    Lancets Does not apply Misc Check bid (Patient not taking: Reported on 1/26/2024) 200 each 1    Glucose Blood (FREESTYLE LITE TEST) In Vitro Strip Check bid (Patient not taking: Reported on 1/26/2024) 100 strip 0    Insulin Pen Needle (PEN NEEDLES) 32G X 4 MM Does not apply Misc 1 each daily. (Patient not taking: Reported on 1/26/2024) 90 each 0    metFORMIN  MG Oral Tablet 24  Hr  (Patient not taking: Reported on 12/28/2023)      Dulaglutide (TRULICITY) 0.75 MG/0.5ML Subcutaneous Solution Pen-injector Inject 0.75 mg into the skin once a week. (Patient not taking: Reported on 12/28/2023) 6 mL 0    multiple vitamin Oral Chew Tab Chew 1 tablet by mouth daily. (Patient not taking: Reported on 1/26/2024)       No Known Allergies  Family History   Problem Relation Age of Onset    Heart Disorder Paternal Grandmother     Diabetes Paternal Grandmother     Cancer Paternal Grandmother     No Known Problems Mother     No Known Problems Sister     No Known Problems Brother      Social History     Occupational History    Not on file   Tobacco Use    Smoking status: Never    Smokeless tobacco: Never   Vaping Use    Vaping status: Never Used   Substance and Sexual Activity    Alcohol use: Not Currently     Alcohol/week: 1.0 standard drink of alcohol     Comment: Did social    Drug use: Never    Sexual activity: Not on file      Review of Systems (negative unless bolded):  General: fevers, chills, fatigue  CV:  chest pain, palpitations, leg swelling  Msk: bodyaches, neck pain, neck stiffness  Skin: rashes, open wounds, nonhealing ulcers  Hem: bleeds easily, bruise easily, immunocompromised  Neuro: dizziness, light headedness, headaches  Psych: anxious, depressed, anger issues    Attention: This note has been scribed by Ashley Cosme under the supervision of Guanaco Villela MD.      Guanaco Villela MD   Hand, Wrist, & Elbow Surgery  [email or web address]  t: [phone]  f: [phone]

## 2024-04-23 NOTE — TELEPHONE ENCOUNTER
Pilar Montgomery CMA 4/23/2024 8:19 AM CDT      ----- Message -----  From: Ran Carter  Sent: 4/23/2024 8:12 AM CDT  To: Pilar Montgomery CMA  Subject: Ozempic update     Good morning Doctor     Just wanted to let you know that I don’t any diabetes medicine left.     Thank you

## 2024-04-24 ENCOUNTER — OFFICE VISIT (OUTPATIENT)
Dept: PHYSICAL THERAPY | Age: 54
End: 2024-04-24
Attending: PHYSICAL MEDICINE & REHABILITATION
Payer: MEDICAID

## 2024-04-24 PROCEDURE — 97110 THERAPEUTIC EXERCISES: CPT

## 2024-04-24 PROCEDURE — 97140 MANUAL THERAPY 1/> REGIONS: CPT

## 2024-04-24 NOTE — PROGRESS NOTES
Diagnosis:     Expected by:    Associated DX:  Ankylosing spondylitis of multiple sites in spine (HCC) (M45.0)  Cervical disc disease (M50.90)  Cervical stenosis of spine (M48.02)  Cervical fusion syndrome (Q76.1)  S/P cervical spinal fusion (Z98.1)      Referring Provider: Everardo  Date of Evaluation:    2/28/24    Precautions:  None Next MD visit:   none scheduled  Date of Surgery: n/a   Insurance Primary/Secondary: BLUE CROSS MEDICAID / N/A     # Auth Visits: 16aut exp 6/16/24            Subjective: Pt reports symptoms up an down. Headache better. Saw ortho about hand. Will be having imaging and nerve testing.     Pain: 4-5/10        Assessment: Pt presents with further improvement in soft tissue mobility. Remaining ROM and joint restrictions. Continued with strengthening/stability work. Session limited as pt 10mins late for appt    Objective:   Observation/Posture: fwd head/neck, rounded shoulders, hyperkyphotic posture     Cervical AROM: (* denotes performed with pain)  Flexion: 28  Extension: 49  Sidebending: R 12; L 24  Rotation: R 245; L 4     Accessory motion: decreased lower cervical segmental mobility greatest into flexion and R>L sideglide, dec upper cervical flex and R lateral flex, dec thoracic PA  Palpation: moderate restrictions at suboccipitals, B cervical paraspinals, UTs, thoracic paraspinals     Strength: (* denotes performed with pain)  UE/Scapular   Shoulder Flex: R 4+/5, L 4+/5  Shoulder ABD (C5): R 5/5, L 5/5  Biceps (C6): R 5/5, L 5/5  Wrist ext (C6): R 5/5, L 5/5  Triceps (C7): R 5/5, L 5/5  Wrist Flex (C7): R 5/5, L 4-/5  ER: R: 5/5; L: 5/5  IR: 5/5 B     Rhomboids: R 4/5, L 5/5  Mid trap: R 4/5; L 5/5  Low trap: R 4-/5; L 5/5  *cervical compensation      Flexibility:   UE/Scapular   Upper Trap: R dec; L dec  Levator Scap: R dec; L dec  Pec Major: R dec; L dec  Scalenes: R dec, L dec      Special tests:   Cervical traction: better         Goals:   Goals: (to be met in 8-12 visits)   Pt  will verbalize and demo compliance with home program at least 75% of the time for independent management of symptoms - pt compliant  Pt will demo improved cervical AROM by at least 25% for all deficit motions to be able to safely turn head while driving/riding motorcycle - improving  Pt will demo improved BUE strength by at least 1 full muscle grade for all deficit motions to be able to safely lift/carry for functional mobility - partially met  Pt will be able to safely return to exercise to maintain strength and stability for recreation and to decrease future risk for reinjury - in progress    Post Neck Disability Index Score  Post Score: 50 % (4/10/2024  9:10 AM)    0 % improvement      Plan: Continue with mobility and strengthening as tolerated  Date: 4/24/2024  TX#: 12/16 Date:  3/27/24               TX#: 8/10 Date: 4/3/24                TX#: 9/10 Date:  4/10/24               TX#: 10/10 Date: 4/17/24  Tx#: 11/16   Manual:15mins  Supine: suboccipital release, STM B cervical paraspinals, sideglide mobilizations throughout with exception of C4/5 fusion, gentle manual traction Manual: 27mins  Supine: suboccipital release, STM B cervical paraspinals, sideglide mobilizations throughout with exception of C4/5 fusion, gentle manual traction, OA distraction L  Seated: 1st rib mob R/L, facet gapping C7 Manual: 25mins  Supine: suboccipital release, STM B cervical paraspinals, sideglide mobilizations throughout with exception of C4/5 fusion, gentle manual traction, OA distraction L, TP release L levator Manual: 18mins  Supine: suboccipital release, STM B cervical paraspinals, sideglide mobilizations throughout with exception of C4/5 fusion, gentle manual traction, OA distraction L Manual: 12mins  Supine: suboccipital release, STM B cervical paraspinals, sideglide mobilizations throughout with exception of C4/5 fusion, gentle manual traction, OA distraction L, 1st rib mob   TherEx: 19 mins  Supine passive stretching c/s all  directions  Sidely thoracic rotations 10x R/L  Seated cervical extension + thoracic extension over chair 10x  Seated cervical rotation with towel assist 10x ea dir  Seated cervical flexion with gentle OP 10x  Seated DARIEN blueTB loop 20x  Scapular clocks - stopped secondary to band pressure on wrist  Standing low trap overhead lift off  Cable rows 20# handles 20x  Overhead lat stretch  Cable low rows 25# handles 20x  Cable low rows 25# 20x  Standing horiz abd 15# 20x   D1 flex/ext 15# 20x R/L  D2 flex/ext 15# 20x  R/L  Overhead stretches at bar therEx: 18mins  Supine passive stretching c/s flex and sidebend, UT, levator  Sidely thoracic rotations 10x R/L  Seated flexion with self OP [id number]  Seated thoracic extensions over chair 10x  UBE lvl 3.0 10mins bwd  Overhead stretch at cable bar  Cable:    - Rows 20# handles 20x   - Low rows 20# handles 20x   - standing shoulder adduction 15# 20x R/L therEx: 20mins  Sidely thoracic rotations 10x R/L  Seated cervical rotation with towel assist 10x  Seated cervical extension over towel  Seated cervical flexion 10x  Doorway pec stretch low and mid range  Cable:  Standing rows 20# handles 20x  Standing low rows 20# 20x  Standing horiz abd 5# 20x R/L  Standing shoulder adduction 15# 20x R/L  UBE lvl 3.0 5mins fwd, 5mins bwd   Therex: 25mins  Reassessment testing  Supine passive stretching B UTs, into cervical flexion  DNF [id number]  Sidely throacic rotations 10x R/L  Standing DARIEN BlueTB [id number]  Standing horiz abd palms up/palms down blueTB 10x ea  Standing rows cable 20# handles [id number]  Standing low rows 20# [id number]  Standing horiz abd 5# 20x R/L  Standing shoulder adduction  20# handle 20x R/L TherEx: 24mins  PROM c/s all directions   Sidely thoracic rotations  Seated cervical extension over chair 10x   Seated cervical rotation over towel 10x   Seated DARIEN blueTB [id number]  Seated horiz abd blackTB palms up/palms down 20x ea  Standing rows 25# 20x   Standing low rows 25# 20x  D2  flex/ext 15# cable 20x R/L  D1 flex/ext 15# 20x R/L  Overhead stretches                  HEP:   2/28:  sidely thoracic rotations BLEs flexed   3/4: seated 1st rib mob with sheet  3/6: seated cervical flex, seated thoracic ext over chair, may perform rows/low rows at gym at cable  3/11: prone low trap lift off  3/13: cervical rotations towel, DARIEN BlueTB   3/18: lateral walk redTB  4/17: blackTB DARIEN and horiz abd    Charges: TherEx x1, Man x1      Total Timed Treatment: 34 min  Total Treatment Time: 34 min

## 2024-04-26 ENCOUNTER — PATIENT MESSAGE (OUTPATIENT)
Dept: ENDOCRINOLOGY CLINIC | Facility: CLINIC | Age: 54
End: 2024-04-26

## 2024-04-26 DIAGNOSIS — E29.1 HYPOGONADISM IN MALE: Primary | ICD-10-CM

## 2024-04-26 DIAGNOSIS — M54.16 LUMBAR RADICULOPATHY: ICD-10-CM

## 2024-04-26 RX ORDER — HYDROCODONE BITARTRATE AND ACETAMINOPHEN 10; 325 MG/1; MG/1
TABLET ORAL
Qty: 150 TABLET | Refills: 0 | Status: SHIPPED | OUTPATIENT
Start: 2024-04-26

## 2024-04-26 NOTE — TELEPHONE ENCOUNTER
Refill Request    Medication request: HYDROcodone-acetaminophen  MG Oral Tab Take 1 tablet by mouth every 4-6 hours PRN pain (max 5 tabs in 24 hours).     LOV:2/8/2024 Juan A Mcgee MD   Due back to clinic per last office note:  \"follow up in 2-3 months \"  NOV: 8/7/2024 Juan A Mcgee MD      [place]: 4/03/2024 #150    Urine drug screen (if applicable): 11/02/2023  Pain contract: VALID until 11/16/2024    LOV plan (if weaning or changing medications): Per Dr. Mcgee's LOV notes: \"He will take the Naprosyn 500 mg 2 times a day for 2 weeks to see if this will help the left wrist pain. He will wean the Norco to one every 6 hours for the next month.He will let me know in one month how he is doing with the lower dose of the Norco and the therapies.aa'

## 2024-04-27 RX ORDER — PANTOPRAZOLE SODIUM 40 MG/1
40 TABLET, DELAYED RELEASE ORAL
Qty: 90 TABLET | Refills: 3 | Status: SHIPPED | OUTPATIENT
Start: 2024-04-27

## 2024-04-30 ENCOUNTER — OFFICE VISIT (OUTPATIENT)
Dept: OPHTHALMOLOGY | Facility: CLINIC | Age: 54
End: 2024-04-30
Payer: MEDICAID

## 2024-04-30 DIAGNOSIS — E11.9 CONTROLLED TYPE 2 DIABETES MELLITUS WITHOUT COMPLICATION, WITHOUT LONG-TERM CURRENT USE OF INSULIN (HCC): Primary | ICD-10-CM

## 2024-04-30 DIAGNOSIS — H04.123 DRY EYE SYNDROME OF BOTH EYES: ICD-10-CM

## 2024-04-30 PROCEDURE — 92004 COMPRE OPH EXAM NEW PT 1/>: CPT | Performed by: OPHTHALMOLOGY

## 2024-04-30 PROCEDURE — 92015 DETERMINE REFRACTIVE STATE: CPT | Performed by: OPHTHALMOLOGY

## 2024-04-30 RX ORDER — PANTOPRAZOLE SODIUM 40 MG/1
40 TABLET, DELAYED RELEASE ORAL
Qty: 90 TABLET | Refills: 3 | Status: SHIPPED | OUTPATIENT
Start: 2024-04-30

## 2024-04-30 NOTE — TELEPHONE ENCOUNTER
Requested Prescriptions     Pending Prescriptions Disp Refills    pantoprazole 40 MG Oral Tab EC 90 tablet 3     Sig: Take 1 tablet (40 mg total) by mouth every morning before breakfast.         Possible duplicate: Hover to review recent actions on this medication     Lr:24              QTY:90 Tablet        Refills: 3  Lov:2023 with Huong Pepe PA-C  Called patient ,verified , in regards to this refills that has refilled and ready for  at [facility] in [place], per patient statement  he went to see  and he was told to take the pantoprazole to taking 1 tablet  twice a day.    Next Appt: With Gastroenterology (SOFYA, PROCEDURE)  07/10/2024 at 1:15 PM  Misha

## 2024-04-30 NOTE — PROGRESS NOTES
Ran Carter is a 53 year old male.    HPI:     HPI    NP/Pt. Here for DM eye exam.   Pt. C/O occasional blurry vision, specially after prolong computer work and eyes feel tired.   States post shower or when tired the eyes turn blood shot red, for which he uses OTC AT's as needed.   C/O glare, that is making night driving difficult. He use to wear bifocals that he lost few months ago, states he was never satisfied with the glasses.   No H/O eye Sx/ trauma.   Last DFE 2 years old.     Pt has been a diabetic for 1 year     Pt's diabetes is currently controlled by pills only (from today)   Pt checks BS 2 times a week   Pt's last blood sugar was  100 2 days ago.   Last HA1C was 5.6 on 04/08/24  Endocrinologist: none       Last edited by Sophy Oneil OT on 4/30/2024  9:29 AM.        Patient History:  Past Medical History:    Back problem    Chronic neck pain    Contusion of cervical cord (HCC)    Diabetes (HCC)    Migraines    Went to emergency care clinic and suggested to get an MRI done because I've been dealing with a bad migraine for over a week straight today being a 10 from 1-10 10,being the worse    Primary hypertension    Visual impairment    readers       Surgical History: Ran Carter has a past surgical history that includes knee surgery (2008) (RIGHT ACL REPAIR); ct cervical spine (c3 & 4 ); hernia surgery (2005) (hiatal hernia); and colonoscopy (N/A, 2/7/2024) (Procedure: COLONOSCOPY;  Surgeon: Amy Means MD;  Location: [facility] ENDOSCOPY).    Family History   Problem Relation Age of Onset    No Known Problems Mother     No Known Problems Sister     No Known Problems Brother     Heart Disorder Paternal Grandmother     Diabetes Paternal Grandmother     Cancer Paternal Grandmother     Glaucoma Neg     Macular degeneration Neg        Social History:   Social History     Socioeconomic History    Marital status:    Tobacco Use    Smoking status: Never    Smokeless tobacco:  Never   Vaping Use    Vaping status: Never Used   Substance and Sexual Activity    Alcohol use: Not Currently     Alcohol/week: 1.0 standard drink of alcohol     Comment: Did social    Drug use: Never   Other Topics Concern    Caffeine Concern Yes    Exercise No   Social History Narrative    The patient does not use an assistive device..      The patient does live in a home with stairs.       Medications:  Current Outpatient Medications   Medication Sig Dispense Refill    pantoprazole 40 MG Oral Tab EC Take 1 tablet (40 mg total) by mouth every morning before breakfast. 90 tablet 3    HYDROcodone-acetaminophen  MG Oral Tab Take 1 tablet by mouth every 4-6 hours PRN pain (max 5 tabs in 24 hours). 150 tablet 0    SITagliptin Phosphate (JANUVIA) 100 MG Oral Tab Take 1 tablet (100 mg total) by mouth daily. 90 tablet 1    amoxicillin 500 MG Oral Cap  (Patient not taking: Reported on 4/8/2024)      FLOWFLEX COVID-19 AG HOME TEST In Vitro Kit  (Patient not taking: Reported on 4/8/2024)       MG Oral Tab  (Patient not taking: Reported on 4/8/2024)      losartan 50 MG Oral Tab Take 1 tablet (50 mg total) by mouth daily. 90 tablet 0    semaglutide (OZEMPIC, 0.25 OR 0.5 MG/DOSE,) 2 MG/3ML Subcutaneous Solution Pen-injector Inject 0.5 mg into the skin once a week. 1 each 12    Tadalafil 10 MG Oral Tab Take 1 tablet (10 mg total) by mouth daily as needed for Erectile Dysfunction. 30 tablet 0    testosterone cypionate 200 mg/mL Intramuscular Solution Inject 0.75 mL (150 mg total) into the muscle every 14 (fourteen) days. (Patient not taking: Reported on 4/8/2024) 4.5 mL 0    Insulin Pen Needle (PEN NEEDLES) 32G X 4 MM Does not apply Misc 1 each daily. (Patient not taking: Reported on 4/8/2024) 90 each 0    naproxen 500 MG Oral Tab EC Take 1 tablet (500 mg total) by mouth 2 (two) times daily with meals. (Patient not taking: Reported on 4/8/2024) 60 tablet 2    Syringe/Needle, Disp, 27G X 1/2\" 1 ML Does not apply Misc  Inject testosterone every two weeks (Patient not taking: Reported on 4/8/2024) 50 each 0    Liraglutide (VICTOZA) 18 MG/3ML Subcutaneous Solution Pen-injector Inject 1.8 mg into the skin daily. 27 mL 0    Syringe 22G X 1\" 3 ML Does not apply Misc Inject testosterone every 2 weeks (Patient not taking: Reported on 1/26/2024) 50 each 0    Blood Glucose Monitoring Suppl (FREESTYLE LITE) Does not apply Device 1 Device by Other route 2 (two) times daily. Use as directed. (Patient not taking: Reported on 1/26/2024) 1 each 3    Glucose Blood (ONETOUCH VERIO) In Vitro Strip Test blood sugar twice daily. (Patient not taking: Reported on 1/26/2024) 200 strip 3    HYDROcodone-acetaminophen 7.5-325 MG Oral Tab Take 1 tablet by mouth every 4-6 hours PRN pain (max 5 tabs in 24 hours). (Patient not taking: Reported on 1/26/2024) 150 tablet 0    Lancets Does not apply Misc Check bid (Patient not taking: Reported on 1/26/2024) 200 each 1    Glucose Blood (FREESTYLE LITE TEST) In Vitro Strip Check bid (Patient not taking: Reported on 1/26/2024) 100 strip 0    Insulin Pen Needle (PEN NEEDLES) 32G X 4 MM Does not apply Misc 1 each daily. (Patient not taking: Reported on 1/26/2024) 90 each 0    metFORMIN  MG Oral Tablet 24 Hr  (Patient not taking: Reported on 12/28/2023)      Dulaglutide (TRULICITY) 0.75 MG/0.5ML Subcutaneous Solution Pen-injector Inject 0.75 mg into the skin once a week. (Patient not taking: Reported on 12/28/2023) 6 mL 0    meclizine 25 MG Oral Tab Take 1 tablet (25 mg total) by mouth 3 (three) times daily as needed. 30 tablet 0    multiple vitamin Oral Chew Tab Chew 1 tablet by mouth daily. (Patient not taking: Reported on 1/26/2024)         Allergies:  No Known Allergies    ROS:       PHYSICAL EXAM:     Base Eye Exam       Visual Acuity (Snellen - Linear)         Right Left    Dist sc 20/25 20/25 +1    Dist ph sc 20/20 20/20    Near cc 20/25 20/30              Tonometry (Applanation, 9:02 AM)         Right Left     Pressure 15 15              Pupils         Pupils    Right PERRL    Left PERRL              Visual Fields         Left Right     Full Full              Extraocular Movement         Right Left     Full Full              Dilation       Both eyes: 1.0% Mydriacyl and 2.5% Lavelle Synephrine @ 9:02 AM              Dilation #2       Both eyes: 1.0% Mydriacyl and 2.5% Lavelle Synephrine @ 9:02 AM                  Slit Lamp and Fundus Exam       Slit Lamp Exam         Right Left    Lids/Lashes Dermatochalasis, Meibomian gland dysfunction Dermatochalasis, Meibomian gland dysfunction    Conjunctiva/Sclera Nasal/temp pinguecula Nasal/temp pinguecula    Cornea Clear Clear    Anterior Chamber Deep and quiet Deep and quiet    Iris Normal Normal    Lens Clear Clear    Vitreous Clear Clear              Fundus Exam         Right Left    Disc Good rim, Temporal crescent Good rim, Temporal crescent    C/D Ratio 0.4 0.4    Macula Normal-no BDR Normal-no BDR    Vessels Normal Normal    Periphery Normal Normal                  Lacrimal Exam       Schirmers         Right Left     06 mm 03 mm      Anesthesia: Yes                  Refraction       Wearing Rx         Sphere Cylinder Axis    Right +1.00 +0.25 172    Left +1.25        Age: 5yrs    Type: Reading only              Manifest Refraction (Auto)         Sphere Cylinder [place]bh [place] Dist VA Add Near VA    Right -0.50 +0.75 170       Left -0.25 +0.50 010                 Manifest Refraction #2         Sphere Cylinder Mcbh [place] Dist VA Add Near VA    Right -0.50 +0.75 180 20/20 +2.00 20/20    Left -0.25 +0.50 010 20/20 +2.00 20/20              Manifest Refraction Comments    Pt. Requesting for bifocals.              Final Rx         Sphere Cylinder Mcbh [place] Dist VA Add Near VA    Right -0.50 +0.75 180 20/20 +2.00 20/20    Left -0.25 +0.50 010 20/20 +2.00 20/20      Type: Flat top bifocal                     ASSESSMENT/PLAN:     Diagnoses and Plan:     Controlled type 2 diabetes mellitus without complication,  without long-term current use of insulin (HCC)  Diabetes type II: no background of retinopathy, no signs of neovascularization noted.  Discussed ocular and systemic benefits of blood sugar control.  Diagnosis and treatment discussed in detail with patient.    Will see patient in 1 year for a diabetic exam    Dry eye syndrome of both eyes  Schirmer's test today revealed dry eyes.  Patient was instructed to use warm compresses to the eyelids twice a day everyday.    Instructions for warm compress use:   Patient should place wash compresses on both eyelids for 5 minutes every morning and every night.  After 5 minutes of holding the warm compresses on the eyelids, patient should gently rub the eyelashes and then rinse thoroughly with warm water.   Patient should also use artificial tears (any over the counter brand is okay) up to 4-6  times per day as needed for dry eye symptoms.        No orders of the defined types were placed in this encounter.      Meds This Visit:  Requested Prescriptions      No prescriptions requested or ordered in this encounter        Follow up instructions:  Return in about 1 year (around 4/30/2025) for Diabetic eye exam.    4/30/2024  Scribed by: Ivan Looney MD

## 2024-04-30 NOTE — ASSESSMENT & PLAN NOTE
Schirmer's test today revealed dry eyes.  Patient was instructed to use warm compresses to the eyelids twice a day everyday.    Instructions for warm compress use:   Patient should place wash compresses on both eyelids for 5 minutes every morning and every night.  After 5 minutes of holding the warm compresses on the eyelids, patient should gently rub the eyelashes and then rinse thoroughly with warm water.   Patient should also use artificial tears (any over the counter brand is okay) up to 4-6  times per day as needed for dry eye symptoms.

## 2024-04-30 NOTE — PATIENT INSTRUCTIONS
Controlled type 2 diabetes mellitus without complication, without long-term current use of insulin (HCC)  Diabetes type II: no background of retinopathy, no signs of neovascularization noted.  Discussed ocular and systemic benefits of blood sugar control.  Diagnosis and treatment discussed in detail with patient.    Will see patient in 1 year for a diabetic exam    Dry eye syndrome of both eyes  Schirmer's test today revealed dry eyes.  Patient was instructed to use warm compresses to the eyelids twice a day everyday.    Instructions for warm compress use:   Patient should place wash compresses on both eyelids for 5 minutes every morning and every night.  After 5 minutes of holding the warm compresses on the eyelids, patient should gently rub the eyelashes and then rinse thoroughly with warm water.   Patient should also use artificial tears (any over the counter brand is okay) up to 4-6  times per day as needed for dry eye symptoms.

## 2024-05-01 ENCOUNTER — APPOINTMENT (OUTPATIENT)
Dept: PHYSICAL THERAPY | Age: 54
End: 2024-05-01
Attending: PHYSICAL MEDICINE & REHABILITATION
Payer: MEDICAID

## 2024-05-01 ENCOUNTER — TELEPHONE (OUTPATIENT)
Dept: PHYSICAL THERAPY | Age: 54
End: 2024-05-01

## 2024-05-08 ENCOUNTER — OFFICE VISIT (OUTPATIENT)
Dept: PHYSICAL THERAPY | Age: 54
End: 2024-05-08
Attending: PHYSICAL MEDICINE & REHABILITATION
Payer: MEDICAID

## 2024-05-08 ENCOUNTER — PATIENT MESSAGE (OUTPATIENT)
Dept: ENDOCRINOLOGY CLINIC | Facility: CLINIC | Age: 54
End: 2024-05-08

## 2024-05-08 ENCOUNTER — LAB ENCOUNTER (OUTPATIENT)
Dept: LAB | Facility: HOSPITAL | Age: 54
End: 2024-05-08
Attending: INTERNAL MEDICINE
Payer: MEDICAID

## 2024-05-08 DIAGNOSIS — E29.1 HYPOGONADISM IN MALE: ICD-10-CM

## 2024-05-08 LAB — TESTOST SERPL-MCNC: 289.85 NG/DL

## 2024-05-08 PROCEDURE — 36415 COLL VENOUS BLD VENIPUNCTURE: CPT

## 2024-05-08 PROCEDURE — 97140 MANUAL THERAPY 1/> REGIONS: CPT

## 2024-05-08 PROCEDURE — 97110 THERAPEUTIC EXERCISES: CPT

## 2024-05-08 PROCEDURE — 84403 ASSAY OF TOTAL TESTOSTERONE: CPT

## 2024-05-08 NOTE — PROGRESS NOTES
Diagnosis:     Expected by:    Associated DX:  Ankylosing spondylitis of multiple sites in spine (HCC) (M45.0)  Cervical disc disease (M50.90)  Cervical stenosis of spine (M48.02)  Cervical fusion syndrome (Q76.1)  S/P cervical spinal fusion (Z98.1)      Referring Provider: Everardo  Date of Evaluation:    2/28/24    Precautions:  None Next MD visit:   none scheduled  Date of Surgery: n/a   Insurance Primary/Secondary: [employer]ICA[employer] / N/A     # Auth Visits: [id number] exp 6/16/24            Subjective: Pt reports continued neck pain. Has also been having pain in his back down his spine.     Pain: 4-5/10        Assessment: Continued with manual interventions at c/s with added work into thoracic region. Cueing required to isolate stretch to c/s and avoid lumbar compensation.     Objective:   Not measured this session  Observation/Posture: fwd head/neck, rounded shoulders, hyperkyphotic posture     Cervical AROM: (* denotes performed with pain)  Flexion: 28  Extension: 49  Sidebending: R 12; L 24  Rotation: R 245; L 4     Accessory motion: decreased lower cervical segmental mobility greatest into flexion and R>L sideglide, dec upper cervical flex and R lateral flex, dec thoracic PA  Palpation: moderate restrictions at suboccipitals, B cervical paraspinals, UTs, thoracic paraspinals     Strength: (* denotes performed with pain)  UE/Scapular   Shoulder Flex: R 4+/5, L 4+/5  Shoulder ABD (C5): R 5/5, L 5/5  Biceps (C6): R 5/5, L 5/5  Wrist ext (C6): R 5/5, L 5/5  Triceps (C7): R 5/5, L 5/5  Wrist Flex (C7): R 5/5, L 4-/5  ER: R: 5/5; L: 5/5  IR: 5/5 B     Rhomboids: R 4/5, L 5/5  Mid trap: R 4/5; L 5/5  Low trap: R 4-/5; L 5/5  *cervical compensation      Flexibility:   UE/Scapular   Upper Trap: R dec; L dec  Levator Scap: R dec; L dec  Pec Major: R dec; L dec  Scalenes: R dec, L dec      Special tests:   Cervical traction: better         Goals:   Goals: (to be met in 8-12 visits)   Pt will verbalize and demo compliance  with home program at least 75% of the time for independent management of symptoms - pt compliant  Pt will demo improved cervical AROM by at least 25% for all deficit motions to be able to safely turn head while driving/riding motorcycle - improving  Pt will demo improved BUE strength by at least 1 full muscle grade for all deficit motions to be able to safely lift/carry for functional mobility - partially met  Pt will be able to safely return to exercise to maintain strength and stability for recreation and to decrease future risk for reinjury - in progress    Post Neck Disability Index Score  Post Score: 50 % (4/10/2024  9:10 AM)    0 % improvement      Plan: Continue with mobility and strengthening as tolerated  Date: 4/24/2024  TX#: 12/16 Date:  5/8/24               TX#: 13/16 Date: 4/3/24                TX#: 9/10 Date:  4/10/24               TX#: 10/10 Date: 4/17/24  Tx#: 11/16   Manual:15mins  Supine: suboccipital release, STM B cervical paraspinals, sideglide mobilizations throughout with exception of C4/5 fusion, gentle manual traction Manual: 24mins  Supine: suboccipital release, STM B cervical paraspinals, sideglide mobilizations throughout with exception of C4/5 fusion, gentle manual traction, OA distraction L  Prone: thoracic PA, STM thoracic paraspinals Manual: 25mins  Supine: suboccipital release, STM B cervical paraspinals, sideglide mobilizations throughout with exception of C4/5 fusion, gentle manual traction, OA distraction L, TP release L levator Manual: 18mins  Supine: suboccipital release, STM B cervical paraspinals, sideglide mobilizations throughout with exception of C4/5 fusion, gentle manual traction, OA distraction L Manual: 12mins  Supine: suboccipital release, STM B cervical paraspinals, sideglide mobilizations throughout with exception of C4/5 fusion, gentle manual traction, OA distraction L, 1st rib mob   TherEx: 19 mins  Supine passive stretching c/s all directions  Sidely thoracic  rotations 10x R/L  Seated cervical extension + thoracic extension over chair 10x  Seated cervical rotation with towel assist 10x ea dir  Seated cervical flexion with gentle OP 10x  Seated DARIEN blueTB loop 20x  Scapular clocks - stopped secondary to band pressure on wrist  Standing low trap overhead lift off  Cable rows 20# handles 20x  Overhead lat stretch  Cable low rows 25# handles 20x  Cable low rows 25# 20x  Standing horiz abd 15# 20x   D1 flex/ext 15# 20x R/L  D2 flex/ext 15# 20x  R/L  Overhead stretches at bar therEx: 18mins  Seated c/s stretches flex, ext, sidebend, rotation  UBE lvl 3.0 alternating directions every 1min [id number] therEx: 20mins  Sidely thoracic rotations 10x R/L  Seated cervical rotation with towel assist 10x  Seated cervical extension over towel  Seated cervical flexion 10x  Doorway pec stretch low and mid range  Cable:  Standing rows 20# handles 20x  Standing low rows 20# 20x  Standing horiz abd 5# 20x R/L  Standing shoulder adduction 15# 20x R/L  UBE lvl 3.0 5mins fwd, 5mins bwd   Therex: 25mins  Reassessment testing  Supine passive stretching B UTs, into cervical flexion  DNF [id number]  Sidely throacic rotations 10x R/L  Standing DARIEN BlueTB [id number]  Standing horiz abd palms up/palms down blueTB 10x ea  Standing rows cable 20# handles [id number]  Standing low rows 20# [id number]  Standing horiz abd 5# 20x R/L  Standing shoulder adduction  20# handle 20x R/L TherEx: 24mins  PROM c/s all directions   Sidely thoracic rotations  Seated cervical extension over chair 10x   Seated cervical rotation over towel 10x   Seated DARIEN blueTB [id number]  Seated horiz abd blackTB palms up/palms down 20x ea  Standing rows 25# 20x   Standing low rows 25# 20x  D2 flex/ext 15# cable 20x R/L  D1 flex/ext 15# 20x R/L  Overhead stretches                  HEP:   2/28:  sidely thoracic rotations BLEs flexed   3/4: seated 1st rib mob with sheet  3/6: seated cervical flex, seated thoracic ext over chair, may perform rows/low rows  at gym at [employer]  3/11: prone low trap lift off  3/13: cervical rotations towel, DARIEN BlueTB   3/18: lateral walk redTB  4/17: blackTB DARIEN and horiz abd    Charges: TherEx x1, Man x2      Total Timed Treatment: 42min  Total Treatment Time: 42 min

## 2024-05-09 NOTE — TELEPHONE ENCOUNTER
From: Consuelo Guadarrama  To: Ran Carter  Sent: 5/8/2024 11:54 AM CDT  Subject: Testosterone result    Hello, Hope you are doing well. I received the results of your testosterone level . While it is slightly better than before it still continues to be on the lower side of normal. Please let me know when you last took your testosterone injection prior to doing the blood work? I can recommend changes in dose accordingly. Thanks.

## 2024-05-13 NOTE — TELEPHONE ENCOUNTER
Dr. Guadarrama patient had last injection a week prior to blood work being done. Patient also wondering if you can prescribe Vitamin B12 to help with energy level.

## 2024-05-14 ENCOUNTER — OFFICE VISIT (OUTPATIENT)
Dept: DERMATOLOGY CLINIC | Facility: CLINIC | Age: 54
End: 2024-05-14

## 2024-05-14 DIAGNOSIS — L30.8 PSORIASIFORM DERMATITIS: Primary | ICD-10-CM

## 2024-05-14 PROCEDURE — 99204 OFFICE O/P NEW MOD 45 MIN: CPT | Performed by: STUDENT IN AN ORGANIZED HEALTH CARE EDUCATION/TRAINING PROGRAM

## 2024-05-14 RX ORDER — NYSTATIN AND TRIAMCINOLONE ACETONIDE 100000; 1 [USP'U]/G; MG/G
OINTMENT TOPICAL
Qty: 60 G | Refills: 3 | Status: SHIPPED | OUTPATIENT
Start: 2024-05-14

## 2024-05-14 NOTE — PROGRESS NOTES
May 14, 2024    New patient     Referred by:   Todd Rivas MD.      CHIEF COMPLAINT: Rash     HISTORY OF PRESENT ILLNESS: Ran Carter is a 53 year old male here for evaluation of rash.    Location: Mid back, near tail bone  Duration: 1 year  Signs and symptoms: Burning sensation, itchy  Current treatment: None  Past treatments: Hydrocortisone 2.5% as needed    Personal Dermatologic History  History of chronic skin disease: No    Family History  History of chronic skin disease: No  History autoimmune diseases:  No    Past Medical History  Past Medical History:    Back problem    Chronic neck pain    Contusion of cervical cord (HCC)    Diabetes (HCC)    Migraines    Went to emergency care clinic and suggested to get an MRI done because I've been dealing with a bad migraine for over a week straight today being a 10 from 1-10 10,being the worse    Primary hypertension    Visual impairment    readers       REVIEW OF SYSTEMS:  Constitutional: Denies fever, chills, unintentional weight loss.   Skin as per HPI    Medications  Current Outpatient Medications   Medication Sig Dispense Refill    pantoprazole 40 MG Oral Tab EC Take 1 tablet (40 mg total) by mouth every morning before breakfast. 90 tablet 3    HYDROcodone-acetaminophen  MG Oral Tab Take 1 tablet by mouth every 4-6 hours PRN pain (max 5 tabs in 24 hours). 150 tablet 0    SITagliptin Phosphate (JANUVIA) 100 MG Oral Tab Take 1 tablet (100 mg total) by mouth daily. 90 tablet 1    amoxicillin 500 MG Oral Cap  (Patient not taking: Reported on 4/8/2024)      FLOWFLEX COVID-19 AG HOME TEST In Vitro Kit  (Patient not taking: Reported on 4/8/2024)       MG Oral Tab  (Patient not taking: Reported on 4/8/2024)      losartan 50 MG Oral Tab Take 1 tablet (50 mg total) by mouth daily. 90 tablet 0    semaglutide (OZEMPIC, 0.25 OR 0.5 MG/DOSE,) 2 MG/3ML Subcutaneous Solution Pen-injector Inject 0.5 mg into the skin once a week. 1 each 12     testosterone cypionate 200 mg/mL Intramuscular Solution Inject 0.75 mL (150 mg total) into the muscle every 14 (fourteen) days. (Patient not taking: Reported on 4/8/2024) 4.5 mL 0    Insulin Pen Needle (PEN NEEDLES) 32G X 4 MM Does not apply Misc 1 each daily. (Patient not taking: Reported on 4/8/2024) 90 each 0    naproxen 500 MG Oral Tab EC Take 1 tablet (500 mg total) by mouth 2 (two) times daily with meals. (Patient not taking: Reported on 4/8/2024) 60 tablet 2    Syringe/Needle, Disp, 27G X 1/2\" 1 ML Does not apply Misc Inject testosterone every two weeks (Patient not taking: Reported on 4/8/2024) 50 each 0    Liraglutide (VICTOZA) 18 MG/3ML Subcutaneous Solution Pen-injector Inject 1.8 mg into the skin daily. 27 mL 0    Syringe 22G X 1\" 3 ML Does not apply Misc Inject testosterone every 2 weeks (Patient not taking: Reported on 1/26/2024) 50 each 0    Blood Glucose Monitoring Suppl (FREESTYLE LITE) Does not apply Device 1 Device by Other route 2 (two) times daily. Use as directed. (Patient not taking: Reported on 1/26/2024) 1 each 3    Glucose Blood (ONETOUCH VERIO) In Vitro Strip Test blood sugar twice daily. (Patient not taking: Reported on 1/26/2024) 200 strip 3    HYDROcodone-acetaminophen 7.5-325 MG Oral Tab Take 1 tablet by mouth every 4-6 hours PRN pain (max 5 tabs in 24 hours). (Patient not taking: Reported on 1/26/2024) 150 tablet 0    Lancets Does not apply Misc Check bid (Patient not taking: Reported on 1/26/2024) 200 each 1    Glucose Blood (FREESTYLE LITE TEST) In Vitro Strip Check bid (Patient not taking: Reported on 1/26/2024) 100 strip 0    Insulin Pen Needle (PEN NEEDLES) 32G X 4 MM Does not apply Misc 1 each daily. (Patient not taking: Reported on 1/26/2024) 90 each 0    metFORMIN  MG Oral Tablet 24 Hr  (Patient not taking: Reported on 12/28/2023)      Dulaglutide (TRULICITY) 0.75 MG/0.5ML Subcutaneous Solution Pen-injector Inject 0.75 mg into the skin once a week. (Patient not taking:  Reported on 12/28/2023) 6 mL 0    meclizine 25 MG Oral Tab Take 1 tablet (25 mg total) by mouth 3 (three) times daily as needed. 30 tablet 0    multiple vitamin Oral Chew Tab Chew 1 tablet by mouth daily. (Patient not taking: Reported on 1/26/2024)         PHYSICAL EXAM:  General: awake, alert, no acute distress  Skin: Skin exam was performed today including the following: gluteal fold. Pertinent findings include:   - with pink scaly plaques     ASSESSMENT & PLAN:  Pathophysiology of diagnoses discussed with patient.  Therapeutic options reviewed. Risks, benefits, and alternatives discussed with patient. Instructions reviewed at length.    #Psoriasiform dermatitis, gluteal fold  - Nystatin-triamcinolone twice daily  to affected areas Monday-Friday     Return to clinic: 3 months or sooner if something concerning arises    Blake Orr MD

## 2024-05-15 ENCOUNTER — OFFICE VISIT (OUTPATIENT)
Dept: PHYSICAL THERAPY | Age: 54
End: 2024-05-15
Attending: PHYSICAL MEDICINE & REHABILITATION
Payer: MEDICAID

## 2024-05-15 PROCEDURE — 97140 MANUAL THERAPY 1/> REGIONS: CPT

## 2024-05-15 PROCEDURE — 97110 THERAPEUTIC EXERCISES: CPT

## 2024-05-15 NOTE — PROGRESS NOTES
Diagnosis:     Expected by:    Associated DX:  Ankylosing spondylitis of multiple sites in spine (HCC) (M45.0)  Cervical disc disease (M50.90)  Cervical stenosis of spine (M48.02)  Cervical fusion syndrome (Q76.1)  S/P cervical spinal fusion (Z98.1)      Referring Provider: Everardo  Date of Evaluation:    2/28/24    Precautions:  None Next MD visit:   none scheduled  Date of Surgery: n/a   Insurance Primary/Secondary: BLUE CROSS MEDICAID / N/A     # Auth Visits: 16aut exp 6/16/24            Subjective: Pt reports has been feeling more nerve pain into his hand. Has noticed this since starting new diabetes medication. Also feels more tired throughout the day.     Pain: 4-5/10        Assessment: Pt continues to respond well to manual interventions. Improved posture awareness. Advised pt on scheduling ordered EMG.     Objective:   Not measured this session  Observation/Posture: fwd head/neck, rounded shoulders, hyperkyphotic posture     Cervical AROM: (* denotes performed with pain)  Flexion: 28  Extension: 49  Sidebending: R 12; L 24  Rotation: R 24; L 24     Accessory motion: decreased lower cervical segmental mobility greatest into flexion and R>L sideglide, dec upper cervical flex and R lateral flex, dec thoracic PA  Palpation: moderate restrictions at suboccipitals, B cervical paraspinals, UTs, thoracic paraspinals     Strength: (* denotes performed with pain)  UE/Scapular   Shoulder Flex: R 4+/5, L 4+/5  Shoulder ABD (C5): R 5/5, L 5/5  Biceps (C6): R 5/5, L 5/5  Wrist ext (C6): R 5/5, L 5/5  Triceps (C7): R 5/5, L 5/5  Wrist Flex (C7): R 5/5, L 4-/5  ER: R: 5/5; L: 5/5  IR: 5/5 B     Rhomboids: R 4/5, L 5/5  Mid trap: R 4/5; L 5/5  Low trap: R 4-/5; L 5/5  *cervical compensation      Flexibility:   UE/Scapular   Upper Trap: R dec; L dec  Levator Scap: R dec; L dec  Pec Major: R dec; L dec  Scalenes: R dec, L dec      Special tests:   Cervical traction: better         Goals:   Goals: (to be met in 8-12 visits)    Pt will verbalize and demo compliance with home program at least 75% of the time for independent management of symptoms - pt compliant  Pt will demo improved cervical AROM by at least 25% for all deficit motions to be able to safely turn head while driving/riding motorcycle - improving  Pt will demo improved BUE strength by at least 1 full muscle grade for all deficit motions to be able to safely lift/carry for functional mobility - partially met  Pt will be able to safely return to exercise to maintain strength and stability for recreation and to decrease future risk for reinjury - in progress    Post Neck Disability Index Score  Post Score: 50 % (4/10/2024  9:10 AM)    0 % improvement      Plan: Continue with mobility and strengthening as tolerated  Date: 4/24/2024  TX#: 12/16 Date:  5/8/24               TX#: 13/16 Date: 5/15/24                TX#: 14/16 Date:  4/10/24               TX#: 10/10 Date: 4/17/24  Tx#: 11/16   Manual:15mins  Supine: suboccipital release, STM B cervical paraspinals, sideglide mobilizations throughout with exception of C4/5 fusion, gentle manual traction Manual: 24mins  Supine: suboccipital release, STM B cervical paraspinals, sideglide mobilizations throughout with exception of C4/5 fusion, gentle manual traction, OA distraction L  Prone: thoracic PA, STM thoracic paraspinals Manual: 24mins  Prone: thoracic PA, STM B thoracic paraspinals  Supine: suboccipital release, STM B cervical paraspinals, sideglide mobilizations throughout with exception of C4/5 fusion, gentle manual traction Manual: 18mins  Supine: suboccipital release, STM B cervical paraspinals, sideglide mobilizations throughout with exception of C4/5 fusion, gentle manual traction, OA distraction L Manual: 12mins  Supine: suboccipital release, STM B cervical paraspinals, sideglide mobilizations throughout with exception of C4/5 fusion, gentle manual traction, OA distraction L, 1st rib mob   TherEx: 19 mins  Supine passive  stretching c/s all directions  Sidely thoracic rotations 10x R/L  Seated cervical extension + thoracic extension over chair 10x  Seated cervical rotation with towel assist 10x ea dir  Seated cervical flexion with gentle OP 10x  Seated DARIEN blueTB loop 20x  Scapular clocks - stopped secondary to band pressure on wrist  Standing low trap overhead lift off  Cable rows 20# handles 20x  Overhead lat stretch  Cable low rows 25# handles 20x  Cable low rows 25# 20x  Standing horiz abd 15# 20x   D1 flex/ext 15# 20x R/L  D2 flex/ext 15# 20x  R/L  Overhead stretches at bar therEx: 18mins  Seated c/s stretches flex, ext, sidebend, rotation  UBE lvl 3.0 alternating directions every 1min [id number] therEx: 15mins  UBE lvl 3.0 5mins fwd, 5mins bwd  Supine PROM all directions c/s  Standing rows 15# 20x  Standing low rows 15# 20x  Standing ER 15# 10x R/L Therex: 25mins  Reassessment testing  Supine passive stretching B UTs, into cervical flexion  DNF [id number]  Sidely throacic rotations 10x R/L  Standing DARIEN BlueTB [id number]  Standing horiz abd palms up/palms down blueTB 10x ea  Standing rows cable 20# handles [id number]  Standing low rows 20# [id number]  Standing horiz abd 5# 20x R/L  Standing shoulder adduction  20# handle 20x R/L TherEx: 24mins  PROM c/s all directions   Sidely thoracic rotations  Seated cervical extension over chair 10x   Seated cervical rotation over towel 10x   Seated DARIEN blueTB [id number]  Seated horiz abd blackTB palms up/palms down 20x ea  Standing rows 25# 20x   Standing low rows 25# 20x  D2 flex/ext 15# cable 20x R/L  D1 flex/ext 15# 20x R/L  Overhead stretches                  HEP:   2/28:  sidely thoracic rotations BLEs flexed   3/4: seated 1st rib mob with sheet  3/6: seated cervical flex, seated thoracic ext over chair, may perform rows/low rows at gym at [employer]  3/11: prone low trap lift off  3/13: cervical rotations towel, DARIEN BlueTB   3/18: lateral walk redTB  4/17: blackTB DARIEN and horiz abd    Charges: TherEx x1,  Man x2      Total Timed Treatment: 39min  Total Treatment Time: 39 min

## 2024-05-20 ENCOUNTER — OFFICE VISIT (OUTPATIENT)
Dept: SLEEP CENTER | Age: 54
End: 2024-05-20
Attending: INTERNAL MEDICINE

## 2024-05-20 DIAGNOSIS — G47.33 OSA (OBSTRUCTIVE SLEEP APNEA): ICD-10-CM

## 2024-05-20 PROCEDURE — 95806 SLEEP STUDY UNATT&RESP EFFT: CPT

## 2024-05-23 ENCOUNTER — OFFICE VISIT (OUTPATIENT)
Dept: SLEEP CENTER | Age: 54
End: 2024-05-23
Attending: INTERNAL MEDICINE

## 2024-05-23 PROCEDURE — 95806 SLEEP STUDY UNATT&RESP EFFT: CPT

## 2024-05-28 DIAGNOSIS — M54.16 LUMBAR RADICULOPATHY: ICD-10-CM

## 2024-05-28 NOTE — TELEPHONE ENCOUNTER
Refill Request    Medication request:   HYDROcodone-acetaminophen  MG Oral Tab       LOV: 2/8/2024 Juan A Mcgee MD   RTC: 3 months  NOV: 6/19/2024 Robert Valera DO      [place]: 4/3/2024 #30 days    UDS: (if applicable): 11/2/2023  Pain contract: Valid through 11/16/2024    LOV plan (if weaning or changing medications): He will wean the Norco to one every 6 hours for the next month.

## 2024-05-29 ENCOUNTER — OFFICE VISIT (OUTPATIENT)
Dept: PHYSICAL THERAPY | Age: 54
End: 2024-05-29
Attending: PHYSICAL MEDICINE & REHABILITATION
Payer: MEDICAID

## 2024-05-29 PROCEDURE — 97140 MANUAL THERAPY 1/> REGIONS: CPT

## 2024-05-29 PROCEDURE — 97110 THERAPEUTIC EXERCISES: CPT

## 2024-05-29 NOTE — PROGRESS NOTES
Diagnosis:     Expected by:    Associated DX:  Ankylosing spondylitis of multiple sites in spine (HCC) (M45.0)  Cervical disc disease (M50.90)  Cervical stenosis of spine (M48.02)  Cervical fusion syndrome (Q76.1)  S/P cervical spinal fusion (Z98.1)      Referring Provider: Everardo  Date of Evaluation:    2/28/24    Precautions:  None Next MD visit:   none scheduled  Date of Surgery: n/a   Insurance Primary/Secondary: BLUE CROSS MEDICAID / N/A     # Auth Visits: 16aut exp 6/16/24            Subjective: Pt reports new medication seems to be aggravating things more. He thinks the shots wore off. Feeling more in the left side of his shoulder. Has EMG scheduled.    Pain: 7-8/10        Assessment: Pt with continued cervical and thoracic restrictions limited by pain and tightness. Continued with manual interventions and strengthening. Limited UBE this session secondary to pain.     Objective:     Observation/Posture: fwd head/neck, rounded shoulders, hyperkyphotic posture     Cervical AROM: (* denotes performed with pain)  Flexion: 28  Extension: 49  Sidebending: R 12; L 24  Rotation: R 24; L 24     Accessory motion: decreased lower cervical segmental mobility greatest into flexion and R>L sideglide, dec upper cervical flex and R lateral flex, dec thoracic PA  Palpation: moderate restrictions at suboccipitals, B cervical paraspinals, UTs, thoracic paraspinals     Strength: (* denotes performed with pain)  UE/Scapular   Shoulder Flex: R 4+/5, L 4+/5  Shoulder ABD (C5): R 5/5, L 5/5  Biceps (C6): R 5/5, L 5/5  Wrist ext (C6): R 5/5, L 5/5  Triceps (C7): R 5/5, L 5/5  Wrist Flex (C7): R 5/5, L 4-/5  ER: R: 5/5; L: 5/5  IR: 5/5 B     Rhomboids: R 4/5, L 5/5  Mid trap: R 4/5; L 5/5  Low trap: R 4-/5; L 5/5  *cervical compensation      Flexibility:   UE/Scapular   Upper Trap: R dec; L dec  Levator Scap: R dec; L dec  Pec Major: R dec; L dec  Scalenes: R dec, L dec      Special tests:   Cervical traction: better         Goals:    Goals: (to be met in 8-12 visits)   Pt will verbalize and demo compliance with home program at least 75% of the time for independent management of symptoms - pt compliant  Pt will demo improved cervical AROM by at least 25% for all deficit motions to be able to safely turn head while driving/riding motorcycle - improving  Pt will demo improved BUE strength by at least 1 full muscle grade for all deficit motions to be able to safely lift/carry for functional mobility - partially met  Pt will be able to safely return to exercise to maintain strength and stability for recreation and to decrease future risk for reinjury - in progress    Post Neck Disability Index Score  Post Score: 50 % (4/10/2024  9:10 AM)    0 % improvement      Plan: Reassess and discuss POC  Date: 4/24/2024  TX#: 12/16 Date:  5/8/24               TX#: 13/16 Date: 5/15/24                TX#: 14/16 Date:  5/29/24               TX#: 15/16 Date: 4/17/24  Tx#: 11/16   Manual:15mins  Supine: suboccipital release, STM B cervical paraspinals, sideglide mobilizations throughout with exception of C4/5 fusion, gentle manual traction Manual: 24mins  Supine: suboccipital release, STM B cervical paraspinals, sideglide mobilizations throughout with exception of C4/5 fusion, gentle manual traction, OA distraction L  Prone: thoracic PA, STM thoracic paraspinals Manual: 24mins  Prone: thoracic PA, STM B thoracic paraspinals  Supine: suboccipital release, STM B cervical paraspinals, sideglide mobilizations throughout with exception of C4/5 fusion, gentle manual traction Manual: 20mins  Prone: thoracic PA, STM B thoracic paraspinals R/L  Supine: suboccipital release, STM B cervical paraspinals, sideglide mobilizations throughout with exception of C4/5 fusion, gentle manual traction, OA distraction L Manual: 12mins  Supine: suboccipital release, STM B cervical paraspinals, sideglide mobilizations throughout with exception of C4/5 fusion, gentle manual traction, OA  distraction L, 1st rib mob   TherEx: 19 mins  Supine passive stretching c/s all directions  Sidely thoracic rotations 10x R/L  Seated cervical extension + thoracic extension over chair 10x  Seated cervical rotation with towel assist 10x ea dir  Seated cervical flexion with gentle OP 10x  Seated DARIEN blueTB loop 20x  Scapular clocks - stopped secondary to band pressure on wrist  Standing low trap overhead lift off  Cable rows 20# handles 20x  Overhead lat stretch  Cable low rows 25# handles 20x  Cable low rows 25# 20x  Standing horiz abd 15# 20x   D1 flex/ext 15# 20x R/L  D2 flex/ext 15# 20x  R/L  Overhead stretches at bar therEx: 18mins  Seated c/s stretches flex, ext, sidebend, rotation  UBE lvl 3.0 alternating directions every 1min [id number] therEx: 15mins  UBE lvl 3.0 5mins fwd, 5mins bwd  Supine PROM all directions c/s  Standing rows 15# 20x  Standing low rows 15# 20x  Standing ER 15# 10x R/L Therex: 23mins  Supine passive stretching all dir  Sidely thoracic rotations  Seated cervical/thoracic extensions over towel/chair 10x  Seated cervical rotations with towel assist 10x R/L  Doorway pec stretch [id number]  Standing rows 15# 20x  Standing low rows 15# 20x  Standing horiz abd 15# 2x10 R/L  Standing horiz abd redTB loop 2x10  Low trap Y at wall 2x10 R/L TherEx: 24mins  PROM c/s all directions   Sidely thoracic rotations  Seated cervical extension over chair 10x   Seated cervical rotation over towel 10x   Seated DARIEN blueTB [id number]  Seated horiz abd blackTB palms up/palms down 20x ea  Standing rows 25# 20x   Standing low rows 25# 20x  D2 flex/ext 15# cable 20x R/L  D1 flex/ext 15# 20x R/L  Overhead stretches                  HEP:   2/28:  sidely thoracic rotations BLEs flexed   3/4: seated 1st rib mob with sheet  3/6: seated cervical flex, seated thoracic ext over chair, may perform rows/low rows at gym at [facility]  3/11: prone low trap lift off  3/13: cervical rotations towel, DARIEN BlueTB   3/18: lateral walk redTB  4/17:  Luann DARIEN and radha abd    Charges: TherEx x1, Man x2      Total Timed Treatment: 39min  Total Treatment Time: 39 min

## 2024-06-03 RX ORDER — HYDROCODONE BITARTRATE AND ACETAMINOPHEN 10; 325 MG/1; MG/1
TABLET ORAL
Qty: 150 TABLET | Refills: 0 | Status: SHIPPED | OUTPATIENT
Start: 2024-06-03

## 2024-06-03 NOTE — TELEPHONE ENCOUNTER
Patient calling to check the status of his refill request as is been about 1 week and the medication request has not been send to his pharmacy, he is completely out of the medication.

## 2024-06-05 ENCOUNTER — TELEPHONE (OUTPATIENT)
Dept: INTERNAL MEDICINE CLINIC | Facility: CLINIC | Age: 54
End: 2024-06-05

## 2024-06-05 ENCOUNTER — APPOINTMENT (OUTPATIENT)
Dept: PHYSICAL THERAPY | Age: 54
End: 2024-06-05
Attending: PHYSICAL MEDICINE & REHABILITATION
Payer: MEDICAID

## 2024-06-05 RX ORDER — LIRAGLUTIDE 6 MG/ML
1.8 INJECTION SUBCUTANEOUS DAILY
Qty: 27 ML | Refills: 1 | Status: SHIPPED | OUTPATIENT
Start: 2024-06-05

## 2024-06-05 RX ORDER — LIRAGLUTIDE 6 MG/ML
1.8 INJECTION SUBCUTANEOUS DAILY
Qty: 27 ML | Refills: 0 | Status: SHIPPED | OUTPATIENT
Start: 2024-06-05 | End: 2024-06-05

## 2024-06-05 NOTE — TELEPHONE ENCOUNTER
Patient called the office, as the Victoza medication that was sent to the [facility] they can't fill.  Pharmacy told him that the medication is back ordered and isn't sure when the medication will be in stock.    He called and found the [facility] in [place] has the medication.    He said he has been without this medication for 3 days, and doesn't want to end up in the ER.    Phone # [phone].

## 2024-06-10 RX ORDER — PEN NEEDLE, DIABETIC 30 GX3/16"
1 NEEDLE, DISPOSABLE MISCELLANEOUS DAILY
Qty: 90 EACH | Refills: 0 | Status: SHIPPED | OUTPATIENT
Start: 2024-06-10

## 2024-06-12 DIAGNOSIS — E29.1 HYPOGONADISM IN MALE: ICD-10-CM

## 2024-06-12 RX ORDER — TESTOSTERONE CYPIONATE 200 MG/ML
175 INJECTION, SOLUTION INTRAMUSCULAR
Qty: 5.28 ML | Refills: 0 | Status: SHIPPED | OUTPATIENT
Start: 2024-06-12 | End: 2024-09-10

## 2024-06-17 ENCOUNTER — LAB ENCOUNTER (OUTPATIENT)
Dept: LAB | Facility: HOSPITAL | Age: 54
End: 2024-06-17
Attending: INTERNAL MEDICINE

## 2024-06-17 DIAGNOSIS — E29.1 HYPOGONADISM IN MALE: ICD-10-CM

## 2024-06-17 LAB — TESTOST SERPL-MCNC: 301.15 NG/DL

## 2024-06-17 PROCEDURE — 36415 COLL VENOUS BLD VENIPUNCTURE: CPT

## 2024-06-17 PROCEDURE — 84403 ASSAY OF TOTAL TESTOSTERONE: CPT

## 2024-06-18 ENCOUNTER — HOSPITAL ENCOUNTER (OUTPATIENT)
Dept: MRI IMAGING | Facility: HOSPITAL | Age: 54
Discharge: HOME | End: 2024-06-18
Attending: ORTHOPAEDIC SURGERY

## 2024-06-18 DIAGNOSIS — M77.8 WRIST TENDONITIS: ICD-10-CM

## 2024-06-18 PROCEDURE — 73221 MRI JOINT UPR EXTREM W/O DYE: CPT | Performed by: ORTHOPAEDIC SURGERY

## 2024-06-19 ENCOUNTER — PROCEDURE VISIT (OUTPATIENT)
Dept: PHYSICAL MEDICINE AND REHAB | Facility: CLINIC | Age: 54
End: 2024-06-19

## 2024-06-19 ENCOUNTER — OFFICE VISIT (OUTPATIENT)
Dept: PULMONOLOGY | Facility: CLINIC | Age: 54
End: 2024-06-19

## 2024-06-19 VITALS
RESPIRATION RATE: 14 BRPM | WEIGHT: 260 LBS | SYSTOLIC BLOOD PRESSURE: 121 MMHG | BODY MASS INDEX: 36.4 KG/M2 | DIASTOLIC BLOOD PRESSURE: 72 MMHG | HEART RATE: 71 BPM | OXYGEN SATURATION: 96 % | HEIGHT: 71 IN

## 2024-06-19 DIAGNOSIS — G47.33 OSA (OBSTRUCTIVE SLEEP APNEA): Primary | ICD-10-CM

## 2024-06-19 PROCEDURE — 95911 NRV CNDJ TEST 9-10 STUDIES: CPT | Performed by: PHYSICAL MEDICINE & REHABILITATION

## 2024-06-19 PROCEDURE — 95886 MUSC TEST DONE W/N TEST COMP: CPT | Performed by: PHYSICAL MEDICINE & REHABILITATION

## 2024-06-19 PROCEDURE — 99213 OFFICE O/P EST LOW 20 MIN: CPT | Performed by: INTERNAL MEDICINE

## 2024-06-19 NOTE — PROCEDURES
[facility]  [address] [place] [id number]        Full Name: Ran Carter Gender: Male  Patient ID: [id number] YOB: 1970      Visit Date: 6/19/2024 10:50 AM  Age: 53 Years  Examining Physician: Dr. CHERY Valera  Referring Physician: Dr. HI Villela  Weight: 279 lbs  History: EMG-BUE. Denies history of thyroid disease or daily alcohol use +DM. +pain and N/T.      Sensory NCS      Nerve / Sites Rec. Site Onset Lat Peak Lat NP Amp PP Amp Segments Distance Velocity Comment     ms ms µV µV  cm m/s    R Median - Dig II (Antidromic)      Wrist Index 3.65 5.26 11.6 10.8 Wrist - Index 14 38       Palm Index 4.38 5.21 6.3 15.7 Palm - Index 7 16    L Median - Dig II (Antidromic)      Wrist Index 3.85 4.74 13.3 24.4 Wrist - Index 14 36    R Ulnar - Dig V (Antidromic)      Wrist Dig V 2.71 3.44 14.2 15.1 Wrist - Dig V 14 52    L Ulnar - Dig V (Antidromic)      Wrist Dig V 2.50 3.39 9.5 11.6 Wrist - Dig V 14 56    R Radial - Superficial (Antidromic)      Forearm Wrist 1.93 2.45 20.9 16.9 Forearm - Wrist 10 52    L Radial - Superficial (Antidromic)      Forearm Wrist 1.88 2.50 19.1 18.0 Forearm - Wrist 10 53        Motor NCS      Nerve / Sites Muscle Latency Amplitude Segments Dist. Lat Diff Velocity Comments     ms mV  cm ms m/s    R Median - APB      Wrist APB 5.75 5.3 Wrist - APB 8         Elbow APB 10.50 4.1 Elbow - Wrist 23 4.75 48.4    L Median - APB      Wrist APB 4.63 7.0 Wrist - APB 8         Elbow APB 9.06 5.4 Elbow - Wrist 24 4.44 54.1    R Ulnar - ADM      Wrist ADM 2.69 8.3 Wrist - ADM 8         B.Elbow ADM 6.88 7.0 B.Elbow - Wrist 24 4.19 57.3       A.Elbow ADM 9.40 6.6 A.Elbow - B.Elbow 12 2.52 47.6    L Ulnar - ADM      Wrist ADM 2.90 9.1 Wrist - ADM 8         B.Elbow ADM 6.92 8.5 B.Elbow - Wrist 23 4.02 57.2       A.Elbow ADM 8.98 7.9 A.Elbow - B.Elbow 12 2.06 58.2        EMG Summary Table     Spontaneous MUAP Recruitment   Muscle IA Fib PSW Fasc H.F. Amp Dur. PPP Pattern    L. Deltoid N None None None None N N N N   R. Deltoid N None None None None N N N N   L. Triceps brachii N None None None None N N N N   R. Triceps brachii N None None None None N N N N   L. Biceps brachii N None None None None N N N N   R. Biceps brachii N None None None None N N N Reduced   L. Pronator teres N None None None None N N N N   R. Pronator teres N None None None None N N N Reduced   L. First dorsal interosseous N None None None None N N N N   R. First dorsal interosseous N None None None None N N N N   L. Abductor pollicis brevis N None None None None N N N N   R. Abductor pollicis brevis 1+ 1+ None None None N N N Reduced       Summary    The motor conduction test was performed on 4 nerve(s). The results were normal in 3 nerve(s): L Median - APB, R Ulnar - ADM, L Ulnar - ADM. Results outside the specified normal range were found in 1 nerve(s), as follows:  In the R Median - APB study  the take off latency result was increased for Wrist stimulation    The sensory conduction test was performed on 6 nerve(s). The results were normal in 4 nerve(s): R Ulnar - Dig V (Antidromic), L Ulnar - Dig V (Antidromic), R Radial - Superficial (Antidromic), L Radial - Superficial (Antidromic). Results outside the specified normal range were found in 2 nerve(s), as follows:  In the R Median - Dig II (Antidromic) study  the peak latency result was increased for Wrist stimulation  the peak latency result was increased for Palm stimulation  In the L Median - Dig II (Antidromic) study  the peak latency result was increased for Wrist stimulation    The needle EMG examination was performed in 12 muscles. It was normal in 9 muscle(s): L. Deltoid, R. Deltoid, L. Triceps brachii, R. Triceps brachii, L. Biceps brachii, L. Pronator teres, L. First dorsal interosseous, R. First dorsal interosseous, L. Abductor pollicis brevis. The study was abnormal in 3 muscle(s), with the following distribution:  Abnormal  spontaneous/insertional activity was found in R. Abductor pollicis brevis.  Abnormal interference pattern was found in R. Biceps brachii, R. Pronator teres, R. Abductor pollicis brevis.        Ran Carter [id number] 6/19/2024 10:50 AM     3 of 4    Conclusion:     This is an abnormal study.    1.  There is electrodiagnostic evidence to support a bilateral median mononeuropathy at or distal to the wrist (carpal tunnel syndrome) electrically mild to moderate with active denervation changes noted on needle EMG on the right side.  2.  There is no evidence to support a right or left-sided ulnar mononeuropathy.  3.  There is no evidence to support a right or left-sided cervical (C5-T1) radiculopathy.        Robert Valera DO  Interventional Spine and Sports Medicine Specialist   Physical Medicine and Rehabilitation  [address] [address]    [address] [place] [address]        ________________________  Dr. CHERY Carter [id number] 6/19/2024 10:50 AM     3 of 4

## 2024-06-19 NOTE — PROGRESS NOTES
Subjective:   Patient ID: Ran Carter is a 53 year old male.    HPI  Reported snoring and apnea and daytime sleepiness and fatigue  Denied sinus pressure or nasal congestion or chest pain or shortness of breath or cough  History/Other:   Review of Systems   Constitutional:  Positive for fatigue.   HENT:  Negative for congestion.    Respiratory:  Positive for apnea. Negative for cough and shortness of breath.    Cardiovascular: Negative.    Neurological: Negative.    Psychiatric/Behavioral: Negative.       Current Outpatient Medications   Medication Sig Dispense Refill    testosterone cypionate 200 mg/mL Intramuscular Solution Inject 0.88 mL (176 mg total) into the muscle every 14 (fourteen) days. 5.28 mL 0    Insulin Pen Needle (PEN NEEDLES) 32G X 4 MM Does not apply Misc 1 each daily. 90 each 0    Liraglutide (VICTOZA) 18 MG/3ML Subcutaneous Solution Pen-injector Inject 1.8 mg into the skin daily. 27 mL 1    HYDROcodone-acetaminophen  MG Oral Tab Take 1 tablet by mouth every 4-6 hours PRN pain (max 5 tabs in 24 hours). 150 tablet 0    nystatin-triamcinolone 100,000-0.1 Units/g-% External Ointment Apply twice daily  Monday-Friday to affected areas of rash. 60 g 3    pantoprazole 40 MG Oral Tab EC Take 1 tablet (40 mg total) by mouth every morning before breakfast. 90 tablet 3    SITagliptin Phosphate (JANUVIA) 100 MG Oral Tab Take 1 tablet (100 mg total) by mouth daily. 90 tablet 1    amoxicillin 500 MG Oral Cap       FLOWFLEX COVID-19 AG HOME TEST In Vitro Kit        MG Oral Tab       losartan 50 MG Oral Tab Take 1 tablet (50 mg total) by mouth daily. 90 tablet 0    semaglutide (OZEMPIC, 0.25 OR 0.5 MG/DOSE,) 2 MG/3ML Subcutaneous Solution Pen-injector Inject 0.5 mg into the skin once a week. 1 each 12    naproxen 500 MG Oral Tab EC Take 1 tablet (500 mg total) by mouth 2 (two) times daily with meals. 60 tablet 2    Syringe/Needle, Disp, 27G X 1/2\" 1 ML Does not apply Misc Inject  testosterone every two weeks 50 each 0    Syringe 22G X 1\" 3 ML Does not apply Misc Inject testosterone every 2 weeks 50 each 0    Blood Glucose Monitoring Suppl (FREESTYLE LITE) Does not apply Device 1 Device by Other route 2 (two) times daily. Use as directed. 1 each 3    Glucose Blood (ONETOUCH VERIO) In Vitro Strip Test blood sugar twice daily. 200 strip 3    HYDROcodone-acetaminophen 7.5-325 MG Oral Tab Take 1 tablet by mouth every 4-6 hours PRN pain (max 5 tabs in 24 hours). 150 tablet 0    Lancets Does not apply Misc Check bid 200 each 1    Glucose Blood (FREESTYLE LITE TEST) In Vitro Strip Check bid 100 strip 0    Insulin Pen Needle (PEN NEEDLES) 32G X 4 MM Does not apply Misc 1 each daily. 90 each 0    metFORMIN  MG Oral Tablet 24 Hr       Dulaglutide (TRULICITY) 0.75 MG/0.5ML Subcutaneous Solution Pen-injector Inject 0.75 mg into the skin once a week. 6 mL 0    meclizine 25 MG Oral Tab Take 1 tablet (25 mg total) by mouth 3 (three) times daily as needed. 30 tablet 0    multiple vitamin Oral Chew Tab Chew 1 tablet by mouth daily.       Allergies:No Known Allergies    Objective:   Physical Exam  Constitutional:       General: He is not in acute distress.     Appearance: He is obese.   HENT:      Head: Normocephalic and atraumatic.      Mouth/Throat:      Mouth: Mucous membranes are moist.      Comments: Upper airway class III Mallampati score  Cardiovascular:      Rate and Rhythm: Normal rate.      Heart sounds:      No gallop.   Pulmonary:      Effort: No respiratory distress.      Breath sounds: No stridor. No wheezing, rhonchi or rales.   Musculoskeletal:      Cervical back: Normal range of motion.      Right lower leg: No edema.      Left lower leg: No edema.   Skin:     General: Skin is dry.   Neurological:      Mental Status: He is oriented to person, place, and time.         Assessment & Plan:   1. LEANN (obstructive sleep apnea)        1-LEANN  Mild / Home study with AHI 12   Pt symptomatic with  ESS of 12  BMI 36 with crowded upper airway    Pt will benefit from a therapy with CPAP  Educated about LEANN and CPAP therapy    Plan :  Auto CPAP 5-15 CWP with mask fitting was ordered    Continue with diet and exercise and weight reduction  Avoid sedative and narcotic and alcohol  Avoid driving if sleepy  Avoid working in heavy machinery if sleepy  Keep regular sleep-wake cycles     Follow-up with me in 3  months       Meds This Visit:  Requested Prescriptions      No prescriptions requested or ordered in this encounter       Imaging & Referrals:  None

## 2024-06-24 ENCOUNTER — TELEPHONE (OUTPATIENT)
Dept: PHYSICAL THERAPY | Facility: HOSPITAL | Age: 54
End: 2024-06-24

## 2024-06-24 ENCOUNTER — APPOINTMENT (OUTPATIENT)
Dept: PHYSICAL THERAPY | Age: 54
End: 2024-06-24
Attending: PHYSICAL MEDICINE & REHABILITATION
Payer: MEDICAID

## 2024-06-27 DIAGNOSIS — M54.16 LUMBAR RADICULOPATHY: ICD-10-CM

## 2024-06-27 NOTE — TELEPHONE ENCOUNTER
Refill Request    Medication request: HYDROcodone-acetaminophen  MG Oral Tab  Take 1 tablet by mouth every 4-6 hours PRN pain (max 5 tabs in 24 hours).     LOV:2/8/2024 Juan A Mcgee MD   Due back to clinic per last office note:  \"He will follow up in 2-3 months or sooner if needed. \"  NOV: 8/7/2024 Juan A Mcgee MD      [place]: 6/3/24 #150 - 30 day supply    Urine drug screen (if applicable): 11/2/23  Pain contract: Valid until 11/16/24    LOV plan (if weaning or changing medications): Per  at LOV: \"He will wean the Norco to one every 6 hours for the next month. \"      Per patient update 3/26/24: \"I’ve tried taking 1 every six hrs as Doctor Everardo requested. But immediately, now I’ve been waking up and getting migraines in the mornings and during the day from the pain on my neck. \"

## 2024-06-28 RX ORDER — HYDROCODONE BITARTRATE AND ACETAMINOPHEN 10; 325 MG/1; MG/1
TABLET ORAL
Qty: 150 TABLET | Refills: 0 | Status: SHIPPED | OUTPATIENT
Start: 2024-07-03

## 2024-07-01 NOTE — PAT NURSING NOTE
Patient instructed to hold Victoza daily injection day of procedure per the Pre-surgical testing policy.      Patient instructed to hold Tadalafil 3 days prior to procedure per the Pre-surgical testing policy.      Patient instructed to hold vitamins and supplements 7 days prior to procedure per the Pre-surgical testing policy.

## 2024-07-02 ENCOUNTER — PATIENT MESSAGE (OUTPATIENT)
Dept: INTERNAL MEDICINE CLINIC | Facility: CLINIC | Age: 54
End: 2024-07-02

## 2024-07-02 DIAGNOSIS — E11.9 TYPE 2 DIABETES MELLITUS WITHOUT COMPLICATION, WITHOUT LONG-TERM CURRENT USE OF INSULIN (HCC): ICD-10-CM

## 2024-07-02 DIAGNOSIS — E11.65 UNCONTROLLED TYPE 2 DIABETES MELLITUS WITH HYPERGLYCEMIA (HCC): Primary | ICD-10-CM

## 2024-07-03 ENCOUNTER — APPOINTMENT (OUTPATIENT)
Dept: PHYSICAL THERAPY | Age: 54
End: 2024-07-03
Attending: PHYSICAL MEDICINE & REHABILITATION
Payer: MEDICAID

## 2024-07-03 NOTE — TELEPHONE ENCOUNTER
Spoke with [facility] inquiry if any other pharmacies have Victoza in stock? RN informed [facility] in [place] at [phone] has Victoza in stock.[facility] in [place] stated Victoza  back order not available until at least August.      Please advise.

## 2024-07-03 NOTE — TELEPHONE ENCOUNTER
From: Ran Carter  To: Todd Rivas  Sent: 7/2/2024 5:27 PM CDT  Subject: Victoza still out of stock    Lois Doctor   Hope all is well. I’m reaching out to you because Victoza still out of stock everywhere I called so far. I am running of victoza, please advise.     Thank you kindly

## 2024-07-05 RX ORDER — LOSARTAN POTASSIUM 50 MG/1
50 TABLET ORAL DAILY
Qty: 90 TABLET | Refills: 0 | Status: SHIPPED | OUTPATIENT
Start: 2024-07-05 | End: 2024-10-03

## 2024-07-09 ENCOUNTER — TELEPHONE (OUTPATIENT)
Dept: GASTROENTEROLOGY | Facility: CLINIC | Age: 54
End: 2024-07-09

## 2024-07-09 NOTE — TELEPHONE ENCOUNTER
Called patient. Pt states that he takes the victoza 1.8 daily not weekly and did take it today     Per 2/9/24 TE pt was to hold victoza for one week, but patient states he thought it was the day before.     Routed to NP who gave EGD orders.

## 2024-07-09 NOTE — TELEPHONE ENCOUNTER
Spoke with lead MA who f/u with [facility] and states that for daily Victoza it needs to be hled the day of. Advised patient.     Discussed with patient can hold [place] day of procedure. Per patient he spoke with [facility] pre admissions who stated he could take it. Advised patient he could follow what preadmission testing told him at this time.

## 2024-07-09 NOTE — TELEPHONE ENCOUNTER
Patient needs to hold Victoza one week prior. Will have to cancel and reschedule.     Huong Pepe PA-C

## 2024-07-09 NOTE — TELEPHONE ENCOUNTER
Patient has procedure tomorrow and asking if he should stop taking pain medication and Victoza for diabetes. I will try nurse line, patient has procedure tomorrow, thanks.

## 2024-07-10 ENCOUNTER — ANESTHESIA EVENT (OUTPATIENT)
Dept: ENDOSCOPY | Facility: HOSPITAL | Age: 54
End: 2024-07-10
Payer: MEDICAID

## 2024-07-10 ENCOUNTER — HOSPITAL ENCOUNTER (OUTPATIENT)
Facility: HOSPITAL | Age: 54
Setting detail: HOSPITAL OUTPATIENT SURGERY
Discharge: HOME | End: 2024-07-10
Attending: INTERNAL MEDICINE | Admitting: INTERNAL MEDICINE
Payer: MEDICAID

## 2024-07-10 ENCOUNTER — ANESTHESIA (OUTPATIENT)
Dept: ENDOSCOPY | Facility: HOSPITAL | Age: 54
End: 2024-07-10
Payer: MEDICAID

## 2024-07-10 VITALS
OXYGEN SATURATION: 96 % | HEIGHT: 71 IN | WEIGHT: 260 LBS | BODY MASS INDEX: 36.4 KG/M2 | DIASTOLIC BLOOD PRESSURE: 96 MMHG | TEMPERATURE: 97 F | RESPIRATION RATE: 19 BRPM | HEART RATE: 70 BPM | SYSTOLIC BLOOD PRESSURE: 143 MMHG

## 2024-07-10 LAB — GLUCOSE BLDC GLUCOMTR-MCNC: 81 MG/DL (ref 70–99)

## 2024-07-10 PROCEDURE — 0DJ08ZZ INSPECTION OF UPPER INTESTINAL TRACT, VIA NATURAL OR ARTIFICIAL OPENING ENDOSCOPIC: ICD-10-PCS | Performed by: INTERNAL MEDICINE

## 2024-07-10 PROCEDURE — 82962 GLUCOSE BLOOD TEST: CPT

## 2024-07-10 RX ORDER — SODIUM CHLORIDE, SODIUM LACTATE, POTASSIUM CHLORIDE, CALCIUM CHLORIDE 600; 310; 30; 20 MG/100ML; MG/100ML; MG/100ML; MG/100ML
INJECTION, SOLUTION INTRAVENOUS CONTINUOUS
Status: DISCONTINUED | OUTPATIENT
Start: 2024-07-10 | End: 2024-07-10

## 2024-07-10 RX ORDER — NALOXONE HYDROCHLORIDE 0.4 MG/ML
0.08 INJECTION, SOLUTION INTRAMUSCULAR; INTRAVENOUS; SUBCUTANEOUS ONCE AS NEEDED
Status: DISCONTINUED | OUTPATIENT
Start: 2024-07-10 | End: 2024-07-10

## 2024-07-10 RX ORDER — LIDOCAINE HYDROCHLORIDE 10 MG/ML
INJECTION, SOLUTION EPIDURAL; INFILTRATION; INTRACAUDAL; PERINEURAL AS NEEDED
Status: DISCONTINUED | OUTPATIENT
Start: 2024-07-10 | End: 2024-07-10 | Stop reason: SURG

## 2024-07-10 RX ADMIN — SODIUM CHLORIDE, SODIUM LACTATE, POTASSIUM CHLORIDE, CALCIUM CHLORIDE: 600; 310; 30; 20 INJECTION, SOLUTION INTRAVENOUS at 14:26:00

## 2024-07-10 RX ADMIN — LIDOCAINE HYDROCHLORIDE 50 MG: 10 INJECTION, SOLUTION EPIDURAL; INFILTRATION; INTRACAUDAL; PERINEURAL at 14:27:00

## 2024-07-10 RX ADMIN — SODIUM CHLORIDE, SODIUM LACTATE, POTASSIUM CHLORIDE, CALCIUM CHLORIDE: 600; 310; 30; 20 INJECTION, SOLUTION INTRAVENOUS at 14:42:00

## 2024-07-10 NOTE — DISCHARGE INSTRUCTIONS
Home Care Instructions for Gastroscopy with Sedation    Diet:  - Resume your regular diet as tolerated unless otherwise instructed.  - Start with light meals to minimize bloating.  - Do not drink alcohol today.    Medication:  - If you have questions about resuming your normal medications, please contact your Primary Care Physician.    Activities:  - Take it easy today. Do not return to work today.  - Do not drive today.  - Do not operate any machinery today (including kitchen equipment).    Gastroscopy:  - You may have a sore throat for 2-3 days following the exam. This is normal. Gargling with warm salt water (1/2 tsp salt to 1 glass warm water) or using throat lozenges will help.  - If you experience any sharp pain in your neck, abdomen or chest, vomiting of blood, oral temperature over 100 degrees Fahrenheit, light-headedness or dizziness, or any other problems, contact your doctor.    **If unable to reach your doctor, please go to the [facility] Emergency Room**    - Your referring physician will receive a full report of your examination.  - If you do not hear from your doctor's office within two weeks of your biopsy, please call them for your results.    You may be able to see your laboratory results in [employer] between 4 and 7 business days.  In some cases, your physician may not have viewed the results before they are released to [employer].  If you have questions regarding your results contact the physician who ordered the test/exam by phone or via [employer] by choosing \"Ask a Medical Question.\"

## 2024-07-10 NOTE — ANESTHESIA POSTPROCEDURE EVALUATION
Patient: Ran Carter    Procedure Summary       Date: 07/10/24 Room / Location: [facility] ENDOSCOPY 04 / [facility] ENDOSCOPY    Anesthesia Start: 1426 Anesthesia Stop: 1445    Procedure: ESOPHAGOGASTRODUODENOSCOPY Diagnosis:       Personal history of unspecified digestive disease      Esophagitis, eosinophilic      (small hiatal hernia)    Surgeons: Amy Means MD Anesthesiologist: Fadia Bridges CRNA    Anesthesia Type: general ASA Status: 3            Anesthesia Type: general    Vitals Value Taken Time   /89 07/10/24 1444   Temp 97.4 °F (36.3 °C) 07/10/24 1444   Pulse 86 07/10/24 1444   Resp 12 07/10/24 1444   SpO2 96 % 07/10/24 1444       [facility] AN Post Evaluation:   Patient Evaluated in PACU  Patient Participation: complete - patient participated  Level of Consciousness: sleepy but conscious  Pain Score: 0  Pain Management: adequate  Airway Patency:patent  Dental exam unchanged from preop  Yes    Cardiovascular Status: acceptable  Respiratory Status: acceptable  Postoperative Hydration acceptable    Fadia Bridges CRNA  7/10/2024 2:45 PM

## 2024-07-10 NOTE — OPERATIVE REPORT
ESOPHAGOGASTRODUODENOSCOPY (EGD) REPORT    Ran Carter     1970 Age 53 year old   PCP Todd Rivas MD Endoscopist Amy Means MD     Date of procedure: 07/10/24    Procedure: EGD w/ cold biopsy    Pre-operative diagnosis: History of severe esophagitis - evaluation for healing     Post-operative diagnosis: hiatal hernia    Medications: MAC    Complications: none    Procedure:  Informed consent was obtained from the patient after the risks of the procedure were discussed, including but not limited to bleeding, perforation, aspiration, infection, or possibility of a missed lesion. After discussions of the risks/benefits and alternatives to this procedure, as well as the planned sedation, the patient was placed in the left lateral decubitus position and begun on continuous blood pressure pulse oximetry and EKG monitoring and this was maintained throughout the procedure. Once an adequate level of sedation was obtained a bite block was placed. Then the lubricated tip of the [id number] diagnostic video upper endoscope was inserted and advanced using direct visualization into the posterior pharynx and ultimately into the esophagus.    Complications: None    Findings:      1. Esophagus: The squamocolumnar junction was noted at 38 cm and appeared normal. The diaphragmatic pinch was noted noted at 40 cm from the incisors. There was a 2 cm hiatal hernia. The esophageal mucosa appeared otherwise unremarkable. There was no endoscopic findings of esophagitis, stricture or Becerra's esophagus.    2. Stomach: The stomach distended normally. Normal rugal folds were seen. The pylorus was patent. The gastric mucosa appeared unremarkable. Retroflexion revealed a normal fundus and a non-patulous cardia.     3. Duodenum: The duodenal mucosa appeared normal in the 1st and 2nd portion of the duodenum.     Impression:  1. Small hiatal hernia.  2. Healed esophagitis.  3. Otherwise unremarkable EGD.      Recommend:  1. Continue pantoprazole daily to twice a day to control acid reflux symptoms.  2. Follow up in GI clinic if any symptoms recur or annually.      >>>If tissue was sampled/removed and you have not received your pathology results either by phone or letter within 2 weeks, please call our office at [phone].    Specimens: none.     Blood loss: <1 ml

## 2024-07-10 NOTE — H&P
History & Physical Examination    Patient Name: Ran Carter  MRN: [id number]  [facility]: [id number]  YOB: 1970    Diagnosis: evaluation of healing of severe esophagitis     Medications Prior to Admission   Medication Sig Dispense Refill Last Dose    HYDROcodone-acetaminophen  MG Oral Tab Take 1 tablet by mouth every 4-6 hours PRN pain (max 5 tabs in 24 hours). 150 tablet 0 7/10/2024 at 0700    Liraglutide (VICTOZA) 18 MG/3ML Subcutaneous Solution Pen-injector Inject 1.8 mg into the skin daily. 27 mL 1 2024    pantoprazole 40 MG Oral Tab EC Take 1 tablet (40 mg total) by mouth every morning before breakfast. 90 tablet 3 2024    HYDROcodone-acetaminophen 7.5-325 MG Oral Tab Take 1 tablet by mouth every 4-6 hours PRN pain (max 5 tabs in 24 hours). 150 tablet 0  at prn    multiple vitamin Oral Chew Tab Chew 1 tablet by mouth daily.       losartan 50 MG Oral Tab Take 1 tablet (50 mg total) by mouth daily. 90 tablet 0 2024    testosterone cypionate 200 mg/mL Intramuscular Solution Inject 0.88 mL (176 mg total) into the muscle every 14 (fourteen) days. 5.28 mL 0     Insulin Pen Needle (PEN NEEDLES) 32G X 4 MM Does not apply Misc 1 each daily. 90 each 0     nystatin-triamcinolone 100,000-0.1 Units/g-% External Ointment Apply twice daily  Monday-Friday to affected areas of rash. 60 g 3     SITagliptin Phosphate (JANUVIA) 100 MG Oral Tab Take 1 tablet (100 mg total) by mouth daily. (Patient not taking: Reported on 2024) 90 tablet 1 Not Taking    amoxicillin 500 MG Oral Cap  (Patient not taking: Reported on 2024)   Not Taking    FLOWFLEX COVID-19 AG HOME TEST In Vitro Kit         MG Oral Tab  (Patient not taking: Reported on 2024)   Not Taking    semaglutide (OZEMPIC, 0.25 OR 0.5 MG/DOSE,) 2 MG/3ML Subcutaneous Solution Pen-injector Inject 0.5 mg into the skin once a week. (Patient not taking: Reported on 2024) 1 each 12 Not Taking    [] Tadalafil 10 MG Oral  Tab Take 1 tablet (10 mg total) by mouth daily as needed for Erectile Dysfunction. 30 tablet 0 2024    [] hydrocortisone 2.5 % External Cream Apply 1 Application topically 2 (two) times daily for 14 days. 20 g 0     naproxen 500 MG Oral Tab EC Take 1 tablet (500 mg total) by mouth 2 (two) times daily with meals. (Patient not taking: Reported on 2024) 60 tablet 2 Not Taking    Syringe/Needle, Disp, 27G X 1/2\" 1 ML Does not apply Misc Inject testosterone every two weeks 50 each 0     Syringe 22G X 1\" 3 ML Does not apply Misc Inject testosterone every 2 weeks 50 each 0     Blood Glucose Monitoring Suppl (FREESTYLE LITE) Does not apply Device 1 Device by Other route 2 (two) times daily. Use as directed. 1 each 3     Glucose Blood (ONETOUCH VERIO) In Vitro Strip Test blood sugar twice daily. 200 strip 3     Lancets Does not apply Misc Check bid 200 each 1     Glucose Blood (FREESTYLE LITE TEST) In Vitro Strip Check bid 100 strip 0     Insulin Pen Needle (PEN NEEDLES) 32G X 4 MM Does not apply Misc 1 each daily. 90 each 0     metFORMIN  MG Oral Tablet 24 Hr        Dulaglutide (TRULICITY) 0.75 MG/0.5ML Subcutaneous Solution Pen-injector Inject 0.75 mg into the skin once a week. 6 mL 0     meclizine 25 MG Oral Tab Take 1 tablet (25 mg total) by mouth 3 (three) times daily as needed. (Patient not taking: Reported on 2024) 30 tablet 0 Not Taking     Current Facility-Administered Medications   Medication Dose Route Frequency    lactated ringers infusion   Intravenous Continuous       Allergies: No Known Allergies    Past Medical History:    Back problem    Chronic neck pain    Contusion of cervical cord (HCC)    Diabetes (HCC)    High blood pressure    Migraines    Went to emergency care clinic and suggested to get an MRI done because I've been dealing with a bad migraine for over a week straight today being a 10 from 1-10 10,being the worse    Primary hypertension    Sleep apnea    Visual impairment     readers     Past Surgical History:   Procedure Laterality Date    Colonoscopy N/A 2/7/2024    Procedure: COLONOSCOPY;  Surgeon: Amy Means MD;  Location: [facility] ENDOSCOPY    Ct cervical spine      c3 & 4     Hernia surgery  2005    hiatal hernia    Knee surgery  2008    RIGHT ACL REPAIR     Family History   Problem Relation Age of Onset    No Known Problems Mother     No Known Problems Sister     No Known Problems Brother     Heart Disorder Paternal Grandmother     Diabetes Paternal Grandmother     Cancer Paternal Grandmother     Glaucoma Neg     Macular degeneration Neg      Social History     Tobacco Use    Smoking status: Never     Passive exposure: Past    Smokeless tobacco: Never   Substance Use Topics    Alcohol use: Not Currently     Alcohol/week: 1.0 standard drink of alcohol     Comment: Did social         SYSTEM Check if Review is Normal Check if Physical Exam is Normal If not normal, please explain:   HEENT [X ] [ X]    NECK  [X ] [ X]    HEART [X ] [ X]    LUNGS [X ] [ X]    ABDOMEN [X ] [ X]    EXTREMITIES [X ] [ X]    OTHER        I have discussed the risks and benefits and alternatives of the procedure with the patient/family.  They understand and agree to proceed with plan of care.   I have reviewed the History and Physical done within the last 30 days.  Any changes noted above.    Amy Means MD  [facility] - Gastroenterology  7/10/2024
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
## 2024-07-12 ENCOUNTER — TELEPHONE (OUTPATIENT)
Dept: ENDOCRINOLOGY CLINIC | Facility: CLINIC | Age: 54
End: 2024-07-12

## 2024-07-12 ENCOUNTER — OFFICE VISIT (OUTPATIENT)
Dept: ENDOCRINOLOGY CLINIC | Facility: CLINIC | Age: 54
End: 2024-07-12
Payer: MEDICAID

## 2024-07-12 VITALS
DIASTOLIC BLOOD PRESSURE: 94 MMHG | HEART RATE: 75 BPM | BODY MASS INDEX: 39 KG/M2 | SYSTOLIC BLOOD PRESSURE: 137 MMHG | WEIGHT: 282 LBS

## 2024-07-12 DIAGNOSIS — E11.69 TYPE 2 DIABETES MELLITUS WITH OTHER SPECIFIED COMPLICATION, WITHOUT LONG-TERM CURRENT USE OF INSULIN (HCC): ICD-10-CM

## 2024-07-12 DIAGNOSIS — D35.00 ADRENAL ADENOMA, UNSPECIFIED LATERALITY: Primary | ICD-10-CM

## 2024-07-12 DIAGNOSIS — E78.5 DYSLIPIDEMIA: ICD-10-CM

## 2024-07-12 DIAGNOSIS — E29.1 HYPOGONADISM IN MALE: ICD-10-CM

## 2024-07-12 LAB
GLUCOSE BLOOD: 97
HEMOGLOBIN A1C: 5.6 % (ref 4.3–5.6)
TEST STRIP LOT #: NORMAL NUMERIC

## 2024-07-12 RX ORDER — ROSUVASTATIN CALCIUM 5 MG/1
5 TABLET, COATED ORAL NIGHTLY
Qty: 90 TABLET | Refills: 0 | Status: SHIPPED | OUTPATIENT
Start: 2024-07-12

## 2024-07-12 RX ORDER — LOSARTAN POTASSIUM 50 MG/1
50 TABLET ORAL DAILY
Qty: 90 TABLET | Refills: 0 | Status: SHIPPED | OUTPATIENT
Start: 2024-07-12 | End: 2024-10-10

## 2024-07-12 NOTE — TELEPHONE ENCOUNTER
Medication PA Requested:      Rebylsus   3mg                                                 CoverMyMeds Used: No  Key:  Quantity:  Day Supply: 30  Sig: Take one tablet daily  DX Code:   E11.69                                  CPT code (if applicable):   Case Number/Pending Ref#:

## 2024-07-12 NOTE — PROGRESS NOTES
Return Office Visit     CHIEF COMPLAINT:    Hypogonadism   Adrenal nodule  DM --> patient will like to address    HISTORY OF PRESENT ILLNESS:  Ran Carter is a 53 year old male who presents for follow up for for evaluation of an adrenal adenoma and hypogonadism     This was noted incidentally on CT scan in May 2019. Stable adenoma on CT in June 2020  Right sided adrenal nodule measuring 2.6 x 2 cm.       Weight gain (central): Moon facies, buffalo hump: no  Recurrent infections: no  Muscle wasting: no  Bleeding/clotting disorders: no   Uncontrolled DM/HTN: no , he was diagnosed with DM, stable       Presenting symptoms:   fatigue, lack of sexual desire, erections do not sustain  No history of testicular trauma, ketoconzole or anti androgen use.     No history of hypothalamic or pituitary tumors/ infiltrative disease, radiation to head and neck region, recent critical illness, head trauma, weight loss, glucocorticoid, anabolic steroid use, excessive alcohol use. No history of uncontrolled DM.      No strokes/ HA  No h/o prostate cancer ( personal or family)  No h/o bleeding/ clotting disorders.   No LEANN      He is on T replacement  176 mg q two weeks  + Fatigue--> Better , but not all the way  + lack of erections--> Better , but not all the way          CURRENT MEDICATION:    Current Outpatient Medications   Medication Sig Dispense Refill    Semaglutide 3 MG Oral Tab Take 3 mg by mouth daily. 30 tablet 0    rosuvastatin 5 MG Oral Tab Take 1 tablet (5 mg total) by mouth nightly. 90 tablet 0    Blood Pressure Monitoring (BLOOD PRESSURE KIT) Does not apply Device 1 each daily. 1 each 0    losartan 50 MG Oral Tab Take 1 tablet (50 mg total) by mouth daily. 90 tablet 0    HYDROcodone-acetaminophen  MG Oral Tab Take 1 tablet by mouth every 4-6 hours PRN pain (max 5 tabs in 24 hours). 150 tablet 0    testosterone cypionate 200 mg/mL Intramuscular Solution Inject 0.88 mL (176 mg total) into the  muscle every 14 (fourteen) days. 5.28 mL 0    Insulin Pen Needle (PEN NEEDLES) 32G X 4 MM Does not apply Misc 1 each daily. 90 each 0    nystatin-triamcinolone 100,000-0.1 Units/g-% External Ointment Apply twice daily  Monday-Friday to affected areas of rash. 60 g 3    pantoprazole 40 MG Oral Tab EC Take 1 tablet (40 mg total) by mouth every morning before breakfast. 90 tablet 3    amoxicillin 500 MG Oral Cap       FLOWFLEX COVID-19 AG HOME TEST In Vitro Kit       Syringe/Needle, Disp, 27G X 1/2\" 1 ML Does not apply Misc Inject testosterone every two weeks 50 each 0    Syringe 22G X 1\" 3 ML Does not apply Misc Inject testosterone every 2 weeks 50 each 0    Blood Glucose Monitoring Suppl (FREESTYLE LITE) Does not apply Device 1 Device by Other route 2 (two) times daily. Use as directed. 1 each 3    Glucose Blood (ONETOUCH VERIO) In Vitro Strip Test blood sugar twice daily. 200 strip 3    HYDROcodone-acetaminophen 7.5-325 MG Oral Tab Take 1 tablet by mouth every 4-6 hours PRN pain (max 5 tabs in 24 hours). 150 tablet 0    Lancets Does not apply Misc Check bid 200 each 1    Glucose Blood (FREESTYLE LITE TEST) In Vitro Strip Check bid 100 strip 0    Insulin Pen Needle (PEN NEEDLES) 32G X 4 MM Does not apply Misc 1 each daily. 90 each 0    meclizine 25 MG Oral Tab Take 1 tablet (25 mg total) by mouth 3 (three) times daily as needed. 30 tablet 0    multiple vitamin Oral Chew Tab Chew 1 tablet by mouth daily.           ALLERGY:  No Known Allergies    PAST MEDICAL, SOCIAL AND FAMILY HISTORY:  See past medical history marked as reviewed.  See past surgical history marked as reviewed.  See past family history marked as reviewed.  See past social history marked as reviewed.    ASSESSMENTS:     REVIEW OF SYSTEMS:  Constitutional: Negative for:  fever, + fatigue, cold/heat intolerance, +  weight gain   Eyes: Negative for:  Visual changes, proptosis, blurring  ENT: Negative for:  dysphagia, neck swelling, dysphonia  Respiratory:  Negative for:  dyspnea, cough  Cardiovascular: Negative for:  chest pain, palpitations, orthopnea  GI: Negative for:  abdominal pain, nausea, vomiting, diarrhea, constipation, bleeding  Neurology: Negative for: headache, numbness, weakness  Genito-Urinary: Negative for: dysuria, frequency  Psychiatric: Negative for:  depression, anxiety  Hematology/Lymphatics: Negative for: bruising, lower extremity edema  Endocrine: Negative for: polyuria, polydypsia  Skin: Negative for: rash, blister, cellulitis,       PHYSICAL EXAM:     Vitals reviewed    General Appearance:  alert, well developed, in no acute distress  Head: Atraumatic  Eyes:  normal conjunctivae, sclera., normal sclera and normal pupils  Throat/Neck: normal sound to voice. Normal hearing, normal speech  Respiratory:  Speaking in full sentences, non-labored. no increased work of breathing, no audible wheezing    Neuro: motor grossly intact, moving all extremities without difficulty  Psychiatric:  oriented to time, self, and place  Extremities: no obvious extremity swelling, no lesions      DATA:     Pertinent data reviewed    ASSESSMENT AND PLAN:    Patient is a 53 year old male with    1. Right sided adrenal adenoma.      I discussed the following:  - Adrenal gland structure and function  - Adrenal incidentaloma is a lesion that is over 1 cm in size  - All patients with an adrenal adenoma should be evaluated for the possibility of malignancy and subclinical hormonal hyperfunction        PLAN:  - cortisol and metanephrine normal in 2023/ early 2024  - He does not have HTN, hence does not need renin/ jose  - CT abdomen 6/2020: Stable 2.6 cm right adrenal adenoma.  CT in 12/2023 shows stable adenoma         2. Hypogonadism  Discussed Endocrine society guidelines for diagnosis of Hypogonadism.     Discussed the changes that can be induced with testosterone therapy    Discussed the side effects of testosterone. Patient understands that testosterone therapy can  contribute to sleep apnea, cause acne and other skin reactions, stimulate non cancerous growth of prostate (benign prostate hyperplasia) and growth of existing prostate cancer, enlarge breasts, limit sperm production, cause testicular shrinkage, increase risk of a blood clot forming in a deep vein (deep vein thrombosis), which could break loose, travel through the bloodstream and lodge in the  lungs, blocking blood flow (pulmonary embolism), could adversely effect liver and lipids and increase the number of red blood cells, a condition called erythrocytosis. Edema, with or without congestive heart failure, may be a serious complication in patients with pre-existing cardiac, renal or hepatic disease. Also, there is a there is a possible increased risk of cardiovascular disease and strokes associated with testosterone use.    Discussed possibility of infertility given lack of spermatogenesis.   He does not have children and understands that being on T replacement might impair his ability to have children.    Discussed regular monitoring of T levels and hematocrit/ PSA/ hepatic panel, labs reviewed    PLAN:  - T is on the better, but on the lower side, he confirms that this is a trough level  - Will increase T to 176 --> 200 mg q 2 weeks  - Repeat labs as below midway between 5th and 6th T injection       3. Type 2 DM  Plan:  Discussed the pathogenesis, natural course of diabetes. Patient understands the importance of glycemic control and the implications of uncontrolled diabetes including Diabetic ketoacidosis and various micro vascular and macrovascular complications.     Diagnosis April 2023: 6.5 %   Patient has Type 2 DM  He has been on the following medications in the past   MTF--> GI SE  Trulicity --> GI SE  Currently on Victoza --> GI SE  Januvia --> GI SE    We discussed reblysus, he will like to try   We will  PA of rebylsus 3 mg daily   No personal or family history of MEN syndrome  Patient counselled  regarding side effects including injection site reactions, nausea, vomiting, diarrhea, pancreatitis, gastroparesis and rare side effect keyon Joo syndrome.      Check BG a few times a week, can alternate timings  > Annual eye exams  > daily feet exam  > Dyslipidemia : low fat diet, daily exercise, start rosuvastatin 5 mg daily ( most common SE: muscle cramping)  fasting lipid panel in 3 months  > Nephropathy screening: Ur MA ordered  > BP is on the higher side: he reports compliance with losartan.   - Low salt diet   - c/w losartan   - BP machine prescribed : to check BP daily and send readings in one week   Will adjust medication is this continues to be high   Keeping a healthy BP decreases risk of HA and strokes           Patient verbalized a complete  understanding of all of the above and did not have any further questions.         RTC in 6 months  Labs as below, Call for results.            Orders Placed This Encounter   Procedures    POC HemoCue Glucose 201 (Finger stick glucose)    POC Glycohemoglobin [18179]    Lipid Panel    Testosterone Total    Microalb/Creat Ratio, Random Urine [E]         Consuelo Guadarrama MD

## 2024-07-12 NOTE — TELEPHONE ENCOUNTER
Patient has Type 2 DM  He has been on the following medications in the past   MTF--> GI SE  Trulicity --> GI SE  Currently on Victoza --> GI SE  Januvia --> GI SE    Can we please PA of rebylsus 3 mg daily     Thanks

## 2024-07-12 NOTE — TELEPHONE ENCOUNTER
Message to Endocrinology    Progress notes from 7/12/24 needs to be completed prior to submitting prior authorization.     Please notify upon completion

## 2024-07-15 ENCOUNTER — OFFICE VISIT (OUTPATIENT)
Dept: INTERNAL MEDICINE CLINIC | Facility: CLINIC | Age: 54
End: 2024-07-15
Payer: MEDICAID

## 2024-07-15 VITALS
HEART RATE: 85 BPM | DIASTOLIC BLOOD PRESSURE: 82 MMHG | OXYGEN SATURATION: 100 % | BODY MASS INDEX: 39.03 KG/M2 | SYSTOLIC BLOOD PRESSURE: 122 MMHG | HEIGHT: 71 IN | WEIGHT: 278.81 LBS

## 2024-07-15 DIAGNOSIS — E78.5 HYPERLIPIDEMIA, UNSPECIFIED HYPERLIPIDEMIA TYPE: ICD-10-CM

## 2024-07-15 DIAGNOSIS — E11.9 CONTROLLED TYPE 2 DIABETES MELLITUS WITHOUT COMPLICATION, WITHOUT LONG-TERM CURRENT USE OF INSULIN (HCC): ICD-10-CM

## 2024-07-15 DIAGNOSIS — I10 PRIMARY HYPERTENSION: ICD-10-CM

## 2024-07-15 DIAGNOSIS — Z00.00 ANNUAL PHYSICAL EXAM: Primary | ICD-10-CM

## 2024-07-15 DIAGNOSIS — R79.89 LOW TESTOSTERONE IN MALE: ICD-10-CM

## 2024-07-15 PROCEDURE — 99396 PREV VISIT EST AGE 40-64: CPT | Performed by: INTERNAL MEDICINE

## 2024-07-15 RX ORDER — NAPROXEN 500 MG/1
TABLET ORAL
COMMUNITY
Start: 2024-02-08

## 2024-07-15 RX ORDER — PANTOPRAZOLE SODIUM 40 MG/1
40 TABLET, DELAYED RELEASE ORAL
Qty: 180 TABLET | Refills: 1 | Status: SHIPPED | OUTPATIENT
Start: 2024-07-15
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
## 2024-07-16 NOTE — TELEPHONE ENCOUNTER
Ligia LOMBARDI from [employer] calling regards prior authorization. States is missing only day supply. Please call.     Case #: [id number]

## 2024-07-16 NOTE — TELEPHONE ENCOUNTER
Medication PA Requested:  Rebylsus   3mg                                                 CoverMyMeds Used: No  Key:  Quantity:  Day Supply: 30  Sig: Take one tablet daily  DX Code:   E11.69        Electronic prior authorization submitted, last office visit and A1C 7/12  Awaiting determination

## 2024-07-17 ENCOUNTER — TELEPHONE (OUTPATIENT)
Dept: PHYSICAL THERAPY | Age: 54
End: 2024-07-17

## 2024-07-17 ENCOUNTER — APPOINTMENT (OUTPATIENT)
Dept: PHYSICAL THERAPY | Age: 54
End: 2024-07-17
Attending: PHYSICAL MEDICINE & REHABILITATION
Payer: MEDICAID

## 2024-07-18 NOTE — TELEPHONE ENCOUNTER
Per LOV note 7/12    \"He has been on the following medications in the past   MTF--> GI SE  Trulicity --> GI SE  Currently on Victoza --> GI SE  Januvia --> GI SE\"      Will defer alternative medication option to Dr. Guadarrama since she is more familiar with patient.

## 2024-07-18 NOTE — TELEPHONE ENCOUNTER
Dr. Guadarrama,     Called [employer] and provided the quantity and day supply of 30 for 30 days.   Per rep, PA was denied due to A1c not being equal to or greater than 6.5%. Please advise.

## 2024-07-19 NOTE — TELEPHONE ENCOUNTER
can we appeal please?   Patient has Type 2 DM  He has been on the following medications:   Metformin, trulicity, januvia. He could not tolerate these due to side effects  He is current on victoza  His A1c is well controlled on this medication  However, given gastric side effects from victoza, he will not be able to continue this medication. Hence an alternate medication is being prescribed to replace the victoza so that we can continue to maintain his A1c in a good range  If he has to stop the victoza without taking an alternate medication, his A1c will go up . This increases his risk of complications from uncontrolled diabetes       Please also notify the patient about this on my chart   Thanks

## 2024-07-22 NOTE — TELEPHONE ENCOUNTER
Dr. Guadarrama,     Please review pended letter and advise if necessary changes need to be made. Pt notified via [facility].

## 2024-07-23 RX ORDER — PEN NEEDLE, DIABETIC 30 GX3/16"
1 NEEDLE, DISPOSABLE MISCELLANEOUS DAILY
Qty: 90 EACH | Refills: 0 | Status: SHIPPED | OUTPATIENT
Start: 2024-07-23

## 2024-07-24 DIAGNOSIS — M54.16 LUMBAR RADICULOPATHY: ICD-10-CM

## 2024-07-24 NOTE — TELEPHONE ENCOUNTER
Laura from [employer] is requesting for the day supply quantity please follow up fax # [phone] case id [id number] ph # [phone] opt 4

## 2024-07-24 NOTE — TELEPHONE ENCOUNTER
Returned call to [employer]. Spoke with Taisha. Provided case number. Advised patient will be taking 1 tablet daily with a quantity of 30 tablets per 30 days.   She states appeal decision should be received by tomorrow at 3pm.

## 2024-07-24 NOTE — TELEPHONE ENCOUNTER
Received denial. Faxed denial, appeal, and LOV to [phone]. Awaiting confirmation.    Confirmation Received.      Denial placed in [place] Folder.

## 2024-07-24 NOTE — TELEPHONE ENCOUNTER
Called [employer] to inquire on fax for denial. Per rep, denial was faxed but it was not received.   Fax will be faxed out. Will await fax   Per rep, can submit reconsideration request, which is the step prior to appeal.

## 2024-07-25 NOTE — TELEPHONE ENCOUNTER
Received fax from [facility] in regards to Rybelsus 3MG. Medication has been approved from 04/26/2024 to 07/25/2025. [employer] message sent to pt and approval letter sent to scanning.     Approval # - [id number]

## 2024-07-25 NOTE — TELEPHONE ENCOUNTER
Refill Request    Medication request: HYDROcodone-acetaminophen  MG Oral Tab. Take 1 tablet by mouth every 4-6 hours PRN pain (max 5 tabs in 24 hours).     LOV:2/8/2024 Juan A Mcgee MD   Pt had BUE EMG with Dr. Valera on 06/19/2024   Due back to clinic per last office note: Per Dr. Mcgee: \"He will follow up in 2-3 months or sooner if needed.\"  NOV: 8/7/2024 Juan A Mcgee MD      [place] 07/03/2024 #[id number]  Earliest fill date: 08/02/2024. Order changed to reflect this.    Urine drug screen (if applicable): 11/02/2023  Pain contract: VALID - Expires 11/16/2024.    LOV plan (if weaning or changing medications): Per Dr. Mcgee: \"He will wean the Norco to one every 6 hours for the next month.     He will let me know in one month how he is doing with the lower dose of the Norco and the therapies.\"      Per patient update on 3/26/24 (located in refill encounter 03/25/2024): \"I’ve tried taking 1 every six hrs as Doctor Everardo requested. But immediately, now I’ve been waking up and getting migraines in the mornings and during the day from the pain on my neck. \"

## 2024-07-26 ENCOUNTER — TELEPHONE (OUTPATIENT)
Dept: PULMONOLOGY | Facility: CLINIC | Age: 54
End: 2024-07-26

## 2024-07-26 RX ORDER — HYDROCODONE BITARTRATE AND ACETAMINOPHEN 10; 325 MG/1; MG/1
TABLET ORAL
Qty: 150 TABLET | Refills: 0 | Status: SHIPPED | OUTPATIENT
Start: 2024-07-31

## 2024-07-26 NOTE — TELEPHONE ENCOUNTER
Received fax from [facility] with order for PAP supplies. Placed in Dr Collier's folder for signature

## 2024-07-29 ENCOUNTER — LAB ENCOUNTER (OUTPATIENT)
Dept: LAB | Facility: HOSPITAL | Age: 54
End: 2024-07-29
Attending: UROLOGY
Payer: MEDICAID

## 2024-07-29 ENCOUNTER — OFFICE VISIT (OUTPATIENT)
Dept: SURGERY | Facility: CLINIC | Age: 54
End: 2024-07-29

## 2024-07-29 ENCOUNTER — APPOINTMENT (OUTPATIENT)
Dept: PHYSICAL THERAPY | Age: 54
End: 2024-07-29
Attending: PHYSICAL MEDICINE & REHABILITATION
Payer: MEDICAID

## 2024-07-29 DIAGNOSIS — E78.5 HYPERLIPIDEMIA, UNSPECIFIED HYPERLIPIDEMIA TYPE: ICD-10-CM

## 2024-07-29 DIAGNOSIS — I10 PRIMARY HYPERTENSION: ICD-10-CM

## 2024-07-29 DIAGNOSIS — Z00.00 ANNUAL PHYSICAL EXAM: ICD-10-CM

## 2024-07-29 DIAGNOSIS — R31.29 MICROHEMATURIA: ICD-10-CM

## 2024-07-29 DIAGNOSIS — R31.29 MICROHEMATURIA: Primary | ICD-10-CM

## 2024-07-29 DIAGNOSIS — E11.9 CONTROLLED TYPE 2 DIABETES MELLITUS WITHOUT COMPLICATION, WITHOUT LONG-TERM CURRENT USE OF INSULIN (HCC): ICD-10-CM

## 2024-07-29 DIAGNOSIS — E11.69 TYPE 2 DIABETES MELLITUS WITH OTHER SPECIFIED COMPLICATION, WITHOUT LONG-TERM CURRENT USE OF INSULIN (HCC): ICD-10-CM

## 2024-07-29 DIAGNOSIS — R79.89 LOW TESTOSTERONE IN MALE: ICD-10-CM

## 2024-07-29 LAB
ALBUMIN SERPL-MCNC: 4.3 G/DL (ref 3.2–4.8)
ALBUMIN/GLOB SERPL: 1.5 {RATIO} (ref 1–2)
ALP LIVER SERPL-CCNC: 53 U/L
ALT SERPL-CCNC: 33 U/L
ANION GAP SERPL CALC-SCNC: 7 MMOL/L (ref 0–18)
AST SERPL-CCNC: 41 U/L (ref ?–34)
BASOPHILS # BLD AUTO: 0.05 X10(3) UL (ref 0–0.2)
BASOPHILS NFR BLD AUTO: 0.5 %
BILIRUB SERPL-MCNC: 0.6 MG/DL (ref 0.3–1.2)
BILIRUB UR QL: NEGATIVE
BUN BLD-MCNC: 10 MG/DL (ref 9–23)
BUN/CREAT SERPL: 10.8 (ref 10–20)
CALCIUM BLD-MCNC: 9.1 MG/DL (ref 8.7–10.4)
CHLORIDE SERPL-SCNC: 105 MMOL/L (ref 98–112)
CLARITY UR: CLEAR
CO2 SERPL-SCNC: 26 MMOL/L (ref 21–32)
CREAT BLD-MCNC: 0.93 MG/DL
DEPRECATED RDW RBC AUTO: 52.8 FL (ref 35.1–46.3)
EGFRCR SERPLBLD CKD-EPI 2021: 98 ML/MIN/1.73M2 (ref 60–?)
EOSINOPHIL # BLD AUTO: 0.34 X10(3) UL (ref 0–0.7)
EOSINOPHIL NFR BLD AUTO: 3.4 %
ERYTHROCYTE [DISTWIDTH] IN BLOOD BY AUTOMATED COUNT: 14.6 % (ref 11–15)
FASTING STATUS PATIENT QL REPORTED: NO
GLOBULIN PLAS-MCNC: 2.9 G/DL (ref 2–3.5)
GLUCOSE BLD-MCNC: 89 MG/DL (ref 70–99)
GLUCOSE UR-MCNC: NORMAL MG/DL
HCT VFR BLD AUTO: 51.4 %
HGB BLD-MCNC: 16.2 G/DL
HGB UR QL STRIP.AUTO: NEGATIVE
IMM GRANULOCYTES # BLD AUTO: 0.03 X10(3) UL (ref 0–1)
IMM GRANULOCYTES NFR BLD: 0.3 %
KETONES UR-MCNC: NEGATIVE MG/DL
LEUKOCYTE ESTERASE UR QL STRIP.AUTO: NEGATIVE
LYMPHOCYTES # BLD AUTO: 2.76 X10(3) UL (ref 1–4)
LYMPHOCYTES NFR BLD AUTO: 27.7 %
MCH RBC QN AUTO: 30.7 PG (ref 26–34)
MCHC RBC AUTO-ENTMCNC: 31.5 G/DL (ref 31–37)
MCV RBC AUTO: 97.5 FL
MONOCYTES # BLD AUTO: 0.89 X10(3) UL (ref 0.1–1)
MONOCYTES NFR BLD AUTO: 8.9 %
NEUTROPHILS # BLD AUTO: 5.9 X10 (3) UL (ref 1.5–7.7)
NEUTROPHILS # BLD AUTO: 5.9 X10(3) UL (ref 1.5–7.7)
NEUTROPHILS NFR BLD AUTO: 59.2 %
NITRITE UR QL STRIP.AUTO: NEGATIVE
OSMOLALITY SERPL CALC.SUM OF ELEC: 285 MOSM/KG (ref 275–295)
PH UR: 7 [PH] (ref 5–8)
PLATELET # BLD AUTO: 253 10(3)UL (ref 150–450)
POTASSIUM SERPL-SCNC: 4.1 MMOL/L (ref 3.5–5.1)
PROT SERPL-MCNC: 7.2 G/DL (ref 5.7–8.2)
PROT UR-MCNC: NEGATIVE MG/DL
RBC # BLD AUTO: 5.27 X10(6)UL
SODIUM SERPL-SCNC: 138 MMOL/L (ref 136–145)
SP GR UR STRIP: 1.01 (ref 1–1.03)
UROBILINOGEN UR STRIP-ACNC: NORMAL
WBC # BLD AUTO: 10 X10(3) UL (ref 4–11)

## 2024-07-29 PROCEDURE — 99213 OFFICE O/P EST LOW 20 MIN: CPT | Performed by: UROLOGY

## 2024-07-29 PROCEDURE — 85025 COMPLETE CBC W/AUTO DIFF WBC: CPT

## 2024-07-29 PROCEDURE — 81003 URINALYSIS AUTO W/O SCOPE: CPT

## 2024-07-29 PROCEDURE — 36415 COLL VENOUS BLD VENIPUNCTURE: CPT

## 2024-07-29 PROCEDURE — 80053 COMPREHEN METABOLIC PANEL: CPT

## 2024-07-29 RX ORDER — ORAL SEMAGLUTIDE 3 MG/1
3 TABLET ORAL DAILY
Qty: 30 TABLET | Refills: 0 | Status: SHIPPED | OUTPATIENT
Start: 2024-07-29 | End: 2024-08-28

## 2024-07-29 NOTE — PROGRESS NOTES
Ran Carter is a 53 year old male.    HPI:     Chief Complaint   Patient presents with    Follow - Up     6 month f/u pt states no changes since LOV       53 year old male with a history of microhematuria here for followup last seen 1/26/24.  Cysto 1/26/24 normal.  CT urogram 12/23 normal.  Negative urine cytology.  No LUTS, gross hematuria or dysuria.  Feels well.      HISTORY:  Past Medical History:    Back problem    Chronic neck pain    Contusion of cervical cord (HCC)    Diabetes (HCC)    High blood pressure    Hyperlipidemia    Migraines    Went to emergency care clinic and suggested to get an MRI done because I've been dealing with a bad migraine for over a week straight today being a 10 from 1-10 10,being the worse    Primary hypertension    Sleep apnea    Visual impairment    readers      Past Surgical History:   Procedure Laterality Date    Colonoscopy N/A 2/7/2024    Procedure: COLONOSCOPY;  Surgeon: Amy Means MD;  Location: [facility] ENDOSCOPY    Ct cervical spine      c3 & 4     Hernia surgery  2005    hiatal hernia    Knee surgery  2008    RIGHT ACL REPAIR      Family History   Problem Relation Age of Onset    No Known Problems Mother     No Known Problems Sister     No Known Problems Brother     Heart Disorder Paternal Grandmother     Diabetes Paternal Grandmother     Cancer Paternal Grandmother     Glaucoma Neg     Macular degeneration Neg       Social History:   Social History     Socioeconomic History    Marital status:    Tobacco Use    Smoking status: Never     Passive exposure: Past    Smokeless tobacco: Never   Vaping Use    Vaping status: Never Used   Substance and Sexual Activity    Alcohol use: Not Currently     Alcohol/week: 1.0 standard drink of alcohol     Comment: Did social    Drug use: Never   Other Topics Concern    Caffeine Concern Yes    Exercise No    History of tanning Yes    Reaction to local anesthetic No    Pt has a pacemaker No    Pt has a defibrillator  No   Social History Narrative    The patient does not use an assistive device..      The patient does live in a home with stairs.        Medications (Active prior to today's visit):  Current Outpatient Medications   Medication Sig Dispense Refill    [START ON 7/31/2024] HYDROcodone-acetaminophen  MG Oral Tab Take 1 tablet by mouth every 4-6 hours PRN pain (max 5 tabs in 24 hours). 150 tablet 0    Insulin Pen Needle (PEN NEEDLES) 32G X 4 MM Does not apply Misc 1 each daily. 90 each 0    naproxen 500 MG Oral Tab       pantoprazole 40 MG Oral Tab EC Take 1 tablet (40 mg total) by mouth 2 (two) times daily before meals. 180 tablet 1    Semaglutide 3 MG Oral Tab Take 3 mg by mouth daily. 30 tablet 0    rosuvastatin 5 MG Oral Tab Take 1 tablet (5 mg total) by mouth nightly. 90 tablet 0    Blood Pressure Monitoring (BLOOD PRESSURE KIT) Does not apply Device 1 each daily. 1 each 0    losartan 50 MG Oral Tab Take 1 tablet (50 mg total) by mouth daily. 90 tablet 0    testosterone cypionate 200 mg/mL Intramuscular Solution Inject 0.88 mL (176 mg total) into the muscle every 14 (fourteen) days. 5.28 mL 0    nystatin-triamcinolone 100,000-0.1 Units/g-% External Ointment Apply twice daily  Monday-Friday to affected areas of rash. 60 g 3    pantoprazole 40 MG Oral Tab EC Take 1 tablet (40 mg total) by mouth every morning before breakfast. 90 tablet 3    amoxicillin 500 MG Oral Cap       FLOWFLEX COVID-19 AG HOME TEST In Vitro Kit       Syringe/Needle, Disp, 27G X 1/2\" 1 ML Does not apply Misc Inject testosterone every two weeks 50 each 0    Syringe 22G X 1\" 3 ML Does not apply Misc Inject testosterone every 2 weeks 50 each 0    Blood Glucose Monitoring Suppl (FREESTYLE LITE) Does not apply Device 1 Device by Other route 2 (two) times daily. Use as directed. 1 each 3    Glucose Blood (ONETOUCH VERIO) In Vitro Strip Test blood sugar twice daily. 200 strip 3    HYDROcodone-acetaminophen 7.5-325 MG Oral Tab Take 1 tablet by  mouth every 4-6 hours PRN pain (max 5 tabs in 24 hours). 150 tablet 0    Lancets Does not apply Misc Check bid 200 each 1    Glucose Blood (FREESTYLE LITE TEST) In Vitro Strip Check bid 100 strip 0    Insulin Pen Needle (PEN NEEDLES) 32G X 4 MM Does not apply Misc 1 each daily. 90 each 0    meclizine 25 MG Oral Tab Take 1 tablet (25 mg total) by mouth 3 (three) times daily as needed. 30 tablet 0    multiple vitamin Oral Chew Tab Chew 1 tablet by mouth daily.         Allergies:  No Known Allergies      ROS:       PHYSICAL EXAM:        ASSESSMENT/PLAN:   Assessment   Encounter Diagnosis   Name Primary?    Microhematuria Yes       Recommend:  -Repeat urinalysis today.  -Followup 6 months.         Orders This Visit:  Orders Placed This Encounter   Procedures    Urinalysis, Routine       Meds This Visit:  Requested Prescriptions      No prescriptions requested or ordered in this encounter       Imaging & Referrals:  None     7/29/2024  Sisi Black MD

## 2024-07-29 NOTE — TELEPHONE ENCOUNTER
Patient called to request a script for the Rybelsus. Patient states the pharmacy never received the script for it. Please send the prescription to the [facility] in [place].     See telephone encounter 7/12.   
Pt recently approved by insurance for rybelsus 3mg, medication pending for a 30 day supply.   
normal...

## 2024-08-05 NOTE — TELEPHONE ENCOUNTER
Order signed and faxed back to [facility], [place]. Confirmation reveived and sent to scanning

## 2024-08-07 ENCOUNTER — OFFICE VISIT (OUTPATIENT)
Dept: PHYSICAL MEDICINE AND REHAB | Facility: CLINIC | Age: 54
End: 2024-08-07
Payer: MEDICAID

## 2024-08-07 ENCOUNTER — TELEPHONE (OUTPATIENT)
Dept: PHYSICAL MEDICINE AND REHAB | Facility: CLINIC | Age: 54
End: 2024-08-07

## 2024-08-07 VITALS — HEIGHT: 71 IN | WEIGHT: 278 LBS | BODY MASS INDEX: 38.92 KG/M2

## 2024-08-07 DIAGNOSIS — G56.03 BILATERAL CARPAL TUNNEL SYNDROME: ICD-10-CM

## 2024-08-07 DIAGNOSIS — I10 PRIMARY HYPERTENSION: ICD-10-CM

## 2024-08-07 DIAGNOSIS — G56.22 ULNAR NEUROPATHY AT ELBOW OF LEFT UPPER EXTREMITY: ICD-10-CM

## 2024-08-07 DIAGNOSIS — M47.812 ARTHROPATHY OF CERVICAL FACET JOINT: ICD-10-CM

## 2024-08-07 DIAGNOSIS — M11.20 CALCIUM PYROPHOSPHATE DEPOSITION DISEASE (CPPD): ICD-10-CM

## 2024-08-07 DIAGNOSIS — Z98.1 S/P CERVICAL SPINAL FUSION: ICD-10-CM

## 2024-08-07 DIAGNOSIS — M54.12 CERVICAL RADICULOPATHY: ICD-10-CM

## 2024-08-07 DIAGNOSIS — M48.02 CERVICAL STENOSIS OF SPINE: ICD-10-CM

## 2024-08-07 DIAGNOSIS — M25.532 CHRONIC PAIN OF LEFT WRIST: ICD-10-CM

## 2024-08-07 DIAGNOSIS — G89.29 CHRONIC PAIN OF LEFT WRIST: ICD-10-CM

## 2024-08-07 DIAGNOSIS — M45.0 ANKYLOSING SPONDYLITIS OF MULTIPLE SITES IN SPINE (HCC): ICD-10-CM

## 2024-08-07 DIAGNOSIS — S69.82XD INJURY OF TRIANGULAR FIBROCARTILAGE COMPLEX (TFCC) OF LEFT WRIST, SUBSEQUENT ENCOUNTER: ICD-10-CM

## 2024-08-07 DIAGNOSIS — Q76.1 CERVICAL FUSION SYNDROME: ICD-10-CM

## 2024-08-07 DIAGNOSIS — M50.90 CERVICAL DISC DISEASE: Primary | ICD-10-CM

## 2024-08-07 DIAGNOSIS — E11.9 CONTROLLED TYPE 2 DIABETES MELLITUS WITHOUT COMPLICATION, WITHOUT LONG-TERM CURRENT USE OF INSULIN (HCC): ICD-10-CM

## 2024-08-07 PROCEDURE — 99214 OFFICE O/P EST MOD 30 MIN: CPT | Performed by: PHYSICAL MEDICINE & REHABILITATION

## 2024-08-07 NOTE — PROGRESS NOTES
Cervical Pain H & P    Chief Complaint:    Chief Complaint   Patient presents with    Neck Pain     LOV 2/8/24 pt is here with complaints of neck pain. MRI of left wrist was completed 6/18/24. Reports n/t in both hands. Takes norco daily to ease pain. No current physical therapy.  Reports to having sharp pain in left side of his neck. ( 8/10*)Pain 6/10( fingers)     Nursing note reviewed and verified.    Patient was last seen on 2/8/2024.  He was not able to tolerate the PT since this increased his pain.  The PT increased his flexibility.  He did the PT wit [facility].  The OT helped the left hand and wrist some.  He still has left hand weakness.  He has seen Dr. Guanaco Villela for the hands who had him get another EMG test.  On 6/19/2024, he had a repeat EMG done by Dr. Valera of the bilateral arms and hands which showed right severe and left mild-mod CTS with possible right C6 radiculopathy and left mild ulnar neuropathy.  He has been having right shoulder pain if he sleeps on the right side.  This pain will last for 7-10 days.     Description of the Pain  The pain is located in the left base of the skull.    The pain does not radiate.  The pain at its best is 6/10. The pain at its worst is 8/10. The pain is currently  7/10.  The pain is described as a(n) sharp sensation.      Past Medical History   Past Medical History:    Back problem    Chronic neck pain    Contusion of cervical cord (HCC)    Diabetes (HCC)    High blood pressure    Hyperlipidemia    Migraines    Went to emergency care clinic and suggested to get an MRI done because I've been dealing with a bad migraine for over a week straight today being a 10 from 1-10 10,being the worse    Primary hypertension    Sleep apnea    Visual impairment    readers       Past Surgical History   Past Surgical History:   Procedure Laterality Date    Colonoscopy N/A 2/7/2024    Procedure: COLONOSCOPY;  Surgeon: Amy Means MD;  Location: [facility] ENDOSCOPY    Ct cervical  spine      c3 & 4     Hernia surgery  2005    hiatal hernia    Knee surgery  2008    RIGHT ACL REPAIR       Family History   Family History   Problem Relation Age of Onset    No Known Problems Mother     No Known Problems Sister     No Known Problems Brother     Heart Disorder Paternal Grandmother     Diabetes Paternal Grandmother     Cancer Paternal Grandmother     Glaucoma Neg     Macular degeneration Neg        Social History   Social History     Socioeconomic History    Marital status:      Spouse name: Not on file    Number of children: Not on file    Years of education: Not on file    Highest education level: Not on file   Occupational History    Not on file   Tobacco Use    Smoking status: Never     Passive exposure: Past    Smokeless tobacco: Never   Vaping Use    Vaping status: Never Used   Substance and Sexual Activity    Alcohol use: Not Currently     Alcohol/week: 1.0 standard drink of alcohol     Comment: Did social    Drug use: Never    Sexual activity: Not on file   Other Topics Concern    Caffeine Concern Yes    Exercise No    Seat Belt Not Asked    Special Diet Not Asked    Stress Concern Not Asked    Weight Concern Not Asked     Service Not Asked    Blood Transfusions Not Asked    Occupational Exposure Not Asked    Hobby Hazards Not Asked    Sleep Concern Not Asked    Back Care Not Asked    Bike Helmet Not Asked    Self-Exams Not Asked    Grew up on a farm Not Asked    History of tanning Yes    Outdoor occupation Not Asked    Reaction to local anesthetic No    Pt has a pacemaker No    Pt has a defibrillator No   Social History Narrative    The patient does not use an assistive device..      The patient does live in a home with stairs.     Social Determinants of Health     Financial Resource Strain: Not on file   Food Insecurity: Not on file   Transportation Needs: Not on file   Physical Activity: Not on file   Stress: Not on file   Social Connections: Not on file   Housing  Stability: Not on file       PE:  The patient does appear in his stated age in no distress.  The patient is well groomed.    Psychiatric:  The patient is alert and oriented x 3.  The patient has a normal affect and mood.      Respiratory:  No acute respiratory distress. Patient does not have a cough.    HEENT:  Extraocular muscles are intact. There is no kern icterus. Pupils are equal, round, and reactive to light. No redness or discharge bilaterally.    Skin:  There are no rashes or lesions.    Lymph Nodes:  The patient has no palpable submandibular, supraclavicular, and cervical lymph nodes..    Vitals:  There were no vitals filed for this visit.    Cervical Spine:    Posture: moderate chin forward superiorly rotated protracted shoulder posture.   Shoulders: Level   Head: In neutral   Spinous Processes Palpations: Non-tender for all Spinous Processes   Z-Joints Palpations: Tender at  bilateral C1-2   Muscular Palpations: Tender at  bilateral pectoralis minor  bilateral coracoid process     Vascular upper extremity:   Right radial pulses: 2+   Left radial pulses: 2+      Neurological Upper Extremity:    Light Touch: Intact in Bilateral UE except:  Decreased in the right thumb, right ring finger, and right little finger.   Pin Prick: Not tested.   UE Muscle Strength: All Upper Extremity strength measurements 5/5 except:  ABP Right: 4-/5   Reflexes: 2+ In the bilateral upper extremities.   Webb's sign Right: Negative   Webb's sigh Left: Negative     Radiology Imaging:  I reviewed with the patient his MRI of the wrist left from 6/18/2024.      Assessment  1. C2-3 mild-mod central, C7-T1 mild central & left foraminal mild-mod bulging discs    2. C3-4 moderate central stenosis    3. Cervical fusion syndrome: C2-T1 due to AS    4. S/P cervical spinal fusion: C3-4 ACDF    5. 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 Calcium pyrophosphate deposition disease (CPPD)    7. Ankylosing  spondylitis of multiple sites in spine (HCC)    8. Controlled type 2 diabetes mellitus without complication, without long-term current use of insulin (HCC)    9. Primary hypertension    10. Arthropathy of cervical facet joint    11. Bilateral carpal tunnel syndrome: right moderate-severe, left moderate sensory & mild motor    12. Chronic pain of left wrist    13. Injury of triangular fibrocartilage complex (TFCC) of left wrist, subsequent encounter    14. right C6 chronic radiculopathy        Plan  I will do bilateral C1-2 z-joint injections under MAC.    He will continue with taking no more than 5 of the Norco per day for the pain.    He will need to come down on the Norco before having any more surgeries.    He will follow up with Dr. Villela as planned next week for the left wrist and the bilateral hands.    The patient will continue with his home exercise program.    The patient will follow up in 2-3 months, but the patient will call me 2 weeks after having the injection to let me know how the injection worked.    The patient understands and agrees with the stated plan.    Juan A Mcgee MD  8/7/2024

## 2024-08-07 NOTE — TELEPHONE ENCOUNTER
Initiated authorization for Bilateral C1-2 z-joint injections under MAC. CPT/HCPCS 56582-86, dx:M47.812 to be done at [facility] with [facility]    [facility]  Status: Pending  Case # [id number]

## 2024-08-07 NOTE — PATIENT INSTRUCTIONS
Plan  I will do bilateral C1-2 z-joint injections.    He will continue with taking no more than 5 of the Norco per day for the pain.    He will need to come down on the Norco before having any more surgeries.    He will follow up with Dr. Villela as planned next week for the left wrist and the bilateral hands.    The patient will continue with his home exercise program.    The patient will follow up in 2-3 months, but the patient will call me 2 weeks after having the injection to let me know how the injection worked.

## 2024-08-12 ENCOUNTER — OFFICE VISIT (OUTPATIENT)
Dept: ORTHOPEDICS CLINIC | Facility: CLINIC | Age: 54
End: 2024-08-12
Payer: MEDICAID

## 2024-08-12 VITALS — HEIGHT: 71 IN | WEIGHT: 278 LBS | BODY MASS INDEX: 38.92 KG/M2

## 2024-08-12 DIAGNOSIS — M77.8 WRIST TENDONITIS: Primary | ICD-10-CM

## 2024-08-12 DIAGNOSIS — S69.82XA INJURY OF TRIANGULAR FIBROCARTILAGE COMPLEX (TFCC) OF LEFT WRIST, INITIAL ENCOUNTER: ICD-10-CM

## 2024-08-12 RX ORDER — BETAMETHASONE SODIUM PHOSPHATE AND BETAMETHASONE ACETATE 3; 3 MG/ML; MG/ML
6 INJECTION, SUSPENSION INTRA-ARTICULAR; INTRALESIONAL; INTRAMUSCULAR; SOFT TISSUE ONCE
Status: COMPLETED | OUTPATIENT
Start: 2024-08-12 | End: 2024-08-12

## 2024-08-12 RX ADMIN — BETAMETHASONE SODIUM PHOSPHATE AND BETAMETHASONE ACETATE 6 MG: 3; 3 INJECTION, SUSPENSION INTRA-ARTICULAR; INTRALESIONAL; INTRAMUSCULAR; SOFT TISSUE at 10:00:00

## 2024-08-12 NOTE — PROGRESS NOTES
Clinic Note     Assessment/Plan:  54 year old male    Left Carpal Tunnel Syndrome-EMG/NCV confirmed-surgical nonsurgical treatment options were reviewed the patient.  Patient will proceed with the left side first.  He is tentatively scheduled for December 12 with a slot hold.  If symptoms do not improve with the steroid injection can consider FCR tendon debridement and TFCC debridement.  Right Carpal Tunnel Syndrome-EMG/NCV confirmed-surgical nonsurgical treatment options were reviewed the patient.  Patient will proceed with a carpal tunnel release once adequately recovered from the left side if his symptoms are significant enough.  Left Cubital Tunnel Syndrome-EMG/NCV negative-symptoms and exam findings more consistent with carpal tunnel syndrome  Right Cubital Tunnel Syndrome-EMG/NCV negative-symptoms and exam findings were consistent with carpal tunnel syndrome  H/o cervical spine fusion surgery.  Left wrist FCR tendonitis-a steroid injection to the FCR tendon sheath was performed today..  Chondrocalcinosis of the left wrist is noted TFCC-symptomatic today.  We proceeded with a corticosteroid injection into the ulnocarpal joint which he tolerated complication.    Follow Up: 6-8 weeks    Injection:    Written consent was obtained.  The skin was prepped with alcohol.  Ethyl chloride spray was used anesthetize the superficial skin.  A 25-gauge needle was used to inject 1.5 mL mixture of 1 mL of 6 mg of betamethasone and 1 mL of 1% lidocaine into left ulnocarpal joint and FCR tendon sheath .  Hemostasis achieved.  Band-Aid was applied.  Patient tolerated procedure without complication.    Diagnostic Studies:     XR Left wrist 3 views: No fractures, dislocations, or osseous abnormalities.  There is calcification of the TFC.  Mild degenerative changes of the thumb CMC joint.    MRI left wrist: Fluid around the FCR tendon without tendinosis is noted.  Subchondral cystic changes of the ulnar aspect of the lunate.   Degenerative changes of the TFC without peripheral tear noted.    EMG/NCV: There is electrodiagnostic evidence to support a bilateral median mononeuropathy at or distal to the wrist (carpal tunnel syndrome) electrically mild to moderate with active denervation changes noted on needle EMG on the right side.      Physical Exam:     Ht 5' 11\" (1.803 m)   Wt 278 lb (126.1 kg)   BMI 38.77 kg/m²     Constitutional: NAD. AOx3. Well-developed and Well-nourished.   Psychiatric: Normal mood/ affect/ behavior. Judgment and thought content normal.     Left Upper Extremity:     Inspection    Skin intact. No skin lesions. No obvious mass visualized.    Palpation    TTP minimal at the CMC joint more significant along the FCR tendon      ROM    Full composite fist.  Wrist, finger and elbow motion is normal.     Neurovascular    Normal sensation in the radial nerve distribution and abnormal within median & ulnar nerve distrubution.     (+) Phdurkan on both sides, (-) Elbow flexion test, No weakness GUILLAUME or APB bilaterally.    L thumb 20%, IF10%, MF20% RF20% SF20% decreased   R thumb 20%, IF20%, MF20% RF20% SF20% decreased     No dorsal ulnar sensory nerve sensory abnormalities.    Normally perfused hand(s).     Special    Pain with resisted wrist flexion at FCR. No DRUJ instability          CC: Left wrist pain    HPI: This 54 year old RHD male presents with left wrist pain. He mentions numbness and tingling sensation predominantly during the nighttime. He complains the bone often pops out. He had a EMG/NCV done in 2022 by .     Onset: 11/23  Pain Character: Sharp  Pain Level: Moderate  Pain @ Night: No    Treatments Tried: Occupational therapy    Occupation: Self-employed    History/Other:   Past Medical History:    Back problem    Chronic neck pain    Contusion of cervical cord (HCC)    Diabetes (HCC)    High blood pressure    Hyperlipidemia    Migraines    Went to emergency care clinic and suggested to get an MRI done  because I've been dealing with a bad migraine for over a week straight today being a 10 from 1-10 10,being the worse    Primary hypertension    Sleep apnea    Visual impairment    readers     Past Surgical History:   Procedure Laterality Date    Colonoscopy N/A 2/7/2024    Procedure: COLONOSCOPY;  Surgeon: Amy Means MD;  Location: [facility] ENDOSCOPY    Ct cervical spine      c3 & 4     Hernia surgery  2005    hiatal hernia    Knee surgery  2008    RIGHT ACL REPAIR     Current Outpatient Medications   Medication Sig Dispense Refill    Semaglutide (RYBELSUS) 3 MG Oral Tab Take 3 mg by mouth daily. 30 tablet 0    HYDROcodone-acetaminophen  MG Oral Tab Take 1 tablet by mouth every 4-6 hours PRN pain (max 5 tabs in 24 hours). 150 tablet 0    Insulin Pen Needle (PEN NEEDLES) 32G X 4 MM Does not apply Misc 1 each daily. 90 each 0    naproxen 500 MG Oral Tab       pantoprazole 40 MG Oral Tab EC Take 1 tablet (40 mg total) by mouth 2 (two) times daily before meals. 180 tablet 1    Semaglutide 3 MG Oral Tab Take 3 mg by mouth daily. 30 tablet 0    rosuvastatin 5 MG Oral Tab Take 1 tablet (5 mg total) by mouth nightly. 90 tablet 0    Blood Pressure Monitoring (BLOOD PRESSURE KIT) Does not apply Device 1 each daily. 1 each 0    losartan 50 MG Oral Tab Take 1 tablet (50 mg total) by mouth daily. 90 tablet 0    testosterone cypionate 200 mg/mL Intramuscular Solution Inject 0.88 mL (176 mg total) into the muscle every 14 (fourteen) days. 5.28 mL 0    nystatin-triamcinolone 100,000-0.1 Units/g-% External Ointment Apply twice daily  Monday-Friday to affected areas of rash. 60 g 3    pantoprazole 40 MG Oral Tab EC Take 1 tablet (40 mg total) by mouth every morning before breakfast. 90 tablet 3    FLOWFLEX COVID-19 AG HOME TEST In Vitro Kit       Syringe/Needle, Disp, 27G X 1/2\" 1 ML Does not apply Misc Inject testosterone every two weeks 50 each 0    Syringe 22G X 1\" 3 ML Does not apply Misc Inject testosterone every 2 weeks 50  each 0    Blood Glucose Monitoring Suppl (FREESTYLE LITE) Does not apply Device 1 Device by Other route 2 (two) times daily. Use as directed. 1 each 3    Glucose Blood (ONETOUCH VERIO) In Vitro Strip Test blood sugar twice daily. 200 strip 3    HYDROcodone-acetaminophen 7.5-325 MG Oral Tab Take 1 tablet by mouth every 4-6 hours PRN pain (max 5 tabs in 24 hours). 150 tablet 0    Lancets Does not apply Misc Check bid 200 each 1    Glucose Blood (FREESTYLE LITE TEST) In Vitro Strip Check bid 100 strip 0    Insulin Pen Needle (PEN NEEDLES) 32G X 4 MM Does not apply Misc 1 each daily. 90 each 0    meclizine 25 MG Oral Tab Take 1 tablet (25 mg total) by mouth 3 (three) times daily as needed. 30 tablet 0    multiple vitamin Oral Chew Tab Chew 1 tablet by mouth daily.      amoxicillin 500 MG Oral Cap  (Patient not taking: Reported on 8/12/2024)       No Known Allergies  Family History   Problem Relation Age of Onset    No Known Problems Mother     No Known Problems Sister     No Known Problems Brother     Heart Disorder Paternal Grandmother     Diabetes Paternal Grandmother     Cancer Paternal Grandmother     Glaucoma Neg     Macular degeneration Neg      Social History     Occupational History    Not on file   Tobacco Use    Smoking status: Never     Passive exposure: Past    Smokeless tobacco: Never   Vaping Use    Vaping status: Never Used   Substance and Sexual Activity    Alcohol use: Not Currently     Alcohol/week: 1.0 standard drink of alcohol     Comment: Did social    Drug use: Never    Sexual activity: Not on file      Review of Systems (negative unless bolded):  General: fevers, chills, fatigue  CV:  chest pain, palpitations, leg swelling  Msk: bodyaches, neck pain, neck stiffness  Skin: rashes, open wounds, nonhealing ulcers  Hem: bleeds easily, bruise easily, immunocompromised  Neuro: dizziness, light headedness, headaches  Psych: anxious, depressed, anger issues    Guanaco Villela MD   Hand, Wrist, & Elbow  Surgery  [email or web address]  t: [phone]  f: [phone]

## 2024-08-13 DIAGNOSIS — E29.1 HYPOGONADISM IN MALE: ICD-10-CM

## 2024-08-13 RX ORDER — TESTOSTERONE CYPIONATE 200 MG/ML
200 INJECTION, SOLUTION INTRAMUSCULAR
Qty: 6 ML | Refills: 0 | Status: SHIPPED | OUTPATIENT
Start: 2024-08-13 | End: 2024-11-11

## 2024-08-13 NOTE — TELEPHONE ENCOUNTER
Endocrine Refill protocol for oral medications    Protocol Criteria:  PASSED    -Appointment with Endocrinology completed in the last 6 months or scheduled in the next 3 months     Verify the above has been completed or scheduled in the appropriate timeline. If so can send a 90 day supply with 1 refill.     Last completed office visit: 7/12/2024 Consuelo Guadarrama MD   Next scheduled Follow up: no future appt

## 2024-08-15 ENCOUNTER — TELEPHONE (OUTPATIENT)
Dept: ENDOCRINOLOGY CLINIC | Facility: CLINIC | Age: 54
End: 2024-08-15

## 2024-08-15 NOTE — TELEPHONE ENCOUNTER
Medication PA Requested: semaglutide 2 MG/3ML Subcutaneous Solution Pen-injector                                                          CoverMyMeds Used:  Key:  Quantity: 9mL  Day Supply: 90  Sig: Inject 0.25 mg weekly for the first 2 weeks, then inject 0.5 mg weekly.   DX Code:    E11.69               **patient has tried and failed Trulicity, Victoza, Januvia, and rybelsus due to GI side effects

## 2024-08-20 ENCOUNTER — TELEPHONE (OUTPATIENT)
Dept: ENDOCRINOLOGY CLINIC | Facility: CLINIC | Age: 54
End: 2024-08-20

## 2024-08-20 NOTE — TELEPHONE ENCOUNTER
Medication PA Requested: semaglutide 2 MG/3ML Subcutaneous Solution Pen-injector                                                          CoverMyMeds Used: No  Key:  Quantity: 9mL  Day Supply: 90  Sig: Inject 0.25 mg weekly for the first 2 weeks, then inject 0.5 mg weekly.   DX Code:    E11.69            Electronic prior authorization submitted, last office visit 7/12  Awaiting determination

## 2024-08-21 ENCOUNTER — TELEPHONE (OUTPATIENT)
Dept: ENDOCRINOLOGY CLINIC | Facility: CLINIC | Age: 54
End: 2024-08-21

## 2024-08-21 NOTE — TELEPHONE ENCOUNTER
Patient states that he contacted Pharmacy and was informed that Ozempic has not been approve.  Patient is requesting that [facility] pharmacy be contacted and given the approval #.  Please call     See 8/15/24 closed telephone encounter

## 2024-08-21 NOTE — TELEPHONE ENCOUNTER
Received fax from [facility] dated on 8/20/24 stating the OZEMPIC(0.25 OR 0.5MG/DOSE) 2MG/3ML SOL PEN-INJ has been approved up to 1 pen per 28 days effective 5/22/24 ends 8/20/25. [facility] sent  ID#[id number]  CASE#[id number]

## 2024-08-26 DIAGNOSIS — M54.16 LUMBAR RADICULOPATHY: ICD-10-CM

## 2024-08-26 NOTE — TELEPHONE ENCOUNTER
Refill Request    Medication request:   HYDROcodone-acetaminophen  MG Oral Tab         LOV: 8/7/2024 Juan A Mcgee MD   RTC: 3 months  NOV: 12/9/2024 Juan A Mcgee MD      [place]/Last refill: 8/2/2024 #30 days    UDS: (if applicable): 11/2/2023  Pain contract: Valid through 11/16/2024    LOV plan (if weaning or changing medications): He will continue with taking no more than 5 of the Norco per day for the pain.

## 2024-08-27 RX ORDER — HYDROCODONE BITARTRATE AND ACETAMINOPHEN 10; 325 MG/1; MG/1
TABLET ORAL
Qty: 150 TABLET | Refills: 0 | Status: SHIPPED | OUTPATIENT
Start: 2024-08-27

## 2024-09-06 RX ORDER — LOSARTAN POTASSIUM 50 MG/1
50 TABLET ORAL DAILY
Qty: 90 TABLET | Refills: 1 | Status: SHIPPED | OUTPATIENT
Start: 2024-09-06 | End: 2024-12-05

## 2024-09-07 NOTE — TELEPHONE ENCOUNTER
Blood pressure was addressed by Dr. Rivas Can please send a high-priority message to the staff in case patient is running out of medication.    LYNNE Rivas  
Endocrine Refill protocol for oral antihypertensive medications    Protocol Criteria:  PASSED  Reason: N/A    -Appointment with Endocrinology completed in the last 6 months or scheduled in the next 3 months    -BMP or CMP has been completed in the last 12 months     -GFR is greater than or equal to 50    Verify the above has been completed or scheduled in the appropriate timeline. If so can send a 90 day supply with 1 refill.   Verify BMP or CMP has been completed in last year  Verify last GFR result     Last completed office visit:7/12/2024 Consuelo Guadarrama MD   Next scheduled Follow up:   Future Appointments   Date Time Provider Department Center   12/9/2024 10:30 AM Juan A Mcgee MD [facility] [place] [facility]   1/27/2025  3:30 PM Sisi Black MD [facility]      Last BMP or CMP completion date: 7/29/24 GFR 98  Lab Results   Component Value Date    GFRAA 118 12/29/2020    GFRNAA 102 12/29/2020    EGFRCR 98 07/29/2024      
Please advise on refill for pt.   
Pt attached blood sugars on mychart. Please see attachment   
show

## 2024-09-10 ENCOUNTER — PATIENT MESSAGE (OUTPATIENT)
Dept: PULMONOLOGY | Facility: CLINIC | Age: 54
End: 2024-09-10

## 2024-09-10 ENCOUNTER — PATIENT MESSAGE (OUTPATIENT)
Dept: INTERNAL MEDICINE CLINIC | Facility: CLINIC | Age: 54
End: 2024-09-10

## 2024-09-11 ENCOUNTER — PATIENT MESSAGE (OUTPATIENT)
Dept: PHYSICAL MEDICINE AND REHAB | Facility: CLINIC | Age: 54
End: 2024-09-11

## 2024-09-11 ENCOUNTER — TELEPHONE (OUTPATIENT)
Dept: PULMONOLOGY | Facility: CLINIC | Age: 54
End: 2024-09-11

## 2024-09-11 NOTE — TELEPHONE ENCOUNTER
Spoke with patient regarding MyChart message below. Patient states he feels like he can't breathe when using CPAP, feels like he is choking/suffocating. Mask was changed and isn't better. Patient inquiring if there are other methods/alternatives? Would patient be a good candidate for an oral appliance?

## 2024-09-11 NOTE — TELEPHONE ENCOUNTER
Vera Collier MD  [facility] Clinical StaffJust now (3:17 PM)       Okay with me  Refer patient to [facility] for oral appliances for mild LEANN  Thank you

## 2024-09-11 NOTE — TELEPHONE ENCOUNTER
From: Ran Carter  To: Vera Collier  Sent: 9/10/2024 2:49 PM CDT  Subject: Sleep apnea machine    Lois gonsalez all is well with you.  In regards to the sleep apnea’s machine that was prescribed to me. I tried using it and I choke every night. I try using it but there’s no way I could sleep with this thing on. Is there any other method I can use please? Thank you so much.

## 2024-09-11 NOTE — TELEPHONE ENCOUNTER
----- Message from [employer]  sent at 9/10/2024  2:49 PM CDT -----  Regarding: Sleep apnea machine  Contact: [phone]  Lois gonsalez all is well with you.  In regards to the sleep apnea’s machine that was prescribed to me. I tried using it and I choke every night.  I try using it but there’s no way I could sleep with this thing on. Is there any other method I can use please? Thank you so much.

## 2024-09-11 NOTE — TELEPHONE ENCOUNTER
Spoke with patient and informed of Dr. Collier's message below. Patient verbalized understanding and would like to try oral appliance. Face sheet, [facility] referral form, home sleep study, and last office visit note faxed to [facility] [phone]. Fax confirmation received.     [employer] message sent to patient with details.     Referral form sent to HIM for scanning.

## 2024-09-13 NOTE — TELEPHONE ENCOUNTER
Please review TE on 8/7/2024. Patient will come in office to discuss further treatment options.     Closing the encounter at this time.

## 2024-09-13 NOTE — TELEPHONE ENCOUNTER
From: Ran Carter  To: Juan A Mcgee  Sent: 9/11/2024 2:51 PM CDT  Subject: Neck injection     Good afternoon Doctor Everardo.   Hope all is well, I am following up in regards to the neck injection I was supposed to get. I know they denied that anesthesia so what will be the next step please advise thank you.

## 2024-09-18 DIAGNOSIS — E11.69 TYPE 2 DIABETES MELLITUS WITH OTHER SPECIFIED COMPLICATION, WITHOUT LONG-TERM CURRENT USE OF INSULIN (HCC): ICD-10-CM

## 2024-09-24 ENCOUNTER — OFFICE VISIT (OUTPATIENT)
Dept: PHYSICAL MEDICINE AND REHAB | Facility: CLINIC | Age: 54
End: 2024-09-24
Payer: MEDICAID

## 2024-09-24 VITALS — WEIGHT: 274 LBS | BODY MASS INDEX: 38.36 KG/M2 | HEIGHT: 71 IN

## 2024-09-24 DIAGNOSIS — M50.90 CERVICAL DISC DISEASE: ICD-10-CM

## 2024-09-24 DIAGNOSIS — M54.12 CERVICAL RADICULOPATHY: ICD-10-CM

## 2024-09-24 DIAGNOSIS — Z98.1 S/P CERVICAL SPINAL FUSION: ICD-10-CM

## 2024-09-24 DIAGNOSIS — M45.0 ANKYLOSING SPONDYLITIS OF MULTIPLE SITES IN SPINE (HCC): ICD-10-CM

## 2024-09-24 DIAGNOSIS — M54.16 LUMBAR RADICULOPATHY: ICD-10-CM

## 2024-09-24 DIAGNOSIS — M25.562 LEFT LATERAL KNEE PAIN: Primary | ICD-10-CM

## 2024-09-24 DIAGNOSIS — M11.20 CALCIUM PYROPHOSPHATE DEPOSITION DISEASE (CPPD): ICD-10-CM

## 2024-09-24 DIAGNOSIS — F11.90 OPIOID USE: ICD-10-CM

## 2024-09-24 DIAGNOSIS — M47.812 ARTHROPATHY OF CERVICAL FACET JOINT: ICD-10-CM

## 2024-09-24 DIAGNOSIS — G56.03 BILATERAL CARPAL TUNNEL SYNDROME: ICD-10-CM

## 2024-09-24 DIAGNOSIS — Q76.1 CERVICAL FUSION SYNDROME: ICD-10-CM

## 2024-09-24 DIAGNOSIS — G56.21 ULNAR NEUROPATHY AT ELBOW OF RIGHT UPPER EXTREMITY: ICD-10-CM

## 2024-09-24 DIAGNOSIS — G56.22 ULNAR NEUROPATHY AT ELBOW OF LEFT UPPER EXTREMITY: ICD-10-CM

## 2024-09-24 DIAGNOSIS — M48.02 CERVICAL STENOSIS OF SPINE: ICD-10-CM

## 2024-09-24 DIAGNOSIS — E11.9 CONTROLLED TYPE 2 DIABETES MELLITUS WITHOUT COMPLICATION, WITHOUT LONG-TERM CURRENT USE OF INSULIN (HCC): ICD-10-CM

## 2024-09-24 PROCEDURE — 99214 OFFICE O/P EST MOD 30 MIN: CPT | Performed by: PHYSICAL MEDICINE & REHABILITATION

## 2024-09-24 RX ORDER — HYDROCODONE BITARTRATE AND ACETAMINOPHEN 10; 325 MG/1; MG/1
TABLET ORAL
Qty: 150 TABLET | Refills: 0 | Status: SHIPPED | OUTPATIENT
Start: 2024-09-26

## 2024-09-30 ENCOUNTER — HOSPITAL ENCOUNTER (OUTPATIENT)
Dept: GENERAL RADIOLOGY | Facility: HOSPITAL | Age: 54
Discharge: HOME | End: 2024-09-30
Attending: PHYSICAL MEDICINE & REHABILITATION
Payer: MEDICAID

## 2024-09-30 DIAGNOSIS — M25.562 LEFT LATERAL KNEE PAIN: ICD-10-CM

## 2024-09-30 PROCEDURE — 73562 X-RAY EXAM OF KNEE 3: CPT | Performed by: PHYSICAL MEDICINE & REHABILITATION

## 2024-09-30 PROCEDURE — 73565 X-RAY EXAM OF KNEES: CPT | Performed by: PHYSICAL MEDICINE & REHABILITATION

## 2024-09-30 PROCEDURE — 73564 X-RAY EXAM KNEE 4 OR MORE: CPT | Performed by: PHYSICAL MEDICINE & REHABILITATION

## 2024-10-01 RX ORDER — LOSARTAN POTASSIUM 100 MG/1
100 TABLET ORAL DAILY
Qty: 90 TABLET | Refills: 1 | Status: SHIPPED | OUTPATIENT
Start: 2024-10-01 | End: 2024-12-30

## 2024-10-01 NOTE — TELEPHONE ENCOUNTER
Pilar Montgomery CMA 9/27/2024 8:17 AM CDT      ----- Message -----  From: Ran Carter  Sent: 9/27/2024 8:16 AM CDT  To: Racheal Avery Clinical Staff  Subject: High blood pressure prescription     These pictures are today and yesterday

## 2024-10-07 ENCOUNTER — APPOINTMENT (OUTPATIENT)
Dept: CT IMAGING | Facility: HOSPITAL | Age: 54
End: 2024-10-07
Attending: STUDENT IN AN ORGANIZED HEALTH CARE EDUCATION/TRAINING PROGRAM
Payer: COMMERCIAL

## 2024-10-07 ENCOUNTER — APPOINTMENT (OUTPATIENT)
Dept: GENERAL RADIOLOGY | Facility: HOSPITAL | Age: 54
End: 2024-10-07
Attending: STUDENT IN AN ORGANIZED HEALTH CARE EDUCATION/TRAINING PROGRAM
Payer: COMMERCIAL

## 2024-10-07 ENCOUNTER — HOSPITAL ENCOUNTER (EMERGENCY)
Facility: HOSPITAL | Age: 54
Discharge: HOME | End: 2024-10-07
Attending: STUDENT IN AN ORGANIZED HEALTH CARE EDUCATION/TRAINING PROGRAM
Payer: COMMERCIAL

## 2024-10-07 ENCOUNTER — APPOINTMENT (OUTPATIENT)
Dept: GENERAL RADIOLOGY | Facility: HOSPITAL | Age: 54
End: 2024-10-07
Payer: COMMERCIAL

## 2024-10-07 VITALS
SYSTOLIC BLOOD PRESSURE: 146 MMHG | HEART RATE: 86 BPM | RESPIRATION RATE: 16 BRPM | BODY MASS INDEX: 37.8 KG/M2 | HEIGHT: 71 IN | TEMPERATURE: 98 F | WEIGHT: 270 LBS | DIASTOLIC BLOOD PRESSURE: 90 MMHG | OXYGEN SATURATION: 95 %

## 2024-10-07 DIAGNOSIS — S16.1XXA STRAIN OF NECK MUSCLE, INITIAL ENCOUNTER: ICD-10-CM

## 2024-10-07 DIAGNOSIS — S62.101A CLOSED FRACTURE OF RIGHT WRIST, INITIAL ENCOUNTER: ICD-10-CM

## 2024-10-07 DIAGNOSIS — S93.402A SPRAIN OF LEFT ANKLE, UNSPECIFIED LIGAMENT, INITIAL ENCOUNTER: Primary | ICD-10-CM

## 2024-10-07 PROCEDURE — 73110 X-RAY EXAM OF WRIST: CPT

## 2024-10-07 PROCEDURE — 72125 CT NECK SPINE W/O DYE: CPT | Performed by: STUDENT IN AN ORGANIZED HEALTH CARE EDUCATION/TRAINING PROGRAM

## 2024-10-07 PROCEDURE — 70450 CT HEAD/BRAIN W/O DYE: CPT | Performed by: STUDENT IN AN ORGANIZED HEALTH CARE EDUCATION/TRAINING PROGRAM

## 2024-10-07 PROCEDURE — 99284 EMERGENCY DEPT VISIT MOD MDM: CPT

## 2024-10-07 PROCEDURE — 73610 X-RAY EXAM OF ANKLE: CPT | Performed by: STUDENT IN AN ORGANIZED HEALTH CARE EDUCATION/TRAINING PROGRAM

## 2024-10-07 RX ORDER — HYDROCODONE BITARTRATE AND ACETAMINOPHEN 5; 325 MG/1; MG/1
1 TABLET ORAL ONCE
Status: COMPLETED | OUTPATIENT
Start: 2024-10-07 | End: 2024-10-07

## 2024-10-07 RX ORDER — CYCLOBENZAPRINE HCL 10 MG
10 TABLET ORAL 3 TIMES DAILY PRN
Qty: 20 TABLET | Refills: 0 | Status: SHIPPED | OUTPATIENT
Start: 2024-10-07 | End: 2024-10-14

## 2024-10-07 RX ORDER — IBUPROFEN 600 MG/1
600 TABLET, FILM COATED ORAL ONCE
Status: COMPLETED | OUTPATIENT
Start: 2024-10-07 | End: 2024-10-07

## 2024-10-07 RX ORDER — HYDROCODONE BITARTRATE AND ACETAMINOPHEN 7.5; 325 MG/1; MG/1
1-2 TABLET ORAL EVERY 6 HOURS PRN
Qty: 10 TABLET | Refills: 0 | Status: SHIPPED | OUTPATIENT
Start: 2024-10-07 | End: 2024-10-12

## 2024-10-07 RX ORDER — LIDOCAINE 50 MG/G
1 PATCH TOPICAL EVERY 24 HOURS
Qty: 7 PATCH | Refills: 0 | Status: SHIPPED | OUTPATIENT
Start: 2024-10-07 | End: 2024-10-14

## 2024-10-07 RX ORDER — DIAZEPAM 2 MG
2 TABLET ORAL ONCE
Status: COMPLETED | OUTPATIENT
Start: 2024-10-07 | End: 2024-10-07
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
## 2024-10-08 RX ORDER — ROSUVASTATIN CALCIUM 5 MG/1
5 TABLET, COATED ORAL NIGHTLY
Qty: 90 TABLET | Refills: 1 | Status: SHIPPED | OUTPATIENT
Start: 2024-10-08

## 2024-10-08 NOTE — TELEPHONE ENCOUNTER
Endocrine refill protocol for lipid lowering medications    Protocol Criteria:  PASSED Reason: N/A    If all below requirements are met, send a 90-day supply with 1 refill per provider protocol.    Verify appointment with Endocrinology completed in the last 6 months or scheduled in the next 3 months.  Lipid panel must have been completed in the last 12 months   ALT result below 80  LDL result below 130    Last completed office visit:7/12/2024 Consuelo Guadarrama MD   Next scheduled Follow up:   Future Appointments   Date Time Provider Department Center   12/13/2024  9:45 AM Consuelo Guadarrama MD [facility]      Last Lipid panel date: Cholesterol: 182, done on 4/22/2024.  HDL Cholesterol: 46, done on 4/22/2024.  TriGlycerides 90, done on 4/22/2024.  LDL Cholesterol: 119, done on 4/22/2024.     Last ALT result: Last ALT was 33 done on 7/29/2024.  Last AST was 41 done on 7/29/2024.

## 2024-10-15 ENCOUNTER — TELEPHONE (OUTPATIENT)
Dept: ORTHOPEDICS CLINIC | Facility: CLINIC | Age: 54
End: 2024-10-15

## 2024-10-15 DIAGNOSIS — M25.531 RIGHT WRIST PAIN: Primary | ICD-10-CM
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
## 2024-10-16 ENCOUNTER — HOSPITAL ENCOUNTER (OUTPATIENT)
Dept: GENERAL RADIOLOGY | Age: 54
Discharge: HOME | End: 2024-10-16
Attending: ORTHOPAEDIC SURGERY
Payer: MEDICAID

## 2024-10-16 ENCOUNTER — OFFICE VISIT (OUTPATIENT)
Dept: ORTHOPEDICS CLINIC | Facility: CLINIC | Age: 54
End: 2024-10-16
Payer: MEDICAID

## 2024-10-16 VITALS — BODY MASS INDEX: 37.8 KG/M2 | HEIGHT: 71 IN | WEIGHT: 270 LBS

## 2024-10-16 DIAGNOSIS — M25.531 RIGHT WRIST PAIN: ICD-10-CM

## 2024-10-16 DIAGNOSIS — S69.91XA SCAPHOLUNATE LIGAMENT INJURY WITH NO INSTABILITY, RIGHT, INITIAL ENCOUNTER: Primary | ICD-10-CM

## 2024-10-16 PROCEDURE — 73130 X-RAY EXAM OF HAND: CPT | Performed by: ORTHOPAEDIC SURGERY

## 2024-10-16 PROCEDURE — 99214 OFFICE O/P EST MOD 30 MIN: CPT | Performed by: ORTHOPAEDIC SURGERY

## 2024-10-16 NOTE — PROGRESS NOTES
Clinic Note     Assessment/Plan:  54 year old male    Right wrist injury-suspect scapholunate ligament injury based on the widening of the SL.  X-rays today suggest that the SL interval has widened compared to ER x-rays.  Will obtain a stat MRI and consider a repair should MRI confirmed injury.  We will likely proceed with a concurrent carpal tunnel release of the right side at that time.  Left Carpal Tunnel Syndrome-EMG/NCV confirmed-surgical nonsurgical treatment options were reviewed the patient.  Patient will proceed with the left side first.  He is tentatively scheduled for December 12 with a slot hold.  If symptoms do not improve with the steroid injection can consider FCR tendon debridement and TFCC debridement.  Right Carpal Tunnel Syndrome-EMG/NCV confirmed-surgical nonsurgical treatment options were reviewed the patient.  Patient will proceed with a carpal tunnel release once adequately recovered from the left side if his symptoms are significant enough.  Left Cubital Tunnel Syndrome-EMG/NCV negative-symptoms and exam findings more consistent with carpal tunnel syndrome  Right Cubital Tunnel Syndrome-EMG/NCV negative-symptoms and exam findings were consistent with carpal tunnel syndrome  H/o cervical spine fusion surgery.  Left wrist FCR tendonitis-a steroid injection to the FCR tendon sheath was performed today..  Chondrocalcinosis of the left wrist is noted TFCC-symptomatic today.  We proceeded with a corticosteroid injection into the ulnocarpal joint which he tolerated complication.    Follow Up: TBD    Diagnostic Studies:     XR Left wrist 3 views: No fractures, dislocations, or osseous abnormalities.  There is calcification of the TFC.  Mild degenerative changes of the thumb CMC joint.    MRI left wrist: Fluid around the FCR tendon without tendinosis is noted.  Subchondral cystic changes of the ulnar aspect of the lunate.  Degenerative changes of the TFC without peripheral tear noted.    EMG/NCV:  There is electrodiagnostic evidence to support a bilateral median mononeuropathy at or distal to the wrist (carpal tunnel syndrome) electrically mild to moderate with active denervation changes noted on needle EMG on the right side.      Physical Exam:     Ht 5' 11\" (1.803 m)   Wt 270 lb (122.5 kg)   BMI 37.66 kg/m²     Constitutional: NAD. AOx3. Well-developed and Well-nourished.   Psychiatric: Normal mood/ affect/ behavior. Judgment and thought content normal.     Left Upper Extremity:     Inspection    Skin intact. No skin lesions. No obvious mass visualized.     Swelling of most focally over the SL interval Palpation    TTP minimal at the CMC joint more significant along the FCR tendon  Tenderness palpation over the SL interval        ROM    Full composite fist.  Wrist, finger and elbow motion is normal.     Neurovascular    Normal sensation in the radial nerve distribution and abnormal within median & ulnar nerve distrubution.     (+) Phdurkan on both sides, (-) Elbow flexion test, No weakness GUILLAUME or APB bilaterally.    L thumb 20%, IF10%, MF20% RF20% SF20% decreased   R thumb 20%, IF20%, MF20% RF20% SF20% decreased   Right side is worsened to 50% decreased     No dorsal ulnar sensory nerve sensory abnormalities.    Normally perfused hand(s).     Special    Pain with resisted wrist flexion at FCR. No DRUJ instability          CC: Left wrist pain    HPI: This 54 year old RHD male presents with left wrist pain. He mentions numbness and tingling sensation predominantly during the nighttime. He complains the bone often pops out. He had a EMG/NCV done in 2022 by .     Onset: 11/23  Pain Character: Sharp  Pain Level: Moderate  Pain @ Night: No    Treatments Tried: Occupational therapy    Occupation: Self-employed    History/Other:   Past Medical History:    Back problem    Chronic neck pain    Contusion of cervical cord (HCC)    Diabetes (HCC)    High blood pressure    Hyperlipidemia    Migraines    Went to  emergency care clinic and suggested to get an MRI done because I've been dealing with a bad migraine for over a week straight today being a 10 from 1-10 10,being the worse    Primary hypertension    Sleep apnea    Visual impairment    readers     Past Surgical History:   Procedure Laterality Date    Colonoscopy N/A 2/7/2024    Procedure: COLONOSCOPY;  Surgeon: Amy Means MD;  Location: [facility] ENDOSCOPY    Ct cervical spine      c3 & 4     Hernia surgery  2005    hiatal hernia    Knee surgery  2008    RIGHT ACL REPAIR     Current Outpatient Medications   Medication Sig Dispense Refill    rosuvastatin 5 MG Oral Tab Take 1 tablet (5 mg total) by mouth nightly. 90 tablet 1    losartan 100 MG Oral Tab Take 1 tablet (100 mg total) by mouth daily. 90 tablet 1    HYDROcodone-acetaminophen  MG Oral Tab Take 1 tablet by mouth every 4-6 hours PRN pain (max 5 tabs in 24 hours). 150 tablet 0    semaglutide 2 MG/3ML Subcutaneous Solution Pen-injector Inject 0.5 mg into the skin once a week. Inject 0.25 mg weekly for the first 2 weeks, then inject 0.5 mg weekly. 9 mL 0    losartan 50 MG Oral Tab Take 1 tablet (50 mg total) by mouth daily. 90 tablet 1    testosterone cypionate 200 mg/mL Intramuscular Solution Inject 1 mL (200 mg total) into the muscle every 14 (fourteen) days. 6 mL 0    Insulin Pen Needle (PEN NEEDLES) 32G X 4 MM Does not apply Misc 1 each daily. 90 each 0    naproxen 500 MG Oral Tab       pantoprazole 40 MG Oral Tab EC Take 1 tablet (40 mg total) by mouth 2 (two) times daily before meals. 180 tablet 1    Blood Pressure Monitoring (BLOOD PRESSURE KIT) Does not apply Device 1 each daily. 1 each 0    nystatin-triamcinolone 100,000-0.1 Units/g-% External Ointment Apply twice daily  Monday-Friday to affected areas of rash. 60 g 3    pantoprazole 40 MG Oral Tab EC Take 1 tablet (40 mg total) by mouth every morning before breakfast. 90 tablet 3    FLOWFLEX COVID-19 AG HOME TEST In Vitro Kit       Syringe/Needle,  Disp, 27G X 1/2\" 1 ML Does not apply Misc Inject testosterone every two weeks 50 each 0    Syringe 22G X 1\" 3 ML Does not apply Misc Inject testosterone every 2 weeks 50 each 0    Blood Glucose Monitoring Suppl (FREESTYLE LITE) Does not apply Device 1 Device by Other route 2 (two) times daily. Use as directed. 1 each 3    Glucose Blood (ONETOUCH VERIO) In Vitro Strip Test blood sugar twice daily. 200 strip 3    HYDROcodone-acetaminophen 7.5-325 MG Oral Tab Take 1 tablet by mouth every 4-6 hours PRN pain (max 5 tabs in 24 hours). 150 tablet 0    Lancets Does not apply Misc Check bid 200 each 1    Glucose Blood (FREESTYLE LITE TEST) In Vitro Strip Check bid 100 strip 0    Insulin Pen Needle (PEN NEEDLES) 32G X 4 MM Does not apply Misc 1 each daily. 90 each 0    meclizine 25 MG Oral Tab Take 1 tablet (25 mg total) by mouth 3 (three) times daily as needed. 30 tablet 0    multiple vitamin Oral Chew Tab Chew 1 tablet by mouth daily.      amoxicillin 500 MG Oral Cap  (Patient not taking: Reported on 10/16/2024)       No Known Allergies  Family History   Problem Relation Age of Onset    No Known Problems Mother     No Known Problems Sister     No Known Problems Brother     Heart Disorder Paternal Grandmother     Diabetes Paternal Grandmother     Cancer Paternal Grandmother     Glaucoma Neg     Macular degeneration Neg      Social History     Occupational History    Not on file   Tobacco Use    Smoking status: Never     Passive exposure: Past    Smokeless tobacco: Never   Vaping Use    Vaping status: Never Used   Substance and Sexual Activity    Alcohol use: Not Currently     Alcohol/week: 1.0 standard drink of alcohol     Comment: Did social    Drug use: Never    Sexual activity: Not on file      Review of Systems (negative unless bolded):  General: fevers, chills, fatigue  CV:  chest pain, palpitations, leg swelling  Msk: bodyaches, neck pain, neck stiffness  Skin: rashes, open wounds, nonhealing ulcers  Hem: bleeds  easily, bruise easily, immunocompromised  Neuro: dizziness, light headedness, headaches  Psych: anxious, depressed, anger issues    Guanaco Villela MD   Hand, Wrist, & Elbow Surgery  [email or web address]  t: [phone]  f: [phone]

## 2024-10-17 DIAGNOSIS — E11.69 TYPE 2 DIABETES MELLITUS WITH OTHER SPECIFIED COMPLICATION, WITHOUT LONG-TERM CURRENT USE OF INSULIN (HCC): ICD-10-CM

## 2024-10-17 RX ORDER — SEMAGLUTIDE 0.68 MG/ML
0.5 INJECTION, SOLUTION SUBCUTANEOUS WEEKLY
Qty: 9 ML | Refills: 0 | Status: SHIPPED | OUTPATIENT
Start: 2024-10-17 | End: 2024-11-07

## 2024-10-17 NOTE — TELEPHONE ENCOUNTER
Endocrine Refill protocol for oral and injectable diabetic medications    Protocol Criteria:  PASSED  Reason: N/A    If all below requirements are met, send a 90-day supply with 1 refill per provider protocol.    Verify appointment with Endocrinology completed in the last 6 months or scheduled in the next 3 months.  Verify A1C has been completed within the last 6 months and is below 8.5%     Last completed office visit: 7/12/2024 Consuelo Guadarrama MD   Next scheduled Follow up:   Future Appointments   Date Time Provider Department Center   12/13/2024  9:45 AM Consuelo Guadarrama MD [id number]      Last A1c result: Last A1c value was 5.6% done 7/12/2024.

## 2024-10-18 ENCOUNTER — PATIENT MESSAGE (OUTPATIENT)
Dept: PHYSICAL MEDICINE AND REHAB | Facility: CLINIC | Age: 54
End: 2024-10-18

## 2024-10-18 ENCOUNTER — HOSPITAL ENCOUNTER (OUTPATIENT)
Dept: MRI IMAGING | Facility: HOSPITAL | Age: 54
Discharge: HOME | End: 2024-10-18
Attending: ORTHOPAEDIC SURGERY
Payer: MEDICAID

## 2024-10-18 DIAGNOSIS — M47.812 OSTEOARTHRITIS OF CERVICAL SPINE, UNSPECIFIED SPINAL OSTEOARTHRITIS COMPLICATION STATUS: Primary | ICD-10-CM

## 2024-10-18 DIAGNOSIS — S69.91XA SCAPHOLUNATE LIGAMENT INJURY WITH NO INSTABILITY, RIGHT, INITIAL ENCOUNTER: ICD-10-CM

## 2024-10-18 PROCEDURE — 73221 MRI JOINT UPR EXTREM W/O DYE: CPT | Performed by: ORTHOPAEDIC SURGERY

## 2024-10-24 ENCOUNTER — OFFICE VISIT (OUTPATIENT)
Dept: DERMATOLOGY CLINIC | Facility: CLINIC | Age: 54
End: 2024-10-24
Payer: MEDICAID

## 2024-10-24 DIAGNOSIS — D49.2 NEOPLASM OF UNSPECIFIED BEHAVIOR OF BONE, SOFT TISSUE, AND SKIN: ICD-10-CM

## 2024-10-24 DIAGNOSIS — L30.8 PSORIASIFORM DERMATITIS: Primary | ICD-10-CM

## 2024-10-24 PROCEDURE — 11102 TANGNTL BX SKIN SINGLE LES: CPT | Performed by: STUDENT IN AN ORGANIZED HEALTH CARE EDUCATION/TRAINING PROGRAM

## 2024-10-24 PROCEDURE — 88305 TISSUE EXAM BY PATHOLOGIST: CPT | Performed by: STUDENT IN AN ORGANIZED HEALTH CARE EDUCATION/TRAINING PROGRAM

## 2024-10-24 PROCEDURE — 99213 OFFICE O/P EST LOW 20 MIN: CPT | Performed by: STUDENT IN AN ORGANIZED HEALTH CARE EDUCATION/TRAINING PROGRAM

## 2024-10-24 NOTE — PROGRESS NOTES
October 24, 2024    Established patient     Referred by:   No referring provider defined for this encounter.      CHIEF COMPLAINT: Rash     HISTORY OF PRESENT ILLNESS: Ran Carter is a 54 year old male here for evaluation of rash.    Location: Mid back, near tail bone  Duration: 1 year  Signs and symptoms: Some flare itchy flare ups  Current treatment:nystatin-triamcinolone 100,000-0.1 Units/g-% External Ointment  Past treatments: nystatin-triamcinolone 100,000-0.1 Units/g-% External Ointment    Personal Dermatologic History  History of chronic skin disease: No    Family History  History of chronic skin disease: No  History autoimmune diseases:  No    Past Medical History  Past Medical History:    Back problem    Chronic neck pain    Contusion of cervical cord (HCC)    Diabetes (HCC)    High blood pressure    Hyperlipidemia    Migraines    Went to emergency care clinic and suggested to get an MRI done because I've been dealing with a bad migraine for over a week straight today being a 10 from 1-10 10,being the worse    Primary hypertension    Sleep apnea    Visual impairment    readers       REVIEW OF SYSTEMS:  Constitutional: Denies fever, chills, unintentional weight loss.   Skin as per HPI    Medications  Current Outpatient Medications   Medication Sig Dispense Refill    semaglutide (OZEMPIC, 0.25 OR 0.5 MG/DOSE,) 2 MG/3ML Subcutaneous Solution Pen-injector Inject 0.5 mg into the skin once a week. 9 mL 0    rosuvastatin 5 MG Oral Tab Take 1 tablet (5 mg total) by mouth nightly. 90 tablet 1    losartan 100 MG Oral Tab Take 1 tablet (100 mg total) by mouth daily. 90 tablet 1    HYDROcodone-acetaminophen  MG Oral Tab Take 1 tablet by mouth every 4-6 hours PRN pain (max 5 tabs in 24 hours). 150 tablet 0    losartan 50 MG Oral Tab Take 1 tablet (50 mg total) by mouth daily. 90 tablet 1    testosterone cypionate 200 mg/mL Intramuscular Solution Inject 1 mL (200 mg total) into the muscle every 14  (fourteen) days. 6 mL 0    Insulin Pen Needle (PEN NEEDLES) 32G X 4 MM Does not apply Misc 1 each daily. 90 each 0    naproxen 500 MG Oral Tab       pantoprazole 40 MG Oral Tab EC Take 1 tablet (40 mg total) by mouth 2 (two) times daily before meals. 180 tablet 1    Blood Pressure Monitoring (BLOOD PRESSURE KIT) Does not apply Device 1 each daily. 1 each 0    nystatin-triamcinolone 100,000-0.1 Units/g-% External Ointment Apply twice daily  Monday-Friday to affected areas of rash. 60 g 3    pantoprazole 40 MG Oral Tab EC Take 1 tablet (40 mg total) by mouth every morning before breakfast. 90 tablet 3    amoxicillin 500 MG Oral Cap  (Patient not taking: Reported on 10/16/2024)      FLOWFLEX COVID-19 AG HOME TEST In Vitro Kit       Syringe/Needle, Disp, 27G X 1/2\" 1 ML Does not apply Misc Inject testosterone every two weeks 50 each 0    Syringe 22G X 1\" 3 ML Does not apply Misc Inject testosterone every 2 weeks 50 each 0    Blood Glucose Monitoring Suppl (FREESTYLE LITE) Does not apply Device 1 Device by Other route 2 (two) times daily. Use as directed. 1 each 3    Glucose Blood (ONETOUCH VERIO) In Vitro Strip Test blood sugar twice daily. 200 strip 3    HYDROcodone-acetaminophen 7.5-325 MG Oral Tab Take 1 tablet by mouth every 4-6 hours PRN pain (max 5 tabs in 24 hours). 150 tablet 0    Lancets Does not apply Misc Check bid 200 each 1    Insulin Pen Needle (PEN NEEDLES) 32G X 4 MM Does not apply Misc 1 each daily. 90 each 0    meclizine 25 MG Oral Tab Take 1 tablet (25 mg total) by mouth 3 (three) times daily as needed. 30 tablet 0    multiple vitamin Oral Chew Tab Chew 1 tablet by mouth daily.         PHYSICAL EXAM:  General: awake, alert, no acute distress  Skin: Skin exam was performed today including the following: R elbow. Pertinent findings include:   - with scaly papule    ASSESSMENT & PLAN:  Pathophysiology of diagnoses discussed with patient.  Therapeutic options reviewed. Risks, benefits, and alternatives  discussed with patient. Instructions reviewed at length.    #Neoplasm(s) of uncertain behavior of skin  - Shave biopsy performed today   - Will notify patient with results and arrange for appropriate definitive treatment, if indicated.      Shave of lesion to establish and confirm diagnosis:  Photo taken: Yes    Risks, benefits, alternatives and personnel required for shave biopsy reviewed with patient. Risks discussed include, but not limited to: pain, bleeding, infection, scar, reaction to anesthetic, and recurrence/need for further treatment.  Patient and physician agree as to site(s) to be biopsied. Patient verbalizes understanding and wishes to proceed.     Site(s) prepped with alcohol and anesthetized with 1% lidocaine with epinephrine.   Shave of lesion(s) performed to the level of the dermis. Specimen(s) from A. R elbow  sent for pathology to r/o VV 50% ALCL and bandaging applied.   Written and verbal wound care instructions provided to patient, understanding verbalized.      #Psoriasiform dermatitis  - Continue nystatin-tac to affected areas twice daily  with flares    Return to clinic:  3 months  or sooner if something concerning arises    Blake Orr MD

## 2024-10-25 ENCOUNTER — OFFICE VISIT (OUTPATIENT)
Dept: SURGERY | Facility: CLINIC | Age: 54
End: 2024-10-25
Payer: MEDICAID

## 2024-10-25 ENCOUNTER — HOSPITAL ENCOUNTER (OUTPATIENT)
Dept: GENERAL RADIOLOGY | Facility: HOSPITAL | Age: 54
Discharge: HOME | End: 2024-10-25
Payer: MEDICAID

## 2024-10-25 VITALS
BODY MASS INDEX: 37.1 KG/M2 | OXYGEN SATURATION: 98 % | SYSTOLIC BLOOD PRESSURE: 113 MMHG | DIASTOLIC BLOOD PRESSURE: 70 MMHG | WEIGHT: 265 LBS | HEIGHT: 71 IN | HEART RATE: 85 BPM

## 2024-10-25 DIAGNOSIS — M54.6 ACUTE MIDLINE THORACIC BACK PAIN: ICD-10-CM

## 2024-10-25 DIAGNOSIS — M62.830 SPASM OF MUSCLE, BACK: ICD-10-CM
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
## 2024-10-25 DIAGNOSIS — M45.0 ANKYLOSING SPONDYLITIS OF MULTIPLE SITES IN SPINE (HCC): ICD-10-CM

## 2024-10-25 DIAGNOSIS — M06.38 RHEUMATOID PANNUS OF CERVICAL SPINE (HCC): ICD-10-CM

## 2024-10-25 DIAGNOSIS — Z98.1 S/P CERVICAL SPINAL FUSION: ICD-10-CM

## 2024-10-25 DIAGNOSIS — G95.9 CERVICAL MYELOPATHY (HCC): Primary | ICD-10-CM

## 2024-10-25 PROCEDURE — 99205 OFFICE O/P NEW HI 60 MIN: CPT

## 2024-10-25 PROCEDURE — 72052 X-RAY EXAM NECK SPINE 6/>VWS: CPT
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
## 2024-10-25 NOTE — PATIENT INSTRUCTIONS
Refill policies:    Allow 2-3 business days for refills; controlled substances may take longer.  Contact your pharmacy at least 5 days prior to running out of medication and have them send an electronic request or submit request through the “request refill” option in your [employer] account.  Refills are not addressed on weekends; covering physicians do not authorize routine medications on weekends.  No narcotics or controlled substances are refilled after noon on Fridays or by on call physicians.  By law, narcotics must be electronically prescribed.  A 30 day supply with no refills is the maximum allowed.  If your prescription is due for a refill, you may be due for a follow up appointment.  To best provide you care, patients receiving routine medications need to be seen at least once a year.  Patients receiving narcotic/controlled substance medications need to be seen at least once every 3 months.  In the event that your preferred pharmacy does not have the requested medication in stock (e.g. Backordered), it is your responsibility to find another pharmacy that has the requested medication available.  We will gladly send a new prescription to that pharmacy at your request.    Scheduling Tests:    If your physician has ordered radiology tests such as MRI or CT scans, please contact Central Scheduling at [phone] right away to schedule the test.  Once scheduled, the [facility] Centralized Referral Team will work with your insurance carrier to obtain pre-certification or prior authorization.  Depending on your insurance carrier, approval may take 3-10 days.  It is highly recommended patients assure they have received an authorization before having a test performed.  If test is done without insurance authorization, patient may be responsible for the entire amount billed.      Precertification and Prior Authorizations:  If your physician has recommended that you have a procedure or additional testing performed the [facility]  Centralized Referral Team will contact your insurance carrier to obtain pre-certification or prior authorization.    You are strongly encouraged to contact your insurance carrier to verify that your procedure/test has been approved and is a COVERED benefit.  Although the [facility] Centralized Referral Team does its due diligence, the insurance carrier gives the disclaimer that \"Although the procedure is authorized, this does not guarantee payment.\"    Ultimately the patient is responsible for payment.   Thank you for your understanding in this matter.  Paperwork Completion:  If you require FMLA or disability paperwork for your recovery, please make sure to either drop it off or have it faxed to our office at [phone]. Be sure the form has your name and date of birth on it.  The form will be faxed to our Forms Department and they will complete it for you.  There is a 25$ fee for all forms that need to be filled out.  Please be aware there is a 10-14 day turnaround time.  You will need to sign a release of information (MADELINE) form if your paperwork does not come with one.  You may call the Forms Department with any questions at [phone].  Their fax number is [phone].

## 2024-10-26 DIAGNOSIS — M54.16 LUMBAR RADICULOPATHY: ICD-10-CM

## 2024-10-26 RX ORDER — METHOCARBAMOL 500 MG/1
500 TABLET, FILM COATED ORAL 3 TIMES DAILY PRN
Qty: 60 TABLET | Refills: 0 | Status: SHIPPED | OUTPATIENT
Start: 2024-10-26

## 2024-10-27 DIAGNOSIS — S69.91XA SCAPHOLUNATE LIGAMENT INJURY WITH NO INSTABILITY, RIGHT, INITIAL ENCOUNTER: Primary | ICD-10-CM

## 2024-10-28 NOTE — TELEPHONE ENCOUNTER
Refill Request    Medication request:   HYDROcodone-acetaminophen  MG Oral Tab         LOV: 9/24/2024 Juan A Mcgee MD   RTC: 4 months  NOV: 1/20/2025 Juan A Mcgee MD      [place]: 10/2/2024 #30 days    UDS: (if applicable): 1/5/2023  Pain contract: Valid through 11/16/2024    LOV plan (if weaning or changing medications): He will continue with the Norco for the pain.

## 2024-10-28 NOTE — TELEPHONE ENCOUNTER
Spoke with patient and scheduled appointment for 10/29/24.     Patient also mentioned that he saw neurosurgery and completed xrays. Patient also has CT scan scheduled for 11/2/24. MRI orders were also placed by neurosurgery, but patient has not scheduled these yet as he was offered an appointment months out by our central scheduling department.     Informed patient if there is an outside facility that accepts his insurance he is more than welcome to complete the MRI's there. Patient will look into this and will call back if MRI orders need to be faxed anywhere.     Patient was very appreciative.

## 2024-10-29 ENCOUNTER — OFFICE VISIT (OUTPATIENT)
Dept: PHYSICAL MEDICINE AND REHAB | Facility: CLINIC | Age: 54
End: 2024-10-29
Payer: MEDICAID

## 2024-10-29 DIAGNOSIS — M25.562 LEFT LATERAL KNEE PAIN: ICD-10-CM

## 2024-10-29 DIAGNOSIS — Z98.1 S/P CERVICAL SPINAL FUSION: ICD-10-CM

## 2024-10-29 DIAGNOSIS — M54.6 ACUTE BILATERAL THORACIC BACK PAIN: ICD-10-CM

## 2024-10-29 DIAGNOSIS — M17.0 PRIMARY OSTEOARTHRITIS OF BOTH KNEES: ICD-10-CM
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
## 2024-10-29 DIAGNOSIS — M45.0 ANKYLOSING SPONDYLITIS OF MULTIPLE SITES IN SPINE (HCC): Primary | ICD-10-CM

## 2024-10-29 DIAGNOSIS — M47.812 ARTHROPATHY OF CERVICAL FACET JOINT: ICD-10-CM

## 2024-10-29 DIAGNOSIS — G56.03 BILATERAL CARPAL TUNNEL SYNDROME: ICD-10-CM

## 2024-10-29 DIAGNOSIS — E11.9 CONTROLLED TYPE 2 DIABETES MELLITUS WITHOUT COMPLICATION, WITHOUT LONG-TERM CURRENT USE OF INSULIN (HCC): ICD-10-CM

## 2024-10-29 DIAGNOSIS — M25.531 RIGHT WRIST PAIN: ICD-10-CM

## 2024-10-29 DIAGNOSIS — M50.90 CERVICAL DISC DISEASE: ICD-10-CM

## 2024-10-29 DIAGNOSIS — M79.672 LEFT FOOT PAIN: ICD-10-CM

## 2024-10-29 DIAGNOSIS — M48.02 CERVICAL STENOSIS OF SPINE: ICD-10-CM

## 2024-10-29 DIAGNOSIS — G56.22 ULNAR NEUROPATHY AT ELBOW OF LEFT UPPER EXTREMITY: ICD-10-CM

## 2024-10-29 DIAGNOSIS — G56.21 ULNAR NEUROPATHY AT ELBOW OF RIGHT UPPER EXTREMITY: ICD-10-CM

## 2024-10-29 DIAGNOSIS — Q76.1 CERVICAL FUSION SYNDROME: ICD-10-CM

## 2024-10-29 DIAGNOSIS — M11.20 CALCIUM PYROPHOSPHATE DEPOSITION DISEASE (CPPD): ICD-10-CM

## 2024-10-29 DIAGNOSIS — M54.2 NECK PAIN: ICD-10-CM

## 2024-10-29 PROCEDURE — 99215 OFFICE O/P EST HI 40 MIN: CPT | Performed by: PHYSICAL MEDICINE & REHABILITATION

## 2024-10-29 RX ORDER — HYDROCODONE BITARTRATE AND ACETAMINOPHEN 10; 325 MG/1; MG/1
TABLET ORAL
Qty: 150 TABLET | Refills: 0 | Status: SHIPPED | OUTPATIENT
Start: 2024-10-29
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
## 2024-10-30 ENCOUNTER — TELEPHONE (OUTPATIENT)
Dept: NEUROLOGY | Facility: CLINIC | Age: 54
End: 2024-10-30

## 2024-10-30 DIAGNOSIS — T84.216A HARDWARE FAILURE OF ANTERIOR COLUMN OF SPINE (HCC): Primary | ICD-10-CM

## 2024-10-30 NOTE — TELEPHONE ENCOUNTER
Attempted to call patient, left voicemail for patient to call back.  I discussed his case with Dr. Brandon.  We reviewed his imaging together.  Based on the location of the anterior C3 screw and its proximity to the foramen, Dr. Brandon recommends obtaining CT angiogram of the head and neck to evaluate proximity of the screw to neurovascular structures in the foramen.  I have placed an order for this.  Please have patient follow-up with Dr. Brandon upon completion of CTA as well as MRI as ordered at his last office visit.

## 2024-11-01 ENCOUNTER — TELEPHONE (OUTPATIENT)
Facility: CLINIC | Age: 54
End: 2024-11-01
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
## 2024-11-02 ENCOUNTER — HOSPITAL ENCOUNTER (OUTPATIENT)
Dept: CT IMAGING | Facility: HOSPITAL | Age: 54
Discharge: HOME | End: 2024-11-02
Payer: MEDICAID
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
## 2024-11-02 DIAGNOSIS — M54.6 ACUTE MIDLINE THORACIC BACK PAIN: ICD-10-CM

## 2024-11-02 PROCEDURE — 72128 CT CHEST SPINE W/O DYE: CPT

## 2024-11-06 ENCOUNTER — TELEPHONE (OUTPATIENT)
Dept: INTERNAL MEDICINE CLINIC | Facility: CLINIC | Age: 54
End: 2024-11-06
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
## 2024-11-07 ENCOUNTER — TELEPHONE (OUTPATIENT)
Dept: CASE MANAGEMENT | Age: 54
End: 2024-11-07

## 2024-11-07 ENCOUNTER — PATIENT MESSAGE (OUTPATIENT)
Dept: CASE MANAGEMENT | Age: 54
End: 2024-11-07

## 2024-11-07 ENCOUNTER — OFFICE VISIT (OUTPATIENT)
Dept: INTERNAL MEDICINE CLINIC | Facility: CLINIC | Age: 54
End: 2024-11-07
Payer: MEDICAID

## 2024-11-07 VITALS
HEIGHT: 71 IN | BODY MASS INDEX: 38.22 KG/M2 | DIASTOLIC BLOOD PRESSURE: 84 MMHG | HEART RATE: 73 BPM | WEIGHT: 273 LBS | OXYGEN SATURATION: 97 % | SYSTOLIC BLOOD PRESSURE: 132 MMHG

## 2024-11-07 DIAGNOSIS — I10 PRIMARY HYPERTENSION: Primary | ICD-10-CM

## 2024-11-07 DIAGNOSIS — E29.1 HYPOGONADISM IN MALE: ICD-10-CM

## 2024-11-07 DIAGNOSIS — S82.892A CLOSED FRACTURE OF LEFT ANKLE, INITIAL ENCOUNTER: ICD-10-CM
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
## 2024-11-07 DIAGNOSIS — E78.5 HYPERLIPIDEMIA, UNSPECIFIED HYPERLIPIDEMIA TYPE: ICD-10-CM

## 2024-11-07 DIAGNOSIS — E29.1 HYPOGONADISM MALE: ICD-10-CM

## 2024-11-07 DIAGNOSIS — S82.891A CLOSED FRACTURE OF RIGHT ANKLE, INITIAL ENCOUNTER: ICD-10-CM

## 2024-11-07 DIAGNOSIS — E11.65 UNCONTROLLED TYPE 2 DIABETES MELLITUS WITH HYPERGLYCEMIA (HCC): ICD-10-CM

## 2024-11-07 RX ORDER — PRAVASTATIN SODIUM 20 MG
20 TABLET ORAL NIGHTLY
Qty: 90 TABLET | Refills: 0 | Status: SHIPPED | OUTPATIENT
Start: 2024-11-07 | End: 2025-02-05

## 2024-11-07 RX ORDER — MELOXICAM 15 MG/1
15 TABLET ORAL DAILY PRN
Qty: 5 TABLET | Refills: 0 | Status: SHIPPED | OUTPATIENT
Start: 2024-11-07

## 2024-11-07 RX ORDER — TADALAFIL 10 MG/1
10 TABLET ORAL
Qty: 30 TABLET | Refills: 0 | Status: SHIPPED | OUTPATIENT
Start: 2024-11-07
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
## 2024-11-07 NOTE — TELEPHONE ENCOUNTER
Patient continues to have pain below neck at shoulder blades, patient can't lay down for more than 10 minutes. Patient states muscle relaxers are not helping him at all, what should he do for the pain.     Prior auth team, patient states he was notified that one of his MRI's was approved, He thought it was for the thoracic MRI but we have a denial on that one. Can you please clarify the approvals/denials for the thoracic and cervical MRIs .   no Active ROM deficits were identified
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
## 2024-11-07 NOTE — TELEPHONE ENCOUNTER
Thoracic MRI has been denied, Reason states no x-ray was done and no documentation of 6 weeks of provider directed care in the last 3 months.     Would you like to order an x-ray and provider directed care?

## 2024-11-08 ENCOUNTER — HOSPITAL ENCOUNTER (OUTPATIENT)
Dept: CT IMAGING | Facility: HOSPITAL | Age: 54
Discharge: HOME | End: 2024-11-08
Payer: MEDICAID

## 2024-11-08 ENCOUNTER — LAB ENCOUNTER (OUTPATIENT)
Dept: LAB | Facility: HOSPITAL | Age: 54
End: 2024-11-08
Payer: MEDICAID

## 2024-11-08 DIAGNOSIS — E29.1 HYPOGONADISM IN MALE: ICD-10-CM

## 2024-11-08 DIAGNOSIS — E11.65 INADEQUATELY CONTROLLED DIABETES MELLITUS (HCC): Primary | ICD-10-CM

## 2024-11-08 DIAGNOSIS — E11.69 TYPE 2 DIABETES MELLITUS WITH OTHER SPECIFIED COMPLICATION, WITHOUT LONG-TERM CURRENT USE OF INSULIN (HCC): ICD-10-CM

## 2024-11-08 DIAGNOSIS — T84.216A HARDWARE FAILURE OF ANTERIOR COLUMN OF SPINE (HCC): ICD-10-CM

## 2024-11-08 DIAGNOSIS — E78.5 DYSLIPIDEMIA: ICD-10-CM
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
## 2024-11-08 PROBLEM — S82.892A CLOSED FRACTURE OF LEFT ANKLE: Status: ACTIVE | Noted: 2024-11-08

## 2024-11-08 PROBLEM — S82.891A CLOSED FRACTURE OF RIGHT ANKLE: Status: ACTIVE | Noted: 2024-11-08

## 2024-11-08 LAB
CHOLEST SERPL-MCNC: 154 MG/DL (ref ?–200)
CREAT BLD-MCNC: 1.1 MG/DL
CREAT UR-SCNC: 179.9 MG/DL
EGFRCR SERPLBLD CKD-EPI 2021: 80 ML/MIN/1.73M2 (ref 60–?)
EST. AVERAGE GLUCOSE BLD GHB EST-MCNC: 126 MG/DL (ref 68–126)
FASTING PATIENT LIPID ANSWER: YES
HBA1C MFR BLD: 6 % (ref ?–5.7)
HDLC SERPL-MCNC: 39 MG/DL (ref 40–59)
LDLC SERPL CALC-MCNC: 93 MG/DL (ref ?–100)
MICROALBUMIN UR-MCNC: 1.9 MG/DL
MICROALBUMIN/CREAT 24H UR-RTO: 10.6 UG/MG (ref ?–30)
NONHDLC SERPL-MCNC: 115 MG/DL (ref ?–130)
TESTOST SERPL-MCNC: 61.44 NG/DL
TRIGL SERPL-MCNC: 121 MG/DL (ref 30–149)
VLDLC SERPL CALC-MCNC: 20 MG/DL (ref 0–30)

## 2024-11-08 PROCEDURE — 80061 LIPID PANEL: CPT

## 2024-11-08 PROCEDURE — 84403 ASSAY OF TOTAL TESTOSTERONE: CPT

## 2024-11-08 PROCEDURE — 70496 CT ANGIOGRAPHY HEAD: CPT

## 2024-11-08 PROCEDURE — 82565 ASSAY OF CREATININE: CPT

## 2024-11-08 PROCEDURE — 82043 UR ALBUMIN QUANTITATIVE: CPT

## 2024-11-08 PROCEDURE — 36415 COLL VENOUS BLD VENIPUNCTURE: CPT

## 2024-11-08 PROCEDURE — 82570 ASSAY OF URINE CREATININE: CPT

## 2024-11-08 PROCEDURE — 83036 HEMOGLOBIN GLYCOSYLATED A1C: CPT

## 2024-11-08 PROCEDURE — 70498 CT ANGIOGRAPHY NECK: CPT

## 2024-11-08 RX ORDER — SEMAGLUTIDE 0.68 MG/ML
0.5 INJECTION, SOLUTION SUBCUTANEOUS WEEKLY
Qty: 9 ML | Refills: 0 | Status: SHIPPED | OUTPATIENT
Start: 2024-11-08

## 2024-11-08 RX ORDER — TESTOSTERONE CYPIONATE 200 MG/ML
200 INJECTION, SOLUTION INTRAMUSCULAR
Qty: 6 ML | Refills: 0 | Status: SHIPPED | OUTPATIENT
Start: 2024-11-08 | End: 2025-02-06

## 2024-11-08 NOTE — TELEPHONE ENCOUNTER
Endocrine Refill protocol for oral and injectable diabetic medications    Protocol Criteria:  PASSED  Reason: N/A    If all below requirements are met, send a 90-day supply with 1 refill per provider protocol.    Verify appointment with Endocrinology completed in the last 6 months or scheduled in the next 3 months.  Verify A1C has been completed within the last 6 months and is below 8.5%     Last completed office visit: 7/12/2024 Consuelo Guadarrama MD   Next scheduled Follow up:   Future Appointments   Date Time Provider Department Center   12/13/2024  9:45 AM Consuelo Guadarrama MD [facility]      Last A1c result: Last A1c value was 5.6% done 7/12/2024.

## 2024-11-08 NOTE — TELEPHONE ENCOUNTER
MRI denial was for the thoracic spine. Upon review of CT thoracic spine, I believe MRI thoracic is warranted. Please assist with appeal process/P2P.     CT thoracic without evidence of fracture. For pain, I am unable to prescribe any further pain medications. Patient is following with Dr. Mcgee who has prescribed Norco. I would advise he reaches out to Dr. Mcgee or his PCP for medication management. In the meantime, I recommend ice or heat to the affected site.

## 2024-11-11 NOTE — TELEPHONE ENCOUNTER
Message noted from Provider regarding pain management.    Advised pt of message and to defer to Dr. Mcgee or PCP at this time.    Pt is active on [facility] [employer].  message sent.
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
## 2024-11-12 ENCOUNTER — TELEPHONE (OUTPATIENT)
Dept: NEUROLOGY | Facility: CLINIC | Age: 54
End: 2024-11-12

## 2024-11-12 NOTE — TELEPHONE ENCOUNTER
Message below noted.    Please see TE from 11/07/24.     Nothing needed further with this encounter.

## 2024-11-19 ENCOUNTER — HOSPITAL ENCOUNTER (OUTPATIENT)
Dept: MRI IMAGING | Facility: HOSPITAL | Age: 54
Discharge: HOME | End: 2024-11-19
Payer: MEDICAID

## 2024-11-19 DIAGNOSIS — M54.6 ACUTE MIDLINE THORACIC BACK PAIN: ICD-10-CM
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
## 2024-11-19 PROCEDURE — 72157 MRI CHEST SPINE W/O & W/DYE: CPT

## 2024-11-19 PROCEDURE — A9575 INJ GADOTERATE MEGLUMI 0.1ML: HCPCS

## 2024-11-19 RX ORDER — GADOTERATE MEGLUMINE 376.9 MG/ML
20 INJECTION INTRAVENOUS
Status: COMPLETED | OUTPATIENT
Start: 2024-11-19 | End: 2024-11-19

## 2024-11-19 RX ADMIN — GADOTERATE MEGLUMINE 20 ML: 376.9 INJECTION INTRAVENOUS at 19:15:00

## 2024-11-22 ENCOUNTER — TELEPHONE (OUTPATIENT)
Dept: PULMONOLOGY | Facility: CLINIC | Age: 54
End: 2024-11-22

## 2024-11-22 NOTE — TELEPHONE ENCOUNTER
Received fax from [facility] requesting note from 8/30/24 to current. Sent patient mychart that appointment is being requested. Provided phone number to schedule appointment

## 2024-11-22 NOTE — TELEPHONE ENCOUNTER
A&Ox4. On 2L NC tolerating well. Continent of bowel and bladder. Bedrest.     R midline C/D/I, flushed well, saline locked     g-tube infusing nephro tube feeding started @1345 15ml/hr per order will increase 10ml Q8hr. Goal 40ml/hr  Tolerating well     Pt c/o something stuck on chest and having continuous coughing  notified and @bedside. Order for chest x-ray. After 30min pt denies coughing.     Pt c/o nausea, anxiety, diarrhea. PRN tigan, ativan, immodium x2 and tylenol given     Per speech reg liquids and dysphagia lvl3      Will endorse to oncoming nurse.       Patient returned call. RN not available please call

## 2024-11-25 DIAGNOSIS — M54.16 LUMBAR RADICULOPATHY: ICD-10-CM

## 2024-11-25 NOTE — TELEPHONE ENCOUNTER
Spoke to patient and told him to call [facility] to come and  CPAP machine since he is not using it. Patient states [facility] never called him. Patient given phone number to [facility] to schedule appointment. Patient verbalized understanding

## 2024-11-25 NOTE — TELEPHONE ENCOUNTER
Refill Request    Medication request:   HYDROcodone-acetaminophen  MG Oral Tab       LOV: 10/29/2024 Juan A Mcgee MD   RTC: 2 months  NOV: 2025 Juan A Mcgee MD      [place]: 2024 #30 days    UDS: (if applicable): 2023  Pain contract:  2024    LOV plan (if weaning or changing medications): He will continue with the 5 Norco per day for the pain for now.

## 2024-11-26 ENCOUNTER — OFFICE VISIT (OUTPATIENT)
Dept: ENDOCRINOLOGY CLINIC | Facility: CLINIC | Age: 54
End: 2024-11-26
Payer: MEDICAID

## 2024-11-26 ENCOUNTER — TELEPHONE (OUTPATIENT)
Dept: ENDOCRINOLOGY CLINIC | Facility: CLINIC | Age: 54
End: 2024-11-26

## 2024-11-26 ENCOUNTER — LAB ENCOUNTER (OUTPATIENT)
Dept: LAB | Facility: HOSPITAL | Age: 54
End: 2024-11-26
Attending: INTERNAL MEDICINE
Payer: MEDICAID

## 2024-11-26 VITALS
BODY MASS INDEX: 38 KG/M2 | WEIGHT: 270 LBS | HEART RATE: 80 BPM | DIASTOLIC BLOOD PRESSURE: 86 MMHG | SYSTOLIC BLOOD PRESSURE: 124 MMHG

## 2024-11-26 DIAGNOSIS — N64.4 BREAST TENDERNESS IN MALE: ICD-10-CM

## 2024-11-26 DIAGNOSIS — E29.1 HYPOGONADISM IN MALE: ICD-10-CM

## 2024-11-26 DIAGNOSIS — D35.00 ADRENAL ADENOMA, UNSPECIFIED LATERALITY: ICD-10-CM

## 2024-11-26 DIAGNOSIS — E11.69 TYPE 2 DIABETES MELLITUS WITH OTHER SPECIFIED COMPLICATION, WITHOUT LONG-TERM CURRENT USE OF INSULIN (HCC): ICD-10-CM

## 2024-11-26 DIAGNOSIS — E78.5 DYSLIPIDEMIA: ICD-10-CM

## 2024-11-26 DIAGNOSIS — E29.1 HYPOGONADISM IN MALE: Primary | ICD-10-CM

## 2024-11-26 PROBLEM — M51.9 THORACIC DISC DISEASE: Status: ACTIVE | Noted: 2024-11-26

## 2024-11-26 PROBLEM — M51.24 HERNIATED THORACIC DISC WITHOUT MYELOPATHY: Status: ACTIVE | Noted: 2024-11-26

## 2024-11-26 PROBLEM — M48.04 THORACIC STENOSIS: Status: ACTIVE | Noted: 2024-11-26

## 2024-11-26 LAB
COMPLEXED PSA SERPL-MCNC: 1.15 NG/ML (ref ?–4)
ESTRADIOL SERPL-MCNC: 72 PG/ML
GLUCOSE BLOOD: 99
HCT VFR BLD AUTO: 46.4 %
PROLACTIN SERPL-MCNC: 9.9 NG/ML
TEST STRIP LOT #: NORMAL NUMERIC
TESTOST SERPL-MCNC: 823.89 NG/DL

## 2024-11-26 PROCEDURE — 99214 OFFICE O/P EST MOD 30 MIN: CPT | Performed by: INTERNAL MEDICINE

## 2024-11-26 PROCEDURE — 36415 COLL VENOUS BLD VENIPUNCTURE: CPT

## 2024-11-26 PROCEDURE — 84146 ASSAY OF PROLACTIN: CPT

## 2024-11-26 PROCEDURE — 82947 ASSAY GLUCOSE BLOOD QUANT: CPT | Performed by: INTERNAL MEDICINE

## 2024-11-26 PROCEDURE — 82670 ASSAY OF TOTAL ESTRADIOL: CPT

## 2024-11-26 PROCEDURE — 85014 HEMATOCRIT: CPT

## 2024-11-26 PROCEDURE — 84403 ASSAY OF TOTAL TESTOSTERONE: CPT

## 2024-11-26 NOTE — PROGRESS NOTES
Return Office Visit     CHIEF COMPLAINT:    Hypogonadism   Adrenal nodule  DM     HISTORY OF PRESENT ILLNESS:  Ran Carter is a 54 year old male who presents for follow up for for evaluation of an adrenal adenoma and hypogonadism     This was noted incidentally on CT scan in May 2019. Stable adenoma on CT in June 2020  Right sided adrenal nodule measuring 2.6 x 2 cm.       Weight gain (central): Moon facies, buffalo hump: no  Recurrent infections: no  Muscle wasting: no  Bleeding/clotting disorders: no   Uncontrolled DM/HTN: no , he was diagnosed with DM, stable       Presenting symptoms:   fatigue, lack of sexual desire, erections do not sustain  No history of testicular trauma, ketoconzole or anti androgen use.     No history of hypothalamic or pituitary tumors/ infiltrative disease, radiation to head and neck region, recent critical illness, head trauma, weight loss, glucocorticoid, anabolic steroid use, excessive alcohol use. No history of uncontrolled DM.      No strokes/ HA  No h/o prostate cancer ( personal or family)  No h/o bleeding/ clotting disorders.   No LEANN      He is on T replacement  200 mg q two weeks        CURRENT MEDICATION:    Current Outpatient Medications   Medication Sig Dispense Refill    testosterone cypionate 200 mg/mL Intramuscular Solution Inject 1 mL (200 mg total) into the muscle every 14 (fourteen) days. 6 mL 0    semaglutide (OZEMPIC, 0.25 OR 0.5 MG/DOSE,) 2 MG/3ML Subcutaneous Solution Pen-injector Inject 0.5 mg into the skin once a week. 9 mL 0    Meloxicam 15 MG Oral Tab Take 1 tablet (15 mg total) by mouth daily as needed for Pain. 5 tablet 0    pravastatin 20 MG Oral Tab Take 1 tablet (20 mg total) by mouth nightly. 90 tablet 0    Tadalafil 10 MG Oral Tab Take 1 tablet (10 mg total) by mouth daily as needed for Erectile Dysfunction. 30 tablet 0    HYDROcodone-acetaminophen  MG Oral Tab Take 1 tablet by mouth every 4-6 hours PRN pain (max 5 tabs in 24  hours). 150 tablet 0    methocarbamol 500 MG Oral Tab Take 1 tablet (500 mg total) by mouth 3 (three) times daily as needed. 60 tablet 0    losartan 100 MG Oral Tab Take 1 tablet (100 mg total) by mouth daily. 90 tablet 1    Insulin Pen Needle (PEN NEEDLES) 32G X 4 MM Does not apply Misc 1 each daily. 90 each 0    naproxen 500 MG Oral Tab       pantoprazole 40 MG Oral Tab EC Take 1 tablet (40 mg total) by mouth 2 (two) times daily before meals. 180 tablet 1    Blood Pressure Monitoring (BLOOD PRESSURE KIT) Does not apply Device 1 each daily. 1 each 0    nystatin-triamcinolone 100,000-0.1 Units/g-% External Ointment Apply twice daily  Monday-Friday to affected areas of rash. 60 g 3    Syringe/Needle, Disp, 27G X 1/2\" 1 ML Does not apply Misc Inject testosterone every two weeks 50 each 0    Syringe 22G X 1\" 3 ML Does not apply Misc Inject testosterone every 2 weeks 50 each 0    Blood Glucose Monitoring Suppl (FREESTYLE LITE) Does not apply Device 1 Device by Other route 2 (two) times daily. Use as directed. 1 each 3    Glucose Blood (ONETOUCH VERIO) In Vitro Strip Test blood sugar twice daily. 200 strip 3    Lancets Does not apply Misc Check bid 200 each 1    Insulin Pen Needle (PEN NEEDLES) 32G X 4 MM Does not apply Misc 1 each daily. 90 each 0    multiple vitamin Oral Chew Tab Chew 1 tablet by mouth daily.      rosuvastatin 5 MG Oral Tab Take 1 tablet (5 mg total) by mouth nightly. (Patient not taking: Reported on 11/7/2024) 90 tablet 1    pantoprazole 40 MG Oral Tab EC Take 1 tablet (40 mg total) by mouth every morning before breakfast. (Patient not taking: Reported on 10/25/2024) 90 tablet 3    meclizine 25 MG Oral Tab Take 1 tablet (25 mg total) by mouth 3 (three) times daily as needed. (Patient not taking: Reported on 10/25/2024) 30 tablet 0         ALLERGY:  No Known Allergies    PAST MEDICAL, SOCIAL AND FAMILY HISTORY:  See past medical history marked as reviewed.  See past surgical history marked as  reviewed.  See past family history marked as reviewed.  See past social history marked as reviewed.    ASSESSMENTS:     REVIEW OF SYSTEMS:  Constitutional: Negative for:  fever, fatigue, cold/heat intolerance, weight changes  Eyes: Negative for:  Visual changes, proptosis, blurring  ENT: Negative for:  dysphagia, neck swelling, dysphonia  Respiratory: Negative for:  dyspnea, cough  Cardiovascular: Negative for:  chest pain, palpitations, orthopnea  GI: Negative for:  abdominal pain, nausea, vomiting, diarrhea, constipation, bleeding  Neurology: Negative for: headache, numbness, weakness  Genito-Urinary: Negative for: dysuria, frequency  Psychiatric: Negative for:  depression, anxiety  Hematology/Lymphatics: Negative for: bruising, lower extremity edema  Endocrine: Negative for: polyuria, polydypsia  Skin: Negative for: rash, blister, cellulitis,       PHYSICAL EXAM:     Vitals reviewed    General Appearance:  alert, well developed, in no acute distress  Head: Atraumatic  Eyes:  normal conjunctivae, sclera., normal sclera and normal pupils  Throat/Neck: normal sound to voice. Normal hearing, normal speech  Respiratory:  Speaking in full sentences, non-labored. no increased work of breathing, no audible wheezing    Neuro: motor grossly intact, moving all extremities without difficulty  Psychiatric:  oriented to time, self, and place  Extremities: Nov 2024  Bilateral barefoot skin diabetic exam is normal, visualized feet and the appearance is normal.  Bilateral monofilament/sensation of both feet is normal.  Pulsation pedal pulse exam of both lower legs/feet is normal as well.          DATA:     Pertinent data reviewed    ASSESSMENT AND PLAN:    Patient is a 54 year old male with    1. Right sided adrenal adenoma.      I discussed the following:  - Adrenal gland structure and function  - Adrenal incidentaloma is a lesion that is over 1 cm in size  - All patients with an adrenal adenoma should be evaluated for the  possibility of malignancy and subclinical hormonal hyperfunction        PLAN:  - cortisol and metanephrine normal in 2023/ early 2024; will repeat 2025 Feb   - He does not have HTN, hence does not need renin/ jose  - CT abdomen 6/2020: Stable 2.6 cm right adrenal adenoma.  CT in 12/2023 shows stable adenoma         2. Hypogonadism  Discussed Endocrine society guidelines for diagnosis of Hypogonadism.     Discussed the changes that can be induced with testosterone therapy    Discussed the side effects of testosterone. Patient understands that testosterone therapy can contribute to sleep apnea, cause acne and other skin reactions, stimulate non cancerous growth of prostate (benign prostate hyperplasia) and growth of existing prostate cancer, enlarge breasts, limit sperm production, cause testicular shrinkage, increase risk of a blood clot forming in a deep vein (deep vein thrombosis), which could break loose, travel through the bloodstream and lodge in the  lungs, blocking blood flow (pulmonary embolism), could adversely effect liver and lipids and increase the number of red blood cells, a condition called erythrocytosis. Edema, with or without congestive heart failure, may be a serious complication in patients with pre-existing cardiac, renal or hepatic disease. Also, there is a there is a possible increased risk of cardiovascular disease and strokes associated with testosterone use.    Discussed possibility of infertility given lack of spermatogenesis.   He does not have children and understands that being on T replacement might impair his ability to have children.    Discussed regular monitoring of T levels and hematocrit/ PSA/ hepatic panel, labs reviewed    PLAN:  - T is low he states that this is a trough level. Will recheck. Will not increase dose at this time since he is on a high dose , and also reports some breast tenderness  - Will repeat labs today, he states he last took T injection about a week ago  Will  also check PSA, hematocrit , estradiol and PRL level      3. Type 2 DM  Plan:  Discussed the pathogenesis, natural course of diabetes. Patient understands the importance of glycemic control and the implications of uncontrolled diabetes including Diabetic ketoacidosis and various micro vascular and macrovascular complications.     Diagnosis April 2023: 6.5 %   Patient has Type 2 DM  He has been on the following medications in the past   MTF--> GI SE  Trulicity --> GI SE  Currently on Victoza --> GI SE  Januvia --> GI SE  Reblysus--> GI SE    Now on ozmepic 0.5 mg weekly   Doing well  No personal or family history of MEN syndrome  Patient counselled regarding side effects including injection site reactions, nausea, vomiting, diarrhea, pancreatitis, gastroparesis and rare side effect keyon Joo syndrome.    Check and call with BG as discussed  > Annual eye exams  > daily feet exam  > Dyslipidemia : low fat diet, daily exercise, c/w rosuvastatin 5 mg daily ( most common SE: muscle cramping)   > Nephropathy screening: Ur MA normal   > BP is normal   - Low salt diet   - c/w losartan            Patient verbalized a complete  understanding of all of the above and did not have any further questions.         RTC in 6 months  Labs as below, Call for results.            Orders Placed This Encounter   Procedures    Testosterone Total    PSA Total, Screen    Hematocrit    POC HemoCue Glucose 201 (Finger stick glucose)    Estradiol    Prolactin    Metanephrines, Plasma Free    Salivary Cortisol         Consuelo Guadarrama MD

## 2024-11-27 RX ORDER — HYDROCODONE BITARTRATE AND ACETAMINOPHEN 10; 325 MG/1; MG/1
TABLET ORAL
Qty: 150 TABLET | Refills: 0 | Status: SHIPPED | OUTPATIENT
Start: 2024-11-27

## 2024-11-27 NOTE — TELEPHONE ENCOUNTER
Patient responded to  message:  Hello Doctor   It was great to see you aw well and I will lower my test intake.      Best regards

## 2024-12-03 ENCOUNTER — HOSPITAL ENCOUNTER (OUTPATIENT)
Dept: MRI IMAGING | Facility: HOSPITAL | Age: 54
Discharge: HOME | End: 2024-12-03
Payer: MEDICAID

## 2024-12-03 DIAGNOSIS — G95.9 CERVICAL MYELOPATHY (HCC): ICD-10-CM

## 2024-12-03 PROCEDURE — 72141 MRI NECK SPINE W/O DYE: CPT

## 2024-12-06 ENCOUNTER — OFFICE VISIT (OUTPATIENT)
Dept: FAMILY MEDICINE CLINIC | Facility: CLINIC | Age: 54
End: 2024-12-06
Payer: MEDICAID

## 2024-12-06 ENCOUNTER — OFFICE VISIT (OUTPATIENT)
Dept: ORTHOPEDICS CLINIC | Facility: CLINIC | Age: 54
End: 2024-12-06
Payer: MEDICAID

## 2024-12-06 ENCOUNTER — TELEPHONE (OUTPATIENT)
Dept: SURGERY | Facility: CLINIC | Age: 54
End: 2024-12-06

## 2024-12-06 VITALS
BODY MASS INDEX: 37.52 KG/M2 | TEMPERATURE: 98 F | DIASTOLIC BLOOD PRESSURE: 74 MMHG | RESPIRATION RATE: 16 BRPM | WEIGHT: 268 LBS | HEART RATE: 102 BPM | HEIGHT: 71 IN | OXYGEN SATURATION: 98 % | SYSTOLIC BLOOD PRESSURE: 108 MMHG

## 2024-12-06 VITALS — WEIGHT: 270 LBS | HEIGHT: 71 IN | BODY MASS INDEX: 37.8 KG/M2

## 2024-12-06 DIAGNOSIS — G56.01 CARPAL TUNNEL SYNDROME OF RIGHT WRIST: ICD-10-CM

## 2024-12-06 DIAGNOSIS — S69.91XA SCAPHOLUNATE LIGAMENT INJURY WITH NO INSTABILITY, RIGHT, INITIAL ENCOUNTER: Primary | ICD-10-CM

## 2024-12-06 DIAGNOSIS — H61.21 IMPACTED CERUMEN OF RIGHT EAR: Primary | ICD-10-CM

## 2024-12-06 PROCEDURE — 99214 OFFICE O/P EST MOD 30 MIN: CPT | Performed by: ORTHOPAEDIC SURGERY

## 2024-12-06 NOTE — TELEPHONE ENCOUNTER
Patient had all the testing done MRI Cervical , Thoracic CTA Brain , CT Spine and XR Cervical that were recommended on LOV  would like to know if he should hold off on having hand surgery with Ortho  on 12/12/24 based on  the imaging findings?

## 2024-12-06 NOTE — TELEPHONE ENCOUNTER
Dr. Brandon reviewed imaging. He recommends in-office follow up prior to the scheduled surgery for evaluation. Discussed with Dr. Villela as well.

## 2024-12-06 NOTE — PROGRESS NOTES
CHIEF COMPLAINT:     Chief Complaint   Patient presents with    Ear Problem     3 days, right ears muffled, no pain  OTC none       HPI:   Ran Carter is a 54 year old male who presents to clinic today with complaints of clogged right ear and decreased hearing. Hx of wax impaction and feels similar. No ear pain. No fever. No URI symptoms. Feels well otherwise.     Current Outpatient Medications   Medication Sig Dispense Refill    HYDROcodone-acetaminophen  MG Oral Tab Take 1 tablet by mouth every 4-6 hours PRN pain (max 5 tabs in 24 hours). 150 tablet 0    testosterone cypionate 200 mg/mL Intramuscular Solution Inject 1 mL (200 mg total) into the muscle every 14 (fourteen) days. 6 mL 0    semaglutide (OZEMPIC, 0.25 OR 0.5 MG/DOSE,) 2 MG/3ML Subcutaneous Solution Pen-injector Inject 0.5 mg into the skin once a week. 9 mL 0    Meloxicam 15 MG Oral Tab Take 1 tablet (15 mg total) by mouth daily as needed for Pain. 5 tablet 0    pravastatin 20 MG Oral Tab Take 1 tablet (20 mg total) by mouth nightly. 90 tablet 0    Tadalafil 10 MG Oral Tab Take 1 tablet (10 mg total) by mouth daily as needed for Erectile Dysfunction. 30 tablet 0    methocarbamol 500 MG Oral Tab Take 1 tablet (500 mg total) by mouth 3 (three) times daily as needed. 60 tablet 0    rosuvastatin 5 MG Oral Tab Take 1 tablet (5 mg total) by mouth nightly. (Patient not taking: Reported on 10/25/2024) 90 tablet 1    losartan 100 MG Oral Tab Take 1 tablet (100 mg total) by mouth daily. 90 tablet 1    Insulin Pen Needle (PEN NEEDLES) 32G X 4 MM Does not apply Misc 1 each daily. 90 each 0    naproxen 500 MG Oral Tab       pantoprazole 40 MG Oral Tab EC Take 1 tablet (40 mg total) by mouth 2 (two) times daily before meals. 180 tablet 1    Blood Pressure Monitoring (BLOOD PRESSURE KIT) Does not apply Device 1 each daily. 1 each 0    nystatin-triamcinolone 100,000-0.1 Units/g-% External Ointment Apply twice daily  Monday-Friday to affected  areas of rash. 60 g 3    pantoprazole 40 MG Oral Tab EC Take 1 tablet (40 mg total) by mouth every morning before breakfast. (Patient not taking: Reported on 12/6/2024) 90 tablet 3    Syringe/Needle, Disp, 27G X 1/2\" 1 ML Does not apply Misc Inject testosterone every two weeks 50 each 0    Syringe 22G X 1\" 3 ML Does not apply Misc Inject testosterone every 2 weeks 50 each 0    Glucose Blood (ONETOUCH VERIO) In Vitro Strip Test blood sugar twice daily. 200 strip 3    Lancets Does not apply Misc Check bid 200 each 1    Insulin Pen Needle (PEN NEEDLES) 32G X 4 MM Does not apply Misc 1 each daily. 90 each 0    meclizine 25 MG Oral Tab Take 1 tablet (25 mg total) by mouth 3 (three) times daily as needed. (Patient not taking: Reported on 12/6/2024) 30 tablet 0    multiple vitamin Oral Chew Tab Chew 1 tablet by mouth daily.        Past Medical History:    Back problem    Chronic neck pain    Contusion of cervical cord (HCC)    Diabetes (HCC)    High blood pressure    Hyperlipidemia    Migraines    Went to emergency care clinic and suggested to get an MRI done because I've been dealing with a bad migraine for over a week straight today being a 10 from 1-10 10,being the worse    Primary hypertension    Sleep apnea    Visual impairment    readers      Social History:  Social History     Socioeconomic History    Marital status:    Tobacco Use    Smoking status: Never     Passive exposure: Past    Smokeless tobacco: Never   Vaping Use    Vaping status: Never Used   Substance and Sexual Activity    Alcohol use: Not Currently     Alcohol/week: 1.0 standard drink of alcohol     Comment: Did social    Drug use: Never   Other Topics Concern    Caffeine Concern Yes    Exercise No    History of tanning Yes    Reaction to local anesthetic No    Pt has a pacemaker No    Pt has a defibrillator No   Social History Narrative    The patient does not use an assistive device..      The patient does live in a home with stairs.         REVIEW OF SYSTEMS:   GENERAL: Feeling well otherwise.    HEENT: See HPI      EXAM:   /74   Pulse 102   Temp 97.8 °F (36.6 °C)   Resp 16   Ht 5' 11\" (1.803 m)   Wt 268 lb (121.6 kg)   SpO2 98%   BMI 37.38 kg/m²   Physical Exam  Constitutional:       Appearance: Normal appearance.   HENT:      Head: Normocephalic.      Right Ear: There is impacted cerumen.      Left Ear: Tympanic membrane, ear canal and external ear normal.   Cardiovascular:      Rate and Rhythm: Normal rate and regular rhythm.   Pulmonary:      Effort: Pulmonary effort is normal.      Breath sounds: Normal breath sounds.   Neurological:      Mental Status: He is alert.           ASSESSMENT AND PLAN:   Ran Carter is a 54 year old male who presents with \"clogged ears\"    ASSESSMENT/PLAN:  Encounter Diagnosis   Name Primary?    Impacted cerumen of right ear Yes       Cerumen Removal Procedure  Patient gave verbal consent.   Risks and benefits of removal were discussed with the patient. Patient agreed to proceed with procedure.    Location: right ear    Indication: TM not visible,fullness.  Prep: Hydrogen Peroxide with warm water via 60 ml syringe   Removal:Multiple attempts to remove cerumen with lavage and lighted curette. Unable to remove wax in office   Patient Status: Tolerated Well; No complications  Patient to go home and use debrox for 3 days then return for repeat ear flush         Patient Instructions   Debrox OTC for 3 days and then return for repeat ear flush     Patient questions answered.  No further concerns.  Patient will follow up as needed.
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
## 2024-12-09 ENCOUNTER — TELEPHONE (OUTPATIENT)
Dept: ORTHOPEDICS CLINIC | Facility: CLINIC | Age: 54
End: 2024-12-09

## 2024-12-09 DIAGNOSIS — S69.91XA SCAPHOLUNATE LIGAMENT INJURY WITH NO INSTABILITY, RIGHT, INITIAL ENCOUNTER: Primary | ICD-10-CM

## 2024-12-09 DIAGNOSIS — G56.01 CARPAL TUNNEL SYNDROME OF RIGHT WRIST: ICD-10-CM

## 2024-12-09 DIAGNOSIS — E78.5 HYPERLIPIDEMIA, UNSPECIFIED HYPERLIPIDEMIA TYPE: ICD-10-CM

## 2024-12-09 DIAGNOSIS — E11.9 CONTROLLED TYPE 2 DIABETES MELLITUS WITHOUT COMPLICATION, WITHOUT LONG-TERM CURRENT USE OF INSULIN (HCC): ICD-10-CM

## 2024-12-09 DIAGNOSIS — Z00.00 ANNUAL PHYSICAL EXAM: ICD-10-CM

## 2024-12-09 DIAGNOSIS — R79.89 LOW TESTOSTERONE IN MALE: ICD-10-CM

## 2024-12-09 DIAGNOSIS — I10 PRIMARY HYPERTENSION: ICD-10-CM

## 2024-12-09 NOTE — PROGRESS NOTES
SURGICAL BOOKING SHEET   Name: Ran Carter  MRN: [id number]   : 1970    Surgical Date:   24   Surgical Consent:   Right wrist scapholunate ligament reconstruction, right endoscopic carpal tunnel release possible open   Diagnosis:      ICD-10-CM   1. Scapholunate ligament injury with no instability, right, initial encounter  S69.91XA   2. Carpal tunnel syndrome of right wrist  G56.01       Workers Comp: No   Procedure Codes:   Endoscopic carpal tunnel release (CPT 21440),    Codes:    SL Reconstruction (CPT 18178)      Disposition:   Outpatient   Operative Time:   2 hrs   Antibiotics:   Ancef 3g   Anesthesia Type:   Monitored Anesthesia Care (MAC) + Regional - Supraclavicular   Clearance:    None   Equipment:   Microaire Smart Release, Arthrex Swivel Locks 3.5 x2, Arthrex Bio-Tenodesis screw, 3.0 mm x 3, 1.5 mm fiber tape, Arthrex quick pass tendon shuttle 2.5 mm x 16 cm, ACL guide ,tendon Stripper, Small cannulated drill bits, Lead Hand, Kwires,  [place] Blade, Mini C-arm, 0 nylon suture, 0 Vircryl, 4-0 nylon, 3-0 moncryl, 4-0 moncryl, Exofin, 2 inch ace wrap, plaster 5x30 sheets    Assistant:   Manohar Assistant: Juice, Pamela Maldonado PA-C   Follow Up:   7-14 days post op with Me   Pain Medication:   Pain Protocol   Therapy:   [facility]

## 2024-12-09 NOTE — TELEPHONE ENCOUNTER
Message received from patient.     Appointment scheduled for 12/11/24.     Nothing needed further with this encounter.

## 2024-12-09 NOTE — TELEPHONE ENCOUNTER
Date of Surgery: 2024     Post Op Appt: 2024 10:30AM    Case ID: [id number]    Notes:     SURGICAL BOOKING SHEET   Name: Ran Carter  MRN: [id number]   : 1970     Surgical Date:    24   Surgical Consent:    Right wrist scapholunate ligament reconstruction, right endoscopic carpal tunnel release possible open   Diagnosis:         ICD-10-CM   1. Scapholunate ligament injury with no instability, right, initial encounter  S69.91XA   2. Carpal tunnel syndrome of right wrist  G56.01       Workers Comp: No   Procedure Codes:    Endoscopic carpal tunnel release (CPT 38781),    Codes:    SL Reconstruction (CPT 01819)      Disposition:    Outpatient   Operative Time:    2 hrs   Antibiotics:    Ancef 3g   Anesthesia Type:    Monitored Anesthesia Care (MAC) + Regional - Supraclavicular   Clearance:     None   Equipment:    Microaire Smart Release, Arthrex Swivel Locks 3.5 x2, Arthrex Bio-Tenodesis screw, 3.0 mm x 3, 1.5 mm fiber tape, Arthrex quick pass tendon shuttle 2.5 mm x 16 cm, ACL guide ,tendon Stripper, Small cannulated drill bits, Lead Hand, Kwires,  [place] Blade, Mini C-arm, 0 nylon suture, 0 Vircryl, 4-0 nylon, 3-0 moncryl, 4-0 moncryl, Exofin, 2 inch ace wrap, plaster 5x30 sheets    Assistant:    Manohar Assistant: Yes, Pamela Maldonado PA-C   Follow Up:    7-14 days post op with Me   Pain Medication:    Pain Protocol   Therapy:    [facility]

## 2024-12-10 ENCOUNTER — EKG ENCOUNTER (OUTPATIENT)
Dept: LAB | Facility: HOSPITAL | Age: 54
End: 2024-12-10
Attending: ORTHOPAEDIC SURGERY
Payer: MEDICAID

## 2024-12-10 ENCOUNTER — OFFICE VISIT (OUTPATIENT)
Dept: INTERNAL MEDICINE CLINIC | Facility: CLINIC | Age: 54
End: 2024-12-10
Payer: MEDICAID

## 2024-12-10 VITALS
SYSTOLIC BLOOD PRESSURE: 132 MMHG | HEART RATE: 98 BPM | OXYGEN SATURATION: 99 % | HEIGHT: 71 IN | BODY MASS INDEX: 38.22 KG/M2 | TEMPERATURE: 98 F | WEIGHT: 273 LBS | DIASTOLIC BLOOD PRESSURE: 82 MMHG

## 2024-12-10 DIAGNOSIS — R20.2 NUMBNESS AND TINGLING IN BOTH HANDS: ICD-10-CM

## 2024-12-10 DIAGNOSIS — Z01.818 PRE-OP TESTING: ICD-10-CM
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
## 2024-12-10 DIAGNOSIS — M54.12 CERVICAL RADICULOPATHY: ICD-10-CM

## 2024-12-10 DIAGNOSIS — R74.8 ELEVATED LIVER ENZYMES: ICD-10-CM

## 2024-12-10 DIAGNOSIS — I10 PRIMARY HYPERTENSION: ICD-10-CM

## 2024-12-10 DIAGNOSIS — R20.0 NUMBNESS AND TINGLING IN BOTH HANDS: ICD-10-CM

## 2024-12-10 DIAGNOSIS — M50.90 CERVICAL DISC DISEASE: ICD-10-CM

## 2024-12-10 DIAGNOSIS — Z98.1 S/P CERVICAL SPINAL FUSION: ICD-10-CM

## 2024-12-10 DIAGNOSIS — M48.02 CERVICAL STENOSIS OF SPINE: ICD-10-CM

## 2024-12-10 DIAGNOSIS — G56.03 BILATERAL CARPAL TUNNEL SYNDROME: ICD-10-CM

## 2024-12-10 DIAGNOSIS — M48.04 THORACIC STENOSIS: ICD-10-CM

## 2024-12-10 DIAGNOSIS — E11.9 CONTROLLED TYPE 2 DIABETES MELLITUS WITHOUT COMPLICATION, WITHOUT LONG-TERM CURRENT USE OF INSULIN (HCC): Primary | ICD-10-CM

## 2024-12-10 LAB
ALBUMIN SERPL-MCNC: 4.3 G/DL (ref 3.2–4.8)
ALP LIVER SERPL-CCNC: 63 U/L
ALT SERPL-CCNC: 27 U/L
ANION GAP SERPL CALC-SCNC: 8 MMOL/L (ref 0–18)
AST SERPL-CCNC: 19 U/L (ref ?–34)
ATRIAL RATE: 76 BPM
BILIRUB DIRECT SERPL-MCNC: 0.1 MG/DL (ref ?–0.3)
BILIRUB SERPL-MCNC: 0.4 MG/DL (ref 0.3–1.2)
BUN BLD-MCNC: 15 MG/DL (ref 9–23)
BUN/CREAT SERPL: 14.3 (ref 10–20)
CALCIUM BLD-MCNC: 9.1 MG/DL (ref 8.7–10.4)
CHLORIDE SERPL-SCNC: 107 MMOL/L (ref 98–112)
CO2 SERPL-SCNC: 25 MMOL/L (ref 21–32)
CREAT BLD-MCNC: 1.05 MG/DL
EGFRCR SERPLBLD CKD-EPI 2021: 84 ML/MIN/1.73M2 (ref 60–?)
FASTING STATUS PATIENT QL REPORTED: YES
GLUCOSE BLD-MCNC: 108 MG/DL (ref 70–99)
OSMOLALITY SERPL CALC.SUM OF ELEC: 291 MOSM/KG (ref 275–295)
P AXIS: 42 DEGREES
P-R INTERVAL: 136 MS
POTASSIUM SERPL-SCNC: 4.4 MMOL/L (ref 3.5–5.1)
PROT SERPL-MCNC: 7.3 G/DL (ref 5.7–8.2)
Q-T INTERVAL: 380 MS
QRS DURATION: 90 MS
QTC CALCULATION (BEZET): 427 MS
R AXIS: -27 DEGREES
SODIUM SERPL-SCNC: 140 MMOL/L (ref 136–145)
T AXIS: 12 DEGREES
VENTRICULAR RATE: 76 BPM

## 2024-12-10 PROCEDURE — 93005 ELECTROCARDIOGRAM TRACING: CPT

## 2024-12-10 PROCEDURE — 80076 HEPATIC FUNCTION PANEL: CPT

## 2024-12-10 PROCEDURE — 80048 BASIC METABOLIC PNL TOTAL CA: CPT

## 2024-12-10 PROCEDURE — 93010 ELECTROCARDIOGRAM REPORT: CPT | Performed by: INTERNAL MEDICINE

## 2024-12-10 PROCEDURE — 36415 COLL VENOUS BLD VENIPUNCTURE: CPT

## 2024-12-10 PROCEDURE — 99202 OFFICE O/P NEW SF 15 MIN: CPT

## 2024-12-10 RX ORDER — LOSARTAN POTASSIUM 100 MG/1
100 TABLET ORAL DAILY
Qty: 90 TABLET | Refills: 0 | Status: SHIPPED | OUTPATIENT
Start: 2024-12-10 | End: 2025-03-10

## 2024-12-10 RX ORDER — PANTOPRAZOLE SODIUM 40 MG/1
40 TABLET, DELAYED RELEASE ORAL
Qty: 180 TABLET | Refills: 1 | Status: SHIPPED | OUTPATIENT
Start: 2024-12-10
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
## 2024-12-10 NOTE — TELEPHONE ENCOUNTER
Future Appt. 05/07/2025    Last Visit: 11/07/2024    Medication Requested:  Requested Prescriptions     Pending Prescriptions Disp Refills    pantoprazole 40 MG Oral Tab  tablet 1     Sig: Take 1 tablet (40 mg total) by mouth 2 (two) times daily before meals.    losartan 100 MG Oral Tab 90 tablet 1     Sig: Take 1 tablet (100 mg total) by mouth daily.        Last refill:                Disp Refills Start End     losartan 100 MG Oral Tab 90 tablet 1 10/1/2024 12/30/2024    Sig - Route: Take 1 tablet (100 mg total) by mouth daily. - Oral        Disp Refills Start End     pantoprazole 40 MG Oral Tab  tablet 1 7/15/2024 --    Sig - Route: Take 1 tablet (40 mg total) by mouth 2 (two) times daily before meals. - Oral      Hypertension Medications Protocol [id number]/09/2024 10:00 PM   Protocol Details CMP or BMP in past 12 months    Last BP reading less than 140/90    In person appointment or virtual visit in the past 12 mos or appointment in next 3 mos    EGFRCR or GFRNAA > 50     Gastrointestional Medication Protocol [id number]/09/2024 10:00 PM   Protocol Details In person appointment or virtual visit in the past 12 mos or appointment in next 3 mos

## 2024-12-11 ENCOUNTER — OFFICE VISIT (OUTPATIENT)
Dept: SURGERY | Facility: CLINIC | Age: 54
End: 2024-12-11
Payer: MEDICAID

## 2024-12-11 VITALS — DIASTOLIC BLOOD PRESSURE: 80 MMHG | SYSTOLIC BLOOD PRESSURE: 122 MMHG

## 2024-12-11 DIAGNOSIS — M45.0 ANKYLOSING SPONDYLITIS OF MULTIPLE SITES IN SPINE (HCC): Primary | ICD-10-CM

## 2024-12-11 PROCEDURE — 99214 OFFICE O/P EST MOD 30 MIN: CPT | Performed by: NEUROLOGICAL SURGERY

## 2024-12-11 NOTE — PATIENT INSTRUCTIONS
Refill policies:    Allow 2-3 business days for refills; controlled substances may take longer.  Contact your pharmacy at least 5 days prior to running out of medication and have them send an electronic request or submit request through the “request refill” option in your [employer] account.  Refills are not addressed on weekends; covering physicians do not authorize routine medications on weekends.  No narcotics or controlled substances are refilled after noon on Fridays or by on call physicians.  By law, narcotics must be electronically prescribed.  A 30 day supply with no refills is the maximum allowed.  If your prescription is due for a refill, you may be due for a follow up appointment.  To best provide you care, patients receiving routine medications need to be seen at least once a year.  Patients receiving narcotic/controlled substance medications need to be seen at least once every 3 months.  In the event that your preferred pharmacy does not have the requested medication in stock (e.g. Backordered), it is your responsibility to find another pharmacy that has the requested medication available.  We will gladly send a new prescription to that pharmacy at your request.    Scheduling Tests:    If your physician has ordered radiology tests such as MRI or CT scans, please contact Central Scheduling at [phone] right away to schedule the test.  Once scheduled, the [facility] Centralized Referral Team will work with your insurance carrier to obtain pre-certification or prior authorization.  Depending on your insurance carrier, approval may take 3-10 days.  It is highly recommended patients assure they have received an authorization before having a test performed.  If test is done without insurance authorization, patient may be responsible for the entire amount billed.      Precertification and Prior Authorizations:  If your physician has recommended that you have a procedure or additional testing performed the [facility]  Centralized Referral Team will contact your insurance carrier to obtain pre-certification or prior authorization.    You are strongly encouraged to contact your insurance carrier to verify that your procedure/test has been approved and is a COVERED benefit.  Although the [facility] Centralized Referral Team does its due diligence, the insurance carrier gives the disclaimer that \"Although the procedure is authorized, this does not guarantee payment.\"    Ultimately the patient is responsible for payment.   Thank you for your understanding in this matter.  Paperwork Completion:  If you require FMLA or disability paperwork for your recovery, please make sure to either drop it off or have it faxed to our office at [phone]. Be sure the form has your name and date of birth on it.  The form will be faxed to our Forms Department and they will complete it for you.  There is a 25$ fee for all forms that need to be filled out.  Please be aware there is a 10-14 day turnaround time.  You will need to sign a release of information (MADELINE) form if your paperwork does not come with one.  You may call the Forms Department with any questions at [phone].  Their fax number is [phone].

## 2024-12-11 NOTE — PROGRESS NOTES
Neurosurgery Clinic Visit  2024    Ran Carter PCP:  Todd Rivas MD    1970 MRN [id number]     HISTORY OF PRESENT ILLNESS:  Ran Carter is a(n) 54 year old male presents today for office evaluation.  He saw Lili a couple of months ago.    We discussed his symptoms related to the neck.  He feels like his neck \"locks\" when lying down sometimes.  His right arm falls asleep when lying on his right side.  He has occasional Lhermitte's phenomenon.  He notes some fine motor difficulties in his hands as well as dropping objects.  His fingertips are numb.  He denies any gait instability.      PHYSICAL EXAMINATION:  Vital Signs:  /80 (BP Location: Right arm, Patient Position: Sitting, Cuff Size: adult)   On examination, strength is 5/5 throughout.  Reflexes are 1+ and symmetric.  No Dalia's or clonus.      REVIEW OF STUDIES:    Imaging workup was reviewed, including CT scan and MRI scan of the cervical spine.    There is a substantial C2 pannus.  There is also a right sided ventral synovial cyst at this level.  This causes at least moderate stenosis with deformation of the spinal cord.  There is some subtle T2 hyperintensity in the cord through this level.    Flexion-extension x-rays do not demonstrate instability.      ASSESSMENT and PLAN:  1.  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  C2 pannus    3.  Ankylosing spondylitis    4.  Mild cervical myelopathy    We had a conversation regarding his symptoms, as well as available treatment options.  I am concerned that the combination of pannus and synovial cyst indicate some subtle underlying instability.  The patient also has mild myelopathic symptoms, although a relative absence of signs on physical examination.    I am somewhat concerned about his positional symptomatology when lying down, as this relates to the proposed upcoming wrist operation.  I think it would be safest to defer this operation until we  come to a final decision regarding how to manage his neck.    My initial inclination is to recommend occipitocervical fusion with excision of the synovial cyst.  However, this would mean losing the remaining limited range of motion that he has in his neck.    I explained this to the patient.  He would like to take some time to consider his options.  I would like to see him back in the next several weeks, to review his clinical situation once again, and make a final decision regarding how to proceed.  He is amenable to this.        Time spent on counseling/coordination of care:  30 Minutes    Total time spent with patient:  15 Minutes        12/11/2024  3:24 PM     This report was dictated using voice recognition technology.  Errors in syntax or recognition may occur, and should not be construed to change the meaning of a sentence.

## 2024-12-12 RX ORDER — SEMAGLUTIDE 0.68 MG/ML
0.5 INJECTION, SOLUTION SUBCUTANEOUS WEEKLY
Qty: 9 ML | Refills: 0 | Status: SHIPPED | OUTPATIENT
Start: 2024-12-12 | End: 2025-01-03

## 2024-12-12 NOTE — TELEPHONE ENCOUNTER
Endocrine Refill protocol for oral and injectable diabetic medications    Protocol Criteria:  PASSED  Reason: N/A    If all below requirements are met, send a 90-day supply with 1 refill per provider protocol.    Verify appointment with Endocrinology completed in the last 6 months or scheduled in the next 3 months.  Verify A1C has been completed within the last 6 months and is below 8.5%     Last completed office visit: 11/26/2024 Consuelo Guadarrama MD   Next scheduled Follow up:   Future Appointments   Date Time Provider Department Center   1/8/2025  4:00 PM Ren Brandon MD [facility]   1/20/2025  9:30 AM Juan A Mcgee MD [facility]   1/27/2025  3:30 PM Sisi Black MD [facility]   5/7/2025  9:40 AM Todd Rivas MD [facility] [address]      Last A1c result: Last A1c value was 6% done 11/8/2024.

## 2024-12-16 ENCOUNTER — TELEPHONE (OUTPATIENT)
Dept: PULMONOLOGY | Facility: CLINIC | Age: 54
End: 2024-12-16

## 2024-12-16 NOTE — TELEPHONE ENCOUNTER
Received fax from [facility], requesting office visit note from 8/30/24 to current. Patients last office visit was 6/19/2024.     Sent Flo[employer] message to patient to schedule a follow up with Dr. Collier

## 2024-12-20 ENCOUNTER — PATIENT MESSAGE (OUTPATIENT)
Dept: INTERNAL MEDICINE CLINIC | Facility: CLINIC | Age: 54
End: 2024-12-20

## 2024-12-20 DIAGNOSIS — H61.20 IMPACTED CERUMEN, UNSPECIFIED LATERALITY: Primary | ICD-10-CM

## 2024-12-20 NOTE — TELEPHONE ENCOUNTER
Spoke with patient he wants an ENT his ear wax is causing pain. He wants an ENTreferral.  ENT referral entered.

## 2024-12-23 NOTE — PROGRESS NOTES
Clinic Note     Assessment/Plan:  54 year old male    Right wrist scapholunate ligament tear-there is significant widening suggestive of secondary ligament injuries along radiolunate ligament, dorsal intercarpal ligament, and dorsal radiocarpal ligament tears.  A reconstruction likely with ANAFAB would be the best possible surgical solution for his problem.  We could concurrently do a carpal tunnel release as well.  While reviewing his imaging of his cervical spine there appears to be significant compression secondary to a synovial cyst at the C1/C2 junction.  I do have concerns about surgery and neck position while performing this fairly long operation.  Would recommend eval by neurosurgery prior to clearance.  Left Carpal Tunnel Syndrome-EMG/NCV confirmed-surgical nonsurgical treatment options were reviewed the patient.  Patient will proceed with the left side first.  He is tentatively scheduled for December 12 with a slot hold.  If symptoms do not improve with the steroid injection can consider FCR tendon debridement and TFCC debridement.  Right Carpal Tunnel Syndrome-EMG/NCV confirmed-surgical nonsurgical treatment options were reviewed the patient.  Patient will proceed with a carpal tunnel release once adequately recovered from the left side if his symptoms are significant enough.  Left Cubital Tunnel Syndrome-EMG/NCV negative-symptoms and exam findings more consistent with carpal tunnel syndrome  Right Cubital Tunnel Syndrome-EMG/NCV negative-symptoms and exam findings were consistent with carpal tunnel syndrome  H/o cervical spine fusion surgery.  Left wrist FCR tendonitis-a steroid injection to the FCR tendon sheath was performed today..  Chondrocalcinosis of the left wrist is noted TFCC-symptomatic today.  We proceeded with a corticosteroid injection into the ulnocarpal joint which he tolerated complication.    Follow Up: TBD    Diagnostic Studies:     XR Left wrist 3 views: No fractures, dislocations,  or osseous abnormalities.  There is calcification of the TFC.  Mild degenerative changes of the thumb CMC joint.    MRI left wrist: Fluid around the FCR tendon without tendinosis is noted.  Subchondral cystic changes of the ulnar aspect of the lunate.  Degenerative changes of the TFC without peripheral tear noted.    MRI right wrist:Full-thickness tear of the scapholunate ligament with widening of the scapholunate interval.  No evidence of SLAC wrist seen at this point.      Contusion within the distal pole of the scaphoid without acute fracture.      Dorsal capsular wrist sprain.     EMG/NCV: There is electrodiagnostic evidence to support a bilateral median mononeuropathy at or distal to the wrist (carpal tunnel syndrome) electrically mild to moderate with active denervation changes noted on needle EMG on the right side.      Physical Exam:     Ht 5' 11\" (1.803 m)   Wt 270 lb (122.5 kg)   BMI 37.66 kg/m²     Constitutional: NAD. AOx3. Well-developed and Well-nourished.   Psychiatric: Normal mood/ affect/ behavior. Judgment and thought content normal.     Left Upper Extremity:     Inspection    Skin intact. No skin lesions. No obvious mass visualized.     Swelling of most focally over the SL interval Palpation    TTP minimal at the CMC joint more significant along the FCR tendon  Tenderness palpation over the SL interval        ROM    Full composite fist.  Wrist, finger and elbow motion is normal.     Neurovascular    Normal sensation in the radial nerve distribution and abnormal within median & ulnar nerve distrubution.     (+) Phdurkan on both sides, (-) Elbow flexion test, No weakness GUILLAUME or APB bilaterally.    L thumb 20%, IF10%, MF20% RF20% SF20% decreased   R thumb 20%, IF20%, MF20% RF20% SF20% decreased   Right side is worsened to 50% decreased     No dorsal ulnar sensory nerve sensory abnormalities.    Normally perfused hand(s).     Special    Pain with resisted wrist flexion at FCR. No DRUJ instability           CC: Left wrist pain    HPI: This 54 year old RHD male presents with left wrist pain. He mentions numbness and tingling sensation predominantly during the nighttime. He complains the bone often pops out. He had a EMG/NCV done in 2022 by .     Onset: 11/23  Pain Character: Sharp  Pain Level: Moderate  Pain @ Night: No    Treatments Tried: Occupational therapy    Occupation: Self-employed    History/Other:   Past Medical History:    Back problem    Chronic neck pain    Contusion of cervical cord (HCC)    Diabetes (HCC)    High blood pressure    Hyperlipidemia    Migraines    Went to emergency care clinic and suggested to get an MRI done because I've been dealing with a bad migraine for over a week straight today being a 10 from 1-10 10,being the worse    Primary hypertension    Problems with swallowing    Sleep apnea    unable to use device    Visual impairment    readers     Past Surgical History:   Procedure Laterality Date    Colonoscopy N/A 2/7/2024    Procedure: COLONOSCOPY;  Surgeon: Amy Means MD;  Location: [facility] ENDOSCOPY    Ct cervical spine      c3 & 4     Hernia surgery  2005    hiatal hernia    Knee surgery  2008    RIGHT ACL REPAIR     Current Outpatient Medications   Medication Sig Dispense Refill    HYDROcodone-acetaminophen  MG Oral Tab Take 1 tablet by mouth every 4-6 hours PRN pain (max 5 tabs in 24 hours). 150 tablet 0    testosterone cypionate 200 mg/mL Intramuscular Solution Inject 1 mL (200 mg total) into the muscle every 14 (fourteen) days. (Patient not taking: Reported on 12/9/2024) 6 mL 0    Meloxicam 15 MG Oral Tab Take 1 tablet (15 mg total) by mouth daily as needed for Pain. (Patient not taking: Reported on 12/9/2024) 5 tablet 0    pravastatin 20 MG Oral Tab Take 1 tablet (20 mg total) by mouth nightly. 90 tablet 0    Tadalafil 10 MG Oral Tab Take 1 tablet (10 mg total) by mouth daily as needed for Erectile Dysfunction. 30 tablet 0    methocarbamol 500 MG Oral Tab  Take 1 tablet (500 mg total) by mouth 3 (three) times daily as needed. 60 tablet 0    Insulin Pen Needle (PEN NEEDLES) 32G X 4 MM Does not apply Misc 1 each daily. 90 each 0    Blood Pressure Monitoring (BLOOD PRESSURE KIT) Does not apply Device 1 each daily. 1 each 0    nystatin-triamcinolone 100,000-0.1 Units/g-% External Ointment Apply twice daily  Monday-Friday to affected areas of rash. (Patient not taking: Reported on 12/9/2024) 60 g 3    Syringe/Needle, Disp, 27G X 1/2\" 1 ML Does not apply Misc Inject testosterone every two weeks 50 each 0    Syringe 22G X 1\" 3 ML Does not apply Misc Inject testosterone every 2 weeks 50 each 0    Glucose Blood (ONETOUCH VERIO) In Vitro Strip Test blood sugar twice daily. 200 strip 3    Lancets Does not apply Misc Check bid 200 each 1    Insulin Pen Needle (PEN NEEDLES) 32G X 4 MM Does not apply Misc 1 each daily. 90 each 0    multiple vitamin Oral Chew Tab Chew 1 tablet by mouth daily.      semaglutide (OZEMPIC, 0.25 OR 0.5 MG/DOSE,) 2 MG/3ML Subcutaneous Solution Pen-injector Inject 0.5 mg into the skin once a week. 9 mL 0    pantoprazole 40 MG Oral Tab EC Take 1 tablet (40 mg total) by mouth 2 (two) times daily before meals. 180 tablet 1    losartan 100 MG Oral Tab Take 1 tablet (100 mg total) by mouth daily. 90 tablet 0    rosuvastatin 5 MG Oral Tab Take 1 tablet (5 mg total) by mouth nightly. (Patient not taking: Reported on 10/25/2024) 90 tablet 1    pantoprazole 40 MG Oral Tab EC Take 1 tablet (40 mg total) by mouth every morning before breakfast. (Patient not taking: Reported on 12/6/2024) 90 tablet 3    meclizine 25 MG Oral Tab Take 1 tablet (25 mg total) by mouth 3 (three) times daily as needed. (Patient not taking: Reported on 12/6/2024) 30 tablet 0     No Known Allergies  Family History   Problem Relation Age of Onset    No Known Problems Mother     No Known Problems Sister     No Known Problems Brother     Heart Disorder Paternal Grandmother     Diabetes Paternal  Grandmother     Cancer Paternal Grandmother     Glaucoma Neg     Macular degeneration Neg      Social History     Occupational History    Not on file   Tobacco Use    Smoking status: Never     Passive exposure: Past    Smokeless tobacco: Never   Vaping Use    Vaping status: Never Used   Substance and Sexual Activity    Alcohol use: Not Currently     Alcohol/week: 1.0 standard drink of alcohol     Comment: Did social    Drug use: Never    Sexual activity: Not on file      Review of Systems (negative unless bolded):  General: fevers, chills, fatigue  CV:  chest pain, palpitations, leg swelling  Msk: bodyaches, neck pain, neck stiffness  Skin: rashes, open wounds, nonhealing ulcers  Hem: bleeds easily, bruise easily, immunocompromised  Neuro: dizziness, light headedness, headaches  Psych: anxious, depressed, anger issues    Guanaco Villela MD   Hand, Wrist, & Elbow Surgery  [email or web address]  t: [phone]  f: [phone]

## 2024-12-28 ENCOUNTER — APPOINTMENT (OUTPATIENT)
Dept: GENERAL RADIOLOGY | Age: 54
End: 2024-12-28
Attending: EMERGENCY MEDICINE
Payer: MEDICAID

## 2024-12-28 ENCOUNTER — HOSPITAL ENCOUNTER (OUTPATIENT)
Age: 54
Discharge: HOME | End: 2024-12-28
Attending: EMERGENCY MEDICINE
Payer: MEDICAID

## 2024-12-28 VITALS
OXYGEN SATURATION: 97 % | RESPIRATION RATE: 20 BRPM | TEMPERATURE: 98 F | DIASTOLIC BLOOD PRESSURE: 94 MMHG | HEART RATE: 83 BPM | SYSTOLIC BLOOD PRESSURE: 156 MMHG

## 2024-12-28 DIAGNOSIS — M54.16 LUMBAR RADICULOPATHY: ICD-10-CM

## 2024-12-28 DIAGNOSIS — M25.511 ACUTE PAIN OF RIGHT SHOULDER: Primary | ICD-10-CM

## 2024-12-28 PROCEDURE — 99213 OFFICE O/P EST LOW 20 MIN: CPT

## 2024-12-28 PROCEDURE — 73030 X-RAY EXAM OF SHOULDER: CPT | Performed by: EMERGENCY MEDICINE

## 2024-12-28 PROCEDURE — 99214 OFFICE O/P EST MOD 30 MIN: CPT
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
## 2024-12-30 RX ORDER — HYDROCODONE BITARTRATE AND ACETAMINOPHEN 10; 325 MG/1; MG/1
TABLET ORAL
Qty: 150 TABLET | Refills: 0 | Status: SHIPPED | OUTPATIENT
Start: 2024-12-30

## 2024-12-30 RX ORDER — LOSARTAN POTASSIUM 100 MG/1
100 TABLET ORAL DAILY
Qty: 90 TABLET | Refills: 0 | OUTPATIENT
Start: 2024-12-30 | End: 2025-03-30

## 2024-12-30 NOTE — TELEPHONE ENCOUNTER
Refill Request    Medication request: HYDROcodone-acetaminophen  MG Oral Tab  Take 1 tablet by mouth every 4-6 hours PRN pain (max 5 tabs in 24 hours).     LOV:10/29/2024 Juan A Mcgee MD   Due back to clinic per last office note:  \"He will currently follow-up as scheduled, but this may change pending the above results. \"  NOV: 2025 Juan A Mcgee MD      [facility]- 30 day supply    Urine drug screen (if applicable): none  Pain contract:  on 24    LOV plan (if weaning or changing medications): Per  at last office visit: \"He will continue with the 5 Norco per day for the pain for now \"             
complains of pain/discomfort

## 2025-01-03 RX ORDER — SEMAGLUTIDE 0.68 MG/ML
0.5 INJECTION, SOLUTION SUBCUTANEOUS WEEKLY
Qty: 9 ML | Refills: 0 | Status: SHIPPED | OUTPATIENT
Start: 2025-01-03

## 2025-01-03 NOTE — TELEPHONE ENCOUNTER
Endocrine Refill protocol for oral and injectable diabetic medications    Protocol Criteria:  PASSED  Reason: N/A    If all below requirements are met, send a 90-day supply with 1 refill per provider protocol.    Verify appointment with Endocrinology completed in the last 6 months or scheduled in the next 3 months.  Verify A1C has been completed within the last 6 months and is below 8.5%     Last completed office visit: 11/26/2024 Consuelo Guadarrama MD   Next scheduled Follow up:   Future Appointments   Date Time Provider Department Center   1/8/2025  4:00 PM Ren Brandon MD EMG NEURSURG [facility]   1/20/2025  9:30 AM Juan A Mcgee MD PM&R [facility]   1/27/2025  3:30 PM Sisi Black MD [facility]   5/7/2025  9:40 AM Todd Rivas MD [facility] [address]      Last A1c result: Last A1c value was 6% done 11/8/2024.

## 2025-01-08 ENCOUNTER — OFFICE VISIT (OUTPATIENT)
Dept: SURGERY | Facility: CLINIC | Age: 55
End: 2025-01-08
Payer: MEDICAID

## 2025-01-08 ENCOUNTER — OFFICE VISIT (OUTPATIENT)
Dept: OTOLARYNGOLOGY | Facility: CLINIC | Age: 55
End: 2025-01-08
Payer: MEDICAID

## 2025-01-08 VITALS
BODY MASS INDEX: 38.22 KG/M2 | HEART RATE: 90 BPM | SYSTOLIC BLOOD PRESSURE: 110 MMHG | HEIGHT: 71 IN | DIASTOLIC BLOOD PRESSURE: 80 MMHG | WEIGHT: 273 LBS

## 2025-01-08 DIAGNOSIS — H61.21 IMPACTED CERUMEN, RIGHT EAR: ICD-10-CM

## 2025-01-08 DIAGNOSIS — H92.01 RIGHT EAR PAIN: Primary | ICD-10-CM

## 2025-01-08 DIAGNOSIS — G95.9 CERVICAL MYELOPATHY (HCC): ICD-10-CM

## 2025-01-08 DIAGNOSIS — M45.0 ANKYLOSING SPONDYLITIS OF MULTIPLE SITES IN SPINE (HCC): Primary | ICD-10-CM

## 2025-01-08 PROCEDURE — 69210 REMOVE IMPACTED EAR WAX UNI: CPT | Performed by: STUDENT IN AN ORGANIZED HEALTH CARE EDUCATION/TRAINING PROGRAM

## 2025-01-08 PROCEDURE — 99214 OFFICE O/P EST MOD 30 MIN: CPT | Performed by: NEUROLOGICAL SURGERY

## 2025-01-08 PROCEDURE — 99203 OFFICE O/P NEW LOW 30 MIN: CPT | Performed by: STUDENT IN AN ORGANIZED HEALTH CARE EDUCATION/TRAINING PROGRAM

## 2025-01-08 NOTE — PROGRESS NOTES
aRn Carter is a 54 year old male.   Chief Complaint   Patient presents with    Ear Problem     Right ear pain      HPI:   54-year-old with right ear pain.  Has been seen in the urgent care several times for ear flushing    Current Outpatient Medications   Medication Sig Dispense Refill    semaglutide (OZEMPIC, 0.25 OR 0.5 MG/DOSE,) 2 MG/3ML Subcutaneous Solution Pen-injector Inject 0.5 mg into the skin once a week. 9 mL 0    pantoprazole 40 MG Oral Tab EC Take 1 tablet (40 mg total) by mouth 2 (two) times daily before meals. 180 tablet 1    losartan 100 MG Oral Tab Take 1 tablet (100 mg total) by mouth daily. 90 tablet 0    pravastatin 20 MG Oral Tab Take 1 tablet (20 mg total) by mouth nightly. 90 tablet 0    Tadalafil 10 MG Oral Tab Take 1 tablet (10 mg total) by mouth daily as needed for Erectile Dysfunction. 30 tablet 0    methocarbamol 500 MG Oral Tab Take 1 tablet (500 mg total) by mouth 3 (three) times daily as needed. 60 tablet 0    Insulin Pen Needle (PEN NEEDLES) 32G X 4 MM Does not apply Misc 1 each daily. 90 each 0    Blood Pressure Monitoring (BLOOD PRESSURE KIT) Does not apply Device 1 each daily. 1 each 0    Syringe/Needle, Disp, 27G X 1/2\" 1 ML Does not apply Misc Inject testosterone every two weeks 50 each 0    Syringe 22G X 1\" 3 ML Does not apply Misc Inject testosterone every 2 weeks 50 each 0    Glucose Blood (ONETOUCH VERIO) In Vitro Strip Test blood sugar twice daily. 200 strip 3    Lancets Does not apply Misc Check bid 200 each 1    Insulin Pen Needle (PEN NEEDLES) 32G X 4 MM Does not apply Misc 1 each daily. 90 each 0    multiple vitamin Oral Chew Tab Chew 1 tablet by mouth daily.      HYDROcodone-acetaminophen  MG Oral Tab Take 1 tablet by mouth every 4-6 hours PRN pain (max 5 tabs in 24 hours). 150 tablet 0    testosterone cypionate 200 mg/mL Intramuscular Solution Inject 1 mL (200 mg total) into the muscle every 14 (fourteen) days. (Patient not taking: Reported on  1/8/2025) 6 mL 0    Meloxicam 15 MG Oral Tab Take 1 tablet (15 mg total) by mouth daily as needed for Pain. (Patient not taking: Reported on 1/8/2025) 5 tablet 0    rosuvastatin 5 MG Oral Tab Take 1 tablet (5 mg total) by mouth nightly. (Patient not taking: Reported on 1/8/2025) 90 tablet 1    nystatin-triamcinolone 100,000-0.1 Units/g-% External Ointment Apply twice daily  Monday-Friday to affected areas of rash. (Patient not taking: Reported on 1/8/2025) 60 g 3    pantoprazole 40 MG Oral Tab EC Take 1 tablet (40 mg total) by mouth every morning before breakfast. (Patient not taking: Reported on 10/25/2024) 90 tablet 3    meclizine 25 MG Oral Tab Take 1 tablet (25 mg total) by mouth 3 (three) times daily as needed. (Patient not taking: Reported on 10/25/2024) 30 tablet 0      Past Medical History:    Back problem    Chronic neck pain    Contusion of cervical cord (HCC)    Diabetes (HCC)    High blood pressure    Hyperlipidemia    Migraines    Went to emergency care clinic and suggested to get an MRI done because I've been dealing with a bad migraine for over a week straight today being a 10 from 1-10 10,being the worse    Primary hypertension    Problems with swallowing    Sleep apnea    unable to use device    Visual impairment    readers      Social History:  Social History     Socioeconomic History    Marital status:    Tobacco Use    Smoking status: Never     Passive exposure: Past    Smokeless tobacco: Never   Vaping Use    Vaping status: Never Used   Substance and Sexual Activity    Alcohol use: Not Currently     Alcohol/week: 1.0 standard drink of alcohol     Comment: Did social    Drug use: Never   Other Topics Concern    Caffeine Concern Yes    Exercise No    History of tanning Yes    Reaction to local anesthetic No    Pt has a pacemaker No    Pt has a defibrillator No   Social History Narrative    The patient does not use an assistive device..      The patient does live in a home with stairs.       Past Surgical History:   Procedure Laterality Date    Colonoscopy N/A 2/7/2024    Procedure: COLONOSCOPY;  Surgeon: Amy Means MD;  Location: [facility] ENDOSCOPY    Ct cervical spine      c3 & 4     Hernia surgery  2005    hiatal hernia    Knee surgery  2008    RIGHT ACL REPAIR         EXAM:   There were no vitals taken for this visit.    System Details   Skin Inspection - Normal.   Constitutional Overall appearance - Normal.   Head/Face Symmetric, TMJ tenderness not present    Eyes EOMI, PERRL   Right ear:  Canal with cerumen impaction, TM intact, no FILIPPO   Left ear:  Canal clear, TM intact, no FILIPPO   Nose: Septum midline, inferior turbinates not enlarged, nasal valves without collapse    Oral cavity/Oropharynx: No lesions or masses on inspection or palpation, tonsils symmetric    Neck: Soft without LAD, thyroid not enlarged  Voice clear/ no stridor   Other:      SCOPES AND PROCEDURES:     Canals:  Right: Canal with cerumen preventing adequate view of TM, debrided with instrumentation    Tympanic Membranes:  Right: Normal tympanic membrane.     TM Visualized Method:   Right TM examined via otomicroscopy.      PROCEDURE:   Removal of cerumen impaction   The cerumen impaction was completely removed on the right side using microscopy as necessary.   Removal was completed by using a curette and suction.     AUDIOGRAM AND IMAGING:         IMPRESSION:   1. Right ear pain    2. Impacted cerumen, right ear       Recommendations:  -Cerumen removed from the right ear.  Slight erythema of the ear canal likely from the flushing or the cerumen itself  -If his ear pain persist he should return otherwise no abnormalities noted    The patient indicates understanding of these issues and agrees to the plan.      Aamir Marsh MD  1/8/2025  2:36 PM

## 2025-01-08 NOTE — PATIENT INSTRUCTIONS
Refill policies:    Allow 2-3 business days for refills; controlled substances may take longer.  Contact your pharmacy at least 5 days prior to running out of medication and have them send an electronic request or submit request through the “request refill” option in your [employer] account.  Refills are not addressed on weekends; covering physicians do not authorize routine medications on weekends.  No narcotics or controlled substances are refilled after noon on Fridays or by on call physicians.  By law, narcotics must be electronically prescribed.  A 30 day supply with no refills is the maximum allowed.  If your prescription is due for a refill, you may be due for a follow up appointment.  To best provide you care, patients receiving routine medications need to be seen at least once a year.  Patients receiving narcotic/controlled substance medications need to be seen at least once every 3 months.  In the event that your preferred pharmacy does not have the requested medication in stock (e.g. Backordered), it is your responsibility to find another pharmacy that has the requested medication available.  We will gladly send a new prescription to that pharmacy at your request.    Scheduling Tests:    If your physician has ordered radiology tests such as MRI or CT scans, please contact Central Scheduling at [phone] right away to schedule the test.  Once scheduled, the [facility] Centralized Referral Team will work with your insurance carrier to obtain pre-certification or prior authorization.  Depending on your insurance carrier, approval may take 3-10 days.  It is highly recommended patients assure they have received an authorization before having a test performed.  If test is done without insurance authorization, patient may be responsible for the entire amount billed.      Precertification and Prior Authorizations:  If your physician has recommended that you have a procedure or additional testing performed the [facility]  Centralized Referral Team will contact your insurance carrier to obtain pre-certification or prior authorization.    You are strongly encouraged to contact your insurance carrier to verify that your procedure/test has been approved and is a COVERED benefit.  Although the [facility] Centralized Referral Team does its due diligence, the insurance carrier gives the disclaimer that \"Although the procedure is authorized, this does not guarantee payment.\"    Ultimately the patient is responsible for payment.   Thank you for your understanding in this matter.  Paperwork Completion:  If you require FMLA or disability paperwork for your recovery, please make sure to either drop it off or have it faxed to our office at [phone]. Be sure the form has your name and date of birth on it.  The form will be faxed to our Forms Department and they will complete it for you.  There is a 25$ fee for all forms that need to be filled out.  Please be aware there is a 10-14 day turnaround time.  You will need to sign a release of information (MADELINE) form if your paperwork does not come with one.  You may call the Forms Department with any questions at [phone].  Their fax number is [phone].

## 2025-01-09 NOTE — PROGRESS NOTES
Neurosurgery Clinic Visit  2025    Ran Carter PCP:  Todd Rivas MD    1970 MRN [id number]     HISTORY OF PRESENT ILLNESS:  Ran Carter is a(n) 54 year old male who presents today in office follow-up.  He is accompanied by his cousin.    He reports the locking sensation in his neck, as well as the shocks of neuropathic pain, are happening less frequently now.  He has no new concerns.      PHYSICAL EXAMINATION:  Vital Signs:  /80 (BP Location: Right arm, Patient Position: Sitting, Cuff Size: large)   Pulse 90   Ht 71\"   Wt 273 lb (123.8 kg)   BMI 38.08 kg/m²   Examination is stable.  Strength is 5/5 throughout.  Reflexes are 1+ and symmetric.  No Dalia's or clonus.      REVIEW OF STUDIES:    No new imaging is available for review.  We spent some time reviewing the previous MRI scan and CT scan.      ASSESSMENT and PLAN:  1.  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  C2 pannus     3.  Ankylosing spondylitis     4.  Mild cervical myelopathy    Reviewed his imaging, as well as his clinical progress.  At this point, the patient appears to be improving symptomatically.  This could indicate some progression of the synovial cyst.    I would like to see him back in 2 months, with a new MRI scan of the cervical spine.    Given his improvement, as well as the complexity and risk entailed with the operation, I would favor continued observation for the time being.    We did spend some time talking about the proposed right upper extremity operation.  Again, considering his clinical improvement, I think this could be performed safely.  It is important to keep the patient positioned with cervical spine neutral, place that does not routinely provoke any of his symptoms.  Further, would recommend arterial line for blood pressure management.  Avoid hypotension.  MAP goal 80-85 throughout the operation.    He will follow-up with me in 2 months as above.    Time  spent on counseling/coordination of care:  30 Minutes    Total time spent with patient:  15 Minutes        1/9/2025  8:07 AM     This report was dictated using voice recognition technology.  Errors in syntax or recognition may occur, and should not be construed to change the meaning of a sentence.

## 2025-01-20 ENCOUNTER — OFFICE VISIT (OUTPATIENT)
Dept: PHYSICAL MEDICINE AND REHAB | Facility: CLINIC | Age: 55
End: 2025-01-20
Payer: MEDICAID

## 2025-01-20 VITALS
WEIGHT: 260 LBS | BODY MASS INDEX: 36.4 KG/M2 | DIASTOLIC BLOOD PRESSURE: 76 MMHG | SYSTOLIC BLOOD PRESSURE: 143 MMHG | HEIGHT: 71 IN | HEART RATE: 96 BPM

## 2025-01-20 DIAGNOSIS — Z98.1 S/P CERVICAL SPINAL FUSION: ICD-10-CM

## 2025-01-20 DIAGNOSIS — M50.90 CERVICAL DISC DISEASE: ICD-10-CM

## 2025-01-20 DIAGNOSIS — G89.29 CHRONIC PAIN OF RIGHT WRIST: ICD-10-CM

## 2025-01-20 DIAGNOSIS — M47.812 ARTHROPATHY OF CERVICAL FACET JOINT: ICD-10-CM

## 2025-01-20 DIAGNOSIS — M25.531 RIGHT WRIST PAIN: ICD-10-CM

## 2025-01-20 DIAGNOSIS — M48.02 CERVICAL STENOSIS OF SPINE: ICD-10-CM

## 2025-01-20 DIAGNOSIS — M54.12 CERVICAL RADICULOPATHY: ICD-10-CM

## 2025-01-20 DIAGNOSIS — M45.0 ANKYLOSING SPONDYLITIS OF MULTIPLE SITES IN SPINE (HCC): Primary | ICD-10-CM

## 2025-01-20 DIAGNOSIS — M51.9 THORACIC DISC DISEASE: ICD-10-CM

## 2025-01-20 DIAGNOSIS — M51.24 HERNIATED THORACIC DISC WITHOUT MYELOPATHY: ICD-10-CM

## 2025-01-20 DIAGNOSIS — M48.04 THORACIC STENOSIS: ICD-10-CM

## 2025-01-20 DIAGNOSIS — M71.30 SYNOVIAL CYST: ICD-10-CM

## 2025-01-20 DIAGNOSIS — M54.16 LUMBAR RADICULOPATHY: ICD-10-CM

## 2025-01-20 DIAGNOSIS — G56.03 BILATERAL CARPAL TUNNEL SYNDROME: ICD-10-CM

## 2025-01-20 DIAGNOSIS — Q76.1 CERVICAL FUSION SYNDROME: ICD-10-CM

## 2025-01-20 DIAGNOSIS — M11.20 CALCIUM PYROPHOSPHATE DEPOSITION DISEASE (CPPD): ICD-10-CM

## 2025-01-20 DIAGNOSIS — M25.531 CHRONIC PAIN OF RIGHT WRIST: ICD-10-CM

## 2025-01-20 DIAGNOSIS — E11.9 CONTROLLED TYPE 2 DIABETES MELLITUS WITHOUT COMPLICATION, WITHOUT LONG-TERM CURRENT USE OF INSULIN (HCC): ICD-10-CM

## 2025-01-20 PROCEDURE — 99214 OFFICE O/P EST MOD 30 MIN: CPT | Performed by: PHYSICAL MEDICINE & REHABILITATION

## 2025-01-20 NOTE — PROGRESS NOTES
Low Back Pain [facility] & P    Chief Complaint:   Chief Complaint   Patient presents with    Neck Pain     LOV 10/29/24 pt presents with Ankylosing spondylitis of multiple sites in spine and neck pain. Pain in neck is 5/10 and is taking Norco and medication helps his ADL's.      Nursing note reviewed and verified.    Patient was last seen on 10/29/2024.  He scan of his thoracic spine as scheduled.  The patient has seen Dr. Graham for his left foot and ankle issues.  The patient states that still has burning pain on the soles of his right greater than left feet.  The patient states he will be following up with Dr. Graham for this.  The patient has seen Dr. Brandon and has had a CT scan and MRI scan of the cervical spine.  The MRI scan did show a synovial cyst at the right C1-2 joint that is not compressing the spinal cord.  The patient has continued to take 5 Norco per day.  The patient states that the shooting pain that he was getting going down his left arm has now resolved.  Patient states that his right wrist pain has also significantly improved since he has been wearing the cock up wrist splint.  The patient will be following up with Dr. Villela for this issue.    Past Medical History   Past Medical History:    Back problem    Chronic neck pain    Contusion of cervical cord (HCC)    Diabetes (HCC)    High blood pressure    Hyperlipidemia    Migraines    Went to emergency care clinic and suggested to get an MRI done because I've been dealing with a bad migraine for over a week straight today being a 10 from 1-10 10,being the worse    Primary hypertension    Problems with swallowing    Sleep apnea    unable to use device    Visual impairment    readers       Past Surgical History   Past Surgical History:   Procedure Laterality Date    Colonoscopy N/A 2/7/2024    Procedure: COLONOSCOPY;  Surgeon: Amy Means MD;  Location: [facility] ENDOSCOPY    Ct cervical spine      c3 & 4     Hernia surgery  2005    hiatal hernia    Knee  surgery  2008    RIGHT ACL REPAIR       Family History   Family History   Problem Relation Age of Onset    No Known Problems Mother     No Known Problems Sister     No Known Problems Brother     Heart Disorder Paternal Grandmother     Diabetes Paternal Grandmother     Cancer Paternal Grandmother     Glaucoma Neg     Macular degeneration Neg        Social History   Social History     Socioeconomic History    Marital status:      Spouse name: Not on file    Number of children: Not on file    Years of education: Not on file    Highest education level: Not on file   Occupational History    Not on file   Tobacco Use    Smoking status: Never     Passive exposure: Past    Smokeless tobacco: Never   Vaping Use    Vaping status: Never Used   Substance and Sexual Activity    Alcohol use: Not Currently     Alcohol/week: 1.0 standard drink of alcohol     Comment: Did social    Drug use: Never    Sexual activity: Not on file   Other Topics Concern    Caffeine Concern Yes    Exercise No    Seat Belt Not Asked    Special Diet Not Asked    Stress Concern Not Asked    Weight Concern Not Asked     Service Not Asked    Blood Transfusions Not Asked    Occupational Exposure Not Asked    Hobby Hazards Not Asked    Sleep Concern Not Asked    Back Care Not Asked    Bike Helmet Not Asked    Self-Exams Not Asked    Grew up on a farm Not Asked    History of tanning Yes    Outdoor occupation Not Asked    Reaction to local anesthetic No    Pt has a pacemaker No    Pt has a defibrillator No   Social History Narrative    The patient does not use an assistive device..      The patient does live in a home with stairs.     Social Drivers of Health     Financial Resource Strain: Not on file   Food Insecurity: Not on file   Transportation Needs: Not on file   Physical Activity: Not on file   Stress: Not on file   Social Connections: Not on file   Housing Stability: Not on file       PE:  The patient does appear in his stated age in no  distress.  The patient is well groomed.    Psychiatric:  The patient is alert and oriented x 3.  The patient has a normal affect and mood.      Respiratory:  No acute respiratory distress. Patient does not have a cough.    HEENT:  Extraocular muscles are intact. There is no kern icterus. Pupils are equal, round, and reactive to light. No redness or discharge bilaterally.    Skin:  There are no rashes or lesions.    Vitals:  Vitals:    01/20/25 0948   BP: 143/76   Pulse: 96     Right Wrist:  There is no significant swelling of the right wrist when compared to the left wrist and the right wrist has good flexion extension with mild pain and also abduction and adduction of the right wrist with minimal pain.  There is no pain on palpation over the carpal joints of the right wrist.    Gait:    Gait: Normal gait   Sit to Stand: no difficulty      Vascular lower extremity:   Dorsalis pedis pulse-RIGHT 2+   Dorsalis pedis pulse-LEFT 2+   Tibialis posterior pulse-RIGHT 2+   Tibialis posterior pulse-LEFT 2+     Neurological Lower Extremity:    Light Touch Sensation: Intact in bilateral Lower Extremities   LE Muscle Strength: All LE strength measurements 5/5 except:  Hamstring RIGHT:   4/5  Hamstring LEFT:   4/5   RIGHT plantar reflexes: downward response   LEFT plantar reflexes: downward response   Reflexes: 1+ in bilateral lower extremities     Radiology Imaging:  I reviewed with the patient his MRI of the cervical spine from 12/3/2024.      Assessment  1. Ankylosing spondylitis of multiple sites in spine (HCC)    2. Controlled type 2 diabetes mellitus without complication, without long-term current use of insulin ([facility])    3. Arthropathy of cervical facet joint: C1-2 and OA    4. Bilateral carpal tunnel syndrome: right moderate-severe, left moderate sensory & mild motor    5. C2-3 mild-mod central, C7-T1 mild central & left foraminal mild-mod bulging discs    6. C3-4 moderate central stenosis    7. Cervical fusion syndrome:  C2-T1 due to AS    8. right C6 chronic radiculopathy    9. S/P cervical spinal fusion: C3-4 ACDF    10. Synovial cyst of the C1-2 z-joint that is moderate-large    11. Calcium pyrophosphate deposition disease (CPPD)    12. Right wrist pain with a torn ligament    13. Chronic pain of right wrist    14. right > left L5-S1 radiculopathy         Plan  I will speak to Dr. Brandon about whether or not it would be prudent to try aspirating the synovial cyst at the C1-2 joint under fluoroscopy if the patient starts to become symptomatic.  I told the patient that I would probably only attempt this if 1 Dr. Brandon agreed to it and to Dr. Brandon would be around just in case I ran into any issues with doing it.    Currently, the patient will just monitor his neck and arm symptoms.    As far as his wrist goes, I will have the patient get into occupational therapy for the right wrist to see if this can further decrease his symptoms.  Patient will also be following up with Dr. Villela regarding his right wrist and whether or not he needs any surgery for it.    Due to the burning pain in the soles of both of his feet and the weakness he has in his hamstrings, I feel that this pain may be due to an S1 radiculopathy and therefore I am having him get an MRI scan of his lumbar spine.    The patient will let me know once he has had the MRI scan.    The patient will follow-up with me in 3 months or sooner if needed.    Patient will continue with taking no more than 5 Norco per day for his pain.    The patient understands and agrees with the stated plan.  Juan A Mcgee MD  1/20/2025

## 2025-01-20 NOTE — PATIENT INSTRUCTIONS
Plan  I will speak to Dr. Brandon about whether or not it would be prudent to try aspirating the synovial cyst at the C1-2 joint under fluoroscopy if the patient starts to become symptomatic.  I told the patient that I would probably only attempt this if 1 Dr. Brandon agreed to it and to Dr. Brandon would be around just in case I ran into any issues with doing it.    Currently, the patient will just monitor his neck and arm symptoms.    As far as his wrist goes, I will have the patient get into occupational therapy for the right wrist to see if this can further decrease his symptoms.  Patient will also be following up with Dr. Villela regarding his right wrist and whether or not he needs any surgery for it.    Due to the burning pain in the soles of both of his feet and the weakness he has in his hamstrings, I feel that this pain may be due to an S1 radiculopathy and therefore I am having him get an MRI scan of his lumbar spine.    The patient will let me know once he has had the MRI scan.    The patient will follow-up with me in 3 months or sooner if needed.    Patient will continue with taking no more than 5 Norco per day for his pain.

## 2025-01-22 ENCOUNTER — TELEPHONE (OUTPATIENT)
Facility: CLINIC | Age: 55
End: 2025-01-22

## 2025-01-22 NOTE — TELEPHONE ENCOUNTER
Future Appointments   Date Time Provider Department Center   1/27/2025  3:30 PM Sisi Black MD [facility]   1/29/2025  8:30 AM Guanaco Villela MD [facility] ORTHO [facility] [place]   2/13/2025  2:15 PM [facility] MRI RM1 (1.5T WIDE) [facility] MRI EM [place]   3/9/2025  9:00 AM [facility] MRI RM1 (1.5T WIDE) [facility] MRI EM Main [place]   3/12/2025  1:00 PM Ren Brandon MD EMG NEURSURG [facility]   5/7/2025  9:40 AM Todd Rivas MD [facility] [address]   5/8/2025  2:30 PM Juan A Mcgee MD PM[facility]

## 2025-01-22 NOTE — TELEPHONE ENCOUNTER
Pt mistakenly missed 1/22 appt and pt is requesting to reschedule appt. The earliest date avail is 3/12 but the pt needs to be seen sooner, please advise. Pt contact is [phone]  Future Appointments  1/27/2025  3:30 PM    Sisi Black MD         [facility]  2/13/2025  2:15 PM    [facility] MRI RM1 (1.5T WIDE)    [facility] MRI             EM [place]  3/9/2025   9:00 AM    [facility] MRI 1 (1.5T WIDE)    [facility] MRI             EM Main [place]  3/12/2025  1:00 PM    Ren Brandon, * EMG NEURSURG        [facility]  5/7/2025   9:40 AM    Todd Rivas MD [facility] [address]  5/8/2025   2:30 PM    Juan A Mcgee MD         PM[facility]            [facility]

## 2025-01-27 ENCOUNTER — LAB ENCOUNTER (OUTPATIENT)
Dept: LAB | Facility: HOSPITAL | Age: 55
End: 2025-01-27
Attending: UROLOGY
Payer: MEDICAID

## 2025-01-27 ENCOUNTER — OFFICE VISIT (OUTPATIENT)
Dept: SURGERY | Facility: CLINIC | Age: 55
End: 2025-01-27
Payer: MEDICAID

## 2025-01-27 DIAGNOSIS — R31.29 MICROHEMATURIA: Primary | ICD-10-CM

## 2025-01-27 DIAGNOSIS — R31.29 MICROHEMATURIA: ICD-10-CM

## 2025-01-27 LAB
BILIRUB UR QL: NEGATIVE
CLARITY UR: CLEAR
GLUCOSE UR-MCNC: NORMAL MG/DL
KETONES UR-MCNC: NEGATIVE MG/DL
LEUKOCYTE ESTERASE UR QL STRIP.AUTO: 25
NITRITE UR QL STRIP.AUTO: NEGATIVE
PH UR: 5.5 [PH] (ref 5–8)
PROT UR-MCNC: NEGATIVE MG/DL
SP GR UR STRIP: 1.02 (ref 1–1.03)
UROBILINOGEN UR STRIP-ACNC: NORMAL

## 2025-01-27 PROCEDURE — 81001 URINALYSIS AUTO W/SCOPE: CPT

## 2025-01-27 PROCEDURE — 99213 OFFICE O/P EST LOW 20 MIN: CPT | Performed by: UROLOGY

## 2025-01-27 NOTE — PROGRESS NOTES
Ran Carter is a 54 year old male.    HPI:     Chief Complaint   Patient presents with    Follow - Up     Patient states he's here for a 6 month follow up, states no changes or concerns.        54-year-old male with a history of microhematuria status post previous evaluation with a cystoscopy January 2024 which was unremarkable, CT urogram December 2023 also unremarkable and negative urine for cytology.  No gross hematuria, dysuria, bothersome irritative or obstructive voiding symptoms.  Has an IPSS score today 5 quality-of-life index of 0.  Repeat urinalysis at his last visit July 2024 unremarkable.      HISTORY:  Past Medical History:    Back problem    Chronic neck pain    Contusion of cervical cord (HCC)    Diabetes (HCC)    High blood pressure    Hyperlipidemia    Migraines    Went to emergency care clinic and suggested to get an MRI done because I've been dealing with a bad migraine for over a week straight today being a 10 from 1-10 10,being the worse    Primary hypertension    Problems with swallowing    Sleep apnea    unable to use device    Visual impairment    readers      Past Surgical History:   Procedure Laterality Date    Colonoscopy N/A 2/7/2024    Procedure: COLONOSCOPY;  Surgeon: mAy Means MD;  Location: [facility] ENDOSCOPY    Ct cervical spine      c3 & 4     Hernia surgery  2005    hiatal hernia    Knee surgery  2008    RIGHT ACL REPAIR      Family History   Problem Relation Age of Onset    No Known Problems Mother     No Known Problems Sister     No Known Problems Brother     Heart Disorder Paternal Grandmother     Diabetes Paternal Grandmother     Cancer Paternal Grandmother     Glaucoma Neg     Macular degeneration Neg       Social History:   Social History     Socioeconomic History    Marital status:    Tobacco Use    Smoking status: Never     Passive exposure: Past    Smokeless tobacco: Never   Vaping Use    Vaping status: Never Used   Substance and Sexual Activity     Alcohol use: Not Currently     Alcohol/week: 1.0 standard drink of alcohol     Comment: Did social    Drug use: Never   Other Topics Concern    Caffeine Concern Yes    Exercise No    History of tanning Yes    Reaction to local anesthetic No    Pt has a pacemaker No    Pt has a defibrillator No   Social History Narrative    The patient does not use an assistive device..      The patient does live in a home with stairs.        Medications (Active prior to today's visit):  Current Outpatient Medications   Medication Sig Dispense Refill    semaglutide (OZEMPIC, 0.25 OR 0.5 MG/DOSE,) 2 MG/3ML Subcutaneous Solution Pen-injector Inject 0.5 mg into the skin once a week. 9 mL 0    HYDROcodone-acetaminophen  MG Oral Tab Take 1 tablet by mouth every 4-6 hours PRN pain (max 5 tabs in 24 hours). 150 tablet 0    pantoprazole 40 MG Oral Tab EC Take 1 tablet (40 mg total) by mouth 2 (two) times daily before meals. 180 tablet 1    losartan 100 MG Oral Tab Take 1 tablet (100 mg total) by mouth daily. 90 tablet 0    testosterone cypionate 200 mg/mL Intramuscular Solution Inject 1 mL (200 mg total) into the muscle every 14 (fourteen) days. 6 mL 0    Meloxicam 15 MG Oral Tab Take 1 tablet (15 mg total) by mouth daily as needed for Pain. (Patient not taking: Reported on 1/20/2025) 5 tablet 0    pravastatin 20 MG Oral Tab Take 1 tablet (20 mg total) by mouth nightly. 90 tablet 0    Tadalafil 10 MG Oral Tab Take 1 tablet (10 mg total) by mouth daily as needed for Erectile Dysfunction. (Patient not taking: Reported on 1/20/2025) 30 tablet 0    methocarbamol 500 MG Oral Tab Take 1 tablet (500 mg total) by mouth 3 (three) times daily as needed. 60 tablet 0    rosuvastatin 5 MG Oral Tab Take 1 tablet (5 mg total) by mouth nightly. (Patient not taking: Reported on 1/20/2025) 90 tablet 1    Insulin Pen Needle (PEN NEEDLES) 32G X 4 MM Does not apply Misc 1 each daily. 90 each 0    Blood Pressure Monitoring (BLOOD PRESSURE KIT) Does not  apply Device 1 each daily. 1 each 0    nystatin-triamcinolone 100,000-0.1 Units/g-% External Ointment Apply twice daily  Monday-Friday to affected areas of rash. 60 g 3    pantoprazole 40 MG Oral Tab EC Take 1 tablet (40 mg total) by mouth every morning before breakfast. 90 tablet 3    Syringe/Needle, Disp, 27G X 1/2\" 1 ML Does not apply Misc Inject testosterone every two weeks 50 each 0    Syringe 22G X 1\" 3 ML Does not apply Misc Inject testosterone every 2 weeks 50 each 0    Glucose Blood (ONETOUCH VERIO) In Vitro Strip Test blood sugar twice daily. 200 strip 3    Lancets Does not apply Misc Check bid 200 each 1    Insulin Pen Needle (PEN NEEDLES) 32G X 4 MM Does not apply Misc 1 each daily. 90 each 0    meclizine 25 MG Oral Tab Take 1 tablet (25 mg total) by mouth 3 (three) times daily as needed. (Patient not taking: Reported on 1/20/2025) 30 tablet 0    multiple vitamin Oral Chew Tab Chew 1 tablet by mouth daily.         Allergies:  Allergies[1]      ROS:       PHYSICAL EXAM:        ASSESSMENT/PLAN:   Assessment   Encounter Diagnosis   Name Primary?    Microhematuria Yes       Recommend:  - Repeat urinalysis to be done today.  - Follow-up, can return to clinic on a as needed basis.         Orders This Visit:  Orders Placed This Encounter   Procedures    Urinalysis, Routine       Meds This Visit:  Requested Prescriptions      No prescriptions requested or ordered in this encounter       Imaging & Referrals:  None     1/27/2025  Sisi Black MD               [1] No Known Allergies     Spiral Flap Text: The defect edges were debeveled with a #15 scalpel blade.  Given the location of the defect, shape of the defect and the proximity to free margins a spiral flap was deemed most appropriate.  Using a sterile surgical marker, an appropriate rotation flap was drawn incorporating the defect and placing the expected incisions within the relaxed skin tension lines where possible. The area thus outlined was incised deep to adipose tissue with a #15 scalpel blade.  The skin margins were undermined to an appropriate distance in all directions utilizing iris scissors.

## 2025-01-28 ENCOUNTER — TELEPHONE (OUTPATIENT)
Dept: ORTHOPEDICS CLINIC | Facility: CLINIC | Age: 55
End: 2025-01-28

## 2025-01-28 DIAGNOSIS — G89.29 CHRONIC PAIN OF RIGHT ANKLE: Primary | ICD-10-CM

## 2025-01-28 DIAGNOSIS — M25.571 CHRONIC PAIN OF RIGHT ANKLE: Primary | ICD-10-CM

## 2025-01-28 NOTE — TELEPHONE ENCOUNTER
XR ordered per ortho protocol. XR scheduled and patient was notified via [facility] to let them know that they should arrive 15-20 minutes early, in order for them to complete imaging.

## 2025-01-29 ENCOUNTER — OFFICE VISIT (OUTPATIENT)
Dept: ORTHOPEDICS CLINIC | Facility: CLINIC | Age: 55
End: 2025-01-29
Payer: MEDICAID

## 2025-01-29 ENCOUNTER — HOSPITAL ENCOUNTER (OUTPATIENT)
Dept: GENERAL RADIOLOGY | Age: 55
Discharge: HOME | End: 2025-01-29
Attending: PODIATRIST
Payer: MEDICAID

## 2025-01-29 ENCOUNTER — TELEPHONE (OUTPATIENT)
Dept: ORTHOPEDICS CLINIC | Facility: CLINIC | Age: 55
End: 2025-01-29

## 2025-01-29 VITALS — BODY MASS INDEX: 36.4 KG/M2 | WEIGHT: 260 LBS | HEIGHT: 71 IN

## 2025-01-29 DIAGNOSIS — S69.91XA SCAPHOLUNATE LIGAMENT INJURY WITH NO INSTABILITY, RIGHT, INITIAL ENCOUNTER: Primary | ICD-10-CM

## 2025-01-29 DIAGNOSIS — M25.571 CHRONIC PAIN OF RIGHT ANKLE: ICD-10-CM

## 2025-01-29 DIAGNOSIS — M54.16 LUMBAR RADICULOPATHY: ICD-10-CM

## 2025-01-29 DIAGNOSIS — G89.29 CHRONIC PAIN OF RIGHT ANKLE: ICD-10-CM

## 2025-01-29 DIAGNOSIS — G56.01 CARPAL TUNNEL SYNDROME OF RIGHT WRIST: ICD-10-CM

## 2025-01-29 DIAGNOSIS — G56.02 CARPAL TUNNEL SYNDROME OF LEFT WRIST: ICD-10-CM

## 2025-01-29 PROCEDURE — 73610 X-RAY EXAM OF ANKLE: CPT | Performed by: PODIATRIST

## 2025-01-29 PROCEDURE — 99214 OFFICE O/P EST MOD 30 MIN: CPT | Performed by: ORTHOPAEDIC SURGERY

## 2025-01-29 NOTE — PROGRESS NOTES
SURGICAL BOOKING SHEET   Name: Ran Carter  MRN: [id number]   : 1970    Surgical Date:   3/20/2025   Surgical Consent:   Left endoscopic carpal tunnel release possible open   Diagnosis:      ICD-10-CM   1. Scapholunate ligament injury with no instability, right, initial encounter  S69.91XA   2. Carpal tunnel syndrome of right wrist  G56.01   3. Carpal tunnel syndrome of left wrist  G56.02       Workers Comp: No   Procedure Codes:   Endoscopic carpal tunnel release (CPT 55209)   Disposition:   Outpatient   Operative Time:   15 mins   Antibiotics:   None   Anesthesia Type:   Monitored Anesthesia Care (MAC)   Clearance:    None   Equipment:   ECTR: [employer] Blade, Microaire Endoscopic System, Local Pre-Mix (1% lidocaine w/ epi, 0.5% marcaine, and 8.4% NaBicarb) , 5-0 moncryl, Exofin, Telfa+Tegaderm   Standby: Lead Hand, 4-0 Nylon PS-2   Assistant:   Manohar Assistant: Yes, Surgical Assist    Follow Up:   7-14 days post op with me   Pain Medication:   Tramadol 50 mg   Therapy:   None

## 2025-01-29 NOTE — PROGRESS NOTES
Clinic Note     Assessment/Plan:  54 year old male    Right wrist scapholunate ligament tear-there is significant widening suggestive of secondary ligament injuries along radiolunate ligament, dorsal intercarpal ligament, and dorsal radiocarpal ligament tears.  A reconstruction likely with ANAFAB would be the best possible surgical solution for his problem.  We could concurrently do a carpal tunnel release as well.  We will defer scapholunate ligament reconstruction until October while his cervical spine issue is addressed however we did get the okay by his neurosurgeon Dr. Brandon to proceed with surgery with precautions.  Left Carpal Tunnel Syndrome-EMG/NCV confirmed-surgical nonsurgical treatment options were reviewed the patient.  Patient like to proceed with surgery on March 20, 2025 with sedation.  CTR Consent: Non-surgical and surgical treatment options where reviewed with the patient. Patient elected to proceed with surgical decompression of median nerve in the carpal tunnel. Patient consented to surgery after having understood the risks associated with surgery, expected outcomes, time to recovery and the possible need for therapy post-operatively. Risks include but not limited to: infection, wound complication, nerve injury resulting in temporary or permanent nerve damage, finger/hand stiffness, persistent pain/symptoms, swelling, CRPS, recurrence of carpal tunnel syndrome, and need for additional surgery.  Right Carpal Tunnel Syndrome-EMG/NCV confirmed-surgical nonsurgical treatment options were reviewed the patient.  Patient will proceed with a carpal tunnel release once adequately recovered from the left side if his symptoms are significant enough and concurrently with scapholunate reconstruction  Left Cubital Tunnel Syndrome-EMG/NCV negative-symptoms and exam findings more consistent with carpal tunnel syndrome  Right Cubital Tunnel Syndrome-EMG/NCV negative-symptoms and exam findings were consistent  with carpal tunnel syndrome  H/o cervical spine fusion surgery.  Left wrist FCR tendonitis-status post 1 corticosteroid injection with minimal symptoms today  Chondrocalcinosis of the left wrist is noted TFCC-status post 1 corticosteroid injection with minimal symptoms today    Follow Up: Surgical date    Diagnostic Studies:     XR Left wrist 3 views: No fractures, dislocations, or osseous abnormalities.  There is calcification of the TFC.  Mild degenerative changes of the thumb CMC joint.    MRI left wrist: Fluid around the FCR tendon without tendinosis is noted.  Subchondral cystic changes of the ulnar aspect of the lunate.  Degenerative changes of the TFC without peripheral tear noted.    MRI right wrist:Full-thickness tear of the scapholunate ligament with widening of the scapholunate interval.  No evidence of SLAC wrist seen at this point.      Contusion within the distal pole of the scaphoid without acute fracture.      Dorsal capsular wrist sprain.     EMG/NCV: There is electrodiagnostic evidence to support a bilateral median mononeuropathy at or distal to the wrist (carpal tunnel syndrome) electrically mild to moderate with active denervation changes noted on needle EMG on the right side.      Physical Exam:     Ht 5' 11\" (1.803 m)   Wt 260 lb (117.9 kg)   BMI 36.26 kg/m²     Constitutional: NAD. AOx3. Well-developed and Well-nourished.   Psychiatric: Normal mood/ affect/ behavior. Judgment and thought content normal.     Left Upper Extremity:     Inspection    Skin intact. No skin lesions. No obvious mass visualized.     Swelling of most focally over the SL interval Palpation    Minimal tenderness to palpation of the ulnar fovea and FCR tendon  Tenderness palpation over the SL interval        ROM    Full composite fist.  Wrist, finger and elbow motion is normal.     Neurovascular    Normal sensation in the radial nerve distribution and abnormal within median & ulnar nerve distrubution.     (+) Phdurkan on  both sides, (-) Elbow flexion test, No weakness GUILLAUME or APB bilaterally.    L thumb 20%, IF10%, MF20% RF20% SF20% decreased   R thumb 20%, IF20%, MF20% RF20% SF20% decreased   Right side is worsened to 50% decreased     No dorsal ulnar sensory nerve sensory abnormalities.    Normally perfused hand(s).     Special    No pain with resisted wrist flexion at FCR. No DRUJ instability          CC: Left wrist pain    HPI: This 54 year old RHD male presents with left wrist pain. He mentions numbness and tingling sensation predominantly during the nighttime. He complains the bone often pops out. He had a EMG/NCV done in 2022 by .     Onset: 11/23  Pain Character: Sharp  Pain Level: Moderate  Pain @ Night: No    Treatments Tried: Occupational therapy    Interval Hx (1/29/2025): Patient continues to have some tingling hands.  Right wrist pain has improved.  Pain along the left FCR and ulnar fovea is also improved.    Occupation: Self-employed    History/Other:   Past Medical History:    Back problem    Chronic neck pain    Contusion of cervical cord (HCC)    Diabetes (HCC)    High blood pressure    Hyperlipidemia    Migraines    Went to emergency care clinic and suggested to get an MRI done because I've been dealing with a bad migraine for over a week straight today being a 10 from 1-10 10,being the worse    Primary hypertension    Problems with swallowing    Sleep apnea    unable to use device    Visual impairment    readers     Past Surgical History:   Procedure Laterality Date    Colonoscopy N/A 2/7/2024    Procedure: COLONOSCOPY;  Surgeon: Amy Means MD;  Location: [facility] ENDOSCOPY    Ct cervical spine      c3 & 4     Hernia surgery  2005    hiatal hernia    Knee surgery  2008    RIGHT ACL REPAIR     Current Outpatient Medications   Medication Sig Dispense Refill    semaglutide (OZEMPIC, 0.25 OR 0.5 MG/DOSE,) 2 MG/3ML Subcutaneous Solution Pen-injector Inject 0.5 mg into the skin once a week. 9 mL 0     HYDROcodone-acetaminophen  MG Oral Tab Take 1 tablet by mouth every 4-6 hours PRN pain (max 5 tabs in 24 hours). 150 tablet 0    pantoprazole 40 MG Oral Tab EC Take 1 tablet (40 mg total) by mouth 2 (two) times daily before meals. 180 tablet 1    losartan 100 MG Oral Tab Take 1 tablet (100 mg total) by mouth daily. 90 tablet 0    testosterone cypionate 200 mg/mL Intramuscular Solution Inject 1 mL (200 mg total) into the muscle every 14 (fourteen) days. 6 mL 0    pravastatin 20 MG Oral Tab Take 1 tablet (20 mg total) by mouth nightly. 90 tablet 0    methocarbamol 500 MG Oral Tab Take 1 tablet (500 mg total) by mouth 3 (three) times daily as needed. 60 tablet 0    Insulin Pen Needle (PEN NEEDLES) 32G X 4 MM Does not apply Misc 1 each daily. 90 each 0    Blood Pressure Monitoring (BLOOD PRESSURE KIT) Does not apply Device 1 each daily. 1 each 0    nystatin-triamcinolone 100,000-0.1 Units/g-% External Ointment Apply twice daily  Monday-Friday to affected areas of rash. 60 g 3    pantoprazole 40 MG Oral Tab EC Take 1 tablet (40 mg total) by mouth every morning before breakfast. 90 tablet 3    Syringe/Needle, Disp, 27G X 1/2\" 1 ML Does not apply Misc Inject testosterone every two weeks 50 each 0    Syringe 22G X 1\" 3 ML Does not apply Misc Inject testosterone every 2 weeks 50 each 0    Glucose Blood (ONETOUCH VERIO) In Vitro Strip Test blood sugar twice daily. 200 strip 3    Lancets Does not apply Misc Check bid 200 each 1    Insulin Pen Needle (PEN NEEDLES) 32G X 4 MM Does not apply Misc 1 each daily. 90 each 0    multiple vitamin Oral Chew Tab Chew 1 tablet by mouth daily.      Meloxicam 15 MG Oral Tab Take 1 tablet (15 mg total) by mouth daily as needed for Pain. (Patient not taking: Reported on 1/29/2025) 5 tablet 0    Tadalafil 10 MG Oral Tab Take 1 tablet (10 mg total) by mouth daily as needed for Erectile Dysfunction. (Patient not taking: Reported on 1/29/2025) 30 tablet 0    rosuvastatin 5 MG Oral Tab Take 1  tablet (5 mg total) by mouth nightly. (Patient not taking: Reported on 1/29/2025) 90 tablet 1    meclizine 25 MG Oral Tab Take 1 tablet (25 mg total) by mouth 3 (three) times daily as needed. (Patient not taking: Reported on 1/29/2025) 30 tablet 0     No Known Allergies  Family History   Problem Relation Age of Onset    No Known Problems Mother     No Known Problems Sister     No Known Problems Brother     Heart Disorder Paternal Grandmother     Diabetes Paternal Grandmother     Cancer Paternal Grandmother     Glaucoma Neg     Macular degeneration Neg      Social History     Occupational History    Not on file   Tobacco Use    Smoking status: Never     Passive exposure: Past    Smokeless tobacco: Never   Vaping Use    Vaping status: Never Used   Substance and Sexual Activity    Alcohol use: Not Currently     Alcohol/week: 1.0 standard drink of alcohol     Comment: Did social    Drug use: Never    Sexual activity: Not on file      Review of Systems (negative unless bolded):  General: fevers, chills, fatigue  CV:  chest pain, palpitations, leg swelling  Msk: bodyaches, neck pain, neck stiffness  Skin: rashes, open wounds, nonhealing ulcers  Hem: bleeds easily, bruise easily, immunocompromised  Neuro: dizziness, light headedness, headaches  Psych: anxious, depressed, anger issues    Guanaco Villela MD   Hand, Wrist, & Elbow Surgery  [email or web address]  t: [phone]  f: [phone]

## 2025-01-29 NOTE — TELEPHONE ENCOUNTER
Date of Surgery: 2025      Post Op Appt: 2025 115pm    Case ID: [id number]    Notes:     SURGICAL BOOKING SHEET   Name: Ran Carter  MRN: [id number]   : 1970     Surgical Date:    3/20/2025   Surgical Consent:    Left endoscopic carpal tunnel release possible open   Diagnosis:         ICD-10-CM   1. Scapholunate ligament injury with no instability, right, initial encounter  S69.91XA   2. Carpal tunnel syndrome of right wrist  G56.01   3. Carpal tunnel syndrome of left wrist  G56.02       Workers Comp: No   Procedure Codes:    Endoscopic carpal tunnel release (CPT 66054)   Disposition:    Outpatient   Operative Time:    15 mins   Antibiotics:    None   Anesthesia Type:    Monitored Anesthesia Care (MAC)   Clearance:     None   Equipment:    ECTR: Schoolcraft Blade, Microaire Endoscopic System, Local Pre-Mix (1% lidocaine w/ epi, 0.5% marcaine, and 8.4% NaBicarb) , 5-0 moncryl, Exofin, Telfa+Tegaderm   Standby: Lead Hand, 4-0 Nylon PS-2   Assistant:    Manohar Assistant: Yes, Surgical Assist    Follow Up:    7-14 days post op with me   Pain Medication:    Tramadol 50 mg   Therapy:    None

## 2025-01-30 RX ORDER — HYDROCODONE BITARTRATE AND ACETAMINOPHEN 10; 325 MG/1; MG/1
TABLET ORAL
Qty: 150 TABLET | Refills: 0 | Status: SHIPPED | OUTPATIENT
Start: 2025-01-30

## 2025-01-31 ENCOUNTER — OFFICE VISIT (OUTPATIENT)
Dept: ORTHOPEDICS CLINIC | Facility: CLINIC | Age: 55
End: 2025-01-31
Payer: MEDICAID

## 2025-01-31 VITALS — HEIGHT: 71 IN | BODY MASS INDEX: 36.4 KG/M2 | WEIGHT: 260 LBS

## 2025-01-31 DIAGNOSIS — M77.51 RETROCALCANEAL BURSITIS, RIGHT: Primary | ICD-10-CM

## 2025-01-31 DIAGNOSIS — E11.8 CONTROLLED TYPE 2 DIABETES MELLITUS WITH COMPLICATION, WITHOUT LONG-TERM CURRENT USE OF INSULIN (HCC): ICD-10-CM

## 2025-01-31 PROCEDURE — 20600 DRAIN/INJ JOINT/BURSA W/O US: CPT | Performed by: PODIATRIST

## 2025-01-31 PROCEDURE — 99214 OFFICE O/P EST MOD 30 MIN: CPT | Performed by: PODIATRIST

## 2025-01-31 RX ORDER — BETAMETHASONE SODIUM PHOSPHATE AND BETAMETHASONE ACETATE 3; 3 MG/ML; MG/ML
4.2 INJECTION, SUSPENSION INTRA-ARTICULAR; INTRALESIONAL; INTRAMUSCULAR; SOFT TISSUE ONCE
Status: COMPLETED | OUTPATIENT
Start: 2025-01-31 | End: 2025-01-31

## 2025-01-31 RX ADMIN — BETAMETHASONE SODIUM PHOSPHATE AND BETAMETHASONE ACETATE 4.2 MG: 3; 3 INJECTION, SUSPENSION INTRA-ARTICULAR; INTRALESIONAL; INTRAMUSCULAR; SOFT TISSUE at 09:03:00
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
## 2025-02-03 ENCOUNTER — OFFICE VISIT (OUTPATIENT)
Dept: OCCUPATIONAL MEDICINE | Facility: HOSPITAL | Age: 55
End: 2025-02-03
Attending: PHYSICAL MEDICINE & REHABILITATION
Payer: MEDICAID

## 2025-02-03 DIAGNOSIS — M25.531 RIGHT WRIST PAIN: Primary | ICD-10-CM

## 2025-02-03 DIAGNOSIS — M25.531 CHRONIC PAIN OF RIGHT WRIST: ICD-10-CM

## 2025-02-03 DIAGNOSIS — G89.29 CHRONIC PAIN OF RIGHT WRIST: ICD-10-CM

## 2025-02-03 DIAGNOSIS — E66.9 OBESITY (BMI 30-39.9): Primary | ICD-10-CM

## 2025-02-03 PROCEDURE — 97166 OT EVAL MOD COMPLEX 45 MIN: CPT

## 2025-02-03 PROCEDURE — 97110 THERAPEUTIC EXERCISES: CPT

## 2025-02-03 NOTE — PATIENT INSTRUCTIONS
Access Code: [email or web address]  URL: [email or web address]  Date: 02/03/2025  Prepared by: Nicole STEVENSON MS, OTR/L    Exercises  - Wrist Flexion Isometric With Forearm Pronated  - 2 x daily - 7 x weekly - 1 sets - 10 reps - 10 hold  - Isometric Wrist Extension Pronated  - 2 x daily - 7 x weekly - 1 sets - 10 reps - 10 hold  - Seated Isometric Wrist Radial Deviation with Manual Resistance  - 2 x daily - 7 x weekly - 1 sets - 10 reps - 10 hold  - Seated Isometric Wrist Ulnar Deviation with Manual Resistance  - 1 x daily - 7 x weekly - 1 sets - 10 reps - 10 hold
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
## 2025-02-04 RX ORDER — SEMAGLUTIDE 0.68 MG/ML
0.5 INJECTION, SOLUTION SUBCUTANEOUS WEEKLY
Qty: 9 ML | Refills: 0 | Status: SHIPPED | OUTPATIENT
Start: 2025-02-04

## 2025-02-04 NOTE — TELEPHONE ENCOUNTER
Endocrine Refill protocol for oral and injectable diabetic medications    Protocol Criteria:  PASSED  Reason: N/A    If all below requirements are met, send a 90-day supply with 1 refill per provider protocol.    Verify appointment with Endocrinology completed in the last 6 months or scheduled in the next 3 months.  Verify A1C has been completed within the last 6 months and is below 8.5%     Last completed office visit: 11/26/2024 Consuelo Guadarrama MD   Next scheduled Follow up:   Future Appointments   Date Time Provider Department Center   2/6/2025 11:00 AM Nicole Yanez, OT [facility] OT [facility]   2/10/2025  7:30 AM Nicole Yanez, OT [facility] OT [facility]   2/12/2025  2:15 PM Nicole Yanez, OT [facility] OT [facility]   2/13/2025  2:15 PM [facility] MRI RM1 (1.5T WIDE) [facility] MRI EM [place]   2/18/2025  4:30 PM Nicole Yanez, OT [facility] OT [facility]   2/25/2025  9:00 AM Nicole Yanez, OT [facility] OT [facility]   3/4/2025  5:15 PM Nicole Yanez, OT [facility] OT [facility]   3/9/2025  9:00 AM [facility] MRI RM1 (1.5T WIDE) [facility] MRI [facility]   3/10/2025  4:45 PM Nicole Yanez, OT [facility] OT [facility]   3/12/2025  1:00 PM Ren Brandon MD [facility] NEURSURG [place] [facility]   5/7/2025  9:40 AM Todd Rivas MD [facility] [address]   5/8/2025  2:30 PM Juan A Mcgee MD PM&R [facility] [place] [facility]      Last A1c result: Last A1c value was 6% done 11/8/2024.

## 2025-02-06 ENCOUNTER — OFFICE VISIT (OUTPATIENT)
Dept: OCCUPATIONAL MEDICINE | Facility: HOSPITAL | Age: 55
End: 2025-02-06
Attending: PHYSICAL MEDICINE & REHABILITATION
Payer: MEDICAID

## 2025-02-06 PROCEDURE — 97110 THERAPEUTIC EXERCISES: CPT

## 2025-02-06 NOTE — PROGRESS NOTES
Diagnosis:    Right wrist pain (M25.531)  Chronic pain of right wrist (M25.531,G89.29)          Referring Provider: Juan A Mcgee  Date of Evaluation:    2/3/2025    Precautions:   DM, HTN, and chronic neck pain Next MD visit:   none scheduled  Date of Surgery: n/a   Insurance Primary/Secondary: BCBS OUT OF STATE / N/A     # Auth Visits: 8 visits 2/3-4/4           Subjective: Pt states performing exercises once.     Pain: 3/10      Objective: See exercises flowsheet below for exercises and activities performed today. All exercises performed bilaterally.      Assessment: Pt demo'd fair comprehension of his HEP as evidenced by min to no v/c for accurate completion. No pain verbalized with strengthening exercises.       Goals: (to be met in 8 visits)  Pt will be independent and compliant with comprehensive HEP to maintain progress achieved in OT  Pt's (R) wrist strength in all areas will be at least 4+/5 for ease of lifting items.  Pt will increase their (R)  strength by 10# for ease of carrying groceries  Pt will decrease their QuickDASH score by 10% in order to demo improvements in functional independence.     Plan: continue strengthening of extrinsic and intrinsic hand muscles.  Date 2/6/2025                  Visit # 2/8                                    Evaluation                 Manual                                                 Ther ex                                  Forearm extensor stretch 3x1, 30 secs    Isometric wrist ex:   10x1, 10 sec  -flexion, extension, RD/UD    1# hand dowel sup/pro 10x2    Blue Web Ex:   -lumbrical squeezes    Red Theraputty Ex:  -opposition tip pinch  -adduction  -lumbrical extension  -lateral pinch  (15 mins)                     HEP instruction                    Therapeutic Activity                                                 Neuromuscular Re-education                                                 Modalities                                                HEP:   Assignment date:   isometric wrist strengthening in flexion, extension, and RD/UD 2/3/25              Charges: TE 3       Total Timed Treatment: 45 min  Total Treatment Time: 45 min

## 2025-02-10 ENCOUNTER — HOSPITAL ENCOUNTER (OUTPATIENT)
Dept: MRI IMAGING | Facility: HOSPITAL | Age: 55
Discharge: HOME | End: 2025-02-10
Attending: PHYSICAL MEDICINE & REHABILITATION
Payer: MEDICAID

## 2025-02-10 ENCOUNTER — OFFICE VISIT (OUTPATIENT)
Dept: OCCUPATIONAL MEDICINE | Facility: HOSPITAL | Age: 55
End: 2025-02-10
Attending: PHYSICAL MEDICINE & REHABILITATION
Payer: MEDICAID

## 2025-02-10 DIAGNOSIS — M54.16 LUMBAR RADICULOPATHY: ICD-10-CM

## 2025-02-10 PROCEDURE — 97110 THERAPEUTIC EXERCISES: CPT

## 2025-02-10 PROCEDURE — 72148 MRI LUMBAR SPINE W/O DYE: CPT | Performed by: PHYSICAL MEDICINE & REHABILITATION

## 2025-02-10 NOTE — PROGRESS NOTES
Diagnosis:    Right wrist pain (M25.531)  Chronic pain of right wrist (M25.531,G89.29)          Referring Provider: Juan A Mcgee  Date of Evaluation:    2/3/2025    Precautions:   DM, HTN, and chronic neck pain Next MD visit:   none scheduled  Date of Surgery: n/a   Insurance Primary/Secondary: BCBS OUT OF STATE / N/A     # Auth Visits: 8 visits 2/3-4/4           Subjective: Pt states doing his exercises regularly. Pt states end of the day it will numb up.     Pain: 2-3/10      Objective: See exercises flowsheet below for exercises and activities performed today. All exercises performed bilaterally.      Assessment: Pt able to tolerate resisted sup/pronation with flexbar with min to no pain. Pt graded up to green putty for extrinsic and intrinsic muscle strengthening.         Goals: (to be met in 8 visits)  Pt will be independent and compliant with comprehensive HEP to maintain progress achieved in OT  Pt's (R) wrist strength in all areas will be at least 4+/5 for ease of lifting items.  Pt will increase their (R)  strength by 10# for ease of carrying groceries  Pt will decrease their QuickDASH score by 10% in order to demo improvements in functional independence.     Plan: continue strengthening of extrinsic and intrinsic hand muscles.  Date 2/6/2025   2/10/2025               Visit # 2/8  3/8                                  Evaluation                 Manual                                                 Ther ex                                  Forearm extensor stretch 3x1, 30 secs    Isometric wrist ex:   10x1, 10 sec  -flexion, extension, RD/UD    1# hand dowel sup/pro 10x2    Blue Web Ex:   -lumbrical squeezes    Red Theraputty Ex:  -opposition tip pinch  -adduction  -lumbrical extension  -lateral pinch  (15 mins)     Forearm extensor stretch 3x1, 30 secs    Blue Web Ex:   -lumbrical squeezes  15x2    Red 10# Flexbar Ex:  -bilateral sup/pro   -unilateral sup/pro  10x2    Green 15# flexbar  -\"T\" bends  10x2    1.5# hand dowel sup/pro 10x2    9# isolated digital flexion 10x2    Green Theraputty Ex:  -opposition tip pinch  -thumb extension (8 mins)                       HEP instruction                    Therapeutic Activity                                                 Neuromuscular Re-education                                                 Modalities                                                HEP:  Assignment date:   isometric wrist strengthening in flexion, extension, and RD/UD 2/3/25              Charges: TE 3       Total Timed Treatment: 40 min  Total Treatment Time: 40 min

## 2025-02-12 ENCOUNTER — OFFICE VISIT (OUTPATIENT)
Dept: OCCUPATIONAL MEDICINE | Facility: HOSPITAL | Age: 55
End: 2025-02-12
Attending: PHYSICAL MEDICINE & REHABILITATION
Payer: MEDICAID

## 2025-02-12 PROCEDURE — 97110 THERAPEUTIC EXERCISES: CPT

## 2025-02-12 NOTE — PROGRESS NOTES
Diagnosis:    Right wrist pain (M25.531)  Chronic pain of right wrist (M25.531,G89.29)          Referring Provider: Juan A Mcgee  Date of Evaluation:    2/3/2025    Precautions:   DM, HTN, and chronic neck pain Next MD visit:   none scheduled  Date of Surgery: n/a   Insurance Primary/Secondary: BCBS OUT OF STATE / N/A     # Auth Visits: 8 visits 2/3-4/4           Subjective: Pt states doing exercises as instructed at home.     Pain: 2-3/10      Objective: See exercises flowsheet below for exercises and activities performed today. All exercises performed bilaterally.      Assessment: Discomfort verbalized with supination exercises. Resisted supination decreased to red flexbar for pain relief. OT and pt discussed modifications to daily routine to place his wrist in neutral/safe position for functional tasks. Pt verbalized understanding.      Goals: (to be met in 8 visits)  Pt will be independent and compliant with comprehensive HEP to maintain progress achieved in OT  Pt's (R) wrist strength in all areas will be at least 4+/5 for ease of lifting items.  Pt will increase their (R)  strength by 10# for ease of carrying groceries  Pt will decrease their QuickDASH score by 10% in order to demo improvements in functional independence.     Plan: continue strengthening of extrinsic and intrinsic hand muscles.  Date 2/6/2025   2/10/2025  2/12/2025             Visit # 2/8  3/8 4/8                                Evaluation                 Manual                                                 Ther ex                                  Forearm extensor stretch 3x1, 30 secs    Isometric wrist ex:   10x1, 10 sec  -flexion, extension, RD/UD    1# hand dowel sup/pro 10x2    Blue Web Ex:   -lumbrical squeezes    Red Theraputty Ex:  -opposition tip pinch  -adduction  -lumbrical extension  -lateral pinch  (15 mins)     Forearm extensor stretch 3x1, 30 secs    Blue Web Ex:   -lumbrical squeezes  15x2    Red 10# Flexbar  Ex:  -bilateral sup/pro   -unilateral sup/pro  10x2    Green 15# flexbar  -\"T\" bends 10x2    1.5# hand dowel sup/pro 10x2    9# isolated digital flexion 10x2    Green Theraputty Ex:  -opposition tip pinch  -thumb extension (8 mins)         Forearm extensor stretch 3x1, 30 secs    Black Web Ex:   -lumbrical squeezes  10x2    3# wrist PRE  -flex/extend  -RD/UD  10x2      Green 15# flexbar  -\"T\" bends 10x2  -bilateral pronation 10x2    Red 10# flexbar  -bilateral sup  10x2    Green Theraputty Ex:  -adduction  -digital extension  -thumb extension (10 mins)             HEP instruction                    Therapeutic Activity                                                 Neuromuscular Re-education                                                 Modalities                                                HEP:  Assignment date:   isometric wrist strengthening in flexion, extension, and RD/UD 2/3/25              Charges: TE 3       Total Timed Treatment: 45 min  Total Treatment Time: 45 min

## 2025-02-13 ENCOUNTER — TELEPHONE (OUTPATIENT)
Dept: SURGERY | Facility: CLINIC | Age: 55
End: 2025-02-13

## 2025-02-13 NOTE — TELEPHONE ENCOUNTER
I s/w pt and I gave him all of the results and instructions in KHCASSIE's msg as stated below and pt stated that he doesn't want to do that cysto test again as it was really painful. Pt stated he will schd the CTU by my chart and he agreed to schd the cysto after his other surgery he is having on 3/20. We arranged the cysto appt for Monday 4/7 at 1:30 pm and I asked pt to arrive at 1 pm. Pt verbalized understanding and compliance.

## 2025-02-13 NOTE — TELEPHONE ENCOUNTER
----- Message from Sisi Black sent at 2/13/2025 12:15 PM CST -----  Urology staff,  Please contact this patient.  Let him know that his repeat urinalysis performed at his recent office visit continues to show microhematuria.  As per our discussions, I would recommend a repeat evaluation with office cystoscopy and CT urogram.  If the testing is negative, no additional testing is necessary in the future even if he continues to have microscopic blood in the urine.  Orders for the CT urogram have been entered.  Please call and notify the patient.

## 2025-02-17 RX ORDER — SEMAGLUTIDE 0.68 MG/ML
0.5 INJECTION, SOLUTION SUBCUTANEOUS WEEKLY
Qty: 9 ML | Refills: 0 | Status: SHIPPED | OUTPATIENT
Start: 2025-02-17 | End: 2025-02-17

## 2025-02-18 ENCOUNTER — OFFICE VISIT (OUTPATIENT)
Dept: OCCUPATIONAL MEDICINE | Facility: HOSPITAL | Age: 55
End: 2025-02-18
Attending: PHYSICAL MEDICINE & REHABILITATION
Payer: MEDICAID

## 2025-02-18 ENCOUNTER — TELEPHONE (OUTPATIENT)
Dept: SURGERY | Facility: CLINIC | Age: 55
End: 2025-02-18

## 2025-02-18 PROCEDURE — 97110 THERAPEUTIC EXERCISES: CPT

## 2025-02-18 NOTE — TELEPHONE ENCOUNTER
Fax received from [facility] approval for ct scan [id number] valid 2/14/25 through 3/31/25. Sent pt Webs[employer] message.   
Never smoker

## 2025-02-18 NOTE — PROGRESS NOTES
Diagnosis:    Right wrist pain (M25.531)  Chronic pain of right wrist (M25.531,G89.29)          Referring Provider: Juan A Mcgee  Date of Evaluation:    2/3/2025    Precautions:   DM, HTN, and chronic neck pain Next MD visit:   none scheduled  Date of Surgery: n/a   Insurance Primary/Secondary: BCBS OUT OF STATE / N/A     # Auth Visits: 8 visits 2/3-4/4           Subjective: Pt states more pain than normal. Pt states using the green flexbar resulted in pain after his last session.      Pain: 4/10      Objective: See exercises flowsheet below for exercises and activities performed today. All exercises performed bilaterally.      Assessment: Pt more prevalent today resulting in decreased resistance in exercises. Pt able to tolerate some 3pt pinching with green clip before downgrading to red.       Goals: (to be met in 8 visits)  Pt will be independent and compliant with comprehensive HEP to maintain progress achieved in OT  Pt's (R) wrist strength in all areas will be at least 4+/5 for ease of lifting items.  Pt will increase their (R)  strength by 10# for ease of carrying groceries  Pt will decrease their QuickDASH score by 10% in order to demo improvements in functional independence.     Plan: continue strengthening of extrinsic and intrinsic hand muscles.  Date 2/6/2025   2/10/2025  2/12/2025 2/18/2025          Visit # 2/8  3/8 4/8 5/8                             Evaluation                 Manual                                                 Ther ex                                  Forearm extensor stretch 3x1, 30 secs    Isometric wrist ex:   10x1, 10 sec  -flexion, extension, RD/UD    1# hand dowel sup/pro 10x2    Blue Web Ex:   -lumbrical squeezes    Red Theraputty Ex:  -opposition tip pinch  -adduction  -lumbrical extension  -lateral pinch  (15 mins)     Forearm extensor stretch 3x1, 30 secs    Blue Web Ex:   -lumbrical squeezes  15x2    Red 10# Flexbar Ex:  -bilateral sup/pro   -unilateral  sup/pro  10x2    Green 15# flexbar  -\"T\" bends 10x2    1.5# hand dowel sup/pro 10x2    9# isolated digital flexion 10x2    Green Theraputty Ex:  -opposition tip pinch  -thumb extension (8 mins)         Forearm extensor stretch 3x1, 30 secs    Black Web Ex:   -lumbrical squeezes  10x2    3# wrist PRE  -flex/extend  -RD/UD  10x2    Green 15# flexbar  -\"T\" bends 10x2  -bilateral pronation 10x2    Red 10# flexbar  -bilateral sup  10x2    Green Theraputty Ex:  -adduction  -digital extension  -thumb extension (10 mins) Forearm extensor stretch 3x1, 30 secs    1.5# hand dowel sup/pro 10x2    Red 10# flexbar  -\"T\" bends 10x2    Red 10# flexbar  -bilateral sup  10x2      Velcro board half with green pin then half with red    Isometric wrist ex:   10x1, 10 sec  -flexion, RD/UD             HEP instruction                    Therapeutic Activity                                                 Neuromuscular Re-education                                                 Modalities                                                HEP:  Assignment date:   isometric wrist strengthening in flexion, extension, and RD/UD 2/3/25              Charges: TE 3       Total Timed Treatment: 45 min  Total Treatment Time: 45 min       I personally performed the service described in the documentation recorded by the scribe in my presence, and it accurately and completely records my words and actions.

## 2025-02-20 ENCOUNTER — OFFICE VISIT (OUTPATIENT)
Dept: OCCUPATIONAL MEDICINE | Facility: HOSPITAL | Age: 55
End: 2025-02-20
Attending: PHYSICAL MEDICINE & REHABILITATION
Payer: MEDICAID

## 2025-02-20 PROCEDURE — 97110 THERAPEUTIC EXERCISES: CPT

## 2025-02-20 NOTE — PROGRESS NOTES
Diagnosis:    Right wrist pain (M25.531)  Chronic pain of right wrist (M25.531,G89.29)          Referring Provider: Juan A Mcgee  Date of Evaluation:    2/3/2025    Precautions:   DM, HTN, and chronic neck pain Next MD visit:   none scheduled  Date of Surgery: n/a   Insurance Primary/Secondary: BCBS OUT OF STATE / N/A     # Auth Visits: 8 visits 2/3-4/4           Subjective: Pt states stiffness in his R thumb from his orthosis being on accidentally wrong at night time.    Pain: 2/10      Objective: See exercises flowsheet below for exercises and activities performed today. All exercises performed bilaterally.      Assessment: Pt better able to tolerate strengthening today with no verbalization of pain increase. Pt able tolerate increased reps today with min rest breaks.      Goals: (to be met in 8 visits)  Pt will be independent and compliant with comprehensive HEP to maintain progress achieved in OT  Pt's (R) wrist strength in all areas will be at least 4+/5 for ease of lifting items.  Pt will increase their (R)  strength by 10# for ease of carrying groceries  Pt will decrease their QuickDASH score by 10% in order to demo improvements in functional independence.     Plan: continue strengthening of extrinsic and intrinsic hand muscles, 2 more visits until d/c or progress note  Date 2/6/2025   2/10/2025  2/12/2025 2/18/2025 2/20/2025         Visit # 2/8  3/8 4/8 5/8 6/8                            Evaluation                 Manual                                                 Ther ex                                  Forearm extensor stretch 3x1, 30 secs    Isometric wrist ex:   10x1, 10 sec  -flexion, extension, RD/UD    1# hand dowel sup/pro 10x2    Blue Web Ex:   -lumbrical squeezes    Red Theraputty Ex:  -opposition tip pinch  -adduction  -lumbrical extension  -lateral pinch  (15 mins)     Forearm extensor stretch 3x1, 30 secs    Blue Web Ex:   -lumbrical squeezes  15x2    Red 10# Flexbar Ex:  -bilateral  sup/pro   -unilateral sup/pro  10x2    Green 15# flexbar  -\"T\" bends 10x2    1.5# hand dowel sup/pro 10x2    9# isolated digital flexion 10x2    Green Theraputty Ex:  -opposition tip pinch  -thumb extension (8 mins)         Forearm extensor stretch 3x1, 30 secs    Black Web Ex:   -lumbrical squeezes  10x2    3# wrist PRE  -flex/extend  -RD/UD  10x2    Green 15# flexbar  -\"T\" bends 10x2  -bilateral pronation 10x2    Red 10# flexbar  -bilateral sup  10x2    Green Theraputty Ex:  -adduction  -digital extension  -thumb extension (10 mins) Forearm extensor stretch 3x1, 30 secs    1.5# hand dowel sup/pro 10x2    Red 10# flexbar  -\"T\" bends 10x2    Red 10# flexbar  -bilateral sup  10x2    Velcro board half with green pin then half with red    Isometric wrist ex:   10x1, 10 sec  -flexion, RD/UD   Digital abd/add 20x1    Opposition 20x1    Wrist Extension 20x1    Forearm extensor stretch 3x1, 30 secs    Isometric wrist ex:   10x1, 15 sec  -flexion, extension, RD/UD    3# wrist PRE  -flex/extend  -RD/UD  15x2    Green Theraputty Ex:  -opposition  -adduction  -lateral pinch  -thumb extension (10 mins)         HEP instruction                    Therapeutic Activity                                                 Neuromuscular Re-education                                                 Modalities                                                HEP:  Assignment date:   isometric wrist strengthening in flexion, extension, and RD/UD 2/3/25              Charges: TE 3       Total Timed Treatment: 40 min  Total Treatment Time: 40 min

## 2025-02-24 DIAGNOSIS — M54.16 LUMBAR RADICULOPATHY: ICD-10-CM

## 2025-02-24 NOTE — TELEPHONE ENCOUNTER
Refill Request    Medication request: HYDROcodone-acetaminophen  MG Oral Tab  Take 1 tablet by mouth every 4-6 hours PRN pain (max 5 tabs in 24 hours).     LOV:1/20/2025 Juan A Mcgee MD   Due back to clinic per last office note:  \"The patient will follow-up with me in 3 months or sooner if needed. \"  NOV: 5/8/2025 Juan A Mcgee MD      [place]: 1/31/25 #150 - 30 day supply    Urine drug screen (if applicable): none  Pain contract: Valid until 1/24/26    LOV plan (if weaning or changing medications): Per  at last office visit: \"Patient will continue with taking no more than 5 Norco per day for his pain. \"

## 2025-02-25 ENCOUNTER — OFFICE VISIT (OUTPATIENT)
Dept: OCCUPATIONAL MEDICINE | Facility: HOSPITAL | Age: 55
End: 2025-02-25
Attending: PHYSICAL MEDICINE & REHABILITATION
Payer: MEDICAID

## 2025-02-25 ENCOUNTER — HOSPITAL ENCOUNTER (OUTPATIENT)
Dept: CT IMAGING | Facility: HOSPITAL | Age: 55
Discharge: HOME | End: 2025-02-25
Attending: UROLOGY
Payer: MEDICAID

## 2025-02-25 DIAGNOSIS — R31.29 MICROHEMATURIA: ICD-10-CM

## 2025-02-25 LAB
CREAT BLD-MCNC: 1 MG/DL
EGFRCR SERPLBLD CKD-EPI 2021: 89 ML/MIN/1.73M2 (ref 60–?)

## 2025-02-25 PROCEDURE — 82565 ASSAY OF CREATININE: CPT

## 2025-02-25 PROCEDURE — 97110 THERAPEUTIC EXERCISES: CPT

## 2025-02-25 PROCEDURE — 74178 CT ABD&PLV WO CNTR FLWD CNTR: CPT | Performed by: UROLOGY

## 2025-02-25 NOTE — PROGRESS NOTES
Diagnosis:    Right wrist pain (M25.531)  Chronic pain of right wrist (M25.531,G89.29)          Referring Provider: Juan A Mcgee  Date of Evaluation:    2/3/2025    Precautions:   DM, HTN, and chronic neck pain Next MD visit:   none scheduled  Date of Surgery: n/a   Insurance Primary/Secondary: BCBS OUT OF STATE / N/A     # Auth Visits: 8 visits 2/3-4/4           Subjective: Pt states feeling okay.     Pain: 2-3/10      Objective: See exercises flowsheet below for exercises and activities performed today. All exercises performed bilaterally.      Assessment: Pt able to tolerate weight bearing into putty with no pain. Pt able to do all flexbar exercises with no verbalization of pain. Discomfort with pronation using hand dowel.     Goals: (to be met in 8 visits)  Pt will be independent and compliant with comprehensive HEP to maintain progress achieved in OT  Pt's (R) wrist strength in all areas will be at least 4+/5 for ease of lifting items.  Pt will increase their (R)  strength by 10# for ease of carrying groceries  Pt will decrease their QuickDASH score by 10% in order to demo improvements in functional independence.     Plan: Discharge pt  Date 2/6/2025   2/10/2025  2/12/2025 2/18/2025 2/20/2025  2/25/2025        Visit # 2/8  3/8 4/8 5/8 6/8  7/8                          Evaluation                 Manual                                                 Ther ex                                  Forearm extensor stretch 3x1, 30 secs    Isometric wrist ex:   10x1, 10 sec  -flexion, extension, RD/UD    1# hand dowel sup/pro 10x2    Blue Web Ex:   -lumbrical squeezes    Red Theraputty Ex:  -opposition tip pinch  -adduction  -lumbrical extension  -lateral pinch  (15 mins)     Forearm extensor stretch 3x1, 30 secs    Blue Web Ex:   -lumbrical squeezes  15x2    Red 10# Flexbar Ex:  -bilateral sup/pro   -unilateral sup/pro  10x2    Green 15# flexbar  -\"T\" bends 10x2    1.5# hand dowel sup/pro 10x2    9# isolated digital  flexion 10x2    Green Theraputty Ex:  -opposition tip pinch  -thumb extension (8 mins)         Forearm extensor stretch 3x1, 30 secs    Black Web Ex:   -lumbrical squeezes  10x2    3# wrist PRE  -flex/extend  -RD/UD  10x2    Green 15# flexbar  -\"T\" bends 10x2  -bilateral pronation 10x2    Red 10# flexbar  -bilateral sup  10x2    Green Theraputty Ex:  -adduction  -digital extension  -thumb extension (10 mins) Forearm extensor stretch 3x1, 30 secs    1.5# hand dowel sup/pro 10x2    Red 10# flexbar  -\"T\" bends 10x2    Red 10# flexbar  -bilateral sup  10x2    Velcro board half with green pin then half with red    Isometric wrist ex:   10x1, 10 sec  -flexion, RD/UD   Digital abd/add 20x1    Opposition 20x1    Wrist Extension 20x1    Forearm extensor stretch 3x1, 30 secs    Isometric wrist ex:   10x1, 15 sec  -flexion, extension, RD/UD    3# wrist PRE  -flex/extend  -RD/UD  15x2    Green Theraputty Ex:  -opposition  -adduction  -lateral pinch  -thumb extension (10 mins) Forearm extensor stretch 3x1, 30 secs    Black Web Ex:   -lumbrical squeezes  10x2    Green putty Ex:  -mash presses into putty 20x1  -putty wt presses and twist.  -opposition (3 rows)    Green 15# flexbar  -\"T\" bends 10x2  -bilateral pronation/supination  -unilateral pro/sup  10x2    2# hand dowel sup/pro 10x2         HEP instruction                    Therapeutic Activity                                                 Neuromuscular Re-education                                                 Modalities                                                HEP:  Assignment date:   isometric wrist strengthening in flexion, extension, and RD/UD 2/3/25              Charges: TE 3       Total Timed Treatment: 40 min  Total Treatment Time: 40 min

## 2025-02-26 RX ORDER — HYDROCODONE BITARTRATE AND ACETAMINOPHEN 10; 325 MG/1; MG/1
TABLET ORAL
Qty: 150 TABLET | Refills: 0 | Status: SHIPPED | OUTPATIENT
Start: 2025-03-02

## 2025-03-06 ENCOUNTER — OFFICE VISIT (OUTPATIENT)
Dept: PHYSICAL MEDICINE AND REHAB | Facility: CLINIC | Age: 55
End: 2025-03-06
Payer: MEDICAID

## 2025-03-06 DIAGNOSIS — M48.061 SPINAL STENOSIS OF LUMBAR REGION WITHOUT NEUROGENIC CLAUDICATION: ICD-10-CM

## 2025-03-06 DIAGNOSIS — M71.30 SYNOVIAL CYST: ICD-10-CM

## 2025-03-06 DIAGNOSIS — Q76.1 CERVICAL FUSION SYNDROME: ICD-10-CM

## 2025-03-06 DIAGNOSIS — G56.21 ULNAR NEUROPATHY AT ELBOW OF RIGHT UPPER EXTREMITY: ICD-10-CM

## 2025-03-06 DIAGNOSIS — M51.9 LUMBAR DISC DISEASE: ICD-10-CM

## 2025-03-06 DIAGNOSIS — M45.0 ANKYLOSING SPONDYLITIS OF MULTIPLE SITES IN SPINE (HCC): ICD-10-CM

## 2025-03-06 DIAGNOSIS — E11.9 CONTROLLED TYPE 2 DIABETES MELLITUS WITHOUT COMPLICATION, WITHOUT LONG-TERM CURRENT USE OF INSULIN (HCC): ICD-10-CM

## 2025-03-06 DIAGNOSIS — M48.061 LUMBAR FORAMINAL STENOSIS: ICD-10-CM

## 2025-03-06 DIAGNOSIS — M11.20 CALCIUM PYROPHOSPHATE DEPOSITION DISEASE (CPPD): ICD-10-CM

## 2025-03-06 DIAGNOSIS — M48.02 CERVICAL STENOSIS OF SPINE: ICD-10-CM

## 2025-03-06 DIAGNOSIS — M54.16 LUMBAR RADICULOPATHY: ICD-10-CM

## 2025-03-06 DIAGNOSIS — M50.90 CERVICAL DISC DISEASE: ICD-10-CM

## 2025-03-06 DIAGNOSIS — G56.22 ULNAR NEUROPATHY AT ELBOW OF LEFT UPPER EXTREMITY: ICD-10-CM

## 2025-03-06 DIAGNOSIS — Z98.1 S/P CERVICAL SPINAL FUSION: ICD-10-CM

## 2025-03-06 DIAGNOSIS — R29.898 WEAKNESS OF BOTH HANDS: ICD-10-CM

## 2025-03-06 DIAGNOSIS — M47.812 ARTHROPATHY OF CERVICAL FACET JOINT: Primary | ICD-10-CM

## 2025-03-06 DIAGNOSIS — G56.03 BILATERAL CARPAL TUNNEL SYNDROME: ICD-10-CM

## 2025-03-06 PROCEDURE — 99214 OFFICE O/P EST MOD 30 MIN: CPT | Performed by: PHYSICAL MEDICINE & REHABILITATION

## 2025-03-06 NOTE — PATIENT INSTRUCTIONS
Plan  I will perform bilateral L5 TFESI(s) once he has recovered from the left carpal tunnel surgery.    He will have the surgery as planned.    He will continue with the Norco 10/325 4-5 per day for the pain.    He will continue with the right wrist and hand OT and home exercise program.    He will follow up with Dr. Brandon for the synovial cyst of the cervical spine.    The patient will follow up in 3-4 months, but the patient will call me 2 weeks after having the injection to let me know how the injection worked.

## 2025-03-06 NOTE — PROGRESS NOTES
Low Back Pain H & P    Chief Complaint:   Chief Complaint   Patient presents with    Follow - Up     LOV 01/20/25 Patient is present to follow up on neck, mid, and low back pain. Patient states the pain is an intermittent ache and throb, Pain 7/10. Imaging completed 02/10/25. Denies N/T. Denies Weakness. Admits PT for R Hand. Admits Norco taken 5x daily.      Nursing note reviewed and verified.    Patient was last seen on 1/20/2025.  He will be having left carpal tunnel release surgery on 3/20/2025.  The right wrist is getting better and he is now able to use the splint as needed.  He is still in the OT for the hands and wrists.  This is helping him.      He still has the left upper neck pain. This is a 6-7/10.    He continues to have intermittent numbness in the bilateral 2-5 toes.  If he sits for too long, he will have bilateral leg weakness.      He has had a right ankle injection which has helped.    He is still taking 4-5 of the Norco per day for the pain.    Past Medical History   Past Medical History:    Back problem    Chronic neck pain    Contusion of cervical cord (HCC)    Diabetes (HCC)    High blood pressure    Hyperlipidemia    Migraines    Went to emergency care clinic and suggested to get an MRI done because I've been dealing with a bad migraine for over a week straight today being a 10 from 1-10 10,being the worse    Primary hypertension    Problems with swallowing    Sleep apnea    unable to use device    Visual impairment    readers       Past Surgical History   Past Surgical History:   Procedure Laterality Date    Colonoscopy N/A 2/7/2024    Procedure: COLONOSCOPY;  Surgeon: Amy Means MD;  Location: [facility] ENDOSCOPY    Ct cervical spine      c3 & 4     Hernia surgery  2005    hiatal hernia    Knee surgery  2008    RIGHT ACL REPAIR       Family History   Family History   Problem Relation Age of Onset    No Known Problems Mother     No Known Problems Sister     No Known Problems Brother     Heart  Disorder Paternal Grandmother     Diabetes Paternal Grandmother     Cancer Paternal Grandmother     Glaucoma Neg     Macular degeneration Neg        Social History   Social History     Socioeconomic History    Marital status:      Spouse name: Not on file    Number of children: Not on file    Years of education: Not on file    Highest education level: Not on file   Occupational History    Not on file   Tobacco Use    Smoking status: Never     Passive exposure: Past    Smokeless tobacco: Never   Vaping Use    Vaping status: Never Used   Substance and Sexual Activity    Alcohol use: Not Currently     Alcohol/week: 1.0 standard drink of alcohol     Comment: Did social    Drug use: Never    Sexual activity: Not on file   Other Topics Concern    Caffeine Concern Yes    Exercise No    Seat Belt Not Asked    Special Diet Not Asked    Stress Concern Not Asked    Weight Concern Not Asked     Service Not Asked    Blood Transfusions Not Asked    Occupational Exposure Not Asked    Hobby Hazards Not Asked    Sleep Concern Not Asked    Back Care Not Asked    Bike Helmet Not Asked    Self-Exams Not Asked    Grew up on a farm Not Asked    History of tanning Yes    Outdoor occupation Not Asked    Reaction to local anesthetic No    Pt has a pacemaker No    Pt has a defibrillator No   Social History Narrative    The patient does not use an assistive device..      The patient does live in a home with stairs.     Social Drivers of Health     Food Insecurity: Not on file   Transportation Needs: Not on file   Stress: Not on file   Housing Stability: Not on file       PE:  The patient does appear in his stated age in no distress.  The patient is well groomed.    Psychiatric:  The patient is alert and oriented x 3.  The patient has a normal affect and mood.      Respiratory:  No acute respiratory distress. Patient does not have a cough.    HEENT:  Extraocular muscles are intact. There is no kern icterus. Pupils are equal,  round, and reactive to light. No redness or discharge bilaterally.    Skin:  There are no rashes or lesions.    Vitals:  There were no vitals filed for this visit.    Gait:    Gait: Normal gait   Sit to Stand: no difficulty      Vascular lower extremity:   Dorsalis pedis pulse-RIGHT 2+   Dorsalis pedis pulse-LEFT 2+   Tibialis posterior pulse-RIGHT 2+   Tibialis posterior pulse-LEFT 2+     Neurological Lower Extremity:    Light Touch Sensation: Intact in bilateral Lower Extremities   LE Muscle Strength: All LE strength measurements 5/5 except:  Hamstring RIGHT:   4/5  Hamstring LEFT:   4/5   RIGHT plantar reflexes: downward response   LEFT plantar reflexes: downward response   Reflexes: absent in bilateral lower extremities     Radiology Imaging:  I reviewed with the patient his MRI of the lumbar spine from 2/10/2025.      Assessment  1. Arthropathy of cervical facet joint: C1-2 and OA    2. C2-3 mild-mod central, C7-T1 mild central & left foraminal mild-mod bulging discs    3. C3-4 moderate central stenosis    4. Cervical fusion syndrome: C2-T1 due to AS    5. S/P cervical spinal fusion: C3-4 ACDF    6. Ulnar neuropathy at elbow of left upper extremity: moderate sensory    7. Calcium pyrophosphate deposition disease (CPPD)    8. Synovial cyst of the C1-2 z-joint that is moderate-large    9. Controlled type 2 diabetes mellitus without complication, without long-term current use of insulin ([facility])    10. Ankylosing spondylitis of multiple sites in spine ([facility])    11. L3-4 mild-mod central stenosis    12. L5-S1 right mod/left mod-large far lateral & mild central, L4-5 mod diffsue, L3-4 mild-mod diffuse & right mod foraminal, L2-3 mild diffuse & bilateral mild-mod foraminal, L1-2 mild diff bulging discs    13. L5-S1 bilateral mod, L4-5 bilateral mod, L3-4 right mod, L2-3 right mod foraminal stenosis    14. Bilateral carpal tunnel syndrome: right moderate-severe, left moderate sensory & mild motor    15. Ulnar neuropathy  at elbow of right upper extremity: moderate sensory    16. Weakness of both hands    17. right > left L5-S1 radiculopathy         Plan  I will perform bilateral L5 TFESI(s) once he has recovered from the left carpal tunnel surgery.    He will have the surgery as planned.    He will continue with the Norco 10/325 4-5 per day for the pain.    He will continue with the right wrist and hand OT and home exercise program.    He will follow up with Dr. Brandon for the synovial cyst of the cervical spine.    The patient will follow up in 3-4 months, but the patient will call me 2 weeks after having the injection to let me know how the injection worked.    The patient understands and agrees with the stated plan.  Juan A Mcgee MD  3/6/2025

## 2025-03-06 NOTE — H&P (VIEW-ONLY)
Low Back Pain H & P    Chief Complaint:   Chief Complaint   Patient presents with    Follow - Up     LOV 01/20/25 Patient is present to follow up on neck, mid, and low back pain. Patient states the pain is an intermittent ache and throb, Pain 7/10. Imaging completed 02/10/25. Denies N/T. Denies Weakness. Admits PT for R Hand. Admits Norco taken 5x daily.      Nursing note reviewed and verified.    Patient was last seen on 1/20/2025.  He will be having left carpal tunnel release surgery on 3/20/2025.  The right wrist is getting better and he is now able to use the splint as needed.  He is still in the OT for the hands and wrists.  This is helping him.      He still has the left upper neck pain. This is a 6-7/10.    He continues to have intermittent numbness in the bilateral 2-5 toes.  If he sits for too long, he will have bilateral leg weakness.      He has had a right ankle injection which has helped.    He is still taking 4-5 of the Norco per day for the pain.    Past Medical History   Past Medical History:    Back problem    Chronic neck pain    Contusion of cervical cord (HCC)    Diabetes (HCC)    High blood pressure    Hyperlipidemia    Migraines    Went to emergency care clinic and suggested to get an MRI done because I've been dealing with a bad migraine for over a week straight today being a 10 from 1-10 10,being the worse    Primary hypertension    Problems with swallowing    Sleep apnea    unable to use device    Visual impairment    readers       Past Surgical History   Past Surgical History:   Procedure Laterality Date    Colonoscopy N/A 2/7/2024    Procedure: COLONOSCOPY;  Surgeon: Amy Means MD;  Location: [facility] ENDOSCOPY    Ct cervical spine      c3 & 4     Hernia surgery  2005    hiatal hernia    Knee surgery  2008    RIGHT ACL REPAIR       Family History   Family History   Problem Relation Age of Onset    No Known Problems Mother     No Known Problems Sister     No Known Problems Brother     Heart  Disorder Paternal Grandmother     Diabetes Paternal Grandmother     Cancer Paternal Grandmother     Glaucoma Neg     Macular degeneration Neg        Social History   Social History     Socioeconomic History    Marital status:      Spouse name: Not on file    Number of children: Not on file    Years of education: Not on file    Highest education level: Not on file   Occupational History    Not on file   Tobacco Use    Smoking status: Never     Passive exposure: Past    Smokeless tobacco: Never   Vaping Use    Vaping status: Never Used   Substance and Sexual Activity    Alcohol use: Not Currently     Alcohol/week: 1.0 standard drink of alcohol     Comment: Did social    Drug use: Never    Sexual activity: Not on file   Other Topics Concern    Caffeine Concern Yes    Exercise No    Seat Belt Not Asked    Special Diet Not Asked    Stress Concern Not Asked    Weight Concern Not Asked     Service Not Asked    Blood Transfusions Not Asked    Occupational Exposure Not Asked    Hobby Hazards Not Asked    Sleep Concern Not Asked    Back Care Not Asked    Bike Helmet Not Asked    Self-Exams Not Asked    Grew up on a farm Not Asked    History of tanning Yes    Outdoor occupation Not Asked    Reaction to local anesthetic No    Pt has a pacemaker No    Pt has a defibrillator No   Social History Narrative    The patient does not use an assistive device..      The patient does live in a home with stairs.     Social Drivers of Health     Food Insecurity: Not on file   Transportation Needs: Not on file   Stress: Not on file   Housing Stability: Not on file       PE:  The patient does appear in his stated age in no distress.  The patient is well groomed.    Psychiatric:  The patient is alert and oriented x 3.  The patient has a normal affect and mood.      Respiratory:  No acute respiratory distress. Patient does not have a cough.    HEENT:  Extraocular muscles are intact. There is no kern icterus. Pupils are equal,  round, and reactive to light. No redness or discharge bilaterally.    Skin:  There are no rashes or lesions.    Vitals:  There were no vitals filed for this visit.    Gait:    Gait: Normal gait   Sit to Stand: no difficulty      Vascular lower extremity:   Dorsalis pedis pulse-RIGHT 2+   Dorsalis pedis pulse-LEFT 2+   Tibialis posterior pulse-RIGHT 2+   Tibialis posterior pulse-LEFT 2+     Neurological Lower Extremity:    Light Touch Sensation: Intact in bilateral Lower Extremities   LE Muscle Strength: All LE strength measurements 5/5 except:  Hamstring RIGHT:   4/5  Hamstring LEFT:   4/5   RIGHT plantar reflexes: downward response   LEFT plantar reflexes: downward response   Reflexes: absent in bilateral lower extremities     Radiology Imaging:  I reviewed with the patient his MRI of the lumbar spine from 2/10/2025.      Assessment  1. Arthropathy of cervical facet joint: C1-2 and OA    2. C2-3 mild-mod central, C7-T1 mild central & left foraminal mild-mod bulging discs    3. C3-4 moderate central stenosis    4. Cervical fusion syndrome: C2-T1 due to AS    5. S/P cervical spinal fusion: C3-4 ACDF    6. Ulnar neuropathy at elbow of left upper extremity: moderate sensory    7. Calcium pyrophosphate deposition disease (CPPD)    8. Synovial cyst of the C1-2 z-joint that is moderate-large    9. Controlled type 2 diabetes mellitus without complication, without long-term current use of insulin (Former[facility])    10. Ankylosing spondylitis of multiple sites in spine ([facility])    11. L3-4 mild-mod central stenosis    12. L5-S1 right mod/left mod-large far lateral & mild central, L4-5 mod diffsue, L3-4 mild-mod diffuse & right mod foraminal, L2-3 mild diffuse & bilateral mild-mod foraminal, L1-2 mild diff bulging discs    13. L5-S1 bilateral mod, L4-5 bilateral mod, L3-4 right mod, L2-3 right mod foraminal stenosis    14. Bilateral carpal tunnel syndrome: right moderate-severe, left moderate sensory & mild motor    15. Ulnar neuropathy  at elbow of right upper extremity: moderate sensory    16. Weakness of both hands    17. right > left L5-S1 radiculopathy         Plan  I will perform bilateral L5 TFESI(s) once he has recovered from the left carpal tunnel surgery.    He will have the surgery as planned.    He will continue with the Norco 10/325 4-5 per day for the pain.    He will continue with the right wrist and hand OT and home exercise program.    He will follow up with Dr. Brandon for the synovial cyst of the cervical spine.    The patient will follow up in 3-4 months, but the patient will call me 2 weeks after having the injection to let me know how the injection worked.    The patient understands and agrees with the stated plan.  Juan A Mcgee MD  3/6/2025

## 2025-03-07 ENCOUNTER — APPOINTMENT (OUTPATIENT)
Dept: ADMINISTRATIVE | Facility: HOSPITAL | Age: 55
End: 2025-03-07
Payer: MEDICAID

## 2025-03-07 ENCOUNTER — TELEPHONE (OUTPATIENT)
Dept: PHYSICAL MEDICINE AND REHAB | Facility: CLINIC | Age: 55
End: 2025-03-07

## 2025-03-07 DIAGNOSIS — M54.16 LUMBAR RADICULOPATHY: Primary | ICD-10-CM

## 2025-03-07 NOTE — TELEPHONE ENCOUNTER
Plan per Dr. Mcgee's LOV note: \"I will perform bilateral L5 TFESI(s) once he has recovered from the left carpal tunnel surgery.\"   - Patient is currently scheduled for: 03/20/2025      Dr. Mcgee has no ENDO slot time in April and next ENDO availability is: 05/23/2025 which is past the approval/authorization date of: 05/06/2025    Will need to reach out to the OR for times in April for scheduling. S/w Kary - OK to add on for 04/04/2025 at 2:00PM.

## 2025-03-07 NOTE — TELEPHONE ENCOUNTER
Initiated authorization for Bilateral L5 TFESI under fluoroscopic guidance. CPT/HCPCS 67053-78 dx:M54.16 to be done at [facility] with [employer] portal.    Status: Approved  Reference/Authorization # [id number]  Valid: 3/7/25-5/6/25  Authorization is not a guarantee of payment and may be subject to review once claim is submitted.

## 2025-03-07 NOTE — TELEPHONE ENCOUNTER
Patient has been scheduled for Bilateral L5 Transforaminal epidural steroid injection on 04/04/2025 at the OR/[facility] with Dr. Mcgee.   Anesthesia type:  LOCAL  Please note: The [facility] will call the business day prior to discuss the exact time/arrival and additional instructions for your appointment.  Patient was advised that if he/she does receive the covid vaccine it needs to be at least 2 weeks before or after the injection.  Medications and allergies reviewed.  Educated to hold NSAIDS (Aleve, Ibuprofen, Motrin, Advil) and anti-inflammatories (Meloxicam, Naproxen, Diclofenac, Celebrex) and for cervical injections must hold Multi-Vitamins, Vitamin E, Fish Oil/Omega-3.  Patient informed to fast 8 hours prior to procedure and 10-12 hours prior to procedure with MAC if patient is on a weight loss medication.   Patient informed of [facility]'s  policy:  he/she will need a  to and from procedure and must be on site for their entirety of their visit, if their ride is unable to the procedure will be cancelled.   [facility] located at [address] [place] [address]. [place] [address]    may park in the blue/green parking lot.  Patient verbalized understanding and agrees with plan.  Scheduled in Epic: Yes  Scheduled in Surgical Case: Yes  Follow up appointment made: NOV: 6/26/2025 Juan A Mcgee MD

## 2025-03-09 ENCOUNTER — HOSPITAL ENCOUNTER (OUTPATIENT)
Dept: MRI IMAGING | Facility: HOSPITAL | Age: 55
End: 2025-03-09
Attending: NEUROLOGICAL SURGERY
Payer: MEDICAID

## 2025-03-09 ENCOUNTER — HOSPITAL ENCOUNTER (OUTPATIENT)
Dept: MRI IMAGING | Facility: HOSPITAL | Age: 55
Discharge: HOME | End: 2025-03-09
Attending: NEUROLOGICAL SURGERY
Payer: MEDICAID

## 2025-03-09 DIAGNOSIS — G95.9 CERVICAL MYELOPATHY (HCC): ICD-10-CM

## 2025-03-09 DIAGNOSIS — M45.0 ANKYLOSING SPONDYLITIS OF MULTIPLE SITES IN SPINE (HCC): ICD-10-CM

## 2025-03-09 PROCEDURE — 72141 MRI NECK SPINE W/O DYE: CPT | Performed by: NEUROLOGICAL SURGERY

## 2025-03-10 ENCOUNTER — APPOINTMENT (OUTPATIENT)
Dept: OCCUPATIONAL MEDICINE | Facility: HOSPITAL | Age: 55
End: 2025-03-10
Attending: PHYSICAL MEDICINE & REHABILITATION
Payer: MEDICAID

## 2025-03-10 ENCOUNTER — TELEPHONE (OUTPATIENT)
Dept: ENDOCRINOLOGY CLINIC | Facility: CLINIC | Age: 55
End: 2025-03-10

## 2025-03-10 ENCOUNTER — LAB ENCOUNTER (OUTPATIENT)
Dept: LAB | Facility: HOSPITAL | Age: 55
End: 2025-03-10
Attending: ORTHOPAEDIC SURGERY
Payer: MEDICAID

## 2025-03-10 ENCOUNTER — TELEPHONE (OUTPATIENT)
Dept: INTERNAL MEDICINE CLINIC | Facility: CLINIC | Age: 55
End: 2025-03-10

## 2025-03-10 DIAGNOSIS — E11.9 CONTROLLED TYPE 2 DIABETES MELLITUS WITHOUT COMPLICATION, WITHOUT LONG-TERM CURRENT USE OF INSULIN (HCC): ICD-10-CM

## 2025-03-10 DIAGNOSIS — G89.29 CHRONIC PAIN OF LEFT WRIST: ICD-10-CM

## 2025-03-10 DIAGNOSIS — M25.532 CHRONIC PAIN OF LEFT WRIST: ICD-10-CM

## 2025-03-10 DIAGNOSIS — I10 PRIMARY HYPERTENSION: ICD-10-CM

## 2025-03-10 DIAGNOSIS — D35.00 ADRENAL ADENOMA, UNSPECIFIED LATERALITY: ICD-10-CM

## 2025-03-10 LAB
ANION GAP SERPL CALC-SCNC: 5 MMOL/L (ref 0–18)
ATRIAL RATE: 74 BPM
BUN BLD-MCNC: 15 MG/DL (ref 9–23)
BUN/CREAT SERPL: 13.8 (ref 10–20)
CALCIUM BLD-MCNC: 9.4 MG/DL (ref 8.7–10.4)
CHLORIDE SERPL-SCNC: 105 MMOL/L (ref 98–112)
CO2 SERPL-SCNC: 27 MMOL/L (ref 21–32)
CREAT BLD-MCNC: 1.09 MG/DL
EGFRCR SERPLBLD CKD-EPI 2021: 81 ML/MIN/1.73M2 (ref 60–?)
FASTING STATUS PATIENT QL REPORTED: YES
GLUCOSE BLD-MCNC: 93 MG/DL (ref 70–99)
OSMOLALITY SERPL CALC.SUM OF ELEC: 285 MOSM/KG (ref 275–295)
P AXIS: 45 DEGREES
P-R INTERVAL: 144 MS
POTASSIUM SERPL-SCNC: 4.2 MMOL/L (ref 3.5–5.1)
Q-T INTERVAL: 378 MS
QRS DURATION: 86 MS
QTC CALCULATION (BEZET): 419 MS
R AXIS: -29 DEGREES
SODIUM SERPL-SCNC: 137 MMOL/L (ref 136–145)
T AXIS: 5 DEGREES
VENTRICULAR RATE: 74 BPM

## 2025-03-10 PROCEDURE — 36415 COLL VENOUS BLD VENIPUNCTURE: CPT

## 2025-03-10 PROCEDURE — 80048 BASIC METABOLIC PNL TOTAL CA: CPT

## 2025-03-10 PROCEDURE — 83835 ASSAY OF METANEPHRINES: CPT

## 2025-03-10 PROCEDURE — 93005 ELECTROCARDIOGRAM TRACING: CPT

## 2025-03-10 PROCEDURE — 93010 ELECTROCARDIOGRAM REPORT: CPT | Performed by: INTERNAL MEDICINE

## 2025-03-10 NOTE — TELEPHONE ENCOUNTER
Dr. Guadarrama --     Spoke to pt. Pt is scheduled for left endoscopic carpal tunnel on 3/20. Pt takes ozempic 1mg every Sunday, instructed to not take it 3/16. Pt is worried about his BG,  fasting BG in 130-150's but before was in the 100's. Pt's asking if you wanted him to take any medication next week when not on ozempic? Or if you wanted to increase ozempic to 2mg?     Current regimen:   Ozempic 1 mg    MC sent with salivary cortisol instructions as per pt request.

## 2025-03-10 NOTE — TELEPHONE ENCOUNTER
Patient called to speak with a nurse in regards to his procedure. The patient was instructed to stop his medications for the procedure and the patient wanted to ask what he can do in regards to the Ozempic. The patient is concerned on not taking his medication for diabetes.   Patient also wanted to ask about the saliva test. Please call.

## 2025-03-10 NOTE — TELEPHONE ENCOUNTER
Patient called the office.  He is having surgery on 3/20 with Dr. Villela.  Calling to schedule a presurgical visit.    I don't see any open appointments with Dr. Rivas.    Please advise.

## 2025-03-11 ENCOUNTER — OFFICE VISIT (OUTPATIENT)
Dept: OCCUPATIONAL MEDICINE | Facility: HOSPITAL | Age: 55
End: 2025-03-11
Attending: PHYSICAL MEDICINE & REHABILITATION
Payer: MEDICAID

## 2025-03-11 PROCEDURE — 97110 THERAPEUTIC EXERCISES: CPT

## 2025-03-11 NOTE — PROGRESS NOTES
Discharge Summary    Pt has attended 8 visits in Occupational Therapy.       Diagnosis:    Right wrist pain (M25.531)  Chronic pain of right wrist (M25.531,G89.29)          Referring Provider: Juan A Mcgee  Date of Evaluation:    2/3/2025    Precautions:   DM, HTN, and chronic neck pain Next MD visit:   none scheduled  Date of Surgery: n/a   Insurance Primary/Secondary: BCBS OUT OF STATE / N/A     # Auth Visits: 8 visits 2/3-4/4           Subjective: Pt states having his carpal tunnel release on the L wrist on Thursday 3/20.       Pain: 2/10      Objective Data:     MANUAL MUSCLE TESTING: (* denotes performed with pain)   Wrist Eval Wrist 3/11/2025   Flexion: R 5/5, L 5/5  Extension: R 4/5, L 5/5  Ulnar Deviation: R 4/5, L 5/5  Radial Deviation R 4/5, L 5/5 Flexion: R 5/5  Extension: R 5/5  Ulnar Deviation: R 4+/5  Radial Deviation R 4+/5        Strength (lbs) Right Average Eval Right Average 3/11/2025     : 39 52 (+13)       Assessment: Pt made good progress throughout skilled OT. Strength improved in all areas of his R hand and wrist, as seen above. Pt met 3/4 goals and progressed in the others. Pain now manageable with less instances of flare-ups. Pt using orthosis less for support. OT and pt discussed and agreed upon discharge at this time.       Goals: (to be met in 8 visits)  Pt will be independent and compliant with comprehensive HEP to maintain progress achieved in OT-MET  Pt's (R) wrist strength in all areas will be at least 4+/5 for ease of lifting items.-MET  Pt will increase their (R)  strength by 10# for ease of carrying groceries-MET  Pt will decrease their QuickDASH score by 10% in order to demo improvements in functional independence.-Progressed     Date 2/6/2025   2/10/2025  2/12/2025 2/18/2025 2/20/2025  2/25/2025  3/11/2025     Visit # 2/8  3/8 4/8 5/8 6/8  7/8  8/8                        Evaluation                 Manual                                                 Ther ex                                   Forearm extensor stretch 3x1, 30 secs    Isometric wrist ex:   10x1, 10 sec  -flexion, extension, RD/UD    1# hand dowel sup/pro 10x2    Blue Web Ex:   -lumbrical squeezes    Red Theraputty Ex:  -opposition tip pinch  -adduction  -lumbrical extension  -lateral pinch  (15 mins)     Forearm extensor stretch 3x1, 30 secs    Blue Web Ex:   -lumbrical squeezes  15x2    Red 10# Flexbar Ex:  -bilateral sup/pro   -unilateral sup/pro  10x2    Green 15# flexbar  -\"T\" bends 10x2    1.5# hand dowel sup/pro 10x2    9# isolated digital flexion 10x2    Green Theraputty Ex:  -opposition tip pinch  -thumb extension (8 mins)         Forearm extensor stretch 3x1, 30 secs    Black Web Ex:   -lumbrical squeezes  10x2    3# wrist PRE  -flex/extend  -RD/UD  10x2    Green 15# flexbar  -\"T\" bends 10x2  -bilateral pronation 10x2    Red 10# flexbar  -bilateral sup  10x2    Green Theraputty Ex:  -adduction  -digital extension  -thumb extension (10 mins) Forearm extensor stretch 3x1, 30 secs    1.5# hand dowel sup/pro 10x2    Red 10# flexbar  -\"T\" bends 10x2    Red 10# flexbar  -bilateral sup  10x2    Velcro board half with green pin then half with red    Isometric wrist ex:   10x1, 10 sec  -flexion, RD/UD   Digital abd/add 20x1    Opposition 20x1    Wrist Extension 20x1    Forearm extensor stretch 3x1, 30 secs    Isometric wrist ex:   10x1, 15 sec  -flexion, extension, RD/UD    3# wrist PRE  -flex/extend  -RD/UD  15x2    Green Theraputty Ex:  -opposition  -adduction  -lateral pinch  -thumb extension (10 mins) Forearm extensor stretch 3x1, 30 secs    Black Web Ex:   -lumbrical squeezes  10x2    Green putty Ex:  -mash presses into putty 20x1  -putty wt presses and twist.  -opposition (3 rows)    Green 15# flexbar  -\"T\" bends 10x2  -bilateral pronation/supination  -unilateral pro/sup  10x2    2# hand dowel sup/pro 10x2   Forearm extensor stretch 3x1, 30 secs    Black Web Ex:   -lumbrical squeezes  10x2    Green 15#  flexbar  -\"T\" bends 10x2    Isometric wrist ex:   10x1, 15 sec  -flexion, extension, RD/UD    5# sup/pronation 10x2       HEP instruction                    Therapeutic Activity                                                 Neuromuscular Re-education                                                 Modalities                                                HEP:  Assignment date:   isometric wrist strengthening in flexion, extension, and RD/UD 2/3/25             Post QuickDASH Outcome Score  Post Score: 22.73 % (4/1/2024  4:47 PM)    6.82 % improvement    Plan: discharge pt     Patient/Family/Caregiver was advised of these findings, precautions, and treatment options and has agreed to actively participate in planning and for this course of care.    Thank you for your referral. If you have any questions, please contact me at Dept: [phone].    Sincerely,  Electronically signed by therapist: Nicole Yanez OT     Physician's certification required:  No  Please co-sign or sign and return this letter via fax as soon as possible to [phone].   I certify the need for these services furnished under this plan of treatment and while under my care.    X___________________________________________________ Date____________________    Certification From: 3/11/2025  To:6/9/2025         Charges: TE 3       Total Timed Treatment: 40 min  Total Treatment Time: 45 min

## 2025-03-11 NOTE — TELEPHONE ENCOUNTER
Called patient back and informed we have no openings this week for pre-op visit, but can add himself on the waitlist or first available in our clinic.     Patient stated give us a call back to schedule an appt.     SALINA CHAPMAN

## 2025-03-11 NOTE — TELEPHONE ENCOUNTER
Patient saw  Dr Villela and decided on the surgery back in 1/29/25.  Unfortunately no SDA   He can add himself on the wait list or see the first available in our clinic

## 2025-03-11 NOTE — TELEPHONE ENCOUNTER
He can please update with BG on my chart on Monday   If Bg are persistently over 180, we can prescribe amaryl till he is off ozempic  I can review other questions at his upcoming apt   Thanks

## 2025-03-12 PROCEDURE — 82533 TOTAL CORTISOL: CPT

## 2025-03-13 ENCOUNTER — LAB ENCOUNTER (OUTPATIENT)
Dept: LAB | Facility: HOSPITAL | Age: 55
End: 2025-03-13
Attending: INTERNAL MEDICINE
Payer: MEDICAID

## 2025-03-13 DIAGNOSIS — D35.00 ADRENAL ADENOMA, UNSPECIFIED LATERALITY: ICD-10-CM

## 2025-03-14 LAB
METANEPHRINE: <25 PG/ML
NORMETANEPHRINE: 41.9 PG/ML

## 2025-03-20 ENCOUNTER — ANESTHESIA (OUTPATIENT)
Dept: SURGERY | Facility: HOSPITAL | Age: 55
End: 2025-03-20
Payer: MEDICAID

## 2025-03-20 ENCOUNTER — ANESTHESIA EVENT (OUTPATIENT)
Dept: SURGERY | Facility: HOSPITAL | Age: 55
End: 2025-03-20
Payer: MEDICAID

## 2025-03-20 ENCOUNTER — HOSPITAL ENCOUNTER (OUTPATIENT)
Facility: HOSPITAL | Age: 55
Setting detail: HOSPITAL OUTPATIENT SURGERY
Discharge: HOME | End: 2025-03-20
Attending: ORTHOPAEDIC SURGERY | Admitting: ORTHOPAEDIC SURGERY
Payer: MEDICAID

## 2025-03-20 VITALS
TEMPERATURE: 97 F | DIASTOLIC BLOOD PRESSURE: 89 MMHG | OXYGEN SATURATION: 96 % | HEIGHT: 71 IN | HEART RATE: 70 BPM | WEIGHT: 265 LBS | SYSTOLIC BLOOD PRESSURE: 123 MMHG | RESPIRATION RATE: 18 BRPM | BODY MASS INDEX: 37.1 KG/M2

## 2025-03-20 DIAGNOSIS — E11.9 CONTROLLED TYPE 2 DIABETES MELLITUS WITHOUT COMPLICATION, WITHOUT LONG-TERM CURRENT USE OF INSULIN (HCC): ICD-10-CM

## 2025-03-20 DIAGNOSIS — G89.29 CHRONIC PAIN OF LEFT WRIST: Primary | ICD-10-CM

## 2025-03-20 DIAGNOSIS — Z48.89 ENCOUNTER FOR POSTOPERATIVE CARE: ICD-10-CM

## 2025-03-20 DIAGNOSIS — I10 PRIMARY HYPERTENSION: ICD-10-CM

## 2025-03-20 DIAGNOSIS — M25.532 CHRONIC PAIN OF LEFT WRIST: Primary | ICD-10-CM

## 2025-03-20 LAB — GLUCOSE BLD-MCNC: 90 MG/DL (ref 70–99)

## 2025-03-20 PROCEDURE — 29848 WRIST ENDOSCOPY/SURGERY: CPT | Performed by: ORTHOPAEDIC SURGERY

## 2025-03-20 PROCEDURE — 01N54ZZ RELEASE MEDIAN NERVE, PERCUTANEOUS ENDOSCOPIC APPROACH: ICD-10-PCS | Performed by: ORTHOPAEDIC SURGERY

## 2025-03-20 RX ORDER — NICOTINE POLACRILEX 4 MG
15 LOZENGE BUCCAL
Status: DISCONTINUED | OUTPATIENT
Start: 2025-03-20 | End: 2025-03-20 | Stop reason: HOSPADM

## 2025-03-20 RX ORDER — SODIUM CHLORIDE, SODIUM LACTATE, POTASSIUM CHLORIDE, CALCIUM CHLORIDE 600; 310; 30; 20 MG/100ML; MG/100ML; MG/100ML; MG/100ML
INJECTION, SOLUTION INTRAVENOUS CONTINUOUS
Status: DISCONTINUED | OUTPATIENT
Start: 2025-03-20 | End: 2025-03-20

## 2025-03-20 RX ORDER — HYDROMORPHONE HYDROCHLORIDE 1 MG/ML
0.2 INJECTION, SOLUTION INTRAMUSCULAR; INTRAVENOUS; SUBCUTANEOUS EVERY 5 MIN PRN
Status: DISCONTINUED | OUTPATIENT
Start: 2025-03-20 | End: 2025-03-20

## 2025-03-20 RX ORDER — HYDROCODONE BITARTRATE AND ACETAMINOPHEN 5; 325 MG/1; MG/1
1 TABLET ORAL ONCE AS NEEDED
Status: DISCONTINUED | OUTPATIENT
Start: 2025-03-20 | End: 2025-03-20

## 2025-03-20 RX ORDER — LABETALOL HYDROCHLORIDE 5 MG/ML
5 INJECTION, SOLUTION INTRAVENOUS EVERY 5 MIN PRN
Status: DISCONTINUED | OUTPATIENT
Start: 2025-03-20 | End: 2025-03-20

## 2025-03-20 RX ORDER — HYDROCODONE BITARTRATE AND ACETAMINOPHEN 5; 325 MG/1; MG/1
2 TABLET ORAL ONCE AS NEEDED
Status: DISCONTINUED | OUTPATIENT
Start: 2025-03-20 | End: 2025-03-20

## 2025-03-20 RX ORDER — TRAMADOL HYDROCHLORIDE 50 MG/1
50 TABLET ORAL EVERY 6 HOURS PRN
Qty: 5 TABLET | Refills: 0 | Status: SHIPPED | OUTPATIENT
Start: 2025-03-20

## 2025-03-20 RX ORDER — ACETAMINOPHEN 500 MG
1000 TABLET ORAL ONCE
Status: DISCONTINUED | OUTPATIENT
Start: 2025-03-20 | End: 2025-03-20 | Stop reason: HOSPADM

## 2025-03-20 RX ORDER — SCOPOLAMINE 1 MG/3D
1 PATCH, EXTENDED RELEASE TRANSDERMAL ONCE
Status: DISCONTINUED | OUTPATIENT
Start: 2025-03-20 | End: 2025-03-20 | Stop reason: HOSPADM

## 2025-03-20 RX ORDER — HYDROMORPHONE HYDROCHLORIDE 1 MG/ML
0.4 INJECTION, SOLUTION INTRAMUSCULAR; INTRAVENOUS; SUBCUTANEOUS EVERY 5 MIN PRN
Status: DISCONTINUED | OUTPATIENT
Start: 2025-03-20 | End: 2025-03-20

## 2025-03-20 RX ORDER — NICOTINE POLACRILEX 4 MG
30 LOZENGE BUCCAL
Status: DISCONTINUED | OUTPATIENT
Start: 2025-03-20 | End: 2025-03-20 | Stop reason: HOSPADM

## 2025-03-20 RX ORDER — NALOXONE HYDROCHLORIDE 0.4 MG/ML
0.08 INJECTION, SOLUTION INTRAMUSCULAR; INTRAVENOUS; SUBCUTANEOUS AS NEEDED
Status: DISCONTINUED | OUTPATIENT
Start: 2025-03-20 | End: 2025-03-20

## 2025-03-20 RX ORDER — ACETAMINOPHEN 500 MG
1000 TABLET ORAL ONCE AS NEEDED
Status: DISCONTINUED | OUTPATIENT
Start: 2025-03-20 | End: 2025-03-20

## 2025-03-20 RX ORDER — DEXTROSE MONOHYDRATE 25 G/50ML
50 INJECTION, SOLUTION INTRAVENOUS
Status: DISCONTINUED | OUTPATIENT
Start: 2025-03-20 | End: 2025-03-20 | Stop reason: HOSPADM

## 2025-03-20 RX ORDER — HYDROMORPHONE HYDROCHLORIDE 1 MG/ML
0.6 INJECTION, SOLUTION INTRAMUSCULAR; INTRAVENOUS; SUBCUTANEOUS EVERY 5 MIN PRN
Status: DISCONTINUED | OUTPATIENT
Start: 2025-03-20 | End: 2025-03-20

## 2025-03-20 RX ADMIN — SODIUM CHLORIDE, SODIUM LACTATE, POTASSIUM CHLORIDE, CALCIUM CHLORIDE: 600; 310; 30; 20 INJECTION, SOLUTION INTRAVENOUS at 07:37:00

## 2025-03-20 NOTE — SPIRITUAL CARE NOTE
Spiritual Care Visit Note    Patient Name: Ran Carter Date of Spiritual Care Visit: 25   : 1970 Primary Dx: <principal problem not specified>       Referred By:      Spiritual Care Taxonomy:    Intended Effects: Kaye affirmation    Methods: Explore spiritual/Oriental orthodox beliefs    Interventions: [place], Share written prayer, Discuss concerns    Visit Type/Summary:     - Spiritual Care: Ran said he appreciated the Prayer of Protection and thanked  for visit.  He is hopeful for good outcomes today.   remains available as needed for follow up.    Spiritual Care support can be requested via an [facility] consult. For urgent/immediate needs, please contact the On Call  at: [place]: ext [phone]

## 2025-03-20 NOTE — ANESTHESIA PREPROCEDURE EVALUATION
PRE-OP EVALUATION    Patient Name: Ran Carter    Admit Diagnosis: Scapholunate ligament injury with no instability, right, initial encounter [S69.91XA]  Carpal tunnel syndrome of right wrist [G56.01]    Pre-op Diagnosis: Scapholunate ligament injury with no instability, right, initial encounter [S69.91XA]  Carpal tunnel syndrome of right wrist [G56.01]    Left endoscopic carpal tunnel release possible open    Anesthesia Procedure: Left endoscopic carpal tunnel release possible open (Left)    Surgeons and Role:     * Guanaco Villela MD - Primary    Pre-op vitals reviewed.  Temp: 98 °F (36.7 °C)  Pulse: 90  Resp: 18  BP: 120/86  SpO2: 98 %  Body mass index is 21.62 kg/m².    Current medications reviewed.  Hospital Medications:   acetaminophen (Tylenol Extra Strength) tab 1,000 mg  1,000 mg Oral Once    scopolamine (Transderm-Scop) 1 MG/3DAYS patch 1 patch  1 patch Transdermal Once    glucose (Dex4) 15 GM/59ML oral liquid 15 g  15 g Oral Q15 Min PRN    Or    glucose (Glutose) 40% oral gel 15 g  15 g Oral Q15 Min PRN    Or    glucose-vitamin C (Dex-4) chewable tab 4 tablet  4 tablet Oral Q15 Min PRN    Or    dextrose 50% injection 50 mL  50 mL Intravenous Q15 Min PRN    Or    glucose (Dex4) 15 GM/59ML oral liquid 30 g  30 g Oral Q15 Min PRN    Or    glucose (Glutose) 40% oral gel 30 g  30 g Oral Q15 Min PRN    Or    glucose-vitamin C (Dex-4) chewable tab 8 tablet  8 tablet Oral Q15 Min PRN    lactated ringers infusion   Intravenous Continuous    ceFAZolin (Ancef) 2g in 10mL IV syringe premix  2 g Intravenous Once    bupivacaine/lidocaine w Epi/sodium bicarbonate local mixture 10mL syringe   Infiltration Once (Intra-Op)       Outpatient Medications:   Prescriptions Prior to Admission[1]    Allergies: Patient has no known allergies.      Anesthesia Evaluation        Anesthetic Complications           GI/Hepatic/Renal             (-) chronic renal disease   (-) liver disease                  Cardiovascular                (+) obesity  (+) hypertension and well controlled    (-) CAD  (-) past MI  (-) CABG/stent  (-) pacemaker/AICD  (-) valvular problems/murmurs     (-) dysrhythmias   (-) CHF  (-) angina     (-) NIX         Endo/Other      (+) diabetes and well controlled, type 2,                          Pulmonary      (-) asthma  (-) COPD                   Neuro/Psych          (-) CVA     (-) seizures                       Past Surgical History:   Procedure Laterality Date    Colonoscopy N/A 02/07/2024    Procedure: COLONOSCOPY;  Surgeon: Amy Means MD;  Location: [facility] ENDOSCOPY    Ct cervical spine      c3 & 4     Hernia surgery  2005    hiatal hernia    Knee surgery  2008    RIGHT ACL REPAIR    Spine surgery procedure unlisted  2018    cervical w hardware     Social History     Socioeconomic History    Marital status:    Tobacco Use    Smoking status: Never     Passive exposure: Past    Smokeless tobacco: Never   Vaping Use    Vaping status: Never Used   Substance and Sexual Activity    Alcohol use: Not Currently     Alcohol/week: 1.0 standard drink of alcohol     Comment: Did social    Drug use: Never   Other Topics Concern    Caffeine Concern Yes    Exercise No    History of tanning Yes    Reaction to local anesthetic No    Pt has a pacemaker No    Pt has a defibrillator No     History   Drug Use Unknown     Available pre-op labs reviewed.     Lab Results   Component Value Date     03/10/2025    K 4.2 03/10/2025     03/10/2025    CO2 27.0 03/10/2025    BUN 15 03/10/2025    CREATSERUM 1.09 03/10/2025    GLU 93 03/10/2025    CA 9.4 03/10/2025            Airway      Mallampati: II  Mouth opening: 3 FB  TM distance: 4 - 6 cm  Neck ROM: full Cardiovascular    Cardiovascular exam normal.         Dental    Dentition appears grossly intact         Pulmonary    Pulmonary exam normal.                 Other findings              ASA: 3   Plan: MAC  NPO status verified and            Plan/risks discussed with: patient                Present on Admission:  **None**             [1]   Facility-Administered Medications Prior to Admission   Medication Dose Route Frequency Provider Last Rate Last Admin    [COMPLETED] betamethasone sodium phosphate & acetate (Celestone) 6 (3-3) MG/ML injection 4.2 mg  4.2 mg Intramuscular Once    4.2 mg at 25 0903     Medications Prior to Admission   Medication Sig Dispense Refill Last Dose/Taking    HYDROcodone-acetaminophen  MG Oral Tab Take 1 tablet by mouth every 4-6 hours PRN pain (max 5 tabs in 24 hours). 150 tablet 0 Past Week    semaglutide (OZEMPIC, 1 MG/DOSE,) 4 MG/3ML Subcutaneous Solution Pen-injector Inject 1 mg into the skin once a week. (Patient taking differently: Inject 1 mg into the skin once a week. ) 9 mL 0 Taking Differently    pantoprazole 40 MG Oral Tab EC Take 1 tablet (40 mg total) by mouth 2 (two) times daily before meals. 180 tablet 1 Taking    [] losartan 100 MG Oral Tab Take 1 tablet (100 mg total) by mouth daily. 90 tablet 0 Taking    [] testosterone cypionate 200 mg/mL Intramuscular Solution Inject 1 mL (200 mg total) into the muscle every 14 (fourteen) days. 6 mL 0 Taking    Tadalafil 10 MG Oral Tab Take 1 tablet (10 mg total) by mouth daily as needed for Erectile Dysfunction. 30 tablet 0 Taking As Needed    multiple vitamin Oral Chew Tab Chew 1 tablet by mouth daily.   Taking    Meloxicam 15 MG Oral Tab Take 1 tablet (15 mg total) by mouth daily as needed for Pain. (Patient not taking: Reported on 2025) 5 tablet 0 Unknown    [] pravastatin 20 MG Oral Tab Take 1 tablet (20 mg total) by mouth nightly. 90 tablet 0     methocarbamol 500 MG Oral Tab Take 1 tablet (500 mg total) by mouth 3 (three) times daily as needed. 60 tablet 0     rosuvastatin 5 MG Oral Tab Take 1 tablet (5 mg total) by mouth nightly. 90 tablet 1     Insulin Pen Needle (PEN NEEDLES) 32G X 4 MM Does not apply Misc 1  each daily. 90 each 0     Blood Pressure Monitoring (BLOOD PRESSURE KIT) Does not apply Device 1 each daily. 1 each 0     nystatin-triamcinolone 100,000-0.1 Units/g-% External Ointment Apply twice daily  Monday-Friday to affected areas of rash. 60 g 3     pantoprazole 40 MG Oral Tab EC Take 1 tablet (40 mg total) by mouth every morning before breakfast. 90 tablet 3     Syringe/Needle, Disp, 27G X 1/2\" 1 ML Does not apply Misc Inject testosterone every two weeks 50 each 0     Syringe 22G X 1\" 3 ML Does not apply Misc Inject testosterone every 2 weeks 50 each 0     Glucose Blood (ONETOUCH VERIO) In Vitro Strip Test blood sugar twice daily. 200 strip 3     Lancets Does not apply Misc Check bid 200 each 1     Insulin Pen Needle (PEN NEEDLES) 32G X 4 MM Does not apply Misc 1 each daily. 90 each 0     meclizine 25 MG Oral Tab Take 1 tablet (25 mg total) by mouth 3 (three) times daily as needed. (Patient not taking: Reported on 1/20/2025) 30 tablet 0       Yes

## 2025-03-20 NOTE — OPERATIVE REPORT
Operative Report     Patient Name: Ran Carter    3/20/2025    Preoperative Diagnosis:     Carpal tunnel syndrome of left wrist [G56.02]    Postoperative Diagnosis:     Same as above    Surgeons and Role:     * Guanaco Villela MD - Primary     Assistant: None    Procedures:     Left endoscopic carpal tunnel release (CPT 62320)    Antibiotics: Ancef 2g    Surgical Findings: Normal Anatomy     Anesthesia: MAC + Local    Complications: None    Condition: Stable    Estimated Blood Loss: 1mL    Indications:  54 year old male with EMG/NCV confirmed left carpal tunnel syndrome that failed non-surgical management. Patient consented to an endoscopic carpal tunnel release possible open having understood the risks associated with surgery, expected outcome, time to recovery and the need for possible rehab.  Risks that were discussed but not limited to infection, nerve injury, tendon injury, artery injury, need for additional surgery, and no improvements of present symptoms.      Procedure:  Patient was met in the preoperative holding area where consent was verified, laterality was marked with the surgeon's initials, and the H&P was updated. Local anesthetic was injected. Patient was brought to the operating room on a transport cart. Patient was then transferred onto the operating room table and placed in supine position with an arm table and with bony prominences well-padded.  SCDs were placed.  Antibiotics were fully infused. An upper arm tourniquet was placed and the limb was exsanguinated using Esmarch bandage. The arm was then prepped and draped in the usual sterile fashion. A surgical timeout was performed.Tourniquet was raised to 250mmHg.    A transverse incision through the dermis was made 5mm proximal to the distal volar wrist crease just ulnar to the palmaris longus using a 15 blade.  Kristineon's and Zhou scissors was used to dissect through the subcutaneous tissue onto the antebrachial fascia.  A  distally based 1 cm wide rectangular flap was elevated using a Carroll blade.  The flap was retracted distally and volarly allowing access to the carpal tunnel.  The undersurface of the transverse carpal ligament was accessed, cleaned, and dilated.  After assessing the width of the ligament, the microaire endoscopic apparatus with camera was inserted into the carpal tunnel.  Under camera magnification with direct visualization of the undersurface of the transverse carpal ligament, the blade was engaged at the distal extents of the ligament and the release of the ligament was carried out distal to proximal.  I verified the release with the endoscopy camera noting divergent edges of the transverse carpal ligament.  I also physically verified with a tenosynovium elevator complete release of the transverse carpal ligament.      The tourniquet was then lowered and bipolar cautery was used to achieve hemostasis.       Closure:  The incision was closed using 5-0 Monocryl in a buried simple interrupted fashion.  Skin glue and Steri-Strips were placed.     Dressing/Splint:  Tegaderm with a pad was applied over the endoscopic carpal tunnel incision.     Post Operative:  Patient was woken up from anesthesia and taken to PACU for further recovery and discharge home.    Guanaco Villela MD   Hand, Wrist, & Elbow Surgery  [email or web address]  t: [phone]  f: [phone]

## 2025-03-20 NOTE — DISCHARGE INSTRUCTIONS
What to expect after a carpal tunnel release     Immediately after your surgery, keep your hand elevated (fingers up pointing to ceiling) as much as possible for the first 7 days. Icing is good too, but not as important as elevation. It is normal for your fingers to swell and have discoloration (bruising) appear a couple days after surgery into your fingers or elbow. You should begin using your hand for light activities - writing, typing, dressing and feeding yourself immediately. Move your fingers and make a fist ten times every hour while awake. Refrain from lifting greater than 2 pounds for the first 2 weeks after your surgery to allow the surgical site to heal completely.    Patient’s pain level is variable after this procedure most patients average a 2-3 out of 10 on a pain scale.. After the numbness wears off you can take pain medication as needed, including an anti-inflammatory (i.e. Aleve, Motrin, ibuprofen) as long as you don’t have any contraindications to taking these.    Keep the clear Tegaderm dressing on for 5 days. You can get it wet right away when washing hands or showering. You can continue to let it get wet after the clear dressing is removed. There are steri strips underneath that can get wet and will fall off on their own. Please refrain from soaking your hand in a bath or dishwater for 3 weeks following your surgery.    You will see Pamela Renner PA-C or Dr Villela at your post op visit approximately 7-14 days after surgery.  An appointment was made for you but if you do not have an appointment or that time does not work please call the office     If you have any questions or concerns please reach out. Our office number is: [phone].

## 2025-03-20 NOTE — ANESTHESIA POSTPROCEDURE EVALUATION
[facility]    Ran Carter Patient Status:  Hospital Outpatient Surgery   Age/Gender 54 year old male MRN [id number]   Location [facility] PERIOPERATIVE SERVICE Attending Guanaco Villela MD   Hosp Day # 0 PCP Todd Rivas MD       Anesthesia Post-op Note    Left endoscopic carpal tunnel release    Procedure Summary       Date: 03/20/25 Room / Location:  MAIN OR  /  MAIN OR    Anesthesia Start: 0727 Anesthesia Stop: 0801    Procedure: Left endoscopic carpal tunnel release (Left: Wrist) Diagnosis:       Scapholunate ligament injury with no instability, right, initial encounter      Carpal tunnel syndrome of right wrist      (Scapholunate ligament injury with no instability, right, initial encounter [S69.91XA]Carpal tunnel syndrome of right wrist [G56.01])    Surgeons: Guanaco Villela MD Anesthesiologist: Jah Pollard MD    Anesthesia Type: MAC ASA Status: 3            Anesthesia Type: MAC    Vitals Value Taken Time   /83 03/20/25 0815   Temp 97.4 °F (36.3 °C) 03/20/25 0800   Pulse 79 03/20/25 0816   Resp 18 03/20/25 0800   SpO2 97 % 03/20/25 0816   Vitals shown include unfiled device data.        Patient Location: Same Day Surgery    Anesthesia Type: MAC    Airway Patency: patent    Postop Pain Control: adequate    Mental Status: preanesthetic baseline    Nausea/Vomiting: none    Cardiopulmonary/Hydration status: stable euvolemic    Complications: no apparent anesthesia related complications    Postop vital signs: stable    Dental Exam: Unchanged from Preop    Patient to be discharged from PACU when criteria met.

## 2025-03-20 NOTE — H&P
Clinic Note     Assessment/Plan:  54 year old male    Right wrist scapholunate ligament tear-there is significant widening suggestive of secondary ligament injuries along radiolunate ligament, dorsal intercarpal ligament, and dorsal radiocarpal ligament tears.  A reconstruction likely with ANAFAB would be the best possible surgical solution for his problem.  We could concurrently do a carpal tunnel release as well.  We will defer scapholunate ligament reconstruction until October while his cervical spine issue is addressed however we did get the okay by his neurosurgeon Dr. Brandon to proceed with surgery with precautions.  Left Carpal Tunnel Syndrome-EMG/NCV confirmed-surgical nonsurgical treatment options were reviewed the patient.  Patient like to proceed with surgery on March 20, 2025 with sedation.  CTR Consent: Non-surgical and surgical treatment options where reviewed with the patient. Patient elected to proceed with surgical decompression of median nerve in the carpal tunnel. Patient consented to surgery after having understood the risks associated with surgery, expected outcomes, time to recovery and the possible need for therapy post-operatively. Risks include but not limited to: infection, wound complication, nerve injury resulting in temporary or permanent nerve damage, finger/hand stiffness, persistent pain/symptoms, swelling, CRPS, recurrence of carpal tunnel syndrome, and need for additional surgery.  Right Carpal Tunnel Syndrome-EMG/NCV confirmed-surgical nonsurgical treatment options were reviewed the patient.  Patient will proceed with a carpal tunnel release once adequately recovered from the left side if his symptoms are significant enough and concurrently with scapholunate reconstruction  Left Cubital Tunnel Syndrome-EMG/NCV negative-symptoms and exam findings more consistent with carpal tunnel syndrome  Right Cubital Tunnel Syndrome-EMG/NCV negative-symptoms and exam findings were consistent  with carpal tunnel syndrome  H/o cervical spine fusion surgery.  Left wrist FCR tendonitis-status post 1 corticosteroid injection with minimal symptoms today  Chondrocalcinosis of the left wrist is noted TFCC-status post 1 corticosteroid injection with minimal symptoms today    Follow Up: Surgical date    Diagnostic Studies:     XR Left wrist 3 views: No fractures, dislocations, or osseous abnormalities.  There is calcification of the TFC.  Mild degenerative changes of the thumb CMC joint.    MRI left wrist: Fluid around the FCR tendon without tendinosis is noted.  Subchondral cystic changes of the ulnar aspect of the lunate.  Degenerative changes of the TFC without peripheral tear noted.    MRI right wrist:Full-thickness tear of the scapholunate ligament with widening of the scapholunate interval.  No evidence of SLAC wrist seen at this point.      Contusion within the distal pole of the scaphoid without acute fracture.      Dorsal capsular wrist sprain.     EMG/NCV: There is electrodiagnostic evidence to support a bilateral median mononeuropathy at or distal to the wrist (carpal tunnel syndrome) electrically mild to moderate with active denervation changes noted on needle EMG on the right side.      Physical Exam:     BP (!) 161/98 (BP Location: Right arm)   Pulse 90   Temp 98 °F (36.7 °C) (Temporal)   Resp 18   Ht 5' 11\" (1.803 m)   Wt 265 lb (120.2 kg)   SpO2 98%   BMI 36.96 kg/m²     Constitutional: NAD. AOx3. Well-developed and Well-nourished.   Psychiatric: Normal mood/ affect/ behavior. Judgment and thought content normal.     Left Upper Extremity:     Inspection    Skin intact. No skin lesions. No obvious mass visualized.     Swelling of most focally over the SL interval Palpation    Minimal tenderness to palpation of the ulnar fovea and FCR tendon  Tenderness palpation over the SL interval        ROM    Full composite fist.  Wrist, finger and elbow motion is normal.     Neurovascular    Normal  sensation in the radial nerve distribution and abnormal within median & ulnar nerve distrubution.     (+) Phdurkan on both sides, (-) Elbow flexion test, No weakness GUILLAUME or APB bilaterally.    L thumb 20%, IF10%, MF20% RF20% SF20% decreased   R thumb 20%, IF20%, MF20% RF20% SF20% decreased   Right side is worsened to 50% decreased     No dorsal ulnar sensory nerve sensory abnormalities.    Normally perfused hand(s).     Special    No pain with resisted wrist flexion at FCR. No DRUJ instability          CC: Left wrist pain    HPI: This 54 year old RHD male presents with left wrist pain. He mentions numbness and tingling sensation predominantly during the nighttime. He complains the bone often pops out. He had a EMG/NCV done in 2022 by .     Onset: 11/23  Pain Character: Sharp  Pain Level: Moderate  Pain @ Night: No    Treatments Tried: Occupational therapy    Interval Hx (1/29/2025): Patient continues to have some tingling hands.  Right wrist pain has improved.  Pain along the left FCR and ulnar fovea is also improved.    Occupation: Self-employed    History/Other:   Past Medical History:    Back problem    Chronic neck pain    Contusion of cervical cord (HCC)    Diabetes (HCC)    High blood pressure    Hyperlipidemia    Migraines    Went to emergency care clinic and suggested to get an MRI done because I've been dealing with a bad migraine for over a week straight today being a 10 from 1-10 10,being the worse    Primary hypertension    Problems with swallowing    Sleep apnea    unable to use device    Visual impairment    readers     Past Surgical History:   Procedure Laterality Date    Colonoscopy N/A 02/07/2024    Procedure: COLONOSCOPY;  Surgeon: Amy Means MD;  Location: [facility] ENDOSCOPY    Ct cervical spine      c3 & 4     Hernia surgery  2005    hiatal hernia    Knee surgery  2008    RIGHT ACL REPAIR    Spine surgery procedure unlisted  2018    cervical w hardware     No current outpatient medications  on file.     No Known Allergies  Family History   Problem Relation Age of Onset    No Known Problems Mother     No Known Problems Sister     No Known Problems Brother     Heart Disorder Paternal Grandmother     Diabetes Paternal Grandmother     Cancer Paternal Grandmother     Glaucoma Neg     Macular degeneration Neg      Social History     Occupational History    Not on file   Tobacco Use    Smoking status: Never     Passive exposure: Past    Smokeless tobacco: Never   Vaping Use    Vaping status: Never Used   Substance and Sexual Activity    Alcohol use: Not Currently     Alcohol/week: 1.0 standard drink of alcohol     Comment: Did social    Drug use: Never    Sexual activity: Not on file      Review of Systems (negative unless bolded):  General: fevers, chills, fatigue  CV:  chest pain, palpitations, leg swelling  Msk: bodyaches, neck pain, neck stiffness  Skin: rashes, open wounds, nonhealing ulcers  Hem: bleeds easily, bruise easily, immunocompromised  Neuro: dizziness, light headedness, headaches  Psych: anxious, depressed, anger issues    Guanaco Villela MD   Hand, Wrist, & Elbow Surgery  [email or web address]  t: [phone]  f: [phone]

## 2025-03-22 DIAGNOSIS — E11.69 TYPE 2 DIABETES MELLITUS WITH OTHER SPECIFIED COMPLICATION, UNSPECIFIED WHETHER LONG TERM INSULIN USE (HCC): ICD-10-CM

## 2025-03-23 LAB — SALIVARY CORTISOL MS: 0.01 UG/DL

## 2025-03-23 RX ORDER — SEMAGLUTIDE 1.34 MG/ML
1 INJECTION, SOLUTION SUBCUTANEOUS WEEKLY
Qty: 9 ML | Refills: 1 | Status: SHIPPED | OUTPATIENT
Start: 2025-03-23

## 2025-03-24 DIAGNOSIS — M54.16 LUMBAR RADICULOPATHY: ICD-10-CM

## 2025-03-24 NOTE — TELEPHONE ENCOUNTER
Refill Request    Medication request: HYDROcodone-acetaminophen  MG Oral Tab  Take 1 tablet by mouth every 4-6 hours PRN pain (max 5 tabs in 24 hours).     LOV:3/6/2025 Juan A Mcgee MD   Due back to clinic per last office note:  \"The patient will follow up in 3-4 months, but the patient will call me 2 weeks after having the injection to let me know how the injection worked. \"  NOV: 4/4/2025 Juan A Mcgee MD - injection, 6/26/25-NOV     [place]: 3/2/25 #150 - 30 day supply    Urine drug screen (if applicable): 11/2/23  Pain contract: Valid until 1/24/26    LOV plan (if weaning or changing medications): Per  at last office visit: \"He will continue with the Norco 10/325 4-5 per day for the pain. \"

## 2025-03-25 RX ORDER — HYDROCODONE BITARTRATE AND ACETAMINOPHEN 10; 325 MG/1; MG/1
TABLET ORAL
Qty: 150 TABLET | Refills: 0 | Status: SHIPPED | OUTPATIENT
Start: 2025-04-01

## 2025-03-28 ENCOUNTER — OFFICE VISIT (OUTPATIENT)
Dept: ORTHOPEDICS CLINIC | Facility: CLINIC | Age: 55
End: 2025-03-28
Payer: MEDICAID

## 2025-03-28 VITALS — WEIGHT: 265 LBS | HEIGHT: 71 IN | BODY MASS INDEX: 37.1 KG/M2

## 2025-03-28 DIAGNOSIS — G56.02 CARPAL TUNNEL SYNDROME OF LEFT WRIST: Primary | ICD-10-CM

## 2025-03-28 PROCEDURE — 99024 POSTOP FOLLOW-UP VISIT: CPT | Performed by: ORTHOPAEDIC SURGERY

## 2025-03-28 NOTE — PROGRESS NOTES
Clinic Note     Assessment/Plan:  54 year old male    Right wrist scapholunate ligament tear-there is significant widening suggestive of secondary ligament injuries along radiolunate ligament, dorsal intercarpal ligament, and dorsal radiocarpal ligament tears.  A reconstruction likely with ANAFAB would be the best possible surgical solution for his problem.  We could concurrently do a carpal tunnel release as well.  We will defer scapholunate ligament reconstruction until October while his cervical spine issue is addressed however we did get the okay by his neurosurgeon Dr. Brandon to proceed with surgery with precautions.  Status post left endoscopic carpal tunnel release on 3/20/2025-improvement in preoperative symptoms are noted.  He does have decreased numbness in the tips of his fingers.  Right Carpal Tunnel Syndrome-EMG/NCV confirmed-surgical nonsurgical treatment options were reviewed the patient.  Patient will proceed with a carpal tunnel release once adequately recovered from the left side if his symptoms are significant enough and concurrently with scapholunate reconstruction  Left Cubital Tunnel Syndrome-EMG/NCV negative-symptoms and exam findings more consistent with carpal tunnel syndrome  Right Cubital Tunnel Syndrome-EMG/NCV negative-symptoms and exam findings were consistent with carpal tunnel syndrome  H/o cervical spine fusion surgery.  Left wrist FCR tendonitis-status post 1 corticosteroid injection with minimal symptoms today  Chondrocalcinosis of the left wrist is noted TFCC-status post 1 corticosteroid injection with minimal symptoms today    Follow Up: 6 weeks    Diagnostic Studies:     XR Left wrist 3 views: No fractures, dislocations, or osseous abnormalities.  There is calcification of the TFC.  Mild degenerative changes of the thumb CMC joint.    MRI left wrist: Fluid around the FCR tendon without tendinosis is noted.  Subchondral cystic changes of the ulnar aspect of the lunate.   Degenerative changes of the TFC without peripheral tear noted.    MRI right wrist:Full-thickness tear of the scapholunate ligament with widening of the scapholunate interval.  No evidence of SLAC wrist seen at this point.      Contusion within the distal pole of the scaphoid without acute fracture.      Dorsal capsular wrist sprain.     EMG/NCV: There is electrodiagnostic evidence to support a bilateral median mononeuropathy at or distal to the wrist (carpal tunnel syndrome) electrically mild to moderate with active denervation changes noted on needle EMG on the right side.      Physical Exam:     Ht 5' 11\" (1.803 m)   Wt 265 lb (120.2 kg)   BMI 36.96 kg/m²     Constitutional: NAD. AOx3. Well-developed and Well-nourished.   Psychiatric: Normal mood/ affect/ behavior. Judgment and thought content normal.     Bilateral Upper Extremity:     Inspection    Incision is healing well without signs of infection    Trace swelling of most focally over the SL interval over the right side Palpation    Minimal tenderness to palpation of the ulnar fovea and FCR tendon  Tenderness palpation over the SL interval        ROM    Full composite fist.  Wrist, finger and elbow motion is normal.     Neurovascular    Normal sensation in the radial nerve distribution and abnormal within median & ulnar nerve distrubution.     (+) Phdurkan on both sides, (-) Elbow flexion test, No weakness GUILLAUME or APB bilaterally.    L thumb 20%, IF10%, MF20% RF20% SF20% decreased-postoperatively slightly improved  R thumb 20%, IF20%, MF20% RF20% SF20% decreased   Right side is worsened to 50% decreased     No dorsal ulnar sensory nerve sensory abnormalities.    Normally perfused hand(s).     Special    No pain with resisted wrist flexion at FCR. No DRUJ instability          CC: Left wrist pain    HPI: This 54 year old RHD male presents with left wrist pain. He mentions numbness and tingling sensation predominantly during the nighttime. He complains the bone  often pops out. He had a EMG/NCV done in 2022 by .     Onset: 11/23  Pain Character: Sharp  Pain Level: Moderate  Pain @ Night: No    Treatments Tried: Occupational therapy    Interval Hx (1/29/2025): Patient continues to have some tingling hands.  Right wrist pain has improved.  Pain along the left FCR and ulnar fovea is also improved.    Interval Hx (3/28/2025): Patient reports improvement in the numbness compared to preop but there is some residual symptoms still in the thumb index middle and ring finger.    Occupation: Self-employed    History/Other:   Past Medical History:    Back problem    Chronic neck pain    Contusion of cervical cord (HCC)    Diabetes (HCC)    High blood pressure    Hyperlipidemia    Migraines    Went to emergency care clinic and suggested to get an MRI done because I've been dealing with a bad migraine for over a week straight today being a 10 from 1-10 10,being the worse    Primary hypertension    Problems with swallowing    Sleep apnea    unable to use device    Visual impairment    readers     Past Surgical History:   Procedure Laterality Date    Colonoscopy N/A 02/07/2024    Procedure: COLONOSCOPY;  Surgeon: Amy Means MD;  Location: [facility] ENDOSCOPY    Ct cervical spine      c3 & 4     Hernia surgery  2005    hiatal hernia    Knee surgery  2008    RIGHT ACL REPAIR    Spine surgery procedure unlisted  2018    cervical w hardware     Current Outpatient Medications   Medication Sig Dispense Refill    [START ON 4/1/2025] HYDROcodone-acetaminophen  MG Oral Tab Take 1 tablet by mouth every 4-6 hours PRN pain (max 5 tabs in 24 hours). 150 tablet 0    semaglutide (OZEMPIC, 1 MG/DOSE,) 4 MG/3ML Subcutaneous Solution Pen-injector Inject 1 mg into the skin once a week. 9 mL 1    traMADol 50 MG Oral Tab Take 1 tablet (50 mg total) by mouth every 6 (six) hours as needed for Pain. 5 tablet 0    pantoprazole 40 MG Oral Tab EC Take 1 tablet (40 mg total) by mouth 2 (two) times daily  before meals. 180 tablet 1    Tadalafil 10 MG Oral Tab Take 1 tablet (10 mg total) by mouth daily as needed for Erectile Dysfunction. 30 tablet 0    methocarbamol 500 MG Oral Tab Take 1 tablet (500 mg total) by mouth 3 (three) times daily as needed. 60 tablet 0    rosuvastatin 5 MG Oral Tab Take 1 tablet (5 mg total) by mouth nightly. 90 tablet 1    Insulin Pen Needle (PEN NEEDLES) 32G X 4 MM Does not apply Misc 1 each daily. 90 each 0    Blood Pressure Monitoring (BLOOD PRESSURE KIT) Does not apply Device 1 each daily. 1 each 0    nystatin-triamcinolone 100,000-0.1 Units/g-% External Ointment Apply twice daily  Monday-Friday to affected areas of rash. 60 g 3    pantoprazole 40 MG Oral Tab EC Take 1 tablet (40 mg total) by mouth every morning before breakfast. 90 tablet 3    Syringe/Needle, Disp, 27G X 1/2\" 1 ML Does not apply Misc Inject testosterone every two weeks 50 each 0    Syringe 22G X 1\" 3 ML Does not apply Misc Inject testosterone every 2 weeks 50 each 0    Glucose Blood (ONETOUCH VERIO) In Vitro Strip Test blood sugar twice daily. 200 strip 3    Lancets Does not apply Misc Check bid 200 each 1    Insulin Pen Needle (PEN NEEDLES) 32G X 4 MM Does not apply Misc 1 each daily. 90 each 0    multiple vitamin Oral Chew Tab Chew 1 tablet by mouth daily.      Meloxicam 15 MG Oral Tab Take 1 tablet (15 mg total) by mouth daily as needed for Pain. (Patient not taking: Reported on 3/28/2025) 5 tablet 0    meclizine 25 MG Oral Tab Take 1 tablet (25 mg total) by mouth 3 (three) times daily as needed. (Patient not taking: Reported on 3/28/2025) 30 tablet 0     No Known Allergies  Family History   Problem Relation Age of Onset    No Known Problems Mother     No Known Problems Sister     No Known Problems Brother     Heart Disorder Paternal Grandmother     Diabetes Paternal Grandmother     Cancer Paternal Grandmother     Glaucoma Neg     Macular degeneration Neg      Social History     Occupational History    Not on file    Tobacco Use    Smoking status: Never     Passive exposure: Past    Smokeless tobacco: Never   Vaping Use    Vaping status: Never Used   Substance and Sexual Activity    Alcohol use: Not Currently     Alcohol/week: 1.0 standard drink of alcohol     Comment: Did social    Drug use: Never    Sexual activity: Not on file      Review of Systems (negative unless bolded):  General: fevers, chills, fatigue  CV:  chest pain, palpitations, leg swelling  Msk: bodyaches, neck pain, neck stiffness  Skin: rashes, open wounds, nonhealing ulcers  Hem: bleeds easily, bruise easily, immunocompromised  Neuro: dizziness, light headedness, headaches  Psych: anxious, depressed, anger issues    Guanaco Villela MD   Hand, Wrist, & Elbow Surgery  [email or web address]  t: [phone]  f: [phone]

## 2025-04-01 RX ORDER — LOSARTAN POTASSIUM 100 MG/1
TABLET ORAL
COMMUNITY
Start: 2025-03-26 | End: 2025-04-01

## 2025-04-01 RX ORDER — LOSARTAN POTASSIUM 100 MG/1
100 TABLET ORAL DAILY
Qty: 90 TABLET | Refills: 0 | Status: SHIPPED | OUTPATIENT
Start: 2025-04-01

## 2025-04-01 NOTE — TELEPHONE ENCOUNTER
Received a fax from pharmacy requesting a refill on this medication:   LOSARTAN POTASSIUM 100MG     Future Appt. 05/07/2025 Evelyn    Last Visit: 12/10/2024 SHERI Ch     Medication Requested:  Requested Prescriptions     Pending Prescriptions Disp Refills    losartan 100 MG Oral Tab 90 tablet 0     Sig: Take 1 tablet (100 mg total) by mouth daily.      Last refill:      Disp Refills Start End     losartan 100 MG Oral Tab 90 tablet 0 12/10/2024 3/10/2025    Sig - Route: Take 1 tablet (100 mg total) by mouth daily. - Oral        Hypertension Medications Protocol [id number]/01/2025 11:07 AM   Protocol Details Medication is active on med list    CMP or BMP in past 12 months    Last BP reading less than 140/90    In person appointment or virtual visit in the past 12 mos or appointment in next 3 mos    EGFRCR or GFRNAA > 50

## 2025-04-03 NOTE — DISCHARGE INSTRUCTIONS
HOME INSTRUCTIONS  FREDDY LEIGH MD  POST-OPERATIVE GENERAL INSTRUCTIONS      POST PROCEDURE CARE AND INFECTION PREVENTION:  1.  Your dressing may be removed in 24 hours.  If it falls off before that time, you need not reapply.  2.  Call your Doctor for UNUSUAL conditions such as: bleeding, increased pain, severe headache, or a temperature greater than 101 degrees.  If you experience any increasing numbness or tingling or have any changes in your bowel or bladder habits, please notify the office.  3.  Ice the injection site for 15 minutes every hour during the day of your injection.  4.  You may shower tomorrow, but no tub baths or hot tubs for 2 days.  5.  If you have any questions please call your Doctor's office.  The office phone number is [phone].  6.  Please call to make an appointment for your follow-up visit as instructed by your Doctor.  7.  You may return to previous activities tomorrow.  8.  You may return to school or work per your Doctor's instructions.  9.  Wash your hands before and after touching your dressing.  Be sure to rub your hands together with warm water and soap for 20 seconds or longer when washing.      MEDICATION:  1.  If you take any anti-inflammatory medications, you may resume taking them in 24 hours.  2.  If you take Metformin, you may resume taking it in 2 days.  3.  You may resume all other usual medications unless instructed otherwise by your Doctor.  4.  See Medication Reconciliation Form for any new prescriptions.  5.  Do not drive, operate machinery or drink alcoholic beverages while taking narcotic pain medication.  Narcotic pain medication may cause constipation.  If no bowel movement in 48 hours, please contact your physician.      IN CASE OF EMERGENCY:  If you are unable to reach your Doctor, call or go to the nearest emergency room.  The [facility] Emergency Department's phone number is [phone].

## 2025-04-04 ENCOUNTER — HOSPITAL ENCOUNTER (OUTPATIENT)
Facility: HOSPITAL | Age: 55
Setting detail: HOSPITAL OUTPATIENT SURGERY
Discharge: HOME | End: 2025-04-04
Attending: PHYSICAL MEDICINE & REHABILITATION | Admitting: PHYSICAL MEDICINE & REHABILITATION
Payer: MEDICAID

## 2025-04-04 VITALS
BODY MASS INDEX: 36.4 KG/M2 | TEMPERATURE: 98 F | RESPIRATION RATE: 16 BRPM | OXYGEN SATURATION: 97 % | WEIGHT: 260 LBS | DIASTOLIC BLOOD PRESSURE: 99 MMHG | HEART RATE: 74 BPM | HEIGHT: 71 IN | SYSTOLIC BLOOD PRESSURE: 134 MMHG

## 2025-04-04 PROCEDURE — 64483 NJX AA&/STRD TFRM EPI L/S 1: CPT | Performed by: PHYSICAL MEDICINE & REHABILITATION

## 2025-04-04 PROCEDURE — 3E0R3BZ INTRODUCTION OF ANESTHETIC AGENT INTO SPINAL CANAL, PERCUTANEOUS APPROACH: ICD-10-PCS | Performed by: PHYSICAL MEDICINE & REHABILITATION

## 2025-04-04 PROCEDURE — 3E0R33Z INTRODUCTION OF ANTI-INFLAMMATORY INTO SPINAL CANAL, PERCUTANEOUS APPROACH: ICD-10-PCS | Performed by: PHYSICAL MEDICINE & REHABILITATION

## 2025-04-04 RX ORDER — TRIAMCINOLONE ACETONIDE 40 MG/ML
INJECTION, SUSPENSION INTRA-ARTICULAR; INTRAMUSCULAR AS NEEDED
Status: DISCONTINUED | OUTPATIENT
Start: 2025-04-04 | End: 2025-04-04 | Stop reason: HOSPADM

## 2025-04-04 RX ORDER — LIDOCAINE HYDROCHLORIDE 10 MG/ML
INJECTION, SOLUTION EPIDURAL; INFILTRATION; INTRACAUDAL; PERINEURAL AS NEEDED
Status: DISCONTINUED | OUTPATIENT
Start: 2025-04-04 | End: 2025-04-04 | Stop reason: HOSPADM

## 2025-04-04 RX ORDER — IOPAMIDOL 408 MG/ML
INJECTION, SOLUTION INTRATHECAL AS NEEDED
Status: DISCONTINUED | OUTPATIENT
Start: 2025-04-04 | End: 2025-04-04 | Stop reason: HOSPADM

## 2025-04-04 NOTE — OPERATIVE REPORT
[facility]    BILATERAL LUMBAR TRANSFORAMINAL   NAME:  Ran Carter    MR #:    [id number] :  1970     PHYSICIAN:  Juan A Mcgee MD        Operative Report    DATE OF PROCEDURE: 2025   PREOPERATIVE DIAGNOSES: Problem   right > left L5-S1 radiculopathy   L5-S1 right mod/left mod-large far lateral & mild central, L4-5 mod diffsue, L3-4 mild-mod diffuse & right mod foraminal, L2-3 mild diffuse & bilateral mild-mod foraminal, L1-2 mild diff bulging discs      POSTOPERATIVE DIAGNOSES:   same   PROCEDURES: Bilateral L5 transforaminal epidural steroid injections done under fluoroscopic guidance with contrast enhancement.   SURGEON: Juan A Mcgee MD   ANESTHESIA: Local   INDICATIONS:      OPERATIVE PROCEDURE:  Written consent was obtained from the patient.  The patient was brought into the operating room and placed in the prone position on the fluoroscopy table with pillow underneath her abdomen.  The patient's skin was cleaned and draped in a normal sterile fashion.  Using AP fluoroscopy, all five lumbar vertebrae were identified.  When the fifth vertebra was identified, fluoroscopy was left anterior obliqued opening up the right L5-S1 intervertebral foramen.  At this point in time, the patient's skin was anesthetized with 1% PF lidocaine without epinephrine.  Then, a 5 inch, 22-gauge spinal needle was inserted and directed towards the right L5-S1 intervertebral foramen.  When it felt to be in good position, AP fluoroscopy was used to advance the needle to the 6 o'clock position on the right L5 pedicle.  At this point in time, Omnipaque-240 contrast was used to obtain a good epidurogram indicating correct needle placement.  Then, aspiration was performed.  No blood, fluid, or air was aspirated.  Then, the patient was injected with a 3 cc solution of 1.5 cc of 40 mg/cc of Kenalog and 1.5 cc of 1% PF lidocaine without epinephrine.  After this, the needle was removed.  Then   fluoroscopy was right anterior obliqued opening up the left L5-S1 intervertebral foramen.  At this point in time, the patient's skin was anesthetized with 1 to 2 cc of 1% PF lidocaine without epinephrine.  Then, a 5 inch, 22-gauge spinal needle was inserted and directed towards the left L5-S1 intervertebral foramen.  When it felt to be in good position, AP fluoroscopy was used to advance the needle to the 6 o'clock position on the left L5 pedicle.  At this point in time, Omnipaque-240 contrast was used to obtain a good epidurogram indicating correct needle placement.  Then, aspiration was performed.  No blood, fluid, or air was aspirated.  Then, the patient was injected with a 3 cc solution of 1.5 cc of 40 mg/cc of Kenalog and 1.5 cc of 1% PF lidocaine without epinephrine.  After this, the needle was removed.  The patient's skin was cleaned.  Band-Aids were applied.  The patient was transferred to the cart and into [facility].  The patient was given discharge instructions and will follow up in the clinic as scheduled.  Throughout the whole procedure, the patient's pulse oximetry and vital signs were monitored and they remained completely stable.  Also, throughout the whole procedure, prior to injection of any medication, aspiration was performed.  No blood, fluid, or air was aspirated at anytime.

## 2025-04-04 NOTE — INTERVAL H&P NOTE
Pre-op Diagnosis: Lumbar radiculopathy [M54.16]    The above referenced H&P was reviewed by Juan A Mcgee MD on 4/4/2025, the patient was examined and no significant changes have occurred in the patient's condition since the H&P was performed.  I discussed with the patient and/or legal representative the potential benefits, risks and side effects of this procedure; the likelihood of the patient achieving goals; and potential problems that might occur during recuperation.  I discussed reasonable alternatives to the procedure, including risks, benefits and side effects related to the alternatives and risks related to not receiving this procedure.  We will proceed with procedure as planned.

## 2025-04-06 ENCOUNTER — PATIENT MESSAGE (OUTPATIENT)
Dept: ENDOCRINOLOGY CLINIC | Facility: CLINIC | Age: 55
End: 2025-04-06

## 2025-04-06 RX ORDER — ROSUVASTATIN CALCIUM 5 MG/1
5 TABLET, COATED ORAL NIGHTLY
Qty: 90 TABLET | Refills: 1 | Status: SHIPPED | OUTPATIENT
Start: 2025-04-06

## 2025-04-09 ENCOUNTER — OFFICE VISIT (OUTPATIENT)
Dept: SURGERY | Facility: CLINIC | Age: 55
End: 2025-04-09
Payer: MEDICAID

## 2025-04-09 VITALS
SYSTOLIC BLOOD PRESSURE: 123 MMHG | HEART RATE: 83 BPM | BODY MASS INDEX: 36.4 KG/M2 | WEIGHT: 260 LBS | DIASTOLIC BLOOD PRESSURE: 78 MMHG | HEIGHT: 71 IN

## 2025-04-09 DIAGNOSIS — M45.0 ANKYLOSING SPONDYLITIS OF MULTIPLE SITES IN SPINE (HCC): Primary | ICD-10-CM

## 2025-04-09 DIAGNOSIS — M06.38 RHEUMATOID PANNUS OF CERVICAL SPINE (HCC): ICD-10-CM

## 2025-04-09 PROCEDURE — 99213 OFFICE O/P EST LOW 20 MIN: CPT | Performed by: NEUROLOGICAL SURGERY

## 2025-04-09 NOTE — PROGRESS NOTES
Neurosurgery Clinic Visit  2025    Ran Carter PCP:  Todd Rivas MD    1970 MRN [id number]     HISTORY OF PRESENT ILLNESS:  Ran Carter is a(n) 54 year old malepresents to the office today for follow-up.    History of Present Illness  Patient presents today for clinical follow-up.    He continues to experience significant pain in the mid back and neck, which is influenced by the weather. The pain is described as 'really bad' and worsens with bad weather, although sometimes it is 'a little better'. The pain has remained about the same overall.    No new neck symptoms, nor symptoms related to myelopathy.    He underwent surgery on his left hand.  He notes that the surgery has resulted in some improvement, stating that his hand feels 'a little better' and that work has been 'much better' since the procedure.    PHYSICAL EXAMINATION:  Vital Signs:  /78 (BP Location: Left arm, Patient Position: Sitting, Cuff Size: adult)   Pulse 83   Ht 71\"   Wt 260 lb (117.9 kg)   BMI 36.26 kg/m²   Examination is stable.  Strength is 5/5 in both upper extremities.  Reflexes are 1+ and symmetric.  No Dalia's or clonus.      REVIEW OF STUDIES:    MRI of the cervical spine was performed 2025.  I personally reviewed these images.  I also compared this with the 2024 study.  This demonstrates stable synovial cyst at the craniocervical junction measuring approximately 10 x 8 mm.  No significant change.    ASSESSMENT and PLAN:  1.  Rheumatoid pannus    2.  Synovial cyst    We had a conversation regarding his clinical situation, as well as available treatment options.  I recommended against any surgical intervention, given that this appears to be essentially asymptomatic, and is radiographically stable.    I would like to see him back in about 6 months, with a new MRI scan of the cervical spine.  In the meantime, if he develops any new symptoms, he should follow-up with  me at once.      Time spent on counseling/coordination of care:  15 Minutes    Total time spent with patient:  15 Minutes        4/9/2025  10:05 AM     This report was dictated using voice recognition technology.  Errors in syntax or recognition may occur, and should not be construed to change the meaning of a sentence.

## 2025-04-09 NOTE — PROGRESS NOTES
Patient returns to clinic for review of updated cervical MRI that was completed on 3/9/25. Patient recently saw Dr. Mcgee for a bilateral L5 TESI performed on 4/4/25. He is feeling ok today after the VIOLETTA; states still having some pain today. Patient had carpal tunnel sx on the left hand recently and he is feeling some relief.     Pain scale: 7/10 consistently         The following individual(s) verbally consented to be recorded using ambient AI listening technology and understand that they can each withdraw their consent to this listening technology at any point by asking the clinician to turn off or pause the recording:    Patient name: Ran Asencio Emma

## 2025-04-26 DIAGNOSIS — M54.16 LUMBAR RADICULOPATHY: ICD-10-CM

## 2025-04-28 NOTE — TELEPHONE ENCOUNTER
Refill Request    Medication request: HYDROcodone-acetaminophen  MG Oral Tab     LOV:4/4/2025 Juan A Mcgee MD   Due back to clinic per last office note:  The patient will follow up in 3-4 months.  NOV: 6/26/2025 Juan A Mcgee MD      [place]: 04/01/2025 #30    Urine drug screen (if applicable): 11/03/2023  Pain contract: Signed 01/20/2025    LOV plan (if weaning or changing medications): He will continue with the Norco 10/325 4-5 per day for the pain.

## 2025-04-29 RX ORDER — HYDROCODONE BITARTRATE AND ACETAMINOPHEN 10; 325 MG/1; MG/1
TABLET ORAL
Qty: 150 TABLET | Refills: 0 | Status: SHIPPED | OUTPATIENT
Start: 2025-04-29 | End: 2025-04-30

## 2025-04-30 NOTE — TELEPHONE ENCOUNTER
[place] original prescription that was sent to [facility]'s pharmacy has not been filled and discontinued order. RN pended [place] reorder to patient's requested [facility]'s in [place]. Routed to Dr. Mcgee for signature.

## 2025-04-30 NOTE — TELEPHONE ENCOUNTER
Patient calling to inform that his medication was send to the wrong pharmacy he is requesting the medication to go to [facility]'s pharmacy instead .

## 2025-05-01 RX ORDER — HYDROCODONE BITARTRATE AND ACETAMINOPHEN 10; 325 MG/1; MG/1
TABLET ORAL
Qty: 150 TABLET | Refills: 0 | Status: SHIPPED | OUTPATIENT
Start: 2025-05-01

## 2025-05-07 ENCOUNTER — OFFICE VISIT (OUTPATIENT)
Dept: INTERNAL MEDICINE CLINIC | Facility: CLINIC | Age: 55
End: 2025-05-07
Payer: MEDICAID

## 2025-05-07 VITALS
BODY MASS INDEX: 37.52 KG/M2 | HEART RATE: 86 BPM | DIASTOLIC BLOOD PRESSURE: 86 MMHG | OXYGEN SATURATION: 98 % | HEIGHT: 71 IN | WEIGHT: 268 LBS | SYSTOLIC BLOOD PRESSURE: 120 MMHG

## 2025-05-07 DIAGNOSIS — R53.83 OTHER FATIGUE: ICD-10-CM

## 2025-05-07 DIAGNOSIS — E29.1 HYPOGONADISM IN MALE: ICD-10-CM

## 2025-05-07 DIAGNOSIS — R10.9 ABDOMINAL PAIN, UNSPECIFIED ABDOMINAL LOCATION: Primary | ICD-10-CM

## 2025-05-07 DIAGNOSIS — E11.9 TYPE 2 DIABETES MELLITUS WITHOUT COMPLICATION, WITHOUT LONG-TERM CURRENT USE OF INSULIN (HCC): ICD-10-CM

## 2025-05-07 DIAGNOSIS — E66.9 OBESITY (BMI 30-39.9): ICD-10-CM

## 2025-05-07 DIAGNOSIS — G47.33 OSA (OBSTRUCTIVE SLEEP APNEA): ICD-10-CM

## 2025-05-07 PROCEDURE — 99214 OFFICE O/P EST MOD 30 MIN: CPT | Performed by: INTERNAL MEDICINE

## 2025-05-07 RX ORDER — PANTOPRAZOLE SODIUM 40 MG/1
40 TABLET, DELAYED RELEASE ORAL
Qty: 90 TABLET | Refills: 3 | Status: SHIPPED | OUTPATIENT
Start: 2025-05-07 | End: 2025-08-05
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
## 2025-05-08 ENCOUNTER — TELEPHONE (OUTPATIENT)
Dept: INTERNAL MEDICINE CLINIC | Facility: CLINIC | Age: 55
End: 2025-05-08

## 2025-05-09 ENCOUNTER — LAB ENCOUNTER (OUTPATIENT)
Dept: LAB | Facility: HOSPITAL | Age: 55
End: 2025-05-09
Attending: INTERNAL MEDICINE
Payer: MEDICAID

## 2025-05-09 ENCOUNTER — OFFICE VISIT (OUTPATIENT)
Dept: ORTHOPEDICS CLINIC | Facility: CLINIC | Age: 55
End: 2025-05-09
Payer: MEDICAID

## 2025-05-09 VITALS — WEIGHT: 268 LBS | HEIGHT: 71 IN | BODY MASS INDEX: 37.52 KG/M2

## 2025-05-09 DIAGNOSIS — E11.9 TYPE 2 DIABETES MELLITUS WITHOUT COMPLICATION, WITHOUT LONG-TERM CURRENT USE OF INSULIN (HCC): ICD-10-CM

## 2025-05-09 DIAGNOSIS — R10.9 ABDOMINAL PAIN, UNSPECIFIED ABDOMINAL LOCATION: ICD-10-CM

## 2025-05-09 DIAGNOSIS — G56.02 CARPAL TUNNEL SYNDROME OF LEFT WRIST: ICD-10-CM

## 2025-05-09 DIAGNOSIS — E66.9 OBESITY (BMI 30-39.9): ICD-10-CM

## 2025-05-09 DIAGNOSIS — E29.1 HYPOGONADISM IN MALE: ICD-10-CM

## 2025-05-09 DIAGNOSIS — S69.91XA SCAPHOLUNATE LIGAMENT INJURY WITH NO INSTABILITY, RIGHT, INITIAL ENCOUNTER: Primary | ICD-10-CM

## 2025-05-09 DIAGNOSIS — G47.33 OSA (OBSTRUCTIVE SLEEP APNEA): ICD-10-CM

## 2025-05-09 LAB
ALBUMIN SERPL-MCNC: 4.7 G/DL (ref 3.2–4.8)
ALBUMIN/GLOB SERPL: 1.9 {RATIO} (ref 1–2)
ALP LIVER SERPL-CCNC: 54 U/L (ref 45–117)
ALT SERPL-CCNC: 33 U/L (ref 10–49)
ANION GAP SERPL CALC-SCNC: 6 MMOL/L (ref 0–18)
AST SERPL-CCNC: 20 U/L (ref ?–34)
BASOPHILS # BLD AUTO: 0.06 X10(3) UL (ref 0–0.2)
BASOPHILS NFR BLD AUTO: 0.6 %
BILIRUB SERPL-MCNC: 0.5 MG/DL (ref 0.3–1.2)
BUN BLD-MCNC: 13 MG/DL (ref 9–23)
BUN/CREAT SERPL: 13.4 (ref 10–20)
CALCIUM BLD-MCNC: 9.4 MG/DL (ref 8.7–10.4)
CHLORIDE SERPL-SCNC: 105 MMOL/L (ref 98–112)
CHOLEST SERPL-MCNC: 124 MG/DL (ref ?–200)
CO2 SERPL-SCNC: 26 MMOL/L (ref 21–32)
CREAT BLD-MCNC: 0.97 MG/DL (ref 0.7–1.3)
CREAT UR-SCNC: 97.3 MG/DL
DEPRECATED HBV CORE AB SER IA-ACNC: 74 NG/ML (ref 50–336)
DEPRECATED RDW RBC AUTO: 53.1 FL (ref 35.1–46.3)
EGFRCR SERPLBLD CKD-EPI 2021: 93 ML/MIN/1.73M2 (ref 60–?)
EOSINOPHIL # BLD AUTO: 0.31 X10(3) UL (ref 0–0.7)
EOSINOPHIL NFR BLD AUTO: 3.3 %
ERYTHROCYTE [DISTWIDTH] IN BLOOD BY AUTOMATED COUNT: 15.1 % (ref 11–15)
EST. AVERAGE GLUCOSE BLD GHB EST-MCNC: 111 MG/DL (ref 68–126)
FASTING PATIENT LIPID ANSWER: YES
FASTING STATUS PATIENT QL REPORTED: YES
GLOBULIN PLAS-MCNC: 2.5 G/DL (ref 2–3.5)
GLUCOSE BLD-MCNC: 94 MG/DL (ref 70–99)
HBA1C MFR BLD: 5.5 % (ref ?–5.7)
HCT VFR BLD AUTO: 47.2 % (ref 39–53)
HDLC SERPL-MCNC: 39 MG/DL (ref 40–59)
HGB BLD-MCNC: 15.3 G/DL (ref 13–17.5)
IMM GRANULOCYTES # BLD AUTO: 0.05 X10(3) UL (ref 0–1)
IMM GRANULOCYTES NFR BLD: 0.5 %
IRON SATN MFR SERPL: 22 % (ref 20–50)
IRON SERPL-MCNC: 63 UG/DL (ref 65–175)
LDLC SERPL CALC-MCNC: 68 MG/DL (ref ?–100)
LIPASE SERPL-CCNC: 51 U/L (ref 12–53)
LYMPHOCYTES # BLD AUTO: 2.07 X10(3) UL (ref 1–4)
LYMPHOCYTES NFR BLD AUTO: 22.2 %
MCH RBC QN AUTO: 30.8 PG (ref 26–34)
MCHC RBC AUTO-ENTMCNC: 32.4 G/DL (ref 31–37)
MCV RBC AUTO: 95.2 FL (ref 80–100)
MICROALBUMIN UR-MCNC: <0.3 MG/DL
MONOCYTES # BLD AUTO: 0.77 X10(3) UL (ref 0.1–1)
MONOCYTES NFR BLD AUTO: 8.2 %
NEUTROPHILS # BLD AUTO: 6.08 X10 (3) UL (ref 1.5–7.7)
NEUTROPHILS # BLD AUTO: 6.08 X10(3) UL (ref 1.5–7.7)
NEUTROPHILS NFR BLD AUTO: 65.2 %
NONHDLC SERPL-MCNC: 85 MG/DL (ref ?–130)
OSMOLALITY SERPL CALC.SUM OF ELEC: 284 MOSM/KG (ref 275–295)
PLATELET # BLD AUTO: 260 10(3)UL (ref 150–450)
POTASSIUM SERPL-SCNC: 4.5 MMOL/L (ref 3.5–5.1)
PROT SERPL-MCNC: 7.2 G/DL (ref 5.7–8.2)
RBC # BLD AUTO: 4.96 X10(6)UL (ref 4.3–5.7)
SODIUM SERPL-SCNC: 137 MMOL/L (ref 136–145)
TESTOST SERPL-MCNC: 682.75 NG/DL (ref 187.72–684.19)
TOTAL IRON BINDING CAPACITY: 286 UG/DL (ref 250–425)
TRANSFERRIN SERPL-MCNC: 229 MG/DL (ref 215–365)
TRIGL SERPL-MCNC: 87 MG/DL (ref 30–149)
TSI SER-ACNC: 1.31 UIU/ML (ref 0.55–4.78)
VIT B12 SERPL-MCNC: 582 PG/ML (ref 211–911)
VLDLC SERPL CALC-MCNC: 13 MG/DL (ref 0–30)
WBC # BLD AUTO: 9.3 X10(3) UL (ref 4–11)

## 2025-05-09 PROCEDURE — 83036 HEMOGLOBIN GLYCOSYLATED A1C: CPT

## 2025-05-09 PROCEDURE — 83540 ASSAY OF IRON: CPT

## 2025-05-09 PROCEDURE — 80053 COMPREHEN METABOLIC PANEL: CPT

## 2025-05-09 PROCEDURE — 36415 COLL VENOUS BLD VENIPUNCTURE: CPT

## 2025-05-09 PROCEDURE — 83690 ASSAY OF LIPASE: CPT

## 2025-05-09 PROCEDURE — 84466 ASSAY OF TRANSFERRIN: CPT

## 2025-05-09 PROCEDURE — 84443 ASSAY THYROID STIM HORMONE: CPT

## 2025-05-09 PROCEDURE — 82570 ASSAY OF URINE CREATININE: CPT

## 2025-05-09 PROCEDURE — 82607 VITAMIN B-12: CPT

## 2025-05-09 PROCEDURE — 80061 LIPID PANEL: CPT

## 2025-05-09 PROCEDURE — 84403 ASSAY OF TOTAL TESTOSTERONE: CPT

## 2025-05-09 PROCEDURE — 82043 UR ALBUMIN QUANTITATIVE: CPT

## 2025-05-09 PROCEDURE — 99024 POSTOP FOLLOW-UP VISIT: CPT | Performed by: ORTHOPAEDIC SURGERY

## 2025-05-09 PROCEDURE — 82728 ASSAY OF FERRITIN: CPT

## 2025-05-09 PROCEDURE — 85025 COMPLETE CBC W/AUTO DIFF WBC: CPT

## 2025-05-09 NOTE — PROGRESS NOTES
Clinic Note     Assessment/Plan:  54 year old male    Right wrist scapholunate ligament tear-there is significant widening suggestive of secondary ligament injuries along radiolunate ligament, dorsal intercarpal ligament, and dorsal radiocarpal ligament tears.  A reconstruction likely with ANAFAB would be the best possible surgical solution for his problem.  We could concurrently do a carpal tunnel release as well.  We will defer scapholunate ligament reconstruction until October while his cervical spine issue is addressed however we did get the okay by his neurosurgeon Dr. Brandon to proceed with surgery with precautions. We will proceed with surgery in late fall early winter  Status post left endoscopic carpal tunnel release on 3/20/2025- continue improvement in numbness in left hand with near normal  Right Carpal Tunnel Syndrome-EMG/NCV confirmed-surgical nonsurgical treatment options were reviewed the patient.  Patient will proceed with a carpal tunnel release once adequately recovered from the left side if his symptoms are significant enough and concurrently with scapholunate reconstruction  Left Cubital Tunnel Syndrome-EMG/NCV negative-symptoms and exam findings more consistent with carpal tunnel syndrome  Right Cubital Tunnel Syndrome-EMG/NCV negative-symptoms and exam findings were consistent with carpal tunnel syndrome  H/o cervical spine fusion surgery.  Left wrist FCR tendonitis-status post 1 corticosteroid injection with minimal symptoms today  Chondrocalcinosis of the left wrist is noted TFCC-status post 1 corticosteroid injection with minimal symptoms today    Follow Up: 3-4 months, XR right wrist prior    Diagnostic Studies:     XR Left wrist 3 views: No fractures, dislocations, or osseous abnormalities.  There is calcification of the TFC.  Mild degenerative changes of the thumb CMC joint.    MRI left wrist: Fluid around the FCR tendon without tendinosis is noted.  Subchondral cystic changes of the  ulnar aspect of the lunate.  Degenerative changes of the TFC without peripheral tear noted.    MRI right wrist:Full-thickness tear of the scapholunate ligament with widening of the scapholunate interval.  No evidence of SLAC wrist seen at this point.      Contusion within the distal pole of the scaphoid without acute fracture.      Dorsal capsular wrist sprain.     EMG/NCV: There is electrodiagnostic evidence to support a bilateral median mononeuropathy at or distal to the wrist (carpal tunnel syndrome) electrically mild to moderate with active denervation changes noted on needle EMG on the right side.      Physical Exam:     Ht 5' 11\" (1.803 m)   Wt 268 lb (121.6 kg)   BMI 37.38 kg/m²     Constitutional: NAD. AOx3. Well-developed and Well-nourished.   Psychiatric: Normal mood/ affect/ behavior. Judgment and thought content normal.     Bilateral Upper Extremity:     Inspection    Incision is healing well without signs of infection    Trace swelling of most focally over the SL interval over the right side Palpation    Minimal tenderness to palpation of the ulnar fovea and FCR tendon  Tenderness palpation over the SL interval        ROM    Full composite fist.  Wrist, finger and elbow motion is normal.     Neurovascular    Normal sensation in the radial nerve distribution and abnormal within median & ulnar nerve distrubution.     (+) Phdurkan on right sides, (-) Elbow flexion test, No weakness GUILLAUME or APB bilaterally.    L side: near normal sensation with only residual on very tips of fingers    R thumb 20%, IF20%, MF20% RF20% SF20% decreased   Right side is worsened to 50% decreased     No dorsal ulnar sensory nerve sensory abnormalities.    Normally perfused hand(s).     Special    No pain with resisted wrist flexion at FCR. No DRUJ instability          CC: Left wrist pain    HPI: This 54 year old RHD male presents with left wrist pain. He mentions numbness and tingling sensation predominantly during the nighttime.  He complains the bone often pops out. He had a EMG/NCV done in 2022 by .     Onset: 11/23  Pain Character: Sharp  Pain Level: Moderate  Pain @ Night: No    Treatments Tried: Occupational therapy    Interval Hx (1/29/2025): Patient continues to have some tingling hands.  Right wrist pain has improved.  Pain along the left FCR and ulnar fovea is also improved.    Interval Hx (3/28/2025): Patient reports improvement in the numbness compared to preop but there is some residual symptoms still in the thumb index middle and ring finger.    Interval Hx (5/9/2025): Patient with continued improvement in left hand from numbness standpoint. Notes very residual numbness at very tips of fingers    Occupation: Self-employed    History/Other:   Past Medical History:    Back problem    Chronic neck pain    Contusion of cervical cord (HCC)    Diabetes (HCC)    Esophageal reflux    High blood pressure    High cholesterol    Hyperlipidemia    Migraines    Went to emergency care clinic and suggested to get an MRI done because I've been dealing with a bad migraine for over a week straight today being a 10 from 1-10 10,being the worse    Primary hypertension    Problems with swallowing    Sleep apnea    unable to use device    Visual impairment    readers     Past Surgical History:   Procedure Laterality Date    Carpal tunnel release Left     left hand    Colonoscopy N/A 02/07/2024    Procedure: COLONOSCOPY;  Surgeon: Amy Means MD;  Location: [facility] ENDOSCOPY    Ct cervical spine      c3 & 4     Hernia surgery  2005    hiatal hernia    Knee surgery  2008    RIGHT ACL REPAIR    Spine surgery procedure unlisted  2018    cervical w hardware     Current Outpatient Medications   Medication Sig Dispense Refill    semaglutide 8 MG/3ML Subcutaneous Solution Pen-injector Inject 2 mg into the skin once a week. 9 mL 1    pantoprazole 40 MG Oral Tab EC Take 1 tablet (40 mg total) by mouth every morning before breakfast. 90 tablet 3     HYDROcodone-acetaminophen  MG Oral Tab Take 1 tablet by mouth every 4-6 hours PRN pain (max 5 tabs in 24 hours). 150 tablet 0    ROSUVASTATIN 5 MG Oral Tab TAKE ONE TABLET BY MOUTH ONCE NIGHTLY 90 tablet 1    losartan 100 MG Oral Tab Take 1 tablet (100 mg total) by mouth daily. 90 tablet 0    semaglutide (OZEMPIC, 1 MG/DOSE,) 4 MG/3ML Subcutaneous Solution Pen-injector Inject 1 mg into the skin once a week. 9 mL 1    Tadalafil 10 MG Oral Tab Take 1 tablet (10 mg total) by mouth daily as needed for Erectile Dysfunction. (Patient not taking: Reported on 5/9/2025) 30 tablet 0    Insulin Pen Needle (PEN NEEDLES) 32G X 4 MM Does not apply Misc 1 each daily. 90 each 0    Blood Pressure Monitoring (BLOOD PRESSURE KIT) Does not apply Device 1 each daily. 1 each 0    pantoprazole 40 MG Oral Tab EC Take 1 tablet (40 mg total) by mouth every morning before breakfast. 90 tablet 3    Syringe/Needle, Disp, 27G X 1/2\" 1 ML Does not apply Misc Inject testosterone every two weeks 50 each 0    Syringe 22G X 1\" 3 ML Does not apply Misc Inject testosterone every 2 weeks 50 each 0    Glucose Blood (ONETOUCH VERIO) In Vitro Strip Test blood sugar twice daily. 200 strip 3    Lancets Does not apply Misc Check bid 200 each 1    Insulin Pen Needle (PEN NEEDLES) 32G X 4 MM Does not apply Misc 1 each daily. 90 each 0    multiple vitamin Oral Chew Tab Chew 1 tablet by mouth daily.       No Known Allergies  Family History   Problem Relation Age of Onset    No Known Problems Mother     No Known Problems Sister     No Known Problems Brother     Heart Disorder Paternal Grandmother     Diabetes Paternal Grandmother     Cancer Paternal Grandmother     Glaucoma Neg     Macular degeneration Neg      Social History     Occupational History    Not on file   Tobacco Use    Smoking status: Never     Passive exposure: Past    Smokeless tobacco: Never   Vaping Use    Vaping status: Never Used   Substance and Sexual Activity    Alcohol use: Not  Currently     Alcohol/week: 1.0 standard drink of alcohol     Comment: Did social    Drug use: Never    Sexual activity: Not on file      Review of Systems (negative unless bolded):  General: fevers, chills, fatigue  CV:  chest pain, palpitations, leg swelling  Msk: bodyaches, neck pain, neck stiffness  Skin: rashes, open wounds, nonhealing ulcers  Hem: bleeds easily, bruise easily, immunocompromised  Neuro: dizziness, light headedness, headaches  Psych: anxious, depressed, anger issues    Guanaco Villela MD   Hand, Wrist, & Elbow Surgery  [email or web address]  t: [phone]  f: [phone]

## 2025-05-09 NOTE — TELEPHONE ENCOUNTER
Can you please advised the patient to complete the blood work so we can submit as much information as we can

## 2025-05-12 NOTE — TELEPHONE ENCOUNTER
Spoke to Dr. Rivas on why increasing Ozempic 4mg and patient is seeing Endo for this medication. Also, patient completed bloodwork.   Per Dr. Rivas increased Ozempic 8mg due to gaining weight.   Will call [employer] informing this information.  SALINA CHAPMAN

## 2025-05-14 ENCOUNTER — OFFICE VISIT (OUTPATIENT)
Dept: INTERNAL MEDICINE CLINIC | Facility: CLINIC | Age: 55
End: 2025-05-14
Payer: MEDICAID

## 2025-05-14 VITALS
DIASTOLIC BLOOD PRESSURE: 80 MMHG | BODY MASS INDEX: 37.33 KG/M2 | OXYGEN SATURATION: 95 % | SYSTOLIC BLOOD PRESSURE: 110 MMHG | WEIGHT: 266.63 LBS | HEIGHT: 71 IN | HEART RATE: 92 BPM

## 2025-05-14 DIAGNOSIS — E11.9 TYPE 2 DIABETES MELLITUS WITHOUT COMPLICATION, WITHOUT LONG-TERM CURRENT USE OF INSULIN (HCC): ICD-10-CM

## 2025-05-14 DIAGNOSIS — Z51.81 THERAPEUTIC DRUG MONITORING: Primary | ICD-10-CM

## 2025-05-14 DIAGNOSIS — N52.9 ERECTILE DYSFUNCTION, UNSPECIFIED ERECTILE DYSFUNCTION TYPE: ICD-10-CM

## 2025-05-14 DIAGNOSIS — E66.9 OBESITY (BMI 30-39.9): ICD-10-CM

## 2025-05-14 PROCEDURE — 99215 OFFICE O/P EST HI 40 MIN: CPT | Performed by: INTERNAL MEDICINE

## 2025-05-14 RX ORDER — TADALAFIL 10 MG/1
10 TABLET ORAL
Qty: 30 TABLET | Refills: 0 | Status: SHIPPED | OUTPATIENT
Start: 2025-05-14

## 2025-05-14 RX ORDER — PHENTERMINE HYDROCHLORIDE 15 MG/1
15 CAPSULE ORAL EVERY MORNING
Qty: 30 CAPSULE | Refills: 1 | Status: SHIPPED | OUTPATIENT
Start: 2025-05-14

## 2025-05-14 NOTE — PATIENT INSTRUCTIONS
TABBY Perdomo to [facility]. We are excited that you are committed to improving your health and have invited our practice to be part of your journey. Our approach to the medical management of weight loss is similar to that of other chronic diseases, like asthma or high blood pressure. Treatment is tailored to your needs and may look different than someone else in our program. Weight-loss success is dependent on many factors, including your motivation and commitment to better health.      We are committed to your medical safety, particularly when prescribing weight-loss medications. Because we are physicians, we measure your success not only by “pounds lost” - we will also track improvements in your laboratory work, vital signs and quality of life.      Weight loss and maintenance can be a challenging endeavor. We want to celebrate your successes and support you if you encounter difficult times. Please don’t hesitate to ask us questions and share with us any struggles you may have. For some patients, there may be many attempts at weight loss before lasting success is found, but we can help you find your success!      Sincerely,     Todd Rivas MD  American Board of Obesity Medicine Diplomate  American Board of Internal Medicine Diplomate                                                                  Weight Loss Tips    Cut down on sugar and starches.    Ok to eat healthy fat ( avocado, nuts,eggs, olive oil and coconut oil)    Eat protein with each meal target 15-30 G of protein with each meal: Protein reduces appetite, cravings and hunger. It increases muscle mass and subsequently metabolism and fat burning.     If not able to meet your protein need, you can get a protein shake. Choose one with around 25 g of protein and low carb less than 5 g ( e.g: premier protein, orgain Clean Protein, whey protein muscle milk)    Limit carbohydrates between 50g-100g / day    Limit processed food, eat  unprocessed food whenever you can.    Read nutrition labels    Drink a lot of water  at least 65 oz of water per day: Water is a natural appetite suppressant, increases calorie burning and help you to loose weight.    Eat slowly.    Do not drink your calories ( avoid soft drinks, juice, high calorie coffee drinks, limit alcohol) Also stay away from artificially sweetened beverages too it can cause weight gain.    Think about challenges and write it down to address it next visit.     Do not eat late at night.      Exercise goal for weight loss is 150 minutes per week.    Take a probiotic everyday ( e.g: germain colon health brand, culturelle, VSL3, Lactobacillus Gasseri )     Use smart phone applications to track your progress/ carb intake e.g:( my fitnesspal, loose it, Carb Manager )    Have a good night sleep aim for 7-8 hours    Reduce your stress level.    Helpful websites: www.dietBooxmedia.Roadnet( search visual low-carb guides dietdoctor), or www.Senzari.Roadnet.    Helpful exercise apps( Evertale cece)   Helpful fasting apps :( Zero)         Foods to avoid :  Sugar: sweets, ice cream, fruit juices, candy and food that is high in added sugar.  Highly processed food  Refined grains: Rice, wheat, bread, cereal and pasta.  Starchy vegetables: Potatoes and corn     Low Carb food :  Meat: lamb, steak, chicken, turkey  Fish and sea food   Eggs  Plain yogurt   Cheese   Fat and oils   Fruits: berries are the best           How I Plan to Lose Weight      Goal setting is the “how” of weight loss. Motivators are the “why.” When setting goals, utilize the SMART technique:    SMART Technique Example   Specific Who, what, where, when, how… “I want to lose 10 pounds in two months.”   Measureable How will you track? 10 pounds in 8 weeks = 1.25 pounds/week   Attainable Resources you have available, previous experience “I have been able to do this before, and now I have new tools from my doctor!”   Relevant Why this goal is important  Review your motivators above   Timely Set benchmarks and deadlines “Focusing for two month intervals works for me.”         Even if your lose 0.5 pound per week You will still lose 26                       pounds by this time next year!
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
## 2025-05-21 ENCOUNTER — OFFICE VISIT (OUTPATIENT)
Dept: PHYSICAL THERAPY | Age: 55
End: 2025-05-21
Attending: INTERNAL MEDICINE
Payer: MEDICAID

## 2025-05-21 PROCEDURE — 97110 THERAPEUTIC EXERCISES: CPT

## 2025-05-21 PROCEDURE — 97165 OT EVAL LOW COMPLEX 30 MIN: CPT
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
## 2025-05-22 ENCOUNTER — APPOINTMENT (OUTPATIENT)
Dept: PHYSICAL THERAPY | Age: 55
End: 2025-05-22
Attending: ORTHOPAEDIC SURGERY
Payer: MEDICAID

## 2025-05-27 DIAGNOSIS — M54.16 LUMBAR RADICULOPATHY: ICD-10-CM

## 2025-05-28 NOTE — TELEPHONE ENCOUNTER
Refill Request    Medication request: HYDROcodone-acetaminophen  MG Oral Tab Take 1 tablet by mouth every 4-6 hours PRN pain (max 5 tabs in 24 hours).     LOV:4/4/2025-injection, 3/6/25- LOV Juan A Mcgee MD   Due back to clinic per last office note:  \"The patient will follow up in 3-4 months, but the patient will call me 2 weeks after having the injection to let me know how the injection worked. \"  NOV: 6/26/2025 Juan A Mcgee MD      [facility]/Last refill: 5/1/25 #150 - 30 day supply    Urine drug screen (if applicable): none  Pain contract: Valid until 1/24/26    LOV plan (if weaning or changing medications): Per  at last office visit: \"He will continue with the Norco 10/325 4-5 per day for the pain \"

## 2025-05-30 RX ORDER — HYDROCODONE BITARTRATE AND ACETAMINOPHEN 10; 325 MG/1; MG/1
TABLET ORAL
Qty: 150 TABLET | Refills: 0 | Status: SHIPPED | OUTPATIENT
Start: 2025-05-31

## 2025-06-03 ENCOUNTER — OFFICE VISIT (OUTPATIENT)
Dept: PHYSICAL THERAPY | Age: 55
End: 2025-06-03
Attending: ORTHOPAEDIC SURGERY
Payer: MEDICAID

## 2025-06-03 PROCEDURE — 97140 MANUAL THERAPY 1/> REGIONS: CPT

## 2025-06-03 PROCEDURE — 97110 THERAPEUTIC EXERCISES: CPT

## 2025-06-03 NOTE — PROGRESS NOTES
Patient: Ran Carter (54 year old, male) Referring Provider:  Insurance:   Diagnosis: Status post left endoscopic carpal tunnel release [place]  [employer]   Date of Surgery: 3/20/2025 Next MD visit:  N/A   Precautions:  None 8/27/2025 Referral Information:   Date of Injury: No data recorded Date of Evaluation: Req: 12, Auth: 12, Exp: 7/20/2025 05/21/25 POC Auth Visits:  12       Today's Date   6/3/2025    Subjective  Pt states that he has been doing the exercises.       Pain: 3/10     Objective  See tx log  for details.           Assessment  Pt not performing HEP as instructed as he lost handouts. Re-issued handouts to pt. Mild tighness located in hand. Intiated gentle strenghening exercises. Pt unable to perform flexbar strenghening due to pain in (R) shoulder. Pt able to complete wrist PRE's with dumbells.    Goals (to be met in 8 visits)   Increase (L)  by 4-5 lbs for pt to be able to lift/carry groceries.  Increase (L) lateral pinch for pt to be able to tie shoes.  Increase (L) 3pt  pinch for pt to be able to open water bottles.   Increase (L) 2 pt  pinch for pt to be able to open packages.   Patient will be independent in final HEP to facilitate carryover of functional gains made in clinical therapy program.        Plan  Patient will be seen 2 x week for 4-6 weeksx/week or a total of 8 visits over a 90 day period.    Treatment Last 4 Visits        5/21/2025 6/3/2025   OT Treatment   Treatment Day  2   Therapeutic Exercise AROM with fluidotherapy x 10\"  AROM with fluidotherapy x 10\"  Powerweb stretches x 3 min  Digiflex (red) x 3 min  Sponges with gripper at 3rd rung  Flexbar strengthening (red) wrist flex/ext   Therapeutic Exercise Min 15 35   Manual Therapy Min  10   Eval Min 30    Total Timed Procedures 15 45   Total Service Procedures 45 45   Total Time 45 45           Charges     1 MT, 2 SILVA StoddardOTR/L

## 2025-06-10 ENCOUNTER — OFFICE VISIT (OUTPATIENT)
Dept: PHYSICAL THERAPY | Age: 55
End: 2025-06-10
Attending: ORTHOPAEDIC SURGERY
Payer: MEDICAID

## 2025-06-10 PROCEDURE — 97110 THERAPEUTIC EXERCISES: CPT

## 2025-06-10 PROCEDURE — 97140 MANUAL THERAPY 1/> REGIONS: CPT

## 2025-06-10 NOTE — PROGRESS NOTES
Patient: Ran Carter (54 year old, male) Referring Provider:  Insurance:   Diagnosis: Status post left endoscopic carpal tunnel release [place]  [employer]UE [employer]OSS MEDICAID   Date of Surgery: 3/20/2025 Next MD visit:  N/A   Precautions:  None 8/27/2025 Referral Information:   Date of Injury: No data recorded Date of Evaluation: Req: 12, Auth: 12, Exp: 7/20/2025 05/21/25 POC Auth Visits:  12       Today's Date   6/10/2025    Subjective  Pt states that his hand sometimes cramps up.       Pain: 3/10     Objective  See tx log  for details.        Assessment  Pt performing HEP as instucted. Pt reports having cramping and pain in hand. Advised pt to avoid lifting, gripping for long period of time and repetitive actvties. Minimal desity of scar tissue and no tightness noted with palpation. Increased resistance levels in clinic which pt was able to tolerate well without any increase im symptoms.    Goals (to be met in 8 visits)   Increase (L)  by 4-5 lbs for pt to be able to lift/carry groceries.  Increase (L) lateral pinch for pt to be able to tie shoes.  Increase (L) 3pt  pinch for pt to be able to open water bottles.   Increase (L) 2 pt  pinch for pt to be able to open packages.   Patient will be independent in final HEP to facilitate carryover of functional gains made in clinical therapy program.            Plan  Patient will be seen 2 x week for 4-6 weeksx/week or a total of 8 visits over a 90 day period.    Treatment Last 4 Visits        5/21/2025 6/3/2025 6/10/2025   OT Treatment   Treatment Day  2 3   Therapeutic Exercise AROM with fluidotherapy x 10\"  AROM with fluidotherapy x 10\"  Powerweb stretches x 3 min  Digiflex (red) x 3 min  Sponges with gripper at 3rd rung  Flexbar strengthening (red) wrist flex/ext AROM with fluidotherapy x 10\"  Powerweb stretches x 3 min  Digiflex (red) x 3 min  Sponges with gripper at 3rd rung  Forearm workout (with 2 weights) sup/pro x 3 min  Wrist PRE's 3 # (wrist  all planes)     Manual Therapy   STM  Scar massage   Therapeutic Exercise Min 15 35 35   Manual Therapy Min  10 10   Eval Min 30     Total Timed Procedures 15 45 45   Total Service Procedures 45 45 45   Total Time 45 45 45             Charges     1 MT, 2 SILVA Stoddard,OTR/L

## 2025-06-17 ENCOUNTER — OFFICE VISIT (OUTPATIENT)
Dept: PHYSICAL THERAPY | Age: 55
End: 2025-06-17
Attending: ORTHOPAEDIC SURGERY
Payer: MEDICAID

## 2025-06-17 PROCEDURE — 97140 MANUAL THERAPY 1/> REGIONS: CPT

## 2025-06-17 PROCEDURE — 97110 THERAPEUTIC EXERCISES: CPT

## 2025-06-17 NOTE — PROGRESS NOTES
Patient: Ran Carter (54 year old, male) Referring Provider:  Insurance:   Diagnosis: Status post left endoscopic carpal tunnel release [place]  [employer] [employer]ICA[employer]   Date of Surgery: 3/20/2025 Next MD visit:  N/A   Precautions:  None 8/27/2025 Referral Information:   Date of Injury: No data recorded Date of Evaluation: Req: 12, Auth: 12, Exp: 7/20/2025 05/21/25 POC Auth Visits:  12       Today's Date   6/17/2025    Subjective  Pt states that he had some pain along the base of his hand.       Pain: 1/10     Objective  See tx log  for details.        Assessment  Pain reported at pisiform. Pt unsure as to cause but suspectshe may have slept on his hand. Pt having pain in (R) shoulder most likely shoulder impingement contributing to dificulty with sleep positions. Advised pt seek MD consult for (R) shoulder. Pt agreed. Applied kinesiotape for pisiform lift with 75% stretch. Decreased pain reported after kinesiotape application.    Goals (to be met in 8 visits)   Increase (L)  by 4-5 lbs for pt to be able to lift/carry groceries.  Increase (L) lateral pinch for pt to be able to tie shoes.  Increase (L) 3pt  pinch for pt to be able to open water bottles.   Increase (L) 2 pt  pinch for pt to be able to open packages.   Patient will be independent in final HEP to facilitate carryover of functional gains made in clinical therapy program.                Plan  Patient will be seen 2 x week for 4-6 weeksx/week or a total of 8 visits over a 90 day period.    Treatment Last 4 Visits        5/21/2025 6/3/2025 6/10/2025 6/17/2025   OT Treatment   Treatment Day  2 3 4   Therapeutic Exercise AROM with fluidotherapy x 10\"  AROM with fluidotherapy x 10\"  Powerweb stretches x 3 min  Digiflex (red) x 3 min  Sponges with gripper at 3rd rung  Flexbar strengthening (red) wrist flex/ext AROM with fluidotherapy x 10\"  Powerweb stretches x 3 min  Digiflex (red) x 3 min  Sponges with gripper at 3rd rung  Forearm  workout (with 2 weights) sup/pro x 3 min  Wrist PRE's 3 # (wrist all planes)   AROM with fluidotherapy x 10\"   Powerweb stretches x 3 min  Digiflex (green) x 3 min  Wrist PRE's 3 # (all planes) x 2 min each   Neuro Re-Educ    Kinesiotape with pisiform lift at 75% stretch   Manual Therapy   STM  Scar massage STM  Scar massage with extractor   Neuro Re-Ed Min    5   Therapeutic Exercise Min 15 35 35 30   Manual Therapy Min  10 10 10   Eval Min 30      Total Timed Procedures 15 45 45 45   Total Service Procedures 45 45 45 45   Total Time 45 45 45 45              Charges     1MT, 2 TE    Halley Stoddard,OTR/L

## 2025-06-20 ENCOUNTER — APPOINTMENT (OUTPATIENT)
Dept: PHYSICAL THERAPY | Age: 55
End: 2025-06-20
Attending: ORTHOPAEDIC SURGERY
Payer: MEDICAID

## 2025-06-24 ENCOUNTER — APPOINTMENT (OUTPATIENT)
Dept: PHYSICAL THERAPY | Age: 55
End: 2025-06-24
Attending: ORTHOPAEDIC SURGERY
Payer: MEDICAID

## 2025-06-27 ENCOUNTER — APPOINTMENT (OUTPATIENT)
Dept: PHYSICAL THERAPY | Age: 55
End: 2025-06-27
Attending: ORTHOPAEDIC SURGERY
Payer: MEDICAID

## 2025-06-27 DIAGNOSIS — M54.16 LUMBAR RADICULOPATHY: ICD-10-CM

## 2025-06-27 NOTE — TELEPHONE ENCOUNTER
Refill Request    LOV:4/4/2025 (procedure) 3/6/25  (office) Juan A Mcgee MD   Due back to clinic per last office note:  3-4 months after injection  NOV: 7/16/2025 Juan A Mcgee MD     Urine drug screen (if applicable): none  Pain contract: valid through 1/20/26     Medication request:   Requested Prescriptions     Pending Prescriptions Disp Refills    HYDROcodone-acetaminophen  MG Oral Tab 150 tablet 0     Sig: Take 1 tablet by mouth every 4-6 hours PRN pain (max 5 tabs in 24 hours).           [place]: 5/31/25 #150 - 30 days supply    LOV plan per Dr. Mcgee(if weaning or changing medications): \"He will continue with the Norco 10/325 4-5 per day for the pain \"

## 2025-06-28 RX ORDER — HYDROCODONE BITARTRATE AND ACETAMINOPHEN 10; 325 MG/1; MG/1
TABLET ORAL
Qty: 150 TABLET | Refills: 0 | Status: SHIPPED | OUTPATIENT
Start: 2025-06-28

## 2025-06-30 ENCOUNTER — TELEPHONE (OUTPATIENT)
Dept: PHYSICAL THERAPY | Facility: HOSPITAL | Age: 55
End: 2025-06-30

## 2025-07-01 ENCOUNTER — APPOINTMENT (OUTPATIENT)
Dept: PHYSICAL THERAPY | Age: 55
End: 2025-07-01
Attending: ORTHOPAEDIC SURGERY
Payer: MEDICAID

## 2025-07-01 DIAGNOSIS — E66.9 OBESITY (BMI 30-39.9): ICD-10-CM

## 2025-07-01 DIAGNOSIS — R10.9 ABDOMINAL PAIN, UNSPECIFIED ABDOMINAL LOCATION: ICD-10-CM

## 2025-07-01 DIAGNOSIS — E11.9 TYPE 2 DIABETES MELLITUS WITHOUT COMPLICATION, WITHOUT LONG-TERM CURRENT USE OF INSULIN (HCC): ICD-10-CM

## 2025-07-01 DIAGNOSIS — E29.1 HYPOGONADISM IN MALE: ICD-10-CM

## 2025-07-01 DIAGNOSIS — G47.33 OSA (OBSTRUCTIVE SLEEP APNEA): ICD-10-CM

## 2025-07-03 ENCOUNTER — APPOINTMENT (OUTPATIENT)
Dept: PHYSICAL THERAPY | Age: 55
End: 2025-07-03
Attending: ORTHOPAEDIC SURGERY
Payer: MEDICAID

## 2025-07-04 RX ORDER — PANTOPRAZOLE SODIUM 40 MG/1
40 TABLET, DELAYED RELEASE ORAL
Qty: 90 TABLET | Refills: 3 | OUTPATIENT
Start: 2025-07-04 | End: 2025-10-02

## 2025-07-08 ENCOUNTER — APPOINTMENT (OUTPATIENT)
Dept: PHYSICAL THERAPY | Age: 55
End: 2025-07-08
Attending: ORTHOPAEDIC SURGERY
Payer: MEDICAID

## 2025-07-14 ENCOUNTER — OFFICE VISIT (OUTPATIENT)
Dept: PULMONOLOGY | Facility: CLINIC | Age: 55
End: 2025-07-14
Payer: MEDICAID

## 2025-07-14 ENCOUNTER — APPOINTMENT (OUTPATIENT)
Dept: PHYSICAL THERAPY | Age: 55
End: 2025-07-14
Attending: ORTHOPAEDIC SURGERY

## 2025-07-14 VITALS
HEART RATE: 84 BPM | BODY MASS INDEX: 36.68 KG/M2 | OXYGEN SATURATION: 96 % | WEIGHT: 262 LBS | SYSTOLIC BLOOD PRESSURE: 121 MMHG | HEIGHT: 71 IN | DIASTOLIC BLOOD PRESSURE: 79 MMHG

## 2025-07-14 DIAGNOSIS — G47.33 OSA (OBSTRUCTIVE SLEEP APNEA): Primary | ICD-10-CM

## 2025-07-14 PROCEDURE — 99213 OFFICE O/P EST LOW 20 MIN: CPT | Performed by: INTERNAL MEDICINE

## 2025-07-14 NOTE — PATIENT INSTRUCTIONS
Schedule a CPAP TITRATION SLEEP STUDY.  Contact the number on the After Visit Summary (AVS):  -Strongly advise absolutely NO driving when sleepy  -Recommend good sleep hygiene as discussed   -Avoid alcohol and caffeine before going to bed      Come back to see us 1 month after starting CPAP therapy

## 2025-07-14 NOTE — PROGRESS NOTES
Outpatient Pulmonary Clinic Follow Up           Reason for encounter: LEANN f/u       SUBJECTIVE:  Pt seen for f/u on 7/14/25  From previous clinic visit with Dr. Collier in 6/2024.  Has hx of snoring, apnea, daytime hypersomnolence.  Diagnosed with mild LEANN with AHI 12.  Started on APAP 5-15 cmH20 but can't tolerate it. Feels it was choking him. Stopped using it.  ESS today is 9.      REVIEW OF SYSTEMS:  Positives and negatives as mentioned in the HPI above. Remainder of 12 pt review of systems is otherwise negative.       PAST MEDICAL HISTORY:  Past Medical History[1]       PAST SURGICAL HISTORY:  Past Surgical History[2]       PAST FAMILY HISTORY:  Family History[3]       PAST SOCIAL HISTORY:  Short Social Hx on File[4]       ALLERGIES:  Allergies[5]       MEDS:  Medications Ordered Prior to Encounter[6]       PHYSICAL EXAM:  /79   Pulse 84   Ht 5' 11\" (1.803 m)   Wt 262 lb (118.8 kg)   SpO2 96%   BMI 36.54 kg/m²   GEN: alert, oriented, pleasant, no apparent distress  HEENT: atraumatic normocephalic  MOUTH: mucous membranes are moist. No OP exudates. MMP 4  NECK: no JVD. Trachea midline. No obvious thyromegaly  LUNG: clear b/l no wheezing, crackles. Chest symmetric with respiratory motion  HEART: regular rate and rhythm, no obvious murmers or gallops noted  ABD: soft non tender. + bowel sounds. No organomegaly noted  EXT: no clubbing, cyanosis, or edema noted. Pulses intact grossly  NEURO: no gross deficits  SKIN: warm, dry. No obvious lesions noted  LYMPH: no obvious LAD       IMAGES:  No recent chest imaging available to review       LABS:  Lab Results   Component Value Date    WBC 9.3 05/09/2025    RBC 4.96 05/09/2025    HGB 15.3 05/09/2025    HCT 47.2 05/09/2025    MCV 95.2 05/09/2025    MCH 30.8 05/09/2025    MCHC 32.4 05/09/2025    MPV 8.0 07/22/2018     No results for input(s): \"GLU\", \"BUN\", \"CREATSERUM\", \"GFRAA\", \"GFRNAA\", \"EGFRCR\", \"CA\", \"ALB\", \"NA\", \"K\", \"CL\", \"CO2\", \"ALKPHO\", \"AST\", \"ALT\",  \"BILT\", \"TP\" in the last 168 hours.       HST 5/2024  INTERPRETATION:  The data generated from this study is consistent with mild obstructive sleep apnea (ICD-10 code G47.33).  RECOMMENDATIONS:    1.       CPAP titration in this symptomatic patient.         ASSESSMENT/PLAN:  Mild LEANN  -Intolerant of APAP  -Schedule a CPAP TITRATION sleep study.  Contact the number on the After Visit Summary (AVS):  -Strongly advise absolutely NO driving when sleepy  -Recommend good sleep hygiene as discussed   -Avoid alcohol and caffeine before going to bed    RTC 1 month after starting CPAP    Thank you for the opportunity to care for Ran Carter.  I spent a total of 22 minutes in direct contact with the patient and reviewing pertinent outside records on the day of the encounter.       MAURO Hernandez DO, MPH  Pulmonary and Critical Care Medicine  [facility]          [1]   Past Medical History:   Back problem    Chronic neck pain    Contusion of cervical cord (HCC)    Diabetes (HCC)    Esophageal reflux    High blood pressure    High cholesterol    Hyperlipidemia    Migraines    Went to emergency care clinic and suggested to get an MRI done because I've been dealing with a bad migraine for over a week straight today being a 10 from 1-10 10,being the worse    Primary hypertension    Problems with swallowing    Sleep apnea    unable to use device    Visual impairment    readers   [2]   Past Surgical History:  Procedure Laterality Date    Carpal tunnel release Left     left hand    Colonoscopy N/A 02/07/2024    Procedure: COLONOSCOPY;  Surgeon: Amy Means MD;  Location: [facility] ENDOSCOPY    Ct cervical spine      c3 & 4     Hernia surgery  2005    hiatal hernia    Knee surgery  2008    RIGHT ACL REPAIR    Spine surgery procedure unlisted  2018    cervical w hardware   [3]   Family History  Problem Relation Age of Onset    No Known Problems Mother     No Known Problems Sister      No Known Problems Brother     Heart Disorder Paternal Grandmother     Diabetes Paternal Grandmother     Cancer Paternal Grandmother     Glaucoma Neg     Macular degeneration Neg    [4]   Social History  Socioeconomic History    Marital status:    Tobacco Use    Smoking status: Never     Passive exposure: Past    Smokeless tobacco: Never   Vaping Use    Vaping status: Never Used   Substance and Sexual Activity    Alcohol use: Not Currently     Alcohol/week: 1.0 standard drink of alcohol     Comment: Did social    Drug use: Never   Other Topics Concern    Caffeine Concern Yes    Exercise No    History of tanning Yes    Reaction to local anesthetic No    Pt has a pacemaker No    Pt has a defibrillator No   Social History Narrative    The patient does not use an assistive device..      The patient does live in a home with stairs.   [5] No Known Allergies  [6]   Current Outpatient Medications on File Prior to Visit   Medication Sig Dispense Refill    HYDROcodone-acetaminophen  MG Oral Tab Take 1 tablet by mouth every 4-6 hours PRN pain (max 5 tabs in 24 hours). 150 tablet 0    Phentermine HCl 15 MG Oral Cap Take 1 capsule (15 mg total) by mouth every morning. 30 capsule 1    Tadalafil 10 MG Oral Tab Take 1 tablet (10 mg total) by mouth daily as needed for Erectile Dysfunction. 30 tablet 0    pantoprazole 40 MG Oral Tab EC Take 1 tablet (40 mg total) by mouth every morning before breakfast. 90 tablet 3    ROSUVASTATIN 5 MG Oral Tab TAKE ONE TABLET BY MOUTH ONCE NIGHTLY 90 tablet 1    losartan 100 MG Oral Tab Take 1 tablet (100 mg total) by mouth daily. 90 tablet 0    semaglutide (OZEMPIC, 1 MG/DOSE,) 4 MG/3ML Subcutaneous Solution Pen-injector Inject 1 mg into the skin once a week. 9 mL 1    Insulin Pen Needle (PEN NEEDLES) 32G X 4 MM Does not apply Misc 1 each daily. 90 each 0    Blood Pressure Monitoring (BLOOD PRESSURE KIT) Does not apply Device 1 each daily. 1 each 0    pantoprazole 40 MG Oral Tab EC  Take 1 tablet (40 mg total) by mouth every morning before breakfast. 90 tablet 3    Syringe/Needle, Disp, 27G X 1/2\" 1 ML Does not apply Misc Inject testosterone every two weeks 50 each 0    Syringe 22G X 1\" 3 ML Does not apply Misc Inject testosterone every 2 weeks 50 each 0    Glucose Blood (ONETOUCH VERIO) In Vitro Strip Test blood sugar twice daily. 200 strip 3    Lancets Does not apply Misc Check bid 200 each 1    Insulin Pen Needle (PEN NEEDLES) 32G X 4 MM Does not apply Misc 1 each daily. 90 each 0    multiple vitamin Oral Chew Tab Chew 1 tablet by mouth daily.      semaglutide 8 MG/3ML Subcutaneous Solution Pen-injector Inject 2 mg into the skin once a week. (Patient not taking: Reported on 7/14/2025) 9 mL 1    Tadalafil 10 MG Oral Tab Take 1 tablet (10 mg total) by mouth daily as needed for Erectile Dysfunction. (Patient not taking: Reported on 7/14/2025) 30 tablet 0     No current facility-administered medications on file prior to visit.

## 2025-07-16 ENCOUNTER — PATIENT MESSAGE (OUTPATIENT)
Dept: ENDOCRINOLOGY CLINIC | Facility: CLINIC | Age: 55
End: 2025-07-16

## 2025-07-16 ENCOUNTER — OFFICE VISIT (OUTPATIENT)
Dept: PHYSICAL MEDICINE AND REHAB | Facility: CLINIC | Age: 55
End: 2025-07-16
Payer: MEDICAID

## 2025-07-16 ENCOUNTER — TELEPHONE (OUTPATIENT)
Dept: ENDOCRINOLOGY CLINIC | Facility: CLINIC | Age: 55
End: 2025-07-16

## 2025-07-16 ENCOUNTER — OFFICE VISIT (OUTPATIENT)
Dept: ENDOCRINOLOGY CLINIC | Facility: CLINIC | Age: 55
End: 2025-07-16
Payer: MEDICAID

## 2025-07-16 VITALS
SYSTOLIC BLOOD PRESSURE: 130 MMHG | HEIGHT: 71 IN | BODY MASS INDEX: 36.82 KG/M2 | HEART RATE: 80 BPM | WEIGHT: 263 LBS | DIASTOLIC BLOOD PRESSURE: 86 MMHG

## 2025-07-16 VITALS — WEIGHT: 263 LBS | BODY MASS INDEX: 36.82 KG/M2 | HEIGHT: 71 IN

## 2025-07-16 DIAGNOSIS — F11.90 OPIOID USE: ICD-10-CM

## 2025-07-16 DIAGNOSIS — E11.9 CONTROLLED TYPE 2 DIABETES MELLITUS WITHOUT COMPLICATION, WITHOUT LONG-TERM CURRENT USE OF INSULIN (HCC): ICD-10-CM

## 2025-07-16 DIAGNOSIS — G56.03 BILATERAL CARPAL TUNNEL SYNDROME: ICD-10-CM

## 2025-07-16 DIAGNOSIS — M48.061 LUMBAR FORAMINAL STENOSIS: ICD-10-CM

## 2025-07-16 DIAGNOSIS — E11.69 TYPE 2 DIABETES MELLITUS WITH OTHER SPECIFIED COMPLICATION, UNSPECIFIED WHETHER LONG TERM INSULIN USE (HCC): Primary | ICD-10-CM

## 2025-07-16 DIAGNOSIS — M47.812 ARTHROPATHY OF CERVICAL FACET JOINT: ICD-10-CM

## 2025-07-16 DIAGNOSIS — M54.16 LUMBAR RADICULOPATHY: ICD-10-CM

## 2025-07-16 DIAGNOSIS — Z98.1 S/P CERVICAL SPINAL FUSION: ICD-10-CM

## 2025-07-16 DIAGNOSIS — M45.0 ANKYLOSING SPONDYLITIS OF MULTIPLE SITES IN SPINE (HCC): ICD-10-CM

## 2025-07-16 DIAGNOSIS — M50.90 CERVICAL DISC DISEASE: ICD-10-CM

## 2025-07-16 DIAGNOSIS — M48.02 CERVICAL STENOSIS OF SPINE: ICD-10-CM

## 2025-07-16 DIAGNOSIS — M71.30 SYNOVIAL CYST: ICD-10-CM

## 2025-07-16 DIAGNOSIS — E29.1 HYPOGONADISM IN MALE: ICD-10-CM

## 2025-07-16 DIAGNOSIS — G56.21 ULNAR NEUROPATHY AT ELBOW OF RIGHT UPPER EXTREMITY: ICD-10-CM

## 2025-07-16 DIAGNOSIS — M48.061 SPINAL STENOSIS OF LUMBAR REGION WITHOUT NEUROGENIC CLAUDICATION: ICD-10-CM

## 2025-07-16 DIAGNOSIS — M11.20 CALCIUM PYROPHOSPHATE DEPOSITION DISEASE (CPPD): ICD-10-CM

## 2025-07-16 DIAGNOSIS — M51.9 LUMBAR DISC DISEASE: Primary | ICD-10-CM

## 2025-07-16 DIAGNOSIS — Q76.1 CERVICAL FUSION SYNDROME: ICD-10-CM

## 2025-07-16 DIAGNOSIS — M54.12 CERVICAL RADICULOPATHY: ICD-10-CM

## 2025-07-16 DIAGNOSIS — E78.5 DYSLIPIDEMIA: ICD-10-CM

## 2025-07-16 DIAGNOSIS — M51.9 THORACIC DISC DISEASE: ICD-10-CM

## 2025-07-16 LAB
GLUCOSE BLOOD: 152
TEST STRIP LOT #: NORMAL NUMERIC

## 2025-07-16 PROCEDURE — 82947 ASSAY GLUCOSE BLOOD QUANT: CPT | Performed by: INTERNAL MEDICINE

## 2025-07-16 PROCEDURE — 99214 OFFICE O/P EST MOD 30 MIN: CPT | Performed by: INTERNAL MEDICINE

## 2025-07-16 PROCEDURE — 99214 OFFICE O/P EST MOD 30 MIN: CPT | Performed by: PHYSICAL MEDICINE & REHABILITATION

## 2025-07-16 RX ORDER — TESTOSTERONE CYPIONATE 200 MG/ML
200 INJECTION, SOLUTION INTRAMUSCULAR
Qty: 6 ML | Refills: 0 | Status: SHIPPED | OUTPATIENT
Start: 2025-07-16 | End: 2025-07-16

## 2025-07-16 RX ORDER — BLOOD-GLUCOSE METER
1 EACH MISCELLANEOUS DAILY
Qty: 1 KIT | Refills: 0 | Status: SHIPPED | OUTPATIENT
Start: 2025-07-16

## 2025-07-16 RX ORDER — PRAVASTATIN SODIUM 10 MG
10 TABLET ORAL NIGHTLY
Qty: 90 TABLET | Refills: 0 | Status: SHIPPED | OUTPATIENT
Start: 2025-07-16

## 2025-07-16 RX ORDER — BLOOD SUGAR DIAGNOSTIC
STRIP MISCELLANEOUS
Qty: 100 STRIP | Refills: 0 | Status: SHIPPED | OUTPATIENT
Start: 2025-07-16

## 2025-07-16 RX ORDER — TESTOSTERONE CYPIONATE 200 MG/ML
175 INJECTION, SOLUTION INTRAMUSCULAR
Qty: 5.28 ML | Refills: 0 | Status: SHIPPED | OUTPATIENT
Start: 2025-07-16 | End: 2025-10-14

## 2025-07-16 RX ORDER — LANCETS 33 GAUGE
1 EACH MISCELLANEOUS DAILY
Qty: 100 EACH | Refills: 0 | Status: SHIPPED | OUTPATIENT
Start: 2025-07-16

## 2025-07-16 RX ORDER — TIRZEPATIDE 2.5 MG/.5ML
2.5 INJECTION, SOLUTION SUBCUTANEOUS WEEKLY
Qty: 2 ML | Refills: 0 | Status: SHIPPED | OUTPATIENT
Start: 2025-07-16

## 2025-07-16 NOTE — PROGRESS NOTES
Return Office Visit     CHIEF COMPLAINT:    Hypogonadism   Adrenal nodule  DM     HISTORY OF PRESENT ILLNESS:  Ran Carter is a 54 year old male who presents for follow up for for evaluation of an adrenal adenoma and hypogonadism and DM     This was noted incidentally on CT scan in May 2019. Stable adenoma on CT in June 2020  Right sided adrenal nodule measuring 2.6 x 2 cm.       Weight gain (central): Moon facies, buffalo hump: no  Recurrent infections: no  Muscle wasting: no  Bleeding/clotting disorders: no   Uncontrolled DM/HTN: no , he was diagnosed with DM, stable       Presenting symptoms:   fatigue, lack of sexual desire, erections do not sustain  No history of testicular trauma, ketoconzole or anti androgen use.     No history of hypothalamic or pituitary tumors/ infiltrative disease, radiation to head and neck region, recent critical illness, head trauma, weight loss, glucocorticoid, anabolic steroid use, excessive alcohol use. No history of uncontrolled DM.      No strokes/ HA  No h/o prostate cancer ( personal or family)  No h/o bleeding/ clotting disorders.   Has mild LEANN, follows with Dr Hernandez, has a study scheduled next month      He is on T replacement  200 mg q two weeks        CURRENT MEDICATION:    Current Outpatient Medications   Medication Sig Dispense Refill    Tirzepatide (MOUNJARO) 2.5 MG/0.5ML Subcutaneous Solution Auto-injector Inject 2.5 mg into the skin once a week. 2 mL 0    pravastatin 10 MG Oral Tab Take 1 tablet (10 mg total) by mouth nightly. 90 tablet 0    Blood Glucose Monitoring Suppl (ONETOUCH VERIO FLEX SYSTEM) w/Device Does not apply Kit 1 each daily. 1 kit 0    Lancets (ONETOUCH DELICA PLUS [id number]) Does not apply Misc 1 each daily. 100 each 0    Glucose Blood (ONETOUCH VERIO) In Vitro Strip Check sugars daily 100 strip 0    testosterone cypionate 200 mg/mL Intramuscular Solution Inject 0.88 mL (176 mg total) into the muscle every 14 (fourteen) days. 5.28 mL  0    HYDROcodone-acetaminophen  MG Oral Tab Take 1 tablet by mouth every 4-6 hours PRN pain (max 5 tabs in 24 hours). 150 tablet 0    Phentermine HCl 15 MG Oral Cap Take 1 capsule (15 mg total) by mouth every morning. 30 capsule 1    Tadalafil 10 MG Oral Tab Take 1 tablet (10 mg total) by mouth daily as needed for Erectile Dysfunction. 30 tablet 0    pantoprazole 40 MG Oral Tab EC Take 1 tablet (40 mg total) by mouth every morning before breakfast. 90 tablet 3    losartan 100 MG Oral Tab Take 1 tablet (100 mg total) by mouth daily. 90 tablet 0    Tadalafil 10 MG Oral Tab Take 1 tablet (10 mg total) by mouth daily as needed for Erectile Dysfunction. (Patient not taking: Reported on 7/14/2025) 30 tablet 0    Insulin Pen Needle (PEN NEEDLES) 32G X 4 MM Does not apply Misc 1 each daily. 90 each 0    Blood Pressure Monitoring (BLOOD PRESSURE KIT) Does not apply Device 1 each daily. 1 each 0    pantoprazole 40 MG Oral Tab EC Take 1 tablet (40 mg total) by mouth every morning before breakfast. 90 tablet 3    Syringe/Needle, Disp, 27G X 1/2\" 1 ML Does not apply Misc Inject testosterone every two weeks 50 each 0    Syringe 22G X 1\" 3 ML Does not apply Misc Inject testosterone every 2 weeks 50 each 0    Glucose Blood (ONETOUCH VERIO) In Vitro Strip Test blood sugar twice daily. 200 strip 3    Lancets Does not apply Misc Check bid 200 each 1    Insulin Pen Needle (PEN NEEDLES) 32G X 4 MM Does not apply Misc 1 each daily. 90 each 0    multiple vitamin Oral Chew Tab Chew 1 tablet by mouth daily.           ALLERGY:  No Known Allergies    PAST MEDICAL, SOCIAL AND FAMILY HISTORY:  See past medical history marked as reviewed.  See past surgical history marked as reviewed.  See past family history marked as reviewed.  See past social history marked as reviewed.    ASSESSMENTS:     REVIEW OF SYSTEMS:  Constitutional: Negative for:  fever, fatigue, cold/heat intolerance, weight changes  Eyes: Negative for:  Visual changes,  proptosis, blurring  ENT: Negative for:  dysphagia, neck swelling, dysphonia  Respiratory: Negative for:  dyspnea, cough  Cardiovascular: Negative for:  chest pain, palpitations, orthopnea  GI: Negative for:  abdominal pain, nausea, vomiting, diarrhea, constipation, bleeding  Neurology: Negative for: headache, numbness, weakness  Genito-Urinary: Negative for: dysuria, frequency  Psychiatric: Negative for:  depression, anxiety  Hematology/Lymphatics: Negative for: bruising, lower extremity edema  Endocrine: Negative for: polyuria, polydypsia  Skin: Negative for: rash, blister, cellulitis,       PHYSICAL EXAM:     Vitals reviewed    General Appearance:  alert, well developed, in no acute distress  Head: Atraumatic  Eyes:  normal conjunctivae, sclera., normal sclera and normal pupils  Throat/Neck: normal sound to voice. Normal hearing, normal speech  Respiratory:  Speaking in full sentences, non-labored. no increased work of breathing, no audible wheezing    Neuro: motor grossly intact, moving all extremities without difficulty  Psychiatric:  oriented to time, self, and place  Extremities: Nov 2024  Bilateral barefoot skin diabetic exam is normal, visualized feet and the appearance is normal.  Bilateral monofilament/sensation of both feet is normal.  Pulsation pedal pulse exam of both lower legs/feet is normal as well.          DATA:     Pertinent data reviewed    ASSESSMENT AND PLAN:    Patient is a 54 year old male with    1. Right sided adrenal adenoma.      I discussed the following:  - Adrenal gland structure and function  - Adrenal incidentaloma is a lesion that is over 1 cm in size  - All patients with an adrenal adenoma should be evaluated for the possibility of malignancy and subclinical hormonal hyperfunction        PLAN:  - cortisol and metanephrine normal in 2023/ early 2024, March 2025   - He does not have HTN, hence does not need renin/ jose  - CT abdomen 6/2020: Stable 2.6 cm right adrenal adenoma.  CT  in 12/2023 shows stable adenoma         2. Hypogonadism  Discussed Endocrine society guidelines for diagnosis of Hypogonadism.     Discussed the changes that can be induced with testosterone therapy    Discussed the side effects of testosterone. Patient understands that testosterone therapy can contribute to sleep apnea, cause acne and other skin reactions, stimulate non cancerous growth of prostate (benign prostate hyperplasia) and growth of existing prostate cancer, enlarge breasts, limit sperm production, cause testicular shrinkage, increase risk of a blood clot forming in a deep vein (deep vein thrombosis), which could break loose, travel through the bloodstream and lodge in the  lungs, blocking blood flow (pulmonary embolism), could adversely effect liver and lipids and increase the number of red blood cells, a condition called erythrocytosis. Edema, with or without congestive heart failure, may be a serious complication in patients with pre-existing cardiac, renal or hepatic disease. Also, there is a there is a possible increased risk of cardiovascular disease and strokes associated with testosterone use.    Discussed possibility of infertility given lack of spermatogenesis.   He does not have children and understands that being on T replacement might impair his ability to have children.    Discussed regular monitoring of T levels and hematocrit/ PSA/ hepatic panel, labs reviewed    PLAN:  - T : he has been using unexpired T from before but takes 200 mg daily. Reviewed old labs and high estradiol. Will change dose to 175 mg q 2 weeks and recheck labs between 5t and 6th injection . Will also check estradiol levels    He is up to date with other lab monitoring       3. Type 2 DM  Plan:  Discussed the pathogenesis, natural course of diabetes. Patient understands the importance of glycemic control and the implications of uncontrolled diabetes including Diabetic ketoacidosis and various micro vascular and  macrovascular complications.     Diagnosis April 2023: 6.5 %   Patient has Type 2 DM  He has been on the following medications in the past   MTF--> GI SE  Trulicity --> GI SE  Currently on Victoza --> GI SE  Januvia --> GI SE  Reblysus--> GI SE    Now on ozmepic 2 mg weekly --> repots fatigue and inability to loose weight   Switch to mounjaro 2.5 mg weekly   Reviewed medication   Update in 2 weeks after starting this    No personal or family history of MEN syndrome  Patient counselled regarding side effects including injection site reactions, nausea, vomiting, diarrhea, pancreatitis, gastroparesis and rare side effect keyon Joo syndrome.    Check and call with BG as discussed  > Annual eye exams--> referral provided   > daily feet exam  > Dyslipidemia : low fat diet, daily exercise, stop  rosuvastatin 5 mg daily--> he reports muscle cramping), switch to pravastatin 10 mg daily. Recheck LDL in 3-4 months  > Nephropathy screening: Ur MA normal   > BP is normal   - Low salt diet   - c/w losartan            Patient verbalized a complete  understanding of all of the above and did not have any further questions.         RTC in 3-4 months  Labs as below, Call for results.            Orders Placed This Encounter   Procedures    POC HemoCue Glucose 201 (Finger stick glucose)    Testosterone Total    Estradiol    Lipid Panel [E]         Consuelo Guadarrama MD

## 2025-07-16 NOTE — TELEPHONE ENCOUNTER
Please start PA from the mounjaro     Type 2 DM        MTF--> GI SE  Trulicity --> GI SE  Currently on Victoza --> GI SE  Januvia --> GI SE  Reblysus--> GI SE     Now on ozmepic 2 mg weekly --> repots fatigue and inability to loose weight     Thanks

## 2025-07-17 NOTE — TELEPHONE ENCOUNTER
Fax received from [facility]: [facility] is offered by [facility].  You are approved for the following:  Mounjaro 2.5mg/0.5ml solution auto-injector effective 4/18/2025 and ends 7/17/2026  Sent to scanning     sent updating patient

## 2025-07-18 ENCOUNTER — TELEPHONE (OUTPATIENT)
Dept: ENDOCRINOLOGY CLINIC | Facility: CLINIC | Age: 55
End: 2025-07-18

## 2025-07-18 NOTE — TELEPHONE ENCOUNTER
Received fax from [facility] DATED ON 7/16/25 stating the testosterone cypionate 200mg/ml solution has been approved up to 10ml per 28 days effective 5/1/25 ends 7/16/26. [facility] sent fax sent to scanning

## 2025-07-21 ENCOUNTER — OFFICE VISIT (OUTPATIENT)
Dept: OCCUPATIONAL MEDICINE | Facility: HOSPITAL | Age: 55
End: 2025-07-21
Attending: ORTHOPAEDIC SURGERY
Payer: MEDICAID

## 2025-07-21 ENCOUNTER — OFFICE VISIT (OUTPATIENT)
Dept: PHYSICAL THERAPY | Age: 55
End: 2025-07-21
Attending: PHYSICAL MEDICINE & REHABILITATION
Payer: MEDICAID

## 2025-07-21 DIAGNOSIS — M48.061 SPINAL STENOSIS OF LUMBAR REGION WITHOUT NEUROGENIC CLAUDICATION: ICD-10-CM

## 2025-07-21 DIAGNOSIS — M54.16 LUMBAR RADICULOPATHY: ICD-10-CM

## 2025-07-21 DIAGNOSIS — M51.9 LUMBAR DISC DISEASE: Primary | ICD-10-CM

## 2025-07-21 DIAGNOSIS — M48.061 LUMBAR FORAMINAL STENOSIS: ICD-10-CM

## 2025-07-21 PROCEDURE — 97162 PT EVAL MOD COMPLEX 30 MIN: CPT

## 2025-07-21 PROCEDURE — 97110 THERAPEUTIC EXERCISES: CPT

## 2025-07-21 PROCEDURE — 97112 NEUROMUSCULAR REEDUCATION: CPT

## 2025-07-21 PROCEDURE — 97140 MANUAL THERAPY 1/> REGIONS: CPT

## 2025-07-21 PROCEDURE — 97530 THERAPEUTIC ACTIVITIES: CPT
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
## 2025-07-23 ENCOUNTER — OFFICE VISIT (OUTPATIENT)
Dept: PHYSICAL THERAPY | Age: 55
End: 2025-07-23
Attending: PHYSICAL MEDICINE & REHABILITATION
Payer: MEDICAID

## 2025-07-23 ENCOUNTER — TELEPHONE (OUTPATIENT)
Dept: ENDOCRINOLOGY CLINIC | Facility: CLINIC | Age: 55
End: 2025-07-23

## 2025-07-23 ENCOUNTER — APPOINTMENT (OUTPATIENT)
Dept: PHYSICAL THERAPY | Age: 55
End: 2025-07-23
Attending: ORTHOPAEDIC SURGERY

## 2025-07-23 PROCEDURE — 97110 THERAPEUTIC EXERCISES: CPT

## 2025-07-23 PROCEDURE — 97112 NEUROMUSCULAR REEDUCATION: CPT

## 2025-07-23 PROCEDURE — 97140 MANUAL THERAPY 1/> REGIONS: CPT

## 2025-07-23 NOTE — PROGRESS NOTES
Patient: Ran Carter (54 year old, male) Referring Provider:  Insurance:   Diagnosis: Lumbar disc disease (M51.9)  Lumbar foraminal stenosis (M48.061)  Spinal stenosis of lumbar region without neurogenic claudication (M48.061)  Lumbar radiculopathy (M54.16) Juan A Mcgee  [facility] MEDICAID   Date of Surgery: No data recorded Next MD visit:  N/A   Precautions:  None 10/13/2025 Referral Information:    Date of Evaluation: Req: 12, Auth: 12, Exp: 9/19/2025 07/21/25 POC Auth Visits:          Today's Date   7/23/2025    Subjective  pt reports that he is feeling better. He has some R hip pain and mild N/T down L leg.       Pain: 2/10     Objective          See tx table     Assessment  Symptoms centralized to L lumbar/L upper gluteal region with pt report of best response to L sideglides (hips towards the R). He had some c/o pain in L ankle/foot and symptoms decreased following gastroc stretch. pt was advised to perform sideglides and then follow up with standing lumbar ext. pt verbalized good understanding of how to monitor symptoms.    Goals (to be met in 12 visits)      Not Met Progress Toward Partially Met Met   Pt will improve transversus abdominis recruitment to perform proper isometric contraction without requiring verbal or tactile cuing to promote advancement of therex. [] [] [] []   Pt will demonstrate good understanding of proper posture and body mechanics to decrease pain and improve spinal safety. [] [] [] []   Pt will improve lumbar spine AROM extension to  minimal loss to allow increase ease with reaching overhead for items on shelf without provocation of pain. [] [] [] []   Pt will report improved symptom centralization and absence of radicular symptoms for 3 consecutive days to improve function with ADLs. [] [] [] []   Pt will have decreased paraspinal mm tension to tolerate standing >60 minutes for work and home activities. [] [] [] []   Pt will demonstrate improved L hip strength to  4+/5 MMT to be able to ambulate with </= 2/10 pain. [] [] [] []   Pt will be independent and compliant with comprehensive HEP to maintain progress achieved in PT [] [] [] []             Plan  Focus on pain reduction, ROM and strengthening.    Treatment Last 4 Visits  Treatment Day: 2       7/21/2025 7/23/2025   Spine Treatment   Therapeutic Exercise CHELY: 10x2  Standing: lumbar ext 10x CHELY 10x1  CHELY with Sag (exhale) 10x1 (No prone press ups on hands due to L hand post-surgical precautions/pain)  Supine: HS stretch with strap 20\"x5 B  Hooklying: BKFO 20x  Standing: lumbar ext 10x  Standing: lumbar ext with UE support at table 10x  Standing: L lumbar sideglide 10x  Standing: gastroc stretch 20\"x5 B   Neuro Re-Education  Hooklying: pelvic tilt with breathing 10x2  Standing: hip abd 10x2 B  Standing: heel raises 10x2 B   Manual Therapy R Sidelying: gapping distraction mobilization at lower lumbar segments  R sidelying: lumbar flex and ext articulation  R sidelying: stm at lumbar paraspinals Prone: lumbar central P/A mob grades II-III; unilateral P/A at L lumbar grades II-III (abolished N/T in L LE)   Therapeutic Activity Pt education on anatomy and physiology; benefit of walking program and/or riding bike per his ankle pain tolerance for healing; pt education on posture in sitting and standing and benefit of taking frequent breaks from prolonged sitting    Gait Training  Gait training: emphasis on heel to toe pattern   Therapeutic Exercise Minutes 8 25   Neuro Re-Educ Minutes  10   Manual Therapy Minutes 10 8   Therapeutic Activity Minutes 15    Gait Training Minutes  4   Evaluation Minutes 15    Total Time Of Timed Procedures 33 47   Total Time Of Service-Based Procedures 15 0   Total Treatment Time 48 47   HEP Access Code: [email or web address]  URL: [email or web address]  Date: 07/21/2025  Prepared by: Gloria Haines    Exercises  - Standing Lumbar Extension  - 3-4 x daily - 7 x weekly - 1 sets - 10 reps  -  Prone Press Up On Elbows  - 3-4 x daily - 7 x weekly - 1 sets - 10 reps Access Code: [email or web address]  URL: [email or web address]  Date: 07/23/2025  Prepared by: Gloria Haines    Exercises  - Right Standing Lateral Shift Correction at Wall - Hold  - 2-3 x daily - 7 x weekly - 1 sets - 10 reps  - Gastroc Stretch on Wall  - 1-2 x daily - 7 x weekly - 1 sets - 5 reps - 20 second hold  - Supine Posterior Pelvic Tilt  - 1-2 x daily - 7 x weekly - 2 sets - 10 reps  - Standing Heel Raise  - 1-2 x daily - 7 x weekly - 2 sets - 10 reps  - Prone Press Up On Elbows  - 2-3 x daily - 7 x weekly - 2 sets - 10 reps  - Standing Lumbar Extension  - 2-3 x daily - 7 x weekly - 2 sets - 10 reps        HEP  Access Code: [email or web address]  URL: [email or web address]  Date: 07/23/2025  Prepared by: Gloria Haines    Exercises  - Right Standing Lateral Shift Correction at Wall - Hold  - 2-3 x daily - 7 x weekly - 1 sets - 10 reps  - Gastroc Stretch on Wall  - 1-2 x daily - 7 x weekly - 1 sets - 5 reps - 20 second hold  - Supine Posterior Pelvic Tilt  - 1-2 x daily - 7 x weekly - 2 sets - 10 reps  - Standing Heel Raise  - 1-2 x daily - 7 x weekly - 2 sets - 10 reps  - Prone Press Up On Elbows  - 2-3 x daily - 7 x weekly - 2 sets - 10 reps  - Standing Lumbar Extension  - 2-3 x daily - 7 x weekly - 2 sets - 10 reps    Charges  2 TE, 1 NMR, 1 MT

## 2025-07-23 NOTE — TELEPHONE ENCOUNTER
Medication Quantity Refills Start End   Tirzepatide (MOUNJARO) 2.5 MG/0.5ML Subcutaneous Solution Auto-injector 2 mL 0 7/16/2025 --   Sig:   Inject 2.5 mg into the skin once a week.     Route:   Subcutaneous     KEY:[id number]    Medication Quantity Refills Start End   testosterone cypionate 200 mg/mL Intramuscular Solution 5.28 mL 0 7/16/2025 10/14/2025   Sig:   Inject 0.88 mL (176 mg total) into the muscle every 14 (fourteen) days.     Route:   Intramuscular     Note to Pharmacy:   This is the correct dose 7/16/2025     KEY:[id number]

## 2025-07-24 NOTE — TELEPHONE ENCOUNTER
PA submitted via CMM:    Tabby Carter   [place]: [id number]  Rx #: [id number]  Mounjaro 2.5MG/0.5ML      TABBY CARTER   Key: [id number]  PA Case ID #: [id number]  Rx #: [id number]  Prior authorization in place     Spoke with pharmacy. PA in place for testosterone and rx filled for $0 copay.

## 2025-07-25 ENCOUNTER — APPOINTMENT (OUTPATIENT)
Dept: PHYSICAL THERAPY | Age: 55
End: 2025-07-25
Attending: ORTHOPAEDIC SURGERY

## 2025-07-27 DIAGNOSIS — M54.16 LUMBAR RADICULOPATHY: ICD-10-CM

## 2025-07-28 ENCOUNTER — OFFICE VISIT (OUTPATIENT)
Dept: OCCUPATIONAL MEDICINE | Facility: HOSPITAL | Age: 55
End: 2025-07-28
Attending: ORTHOPAEDIC SURGERY
Payer: MEDICAID

## 2025-07-28 ENCOUNTER — OFFICE VISIT (OUTPATIENT)
Dept: INTERNAL MEDICINE CLINIC | Facility: CLINIC | Age: 55
End: 2025-07-28
Payer: MEDICAID

## 2025-07-28 VITALS
SYSTOLIC BLOOD PRESSURE: 138 MMHG | HEIGHT: 71 IN | HEART RATE: 89 BPM | DIASTOLIC BLOOD PRESSURE: 90 MMHG | OXYGEN SATURATION: 96 % | BODY MASS INDEX: 36.98 KG/M2 | WEIGHT: 264.19 LBS

## 2025-07-28 DIAGNOSIS — Z51.81 THERAPEUTIC DRUG MONITORING: Primary | ICD-10-CM

## 2025-07-28 DIAGNOSIS — R53.83 OTHER FATIGUE: ICD-10-CM

## 2025-07-28 DIAGNOSIS — E11.9 CONTROLLED TYPE 2 DIABETES MELLITUS WITHOUT COMPLICATION, WITHOUT LONG-TERM CURRENT USE OF INSULIN (HCC): ICD-10-CM

## 2025-07-28 DIAGNOSIS — E66.9 OBESITY (BMI 30-39.9): ICD-10-CM

## 2025-07-28 PROCEDURE — 99214 OFFICE O/P EST MOD 30 MIN: CPT | Performed by: INTERNAL MEDICINE

## 2025-07-28 PROCEDURE — 97110 THERAPEUTIC EXERCISES: CPT

## 2025-07-28 PROCEDURE — 97140 MANUAL THERAPY 1/> REGIONS: CPT

## 2025-07-28 RX ORDER — TIRZEPATIDE 5 MG/.5ML
5 INJECTION, SOLUTION SUBCUTANEOUS WEEKLY
Qty: 6 ML | Refills: 0 | Status: SHIPPED | OUTPATIENT
Start: 2025-07-28

## 2025-07-28 NOTE — TELEPHONE ENCOUNTER
Received fax from [facility]. Stating medication Mounjaro has been approved. Effective dates 4/18/2025 to 7/17/2026.  Sent to scanning.

## 2025-07-28 NOTE — PATIENT INSTRUCTIONS
Is too little sleep a cause of weight gain?  It might be. Recent studies have suggested an association between sleep duration and weight gain. Sleeping less than five hours -- or more than nine hours -- a night appears to increase the likelihood of weight gain.  In one study, recurrent sleep deprivation in men increased their preferences for high-calorie foods and their overall calorie intake. In another study, women who slept less than six hours a night or more than nine hours were more likely to gain 11 pounds (5 kilograms) compared with women who slept seven hours a night. Other studies have found similar patterns in children and adolescents.  One explanation might be that sleep duration affects hormones regulating hunger -- ghrelin and leptin -- and stimulates the appetite. Another contributing factor might be that lack of sleep leads to fatigue and results in less physical activity.  So now you have another reason to get a good night's sleep.    T[facility] from [facility]-  Freddie Moore M.D.
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
## 2025-07-28 NOTE — PROGRESS NOTES
Patient: Ran Carter (54 year old, male) Referring Provider:  Insurance:   Diagnosis: Status post left endoscopic carpal tunnel release [place]  [employer]ICA[employer]   Date of Surgery: 3/20/2025 Next MD visit:  N/A   Precautions:  None 8/27/2025 Referral Information:   Date of Injury: No data recorded Date of Evaluation: Req: 0, Auth: 0, Exp:     05/21/25 POC Auth Visits:  12       Today's Date   7/28/2025    Subjective  My finger has been sore for some reason. I was a little sore after last session, but I think it's because you worked it!       Pain: 2/10     Objective  See treatment section.             Assessment  Overall pt notes feeling good with minimal pain. Mild tissue restrictions noted around thenar eminence today. Pt declined UBE today and requested fluidotherapy. Progressed slightly with strengthening activities with good tolerance. Issued and instructed on transverse carpal ligament stretch today.    Goals (to be met in 12 visits)     Increase (L)  by 4-5 lbs for pt to be able to lift/carry groceries.  Increase (L) lateral pinch for pt to be able to tie shoes.  Increase (L) 3pt  pinch for pt to be able to open water bottles.   Increase (L) 2 pt  pinch for pt to be able to open packages.   Patient will be independent in final HEP to facilitate carryover of functional gains made in clinical therapy program.                          Plan  Continue 2x per week for 6 more sessions    Treatment Last 4 Visits        6/10/2025 6/17/2025 7/21/2025 7/28/2025   OT Treatment   Treatment Day 3 4 4 6   Therapeutic Exercise AROM with fluidotherapy x 10\"  Powerweb stretches x 3 min  Digiflex (red) x 3 min  Sponges with gripper at 3rd rung  Forearm workout (with 2 weights) sup/pro x 3 min  Wrist PRE's 3 # (wrist all planes)   AROM with fluidotherapy x 10\"   Powerweb stretches x 3 min  Digiflex (green) x 3 min  Wrist PRE's 3 # (all planes) x 2 min each UBE x 10 mins reverse  Gripper  Pinch  pins  Flexbar:   FA rotation  Wrist flex/ext UBE x 10 mins reverse (pt decline today)  Fluidotherapy x 12 mins with AROM stretching  Gripper 50# x 10\" second hold x 10, 10 reps  Flexbar green:  Wrist flex/ext x 20  FA rotation sup/pro x 20  FA rotation with weight: 1.5# x 30 reps  Extrinsic stretches  Transverse carpal ligament stretch       Neuro Re-Educ  Kinesiotape with pisiform lift at 75% stretch     Manual Therapy STM  Scar massage STM  Scar massage with extractor STM, scar management, edema management STM, edema management, scar management   Neuro Re-Ed Min  5     Therapeutic Exercise Min 35 30 30 30   Manual Therapy Min 10 10 10 15   Total Timed Procedures 45 45 40 45   Total Service Procedures 45 45 40 45   Total Time 45 45 40 45         HEP       Charges     2 ther ex, 1 manual

## 2025-07-29 RX ORDER — HYDROCODONE BITARTRATE AND ACETAMINOPHEN 10; 325 MG/1; MG/1
TABLET ORAL
Qty: 150 TABLET | Refills: 0 | Status: SHIPPED | OUTPATIENT
Start: 2025-07-29

## 2025-07-30 ENCOUNTER — OFFICE VISIT (OUTPATIENT)
Dept: PHYSICAL THERAPY | Age: 55
End: 2025-07-30
Attending: PHYSICAL MEDICINE & REHABILITATION
Payer: MEDICAID

## 2025-07-30 ENCOUNTER — APPOINTMENT (OUTPATIENT)
Dept: OCCUPATIONAL MEDICINE | Facility: HOSPITAL | Age: 55
End: 2025-07-30
Attending: ORTHOPAEDIC SURGERY
Payer: MEDICAID

## 2025-07-30 ENCOUNTER — TELEPHONE (OUTPATIENT)
Dept: PHYSICAL THERAPY | Facility: HOSPITAL | Age: 55
End: 2025-07-30

## 2025-07-30 PROCEDURE — 97110 THERAPEUTIC EXERCISES: CPT

## 2025-07-30 PROCEDURE — 97112 NEUROMUSCULAR REEDUCATION: CPT

## 2025-07-30 PROCEDURE — 97140 MANUAL THERAPY 1/> REGIONS: CPT

## 2025-08-04 ENCOUNTER — TELEPHONE (OUTPATIENT)
Dept: INTERNAL MEDICINE CLINIC | Facility: CLINIC | Age: 55
End: 2025-08-04

## 2025-08-04 ENCOUNTER — OFFICE VISIT (OUTPATIENT)
Dept: PHYSICAL THERAPY | Age: 55
End: 2025-08-04
Attending: PHYSICAL MEDICINE & REHABILITATION

## 2025-08-04 DIAGNOSIS — E11.9 CONTROLLED TYPE 2 DIABETES MELLITUS WITHOUT COMPLICATION, WITHOUT LONG-TERM CURRENT USE OF INSULIN (HCC): Primary | ICD-10-CM

## 2025-08-04 PROCEDURE — 97110 THERAPEUTIC EXERCISES: CPT

## 2025-08-04 PROCEDURE — 97112 NEUROMUSCULAR REEDUCATION: CPT

## 2025-08-04 PROCEDURE — 97140 MANUAL THERAPY 1/> REGIONS: CPT

## 2025-08-05 ENCOUNTER — OFFICE VISIT (OUTPATIENT)
Dept: OCCUPATIONAL MEDICINE | Facility: HOSPITAL | Age: 55
End: 2025-08-05
Attending: ORTHOPAEDIC SURGERY

## 2025-08-05 PROCEDURE — 97140 MANUAL THERAPY 1/> REGIONS: CPT

## 2025-08-05 PROCEDURE — 97110 THERAPEUTIC EXERCISES: CPT

## 2025-08-06 ENCOUNTER — APPOINTMENT (OUTPATIENT)
Dept: ENDOCRINOLOGY | Facility: HOSPITAL | Age: 55
End: 2025-08-06
Attending: INTERNAL MEDICINE

## 2025-08-07 ENCOUNTER — TELEPHONE (OUTPATIENT)
Dept: OCCUPATIONAL MEDICINE | Facility: HOSPITAL | Age: 55
End: 2025-08-07

## 2025-08-11 ENCOUNTER — OFFICE VISIT (OUTPATIENT)
Dept: PHYSICAL THERAPY | Age: 55
End: 2025-08-11
Attending: PHYSICAL MEDICINE & REHABILITATION

## 2025-08-11 PROCEDURE — 97112 NEUROMUSCULAR REEDUCATION: CPT

## 2025-08-11 PROCEDURE — 97110 THERAPEUTIC EXERCISES: CPT

## 2025-08-11 PROCEDURE — 97140 MANUAL THERAPY 1/> REGIONS: CPT

## 2025-08-12 ENCOUNTER — HOSPITAL ENCOUNTER (OUTPATIENT)
Dept: ENDOCRINOLOGY | Facility: HOSPITAL | Age: 55
Discharge: HOME | End: 2025-08-12
Attending: INTERNAL MEDICINE

## 2025-08-12 ENCOUNTER — OFFICE VISIT (OUTPATIENT)
Dept: OCCUPATIONAL MEDICINE | Facility: HOSPITAL | Age: 55
End: 2025-08-12
Attending: ORTHOPAEDIC SURGERY

## 2025-08-12 VITALS — WEIGHT: 263.88 LBS | BODY MASS INDEX: 37 KG/M2

## 2025-08-12 DIAGNOSIS — E11.9 CONTROLLED TYPE 2 DIABETES MELLITUS WITHOUT COMPLICATION, WITHOUT LONG-TERM CURRENT USE OF INSULIN (HCC): Primary | ICD-10-CM

## 2025-08-12 PROCEDURE — 97110 THERAPEUTIC EXERCISES: CPT

## 2025-08-12 PROCEDURE — 97140 MANUAL THERAPY 1/> REGIONS: CPT

## 2025-08-14 ENCOUNTER — OFFICE VISIT (OUTPATIENT)
Dept: OCCUPATIONAL MEDICINE | Facility: HOSPITAL | Age: 55
End: 2025-08-14
Attending: ORTHOPAEDIC SURGERY

## 2025-08-14 PROCEDURE — 97110 THERAPEUTIC EXERCISES: CPT

## 2025-08-14 PROCEDURE — 97140 MANUAL THERAPY 1/> REGIONS: CPT

## 2025-08-19 ENCOUNTER — APPOINTMENT (OUTPATIENT)
Dept: OCCUPATIONAL MEDICINE | Facility: HOSPITAL | Age: 55
End: 2025-08-19
Attending: ORTHOPAEDIC SURGERY

## 2025-08-21 ENCOUNTER — OFFICE VISIT (OUTPATIENT)
Dept: OCCUPATIONAL MEDICINE | Facility: HOSPITAL | Age: 55
End: 2025-08-21
Attending: ORTHOPAEDIC SURGERY

## 2025-08-21 PROCEDURE — 97140 MANUAL THERAPY 1/> REGIONS: CPT

## 2025-08-21 PROCEDURE — 97110 THERAPEUTIC EXERCISES: CPT

## 2025-08-25 ENCOUNTER — APPOINTMENT (OUTPATIENT)
Dept: PHYSICAL THERAPY | Age: 55
End: 2025-08-25
Attending: PHYSICAL MEDICINE & REHABILITATION

## 2025-08-25 ENCOUNTER — OFFICE VISIT (OUTPATIENT)
Dept: OCCUPATIONAL MEDICINE | Facility: HOSPITAL | Age: 55
End: 2025-08-25
Attending: ORTHOPAEDIC SURGERY

## 2025-08-25 PROCEDURE — 97110 THERAPEUTIC EXERCISES: CPT

## 2025-08-25 PROCEDURE — 97140 MANUAL THERAPY 1/> REGIONS: CPT

## 2025-08-26 ENCOUNTER — TELEPHONE (OUTPATIENT)
Dept: ORTHOPEDICS CLINIC | Facility: CLINIC | Age: 55
End: 2025-08-26

## 2025-08-26 DIAGNOSIS — S69.91XA SCAPHOLUNATE LIGAMENT INJURY WITH NO INSTABILITY, RIGHT, INITIAL ENCOUNTER: Primary | ICD-10-CM

## 2025-08-27 ENCOUNTER — HOSPITAL ENCOUNTER (OUTPATIENT)
Dept: GENERAL RADIOLOGY | Age: 55
Discharge: HOME | End: 2025-08-27
Attending: ORTHOPAEDIC SURGERY

## 2025-08-27 ENCOUNTER — OFFICE VISIT (OUTPATIENT)
Dept: ORTHOPEDICS CLINIC | Facility: CLINIC | Age: 55
End: 2025-08-27

## 2025-08-27 VITALS — BODY MASS INDEX: 36.82 KG/M2 | HEIGHT: 71 IN | WEIGHT: 263 LBS

## 2025-08-27 DIAGNOSIS — G56.02 CARPAL TUNNEL SYNDROME OF LEFT WRIST: ICD-10-CM

## 2025-08-27 DIAGNOSIS — S69.91XA SCAPHOLUNATE LIGAMENT INJURY WITH NO INSTABILITY, RIGHT, INITIAL ENCOUNTER: ICD-10-CM

## 2025-08-27 DIAGNOSIS — M77.8 WRIST TENDONITIS: ICD-10-CM

## 2025-08-27 DIAGNOSIS — G56.02 CARPAL TUNNEL SYNDROME OF LEFT WRIST: Primary | ICD-10-CM

## 2025-08-27 PROCEDURE — 73110 X-RAY EXAM OF WRIST: CPT | Performed by: ORTHOPAEDIC SURGERY

## 2025-08-27 RX ORDER — BETAMETHASONE SODIUM PHOSPHATE AND BETAMETHASONE ACETATE 3; 3 MG/ML; MG/ML
6 INJECTION, SUSPENSION INTRA-ARTICULAR; INTRALESIONAL; INTRAMUSCULAR; SOFT TISSUE ONCE
Status: COMPLETED | OUTPATIENT
Start: 2025-08-27 | End: 2025-08-27

## 2025-08-27 RX ADMIN — BETAMETHASONE SODIUM PHOSPHATE AND BETAMETHASONE ACETATE 6 MG: 3; 3 INJECTION, SUSPENSION INTRA-ARTICULAR; INTRALESIONAL; INTRAMUSCULAR; SOFT TISSUE at 11:01:00

## 2025-08-29 ENCOUNTER — TELEPHONE (OUTPATIENT)
Dept: PHYSICAL THERAPY | Age: 55
End: 2025-08-29

## (undated) DIAGNOSIS — M54.16 LUMBAR RADICULOPATHY: ICD-10-CM

## (undated) DEVICE — PEN: MARKING STD PT 100/CS: Brand: MEDICAL ACTION INDUSTRIES

## (undated) DEVICE — GLOVE SUR 6.5 SENSICARE PI PIP CRM PWD F

## (undated) DEVICE — ADHESIVE SKIN TOP FOR WND CLSR DERMBND ADV

## (undated) DEVICE — APPLICATOR PREP 10.5ML ORNG CHG 2% ISO ALC

## (undated) DEVICE — NEEDLE 18G 1-1/2 BLUNT FILL

## (undated) DEVICE — ABDOMINAL BINDER: Brand: DEROYAL

## (undated) DEVICE — CHLORAPREP ORANGE TINT 10.5ML

## (undated) DEVICE — DECANTER SPIKE TRANSFLOW

## (undated) DEVICE — DISPOSABLE BIPOLAR FORCEPS 4" (10.2CM) JEWELERS, STRAIGHT 0.4MM TIP AND 12 FT. (3.6M) CABLE: Brand: KIRWAN

## (undated) DEVICE — SOL  .9 3000ML

## (undated) DEVICE — 6 ML SYRINGE LUER-LOCK TIP: Brand: MONOJECT

## (undated) DEVICE — DERMABOND LIQUID ADHESIVE

## (undated) DEVICE — 12 ML SYRINGE LUER-LOCK TIP: Brand: MONOJECT

## (undated) DEVICE — KIT,ANTI FOG,W/SPONGE & FLUID,SOFT PACK: Brand: MEDLINE

## (undated) DEVICE — SNARE OPTMZ PLPCTM TRP

## (undated) DEVICE — SUTURE VICRYL 3-0 RB-1

## (undated) DEVICE — ZZ-CONVERTED-TO-522442- SPONGE 4X4 10PK

## (undated) DEVICE — Device

## (undated) DEVICE — LIGASURE LAP MARYLAND 37CM

## (undated) DEVICE — DISPOSABLE TOURNIQUET CUFF SINGLE BLADDER, DUAL PORT AND QUICK CONNECT CONNECTOR: Brand: COLOR CUFF

## (undated) DEVICE — BAG SRG CLR 36X30IN

## (undated) DEVICE — DRAPE SRG 150X54IN LEICA

## (undated) DEVICE — 3M™ STERI-STRIP™ REINFORCED ADHESIVE SKIN CLOSURES, R1547, 1/2 IN X 4 IN (12 MM X 100 MM), 6 STRIPS/ENVELOPE: Brand: 3M™ STERI-STRIP™

## (undated) DEVICE — DRAPE SHEET LG

## (undated) DEVICE — SYRINGE MNJCT 10ML LF STRL REG

## (undated) DEVICE — KENDALL SCD EXPRESS SLEEVES, KNEE LENGTH, MEDIUM: Brand: KENDALL SCD

## (undated) DEVICE — SET EXTN 2.7ML TBNG L20IN DIA0.100IN MACBOR

## (undated) DEVICE — OR TOWEL, 17" X 26" STERILE, BLUE: Brand: PREMIERPRO

## (undated) DEVICE — CO2 CANNULA,SSOFT,ADLT,7O2,4CO2,FEMALE: Brand: MEDLINE

## (undated) DEVICE — KIT ENDO ORCAPOD 160/180/190

## (undated) DEVICE — NEEDLE SPNL 25GA L3.5IN BLU HUB QNCKE BVL

## (undated) DEVICE — FORCEPS BX LG 2.4MM X 240CM NDL RAD JAW 4

## (undated) DEVICE — YANKAUER,BULB TIP,W/O VENT,RIGID,STERILE: Brand: MEDLINE

## (undated) DEVICE — KIT CLEAN ENDOKIT 1.1OZ GOWNX2

## (undated) DEVICE — APPLICATOR SKIN PREP 10.5ML HI LT ORNG 2% CHG

## (undated) DEVICE — TUBE SUCT STD TRNSPAR RIG W/ BLB TIP RIB 5IN1

## (undated) DEVICE — NEEDLE SPINAL 25X3-1/2 BLUE

## (undated) DEVICE — SET XTN .1IN 2.7ML 20IN IV

## (undated) DEVICE — INTENDED USE FOR SURGICAL MARKING ON INTACT SKIN, ALSO PROVIDES A PERMANENT METHOD OF IDENTIFYING OBJECTS IN THE OPERATING ROOM: Brand: WRITESITE® PLUS MINI PREP RESISTANT MARKER

## (undated) DEVICE — DISPOSABLE SUCTION/IRRIGATOR TUBE SET: Brand: AHTO

## (undated) DEVICE — MEDI-VAC NON-CONDUCTIVE SUCTION TUBING: Brand: CARDINAL HEALTH

## (undated) DEVICE — TOWEL,OR,DSP,ST,BLUE,DLX,2/PK,40PK/CS: Brand: MEDLINE

## (undated) DEVICE — UPPER EXTREMITY CDS-LF: Brand: MEDLINE INDUSTRIES, INC.

## (undated) DEVICE — 20 ML SYRINGE LUER-LOCK TIP: Brand: MONOJECT

## (undated) DEVICE — SOL  .9 1000ML BTL

## (undated) DEVICE — SOL H2O 1000ML BTL

## (undated) DEVICE — TUBING SCT CLR 6FT .25IN MDVC

## (undated) DEVICE — GLOVE SUR 7.5 SENSICARE PI PIP CRM PWD F

## (undated) DEVICE — TOWEL SURG OR 17X30IN BLUE

## (undated) DEVICE — OMNIPAQUE 240ML VIAL

## (undated) DEVICE — GAMMEX® PI HYBRID SIZE 7, STERILE POWDER-FREE SURGICAL GLOVE, POLYISOPRENE AND NEOPRENE BLEND: Brand: GAMMEX

## (undated) DEVICE — LAP CHOLE: Brand: MEDLINE INDUSTRIES, INC.

## (undated) DEVICE — BUR LEGEND MATCH 14X3 14MH30

## (undated) DEVICE — KIT ELECTROTHERAPY COOLIEF KNE

## (undated) DEVICE — SYRINGE REGULAR TIP 60ML

## (undated) DEVICE — PENCAN® 22 GA. X 3-1/2 (90 MM) SPINAL NEEDLE: Brand: PENCAN®

## (undated) DEVICE — INSUFFLATION NEEDLE TO ESTABLISH PNEUMOPERITONEUM.: Brand: INSUFFLATION NEEDLE

## (undated) DEVICE — UNDYED BRAIDED (POLYGLACTIN 910), SYNTHETIC ABSORBABLE SUTURE: Brand: COATED VICRYL

## (undated) DEVICE — FLEXIBLE ADHESIVE BANDAGE: Brand: CURITY

## (undated) DEVICE — NEEDLE BLNT 18GA L1.5IN FILL DISP PRECISGLDE

## (undated) DEVICE — REM POLYHESIVE ADULT PATIENT RETURN ELECTRODE: Brand: VALLEYLAB

## (undated) DEVICE — GAMMEX® PI HYBRID SIZE 7.5, STERILE POWDER-FREE SURGICAL GLOVE, POLYISOPRENE AND NEOPRENE BLEND: Brand: GAMMEX

## (undated) DEVICE — STRATAFIX

## (undated) DEVICE — 3M™ TEGADERM™ +PAD FILM DRESSING WITH NON-ADHERENT PAD, 3587, 3-1/2 IN X 4-1/8 IN (9 CM X 10.5 CM), 25/CAR, 4 CAR/CS: Brand: 3M™ TEGADERM™

## (undated) DEVICE — TROCAR: Brand: KII FIOS FIRST ENTRY

## (undated) DEVICE — NEEDLE HPO 18GA 1.5IN ECLPS

## (undated) DEVICE — TUBING SUCT L12FT DIA6MM CLR N CNDTVE W/ MAXI

## (undated) DEVICE — MEDI-VAC NON-CONDUCTIVE SUCTION TUBING 6MM X 1.8M (6FT.) L: Brand: CARDINAL HEALTH

## (undated) DEVICE — GOWN,SIRUS,FABRNF,XL,20/CS: Brand: MEDLINE

## (undated) DEVICE — [HIGH FLOW INSUFFLATOR,  DO NOT USE IF PACKAGE IS DAMAGED,  KEEP DRY,  KEEP AWAY FROM SUNLIGHT,  PROTECT FROM HEAT AND RADIOACTIVE SOURCES.]: Brand: PNEUMOSURE

## (undated) DEVICE — NEEDLE SPNL 22GA L3.5IN PP ATRAUM TIP PENCAN

## (undated) DEVICE — DEVICE FIX.SECURESTRAP 5MM

## (undated) DEVICE — STANDARD HYPODERMIC NEEDLE,POLYPROPYLENE HUB: Brand: MONOJECT

## (undated) DEVICE — 3M™ IOBAN™ 2 ANTIMICROBIAL INCISE DRAPE 6651EZ: Brand: IOBAN™ 2

## (undated) DEVICE — NEEDLE SPNL 22GA L5IN BLK HUB QNCKE BVL DISP

## (undated) DEVICE — BITE BLK L LUMN SZ 20X27MM GRN PLAS FLX SIDE

## (undated) DEVICE — AGENT HMST STRL KT MTRX THRMB

## (undated) DEVICE — C-ARM: Brand: UNBRANDED

## (undated) DEVICE — 3.0MM PRECISION NEURO (MATCH HEAD)

## (undated) DEVICE — CERVICAL CDS: Brand: MEDLINE INDUSTRIES, INC.

## (undated) DEVICE — LOW PROFILE (LP) BLADE ASSEMBLY 6PK: Brand: MICROAIRE®

## (undated) DEVICE — SOLUTION SURG DURA PREP HAZMAT

## (undated) DEVICE — PIN DSTRCT 14MM

## (undated) DEVICE — APPLICATOR CHLORAPREP 10.5ML

## (undated) DEVICE — STANDARD HYPODERMIC NEEDLE,ALUMINUM HUB: Brand: MONOJECT

## (undated) DEVICE — STERILE LATEX POWDER-FREE SURGICAL GLOVESWITH NITRILE COATING: Brand: PROTEXIS

## (undated) DEVICE — CAUTERY BLADE 2IN INS E1455

## (undated) DEVICE — MINI-BLADE®: Brand: BEAVER®

## (undated) DEVICE — GLOVE SUR 7.5 SENSICARE PI PIP GRN PWD F

## (undated) DEVICE — SPONGE GZ 4XL4IN 100% COT 12 PLY TYP VII WVN

## (undated) DEVICE — NEEDLE SPINAL 22X5 405148

## (undated) DEVICE — GLOVE SUR 7 SENSICARE PI PIP CRM PWD F

## (undated) DEVICE — PAD GROUNDING ELECTROSURGICAL

## (undated) DEVICE — NEEDLE HYPO 18GA L1.5IN PNK HUB PVT SHLD BVL

## (undated) DEVICE — SNARE POLYP 10MM X 230MM CLD OVL ROT W/ 2.8MM

## (undated) DEVICE — ANTISEPTIC 4OZ 70% ISO ALC

## (undated) DEVICE — SYRINGE, LUER SLIP, STERILE, 60ML: Brand: MEDLINE

## (undated) DEVICE — GAUZE SPONGES,12 PLY: Brand: CURITY

## (undated) DEVICE — SUTURE VICRYL 0 J906G

## (undated) DEVICE — CANNULA NASAL ADULT PIGTAIL L7

## (undated) DEVICE — SOLUTION IRRIG 1000ML 0.9% NACL USP BTL

## (undated) DEVICE — SUT MCRYL 5-0 18IN P-3 ABSRB UD 13MM 3/8 CIR

## (undated) DEVICE — MAXCESS C MODULE

## (undated) DEVICE — SPINOCAN® 22 GA. X 3-1/2 IN. (90 MM) SPINAL NEEDLE: Brand: SPINOCAN®

## (undated) DEVICE — CONMED SCOPE SAVER BITE BLOCK, 20X27 MM: Brand: SCOPE SAVER

## (undated) DEVICE — 3M™ STERI-DRAPE™ INSTRUMENT POUCH 1018: Brand: STERI-DRAPE™

## (undated) DEVICE — TROCAR: Brand: KII® SLEEVE

## (undated) NOTE — LETTER
[facility]  Administration of Anesthesia  I, Ran Carter agree to be cared for by a physician anesthesiologist alone and/or with a nurse anesthetist, who is specially trained to monitor me and give me medicine to put me to sleep or keep me comfortable during my procedure    I understand that my anesthesiologist and/or anesthetist is not an employee or agent of [facility] or [employer]. He or she works for [facility]    As the patient asking for anesthesia services, I agree to:  Allow the anesthesiologist (anesthesia doctor) to give me medicine and do additional procedures as necessary. Some examples are: Starting or using an “IV” to give me medicine, fluids or blood during my procedure, and having a breathing tube placed to help me breathe when I’m asleep (intubation). In the event that my heart stops working properly, I understand that my anesthesiologist will make every effort to sustain my life, unless otherwise directed by [facility] Do Not Resuscitate documents.  Tell my anesthesia doctor before my procedure:  If I am pregnant.  The last time that I ate or drank.  iii. All of the medicines I take (including prescriptions, herbal supplements, and pills I can buy without a prescription (including street drugs/illegal medications). Failure to inform my anesthesiologist about these medicines may increase my risk of anesthetic complications.  iv.If I am allergic to anything or have had a reaction to anesthesia before.  I understand how the anesthesia medicine will help me (benefits).  I understand that with any type of anesthesia medicine there are risks:  The most common risks are: nausea, vomiting, sore throat, muscle soreness, damage to my eyes, mouth, or teeth (from breathing tube placement).  Rare risks include: remembering what happened during my procedure, allergic reactions to medications, injury to my airway, heart, lungs, vision,  nerves, or muscles and in extremely rare instances death.  My doctor has explained to me other choices available to me for my care (alternatives).  Pregnant Patients (“epidural”):  I understand that the risks of having an epidural (medicine given into my back to help control pain during labor), include itching, low blood pressure, difficulty urinating, headache or slowing of the baby’s heart. Very rare risks include infection, bleeding, seizure, irregular heart rhythms and nerve injury.  Regional Anesthesia (“spinal”, “epidural”, & “nerve blocks”):  I understand that rare but potential complications include headache, bleeding, infection, seizure, irregular heart rhythms, and nerve injury.    _____________________________________________________________________________  Patient (or Representative) Signature/Relationship to Patient  Date   Time    _____________________________________________________________________________   Name (if used)    Language/Organization   Time    _____________________________________________________________________________  Nurse Anesthetist Signature     Date   Time  _____________________________________________________________________________  Anesthesiologist Signature     Date   Time  I have discussed the procedure and information above with the patient (or patient’s representative) and answered their questions. The patient or their representative has agreed to have anesthesia services.    _____________________________________________________________________________  Witness        Date   Time  I have verified that the signature is that of the patient or patient’s representative, and that it was signed before the procedure  Patient Name: Ran Carter     : 1970                 Printed: 2024 at 8:26 AM    Medical Record #: [id number]                                            Page 1 of 1  ----------ANESTHESIA CONSENT----------

## (undated) NOTE — LETTER
3/6/2025      Juan A Mcgee MD  Physical Medicine and Rehabilitation  [address] [place] [address], [address]  [facility] [address]  Dept: [phone]  Dept Fax: [phone]        RE: Consultation for Ran Carter        Dear Todd Rivas MD,    Thank you very much for the opportunity to see your patient.  Attached please find a summary from your patient's recent visit.     I appreciate the chance to take care of your patient with you.  Please feel free to call me with any questions or concerns.    Sincerely,        Juan A Mcgee MD  Electronically Signed on 3/6/2025

## (undated) NOTE — LETTER
5/12/2022      Fide Alejo MD  Physical Medicine and Rehabilitation  [facility], [address] [place] [address]  Dept: [phone]  Dept Fax: [phone]        RE: Consultation for Ade Henry        Dear Ritika More MD,    Thank you very much for the opportunity to see your patient. Attached please find a summary from your patient's recent visit. I appreciate the chance to take care of your patient with you. Please feel free to call me with any questions or concerns. Sincerely,        Pepe Cody.  Jasvir Alejo MD  Electronically Signed on 5/12/2022

## (undated) NOTE — LETTER
2/8/2024      Juan A Mcgee MD  Physical Medicine and Rehabilitation  [address] [place] [address], [address]  [facility] [address]  Dept: [phone]  Dept Fax: [phone]        RE: Consultation for Ran Carter        Dear Todd Rivas MD,    Thank you very much for the opportunity to see your patient.  Attached please find a summary from your patient's recent visit.     I appreciate the chance to take care of your patient with you.  Please feel free to call me with any questions or concerns.    Sincerely,        Juan A Mcgee MD  Electronically Signed on 2/8/2024

## (undated) NOTE — LETTER
8/2/2023      Adam Gonzalez MD  Physical Medicine and Rehabilitation  [facility], [address] [place] [address]  Dept: [phone]  Dept Fax: [phone]        RE: Consultation for Katelin Bean        Dear Saul Granda MD,    Thank you very much for the opportunity to see your patient. Attached please find a summary from your patient's recent visit. I appreciate the chance to take care of your patient with you. Please feel free to call me with any questions or concerns. Sincerely,        Vanessa Hyman.  Myesha Gonzalez MD  Electronically Signed on 8/2/2023

## (undated) NOTE — LETTER
[facility] [address], [place], [place]    Authorization for Surgical Operation and Procedure                               I hereby authorize Amy Means MD, my physician and his/her assistants (if applicable), which may include medical students, residents, and/or fellows, to perform the following surgical operation/ procedure and administer such anesthesia as may be determined necessary by my physician: Operation/Procedure name (s) ESOPHAGOGASTRODUODENOSCOPY on Ran Carter   2.   I recognize that during the surgical operation/procedure, unforeseen conditions may necessitate additional or different procedures than those listed above.  I, therefore, further authorize and request that the above-named surgeon, assistants, or designees perform such procedures as are, in their judgment, necessary and desirable.    3.   My surgeon/physician has discussed prior to my surgery the potential benefits, risks and side effects of this procedure; the likelihood of achieving goals; and potential problems that might occur during recuperation.  They also discussed reasonable alternatives to the procedure, including risks, benefits, and side effects related to the alternatives and risks related to not receiving this procedure.  I have had all my questions answered and I acknowledge that no guarantee has been made as to the result that may be obtained.    4.   Should the need arise during my operation/procedure, which includes change of level of care prior to discharge, I also consent to the administration of blood and/or blood products.  Further, I understand that despite careful testing and screening of blood or blood products by collecting agencies, I may still be subject to ill effects as a result of receiving a blood transfusion and/or blood products.  The following are some, but not all, of the potential risks that can occur: fever and allergic reactions, hemolytic reactions,  transmission of diseases such as Hepatitis, AIDS and Cytomegalovirus (CMV) and fluid overload.  In the event that I wish to have an autologous transfusion of my own blood, or a directed donor transfusion, I will discuss this with my physician.  Check only if Refusing Blood or Blood Products  I understand refusal of blood or blood products as deemed necessary by my physician may have serious consequences to my condition to include possible death. I hereby assume responsibility for my refusal and release the hospital, its personnel, and my physicians from any responsibility for the consequences of my refusal.    o  Refuse   5.   I authorize the use of any specimen, organs, tissues, body parts or foreign objects that may be removed from my body during the operation/procedure for diagnosis, research or teaching purposes and their subsequent disposal by hospital authorities.  I also authorize the release of specimen test results and/or written reports to my treating physician on the hospital medical staff or other referring or consulting physicians involved in my care, at the discretion of the Pathologist or my treating physician.    6.   I consent to the photographing or videotaping of the operations or procedures to be performed, including appropriate portions of my body for medical, scientific, or educational purposes, provided my identity is not revealed by the pictures or by descriptive texts accompanying them.  If the procedure has been photographed/videotaped, the surgeon will obtain the original picture, image, videotape or CD.  The hospital will not be responsible for storage, release or maintenance of the picture, image, tape or CD.    7.   I consent to the presence of a  or observers in the operating room as deemed necessary by my physician or their designees.    8.   I recognize that in the event my procedure results in extended X-Ray/fluoroscopy time, I may develop a skin reaction.    9. If  I have a Do Not Attempt Resuscitation (DNAR) order in place, that status will be suspended while in the operating room, procedural suite, and during the recovery period unless otherwise explicitly stated by me (or a person authorized to consent on my behalf). The surgeon or my attending physician will determine when the applicable recovery period ends for purposes of reinstating the DNAR order.  10. Patients having a sterilization procedure: I understand that if the procedure is successful the results will be permanent and it will therefore be impossible for me to inseminate, conceive, or bear children.  I also understand that the procedure is intended to result in sterility, although the result has not been guaranteed.   11. I acknowledge that my physician has explained sedation/analgesia administration to me including the risk and benefits I consent to the administration of sedation/analgesia as may be necessary or desirable in the judgment of my physician.    I CERTIFY THAT I HAVE READ AND FULLY UNDERSTAND THE ABOVE CONSENT TO OPERATION and/or OTHER PROCEDURE.     ____________________________________  _________________________________        ______________________________  Signature of Patient    Signature of Responsible Person                Printed Name of Responsible Person                                      ____________________________________  _____________________________                ________________________________  Signature of Witness        Date  Time         Relationship to Patient    STATEMENT OF PHYSICIAN My signature below affirms that prior to the time of the procedure; I have explained to the patient and/or his/her legal representative, the risks and benefits involved in the proposed treatment and any reasonable alternative to the proposed treatment. I have also explained the risks and benefits involved in refusal of the proposed treatment and alternatives to the proposed treatment and have  answered the patient's questions. If I have a significant financial interest in a co-management agreement or a significant financial interest in any product or implant, or other significant relationship used in this procedure/surgery, I have disclosed this and had a discussion with my patient.     _____________________________________________________              _____________________________  (Signature of Physician)                                                                                         (Date)                                   (Time)  Patient Name: Ran Carter      : 1970      Printed: 2024     Medical Record #: [id number]                                      Page 1 of 1

## (undated) NOTE — Clinical Note
Dr. Matilde Soares -- please review and sign pended script for testosterone injection to be done at home per plan. Thank you.

## (undated) NOTE — LETTER
OPIOID TREATMENT AGREEMENT    For Pain Management      Please read each statement, initial at the bottom of each page, and sign the last page to indicate your agreement with this form. If you have any questions about any information in this form or the opioid treatment plan, please request immediate clarification from your physician or health care provider.     The purpose of this agreement is to give you information about the medications you will be taking for pain management and to assure that you and your physician/health care provider comply with state and federal regulations concerning the prescribing of controlled substances. A trial of opioid therapy can be considered for moderate to severe pain with the intent of reducing pain and increasing function. The physician’s goal is for you to have the best quality of life possible given the reality of your clinical condition. The success of treatment depends on mutual trust and honesty in the physician/patient relationship and full agreement and understanding of the risks and benefits of using opioids to treat pain.    I agree to use opioids (morphine-like drugs) as part of my treatment for chronic pain.  I understand that these drugs can be effective, but have a high potential for misuse and are therefore closely controlled by the local, state, and federal government. I understand that my physician/health care provider is prescribing such medication to help manage my pain, and I agree to the following conditions as part of my treatment plan    I am responsible for my pain medications.  I agree to take the medication only as prescribed.    I understand that increasing my dose without the close supervision of my physician could    lead to drug overdose causing severe sedation and respiratory depression and death.    I understand that decreasing or stopping my medication without the close supervision of my physician can lead to withdrawal. Withdrawal symptoms can  include yawning, sweating, watery eyes, runny nose, anxiety, tremors, aching muscles, hot and cold flashes, “goose bumps”, abdominal cramps, and diarrhea.  These symptoms can occur 24-48 hours after the last dose and can last up to 3 weeks.    I will not request or accept controlled substance medication from any other physician or individual while I am receiving such medication from my physician/health care provider at the clinic.    I acknowledge that there are side effects with opioid therapy, and I understand it is my responsibility to notify my physician/health care provider for any side effect that continue or are severe (i.e., difficulty breathing, slow heart rate, sedation, confusion).  I am also responsible for notifying my pain physician immediately if I need to visit another physician or need to visit an emergency room due to pain, of if I become pregnant.    I understand that the opioid medication is strictly for my own use and I agree not to give or sell my medication to others because it may endanger another person’s health and is against the law.    I will inform my physician of all medications I am taking, including herbal remedies.  Medications like Valium or Ativan; sedatives such as Soma, Xanax, Fiorinal; antihistamines like Benadryl; herbal remedies, alcohol, and cough syrup containing alcohol, codeine, or hydrocodone can interact with opioids and produce serious side effects.    I understand I will be expected to return to the clinic as instructed by my provider during the time any of my medication is being adjusted.    I understand that any evidence of drug hoarding, acquisition of any opioid medication or adjunctive analgesia from other physicians (which includes emergency rooms), uncontrolled dose escalation or reduction, loss of prescriptions or failure to follow agreement may result in change to the treatment plan, referral to the Medication Assisted Therapy Program, an may result in  termination of the physician/patient relationship.    I will not use any illicit substances, such as cocaine, marijuana, etc. while taking these medications.  I understand that use of any illicit substances while taking these medications may result in a change to my treatment plan, referral to the [facility]ication [facility] Therapy Clinic, and may result in termination of the physician/patient relationship.    I understand that I should not consume alcohol while taking these medications because the use of alcohol together with opioid medications is warned against.    I am responsible for my opioid prescriptions.  I understand that:    Refill prescriptions can be written for a one month supply and increased to a maximum of two months at the discretion of the provider.    It is my responsibility to schedule appointments for the next opioid refill to ensure I do not run out of medications.    I am responsible for keeping my prescriptions and pain medications in a safe and secure place, such as a locked cabinet or safe.  I am expected to protect my medications from loss or theft.  I am responsible for taking the medication in the dose prescribed and for keeping track of the amount remaining.  If my medication is stolen, I will report this to my local police department and obtain a stolen item report.  I will then report the stolen medication to my physician.  If my medications are lost, misplaced, or stolen, my physician may choose not to replace the medications.    Refills issued by physicians/health care providers in this clinic can only be filled by a pharmacy in the State of [place], even if I am a resident of another state.    Prescriptions for pain medicine or any other prescriptions will be written only during an office visit or during regular office hours.  No refills of any medications during the evening or on weekends.    I must bring back all opioid medications and adjunctive medications prescribed by my physician  in the original containers/bottles at every visit.    Prescriptions will not be written in advance due to vacations, meetings, or other commitments.    If an appointment for a prescription refill is missed, another appointment will be made as soon as possible.  Immediate or emergency appointments will not be possible.    I understand while physical dependence is to be expected after long-term use of opioids, signs of addiction, abuse, or misuse shall prompt the need for substance dependence treatment as well as weaning and detoxification from the opioids.  I further understand the following:    Physical dependence is common to many drugs such as blood pressure medications, anti-seizure medications, and opioids.  It results in biochemical changes such that abruptly stopping these drugs will cause a withdrawal response.  It should be noted that physical dependence does not equal addiction.  A person can be dependent on insulin to treat diabetes or dependent on prednisone (steroids) to treat asthma, but not addicted to the insulin or prednisone.    Addiction is a primary, chronic neurobiologic disease with genetic, psychosocial and environmental factors influencing its development and manifestation.  It is characterized by behavior that includes one or more of the following: impaired control over drug use, compulsive use, continued use despite harm, and cravings.  This means the drug decreases a person’s quality of life.  If a patient exhibits such behavior, the drug will be tapered and the patient will not be a candidate for an opioid trial.  He/she will be referred to an addiction medicine specialist.    Tolerance means a state of adaption in which exposure to the drug induces changes that result in a lessening of one or more of the drug’s effects over time.  The dose of the opioid may have to be adjusted up or down to a dose that produces maximum function and a realistic decrease of the patient’s pain.    If it  appears to my physician/health care provider that there is no improvement in my daily function or quality of life from the controlled substance, I understand my opioids may be discontinued.    If I have a history of alcohol or drug misuse/addiction, I must notify the physician of such history since the treatment with opioids for may increase the possibility of relapse.    I agree and understand that my physician reserves the right to perform random or unannounced urine drug testing.  If requested to provide a urine sample, I agree to cooperate.  If I decide not to provide a urine sample, I understand that my physician may change my treatment plan, including discontinuation of my opioid medications when applicable or complete termination of the physician/patient relationship.  The presence of non-prescribed drug(s) or illicit drug(s) in the urine can be grounds for termination of the physician/patient relationship.  Urine drug testing is not forensic testing, but is done for my benefit as a diagnostic tool and in accordance with certain legal and regulatory guidance on the use of controlled substances to treat pain.    I agree to allow my physician/meera care provider to contact any health care professional, including behavioral health, family member, pharmacy, legal authority, or regulator agency to obtain or provide information about my care or actions if the physician feels it is necessary.    I understand that non-compliance with the above conditions may result in a re-evaluation of my treatment plan, referral to the [facility] Therapy [facility], discontinuation of opioid therapy, and possible discharge from the clinic.    I agree to work closely with my physician/health care provider to assure the agreed plan of care is followed.    I acknowledge that I have received a copy of this agreement for my records.          I __________________________________________ have read the above information or it has been  read to me.  All my questions regarding the treatment of pain with opioids have been answered to my satisfaction, and I understand all of the conditions of my participation in the opioid treatment plan listed above.  I hereby agree to the conditions listed about and consent to participate in the opioid medication therapy.        ______________________________    ____________    ______________________________              Patient Signature                                  Date                        Patient Printed Name  ______________________________    ____________    ______________________________              Witness Signature                                Date                       Witness Printed Name

## (undated) NOTE — LETTER
Date & Time: 10/7/2024, 8:31 AM  Patient: Ran Carter  Encounter Provider(s):    Rose Rowell MD       To Whom It May Concern:    Ran Carter was seen and treated in our department on 10/7/2024. He should not return to work until 10/9/2024 .    If you have any questions or concerns, please do not hesitate to call.        _____________________________  Physician/APC Signature

## (undated) NOTE — LETTER
Hospital Discharge Documentation  Please phone to schedule a hospital follow up appointment. No discharge summary available at this time, below is the most recent progress note  for your review .       From: [address] [facility]  Phone: [phone] LAP INCSIONS, HYPOACTIVE BOWEL SOUNDS. Musculoskeletal: Normal range of motion. Neurological: He is alert and oriented to person, place, and time. No cranial nerve deficit, sensory deficit or motor deficit. Skin: Skin is warm and dry.    Psychiatric:

## (undated) NOTE — LETTER
April 30, 2024      No Recipients     Patient: Ran Carter   YOB: 1970   Date of Visit: 4/30/2024       Dear Dr. Gayle Recipients:    Thank you for referring Ran Carter to me for evaluation. Here is my assessment and plan of care:    Ran Carter is a 53 year old male.    HPI:     HPI    NP/Pt. Here for DM eye exam.   Pt. C/O occasional blurry vision, specially after prolong computer work and eyes feel tired.   States post shower or when tired the eyes turn blood shot red, for which he uses OTC AT's as needed.   C/O glare, that is making night driving difficult. He use to wear bifocals that he lost few months ago, states he was never satisfied with the glasses.   No H/O eye Sx/ trauma.   Last DFE 2 years old.     Pt has been a diabetic for 1 year     Pt's diabetes is currently controlled by pills only (from today)   Pt checks BS 2 times a week   Pt's last blood sugar was  100 2 days ago.   Last HA1C was 5.6 on 04/08/24  Endocrinologist: none       Last edited by Sophy Oneil OT on 4/30/2024  9:29 AM.        Patient History:  Past Medical History:    Back problem    Chronic neck pain    Contusion of cervical cord (HCC)    Diabetes (HCC)    Migraines    Went to emergency care clinic and suggested to get an MRI done because I've been dealing with a bad migraine for over a week straight today being a 10 from 1-10 10,being the worse    Primary hypertension    Visual impairment    readers       Surgical History: Ran Carter has a past surgical history that includes knee surgery (2008) (RIGHT ACL REPAIR); ct cervical spine (c3 & 4 ); hernia surgery (2005) (hiatal hernia); and colonoscopy (N/A, 2/7/2024) (Procedure: COLONOSCOPY;  Surgeon: Amy Means MD;  Location: [facility] ENDOSCOPY).    Family History   Problem Relation Age of Onset    No Known Problems Mother     No Known Problems Sister     No Known Problems Brother     Heart Disorder Paternal  Grandmother     Diabetes Paternal Grandmother     Cancer Paternal Grandmother     Glaucoma Neg     Macular degeneration Neg        Social History:   Social History     Socioeconomic History    Marital status:    Tobacco Use    Smoking status: Never    Smokeless tobacco: Never   Vaping Use    Vaping status: Never Used   Substance and Sexual Activity    Alcohol use: Not Currently     Alcohol/week: 1.0 standard drink of alcohol     Comment: Did social    Drug use: Never   Other Topics Concern    Caffeine Concern Yes    Exercise No   Social History Narrative    The patient does not use an assistive device..      The patient does live in a home with stairs.       Medications:  Current Outpatient Medications   Medication Sig Dispense Refill    pantoprazole 40 MG Oral Tab EC Take 1 tablet (40 mg total) by mouth every morning before breakfast. 90 tablet 3    HYDROcodone-acetaminophen  MG Oral Tab Take 1 tablet by mouth every 4-6 hours PRN pain (max 5 tabs in 24 hours). 150 tablet 0    SITagliptin Phosphate (JANUVIA) 100 MG Oral Tab Take 1 tablet (100 mg total) by mouth daily. 90 tablet 1    amoxicillin 500 MG Oral Cap  (Patient not taking: Reported on 4/8/2024)      FLOWFLEX COVID-19 AG HOME TEST In Vitro Kit  (Patient not taking: Reported on 4/8/2024)       MG Oral Tab  (Patient not taking: Reported on 4/8/2024)      losartan 50 MG Oral Tab Take 1 tablet (50 mg total) by mouth daily. 90 tablet 0    semaglutide (OZEMPIC, 0.25 OR 0.5 MG/DOSE,) 2 MG/3ML Subcutaneous Solution Pen-injector Inject 0.5 mg into the skin once a week. 1 each 12    Tadalafil 10 MG Oral Tab Take 1 tablet (10 mg total) by mouth daily as needed for Erectile Dysfunction. 30 tablet 0    testosterone cypionate 200 mg/mL Intramuscular Solution Inject 0.75 mL (150 mg total) into the muscle every 14 (fourteen) days. (Patient not taking: Reported on 4/8/2024) 4.5 mL 0    Insulin Pen Needle (PEN NEEDLES) 32G X 4 MM Does not apply Misc 1  each daily. (Patient not taking: Reported on 4/8/2024) 90 each 0    naproxen 500 MG Oral Tab EC Take 1 tablet (500 mg total) by mouth 2 (two) times daily with meals. (Patient not taking: Reported on 4/8/2024) 60 tablet 2    Syringe/Needle, Disp, 27G X 1/2\" 1 ML Does not apply Misc Inject testosterone every two weeks (Patient not taking: Reported on 4/8/2024) 50 each 0    Liraglutide (VICTOZA) 18 MG/3ML Subcutaneous Solution Pen-injector Inject 1.8 mg into the skin daily. 27 mL 0    Syringe 22G X 1\" 3 ML Does not apply Misc Inject testosterone every 2 weeks (Patient not taking: Reported on 1/26/2024) 50 each 0    Blood Glucose Monitoring Suppl (FREESTYLE LITE) Does not apply Device 1 Device by Other route 2 (two) times daily. Use as directed. (Patient not taking: Reported on 1/26/2024) 1 each 3    Glucose Blood (ONETOUCH VERIO) In Vitro Strip Test blood sugar twice daily. (Patient not taking: Reported on 1/26/2024) 200 strip 3    HYDROcodone-acetaminophen 7.5-325 MG Oral Tab Take 1 tablet by mouth every 4-6 hours PRN pain (max 5 tabs in 24 hours). (Patient not taking: Reported on 1/26/2024) 150 tablet 0    Lancets Does not apply Misc Check bid (Patient not taking: Reported on 1/26/2024) 200 each 1    Glucose Blood (FREESTYLE LITE TEST) In Vitro Strip Check bid (Patient not taking: Reported on 1/26/2024) 100 strip 0    Insulin Pen Needle (PEN NEEDLES) 32G X 4 MM Does not apply Misc 1 each daily. (Patient not taking: Reported on 1/26/2024) 90 each 0    metFORMIN  MG Oral Tablet 24 Hr  (Patient not taking: Reported on 12/28/2023)      Dulaglutide (TRULICITY) 0.75 MG/0.5ML Subcutaneous Solution Pen-injector Inject 0.75 mg into the skin once a week. (Patient not taking: Reported on 12/28/2023) 6 mL 0    meclizine 25 MG Oral Tab Take 1 tablet (25 mg total) by mouth 3 (three) times daily as needed. 30 tablet 0    multiple vitamin Oral Chew Tab Chew 1 tablet by mouth daily. (Patient not taking: Reported on 1/26/2024)          Allergies:  No Known Allergies    ROS:       PHYSICAL EXAM:     Base Eye Exam       Visual Acuity (Snellen - Linear)         Right Left    Dist sc 20/25 20/25 +1    Dist ph sc 20/20 20/20    Near cc 20/25 20/30              Tonometry (Applanation, 9:02 AM)         Right Left    Pressure 15 15              Pupils         Pupils    Right PERRL    Left PERRL              Visual Fields         Left Right     Full Full              Extraocular Movement         Right Left     Full Full              Dilation       Both eyes: 1.0% Mydriacyl and 2.5% Lavelle Synephrine @ 9:02 AM              Dilation #2       Both eyes: 1.0% Mydriacyl and 2.5% Lavelle Synephrine @ 9:02 AM                  Slit Lamp and Fundus Exam       Slit Lamp Exam         Right Left    Lids/Lashes Dermatochalasis, Meibomian gland dysfunction Dermatochalasis, Meibomian gland dysfunction    Conjunctiva/Sclera Nasal/temp pinguecula Nasal/temp pinguecula    Cornea Clear Clear    Anterior Chamber Deep and quiet Deep and quiet    Iris Normal Normal    Lens Clear Clear    Vitreous Clear Clear              Fundus Exam         Right Left    Disc Good rim, Temporal crescent Good rim, Temporal crescent    C/D Ratio 0.4 0.4    Macula Normal-no BDR Normal-no BDR    Vessels Normal Normal    Periphery Normal Normal                  Lacrimal Exam       Schirmers         Right Left     06 mm 03 mm      Anesthesia: Yes                  Refraction       Wearing Rx         Sphere Cylinder Axis    Right +1.00 +0.25 172    Left +1.25        Age: 5yrs    Type: Reading only              Manifest Refraction (Auto)         Sphere Cylinder [place] Dist VA Add Near VA    Right -0.50 +0.75 170       Left -0.25 +0.50 010                 Manifest Refraction #2         Sphere Cylinder [place] Dist VA Add Near VA    Right -0.50 +0.75 180 20/20 +2.00 20/20    Left -0.25 +0.50 010 20/20 +2.00 20/20              Manifest Refraction Comments    Pt. Requesting for bifocals.              Final Rx          Sphere Cylinder [place] Dist VA Add Near VA    Right -0.50 +0.75 180 20/20 +2.00 20/20    Left -0.25 +0.50 010 20/20 +2.00 20/20      Type: Flat top bifocal                     ASSESSMENT/PLAN:     Diagnoses and Plan:     Controlled type 2 diabetes mellitus without complication, without long-term current use of insulin (HCC)  Diabetes type II: no background of retinopathy, no signs of neovascularization noted.  Discussed ocular and systemic benefits of blood sugar control.  Diagnosis and treatment discussed in detail with patient.    Will see patient in 1 year for a diabetic exam    Dry eye syndrome of both eyes  Schirmer's test today revealed dry eyes.  Patient was instructed to use warm compresses to the eyelids twice a day everyday.    Instructions for warm compress use:   Patient should place wash compresses on both eyelids for 5 minutes every morning and every night.  After 5 minutes of holding the warm compresses on the eyelids, patient should gently rub the eyelashes and then rinse thoroughly with warm water.   Patient should also use artificial tears (any over the counter brand is okay) up to 4-6  times per day as needed for dry eye symptoms.        No orders of the defined types were placed in this encounter.      Meds This Visit:  Requested Prescriptions      No prescriptions requested or ordered in this encounter        Follow up instructions:  Return in about 1 year (around 4/30/2025) for Diabetic eye exam.    4/30/2024  Scribed by: Ivan Looney MD        If you have questions, please do not hesitate to call me. I look forward to following Ran along with you.    Sincerely,        Ivan Looney MD        CC:   No Recipients    Document electronically generated by: Ivan Looney MD

## (undated) NOTE — LETTER
[facility]  Administration of Anesthesia  I, Ran Carter agree to be cared for by a physician anesthesiologist alone and/or with a nurse anesthetist, who is specially trained to monitor me and give me medicine to put me to sleep or keep me comfortable during my procedure    I understand that my anesthesiologist and/or anesthetist is not an employee or agent of [facility] or [employer]. He or she works for [facility]    As the patient asking for anesthesia services, I agree to:  Allow the anesthesiologist (anesthesia doctor) to give me medicine and do additional procedures as necessary. Some examples are: Starting or using an “IV” to give me medicine, fluids or blood during my procedure, and having a breathing tube placed to help me breathe when I’m asleep (intubation). In the event that my heart stops working properly, I understand that my anesthesiologist will make every effort to sustain my life, unless otherwise directed by [facility] Do Not Resuscitate documents.  Tell my anesthesia doctor before my procedure:  If I am pregnant.  The last time that I ate or drank.  iii. All of the medicines I take (including prescriptions, herbal supplements, and pills I can buy without a prescription (including street drugs/illegal medications). Failure to inform my anesthesiologist about these medicines may increase my risk of anesthetic complications.  iv.If I am allergic to anything or have had a reaction to anesthesia before.  I understand how the anesthesia medicine will help me (benefits).  I understand that with any type of anesthesia medicine there are risks:  The most common risks are: nausea, vomiting, sore throat, muscle soreness, damage to my eyes, mouth, or teeth (from breathing tube placement).  Rare risks include: remembering what happened during my procedure, allergic reactions to medications, injury to my airway, heart, lungs, vision,  nerves, or muscles and in extremely rare instances death.  My doctor has explained to me other choices available to me for my care (alternatives).  Pregnant Patients (“epidural”):  I understand that the risks of having an epidural (medicine given into my back to help control pain during labor), include itching, low blood pressure, difficulty urinating, headache or slowing of the baby’s heart. Very rare risks include infection, bleeding, seizure, irregular heart rhythms and nerve injury.  Regional Anesthesia (“spinal”, “epidural”, & “nerve blocks”):  I understand that rare but potential complications include headache, bleeding, infection, seizure, irregular heart rhythms, and nerve injury.    _____________________________________________________________________________  Patient (or Representative) Signature/Relationship to Patient  Date   Time    _____________________________________________________________________________   Name (if used)    Language/Organization   Time    _____________________________________________________________________________  Nurse Anesthetist Signature     Date   Time  _____________________________________________________________________________  Anesthesiologist Signature     Date   Time  I have discussed the procedure and information above with the patient (or patient’s representative) and answered their questions. The patient or their representative has agreed to have anesthesia services.    _____________________________________________________________________________  Witness        Date   Time  I have verified that the signature is that of the patient or patient’s representative, and that it was signed before the procedure  Patient Name: Ran Carter     : 1970                 Printed: 2024 at 11:15 AM    Medical Record #: [id number]                                            Page 1 of 1  ----------ANESTHESIA CONSENT----------

## (undated) NOTE — ED AVS SNAPSHOT
Josh Carlton   MRN: [id number]    Department:  [facility] Emergency Department   Date of Visit:  7/22/2018           Disclosure     Insurance plans vary and the physician(s) referred by the ER may not be covered by your plan.  Please contact CARE PHYSICIAN AT ONCE OR RETURN IMMEDIATELY TO THE EMERGENCY DEPARTMENT. If you have been prescribed any medication(s), please fill your prescription right away and begin taking the medication(s) as directed.   If you believe that any of the medications

## (undated) NOTE — Clinical Note
Thank you for the consult. I saw Mr. Nicole Burgess in the endocrine/diabetes clinic today. Please see attached my note. Please feel free to contact me with any questions. Thanks!

## (undated) NOTE — LETTER
[facility] [address], [place], [place]  Authorization for Surgical Operation and Procedure                                                                                           I hereby authorize Amy Means MD, my physician and his/her assistants (if applicable), which may include medical students, residents, and/or fellows, to perform the following surgical operation/ procedure and administer such anesthesia as may be determined necessary by my physician: Operation/Procedure name (s) COLONOSCOPY / ESOPHAGOGASTRODUODENOSCOPY on Ran Luis M Carter   2.   I recognize that during the surgical operation/procedure, unforeseen conditions may necessitate additional or different procedures than those listed above.  I, therefore, further authorize and request that the above-named surgeon, assistants, or designees perform such procedures as are, in their judgment, necessary and desirable.    3.   My surgeon/physician has discussed prior to my surgery the potential benefits, risks and side effects of this procedure; the likelihood of achieving goals; and potential problems that might occur during recuperation.  They also discussed reasonable alternatives to the procedure, including risks, benefits, and side effects related to the alternatives and risks related to not receiving this procedure.  I have had all my questions answered and I acknowledge that no guarantee has been made as to the result that may be obtained.    4.   Should the need arise during my operation/procedure, which includes change of level of care prior to discharge, I also consent to the administration of blood and/or blood products.  Further, I understand that despite careful testing and screening of blood or blood products by collecting agencies, I may still be subject to ill effects as a result of receiving a blood transfusion and/or blood products.  The following are some, but not all, of the potential risks that  can occur: fever and allergic reactions, hemolytic reactions, transmission of diseases such as Hepatitis, AIDS and Cytomegalovirus (CMV) and fluid overload.  In the event that I wish to have an autologous transfusion of my own blood, or a directed donor transfusion, I will discuss this with my physician.  Check only if Refusing Blood or Blood Products  I understand refusal of blood or blood products as deemed necessary by my physician may have serious consequences to my condition to include possible death. I hereby assume responsibility for my refusal and release the hospital, its personnel, and my physicians from any responsibility for the consequences of my refusal.    o  Refuse   5.   I authorize the use of any specimen, organs, tissues, body parts or foreign objects that may be removed from my body during the operation/procedure for diagnosis, research or teaching purposes and their subsequent disposal by hospital authorities.  I also authorize the release of specimen test results and/or written reports to my treating physician on the hospital medical staff or other referring or consulting physicians involved in my care, at the discretion of the Pathologist or my treating physician.    6.   I consent to the photographing or videotaping of the operations or procedures to be performed, including appropriate portions of my body for medical, scientific, or educational purposes, provided my identity is not revealed by the pictures or by descriptive texts accompanying them.  If the procedure has been photographed/videotaped, the surgeon will obtain the original picture, image, videotape or CD.  The hospital will not be responsible for storage, release or maintenance of the picture, image, tape or CD.    7.   I consent to the presence of a  or observers in the operating room as deemed necessary by my physician or their designees.    8.   I recognize that in the event my procedure results in extended  X-Ray/fluoroscopy time, I may develop a skin reaction.    9. If I have a Do Not Attempt Resuscitation (DNAR) order in place, that status will be suspended while in the operating room, procedural suite, and during the recovery period unless otherwise explicitly stated by me (or a person authorized to consent on my behalf). The surgeon or my attending physician will determine when the applicable recovery period ends for purposes of reinstating the DNAR order.  10. Patients having a sterilization procedure: I understand that if the procedure is successful the results will be permanent and it will therefore be impossible for me to inseminate, conceive, or bear children.  I also understand that the procedure is intended to result in sterility, although the result has not been guaranteed.   11. I acknowledge that my physician has explained sedation/analgesia administration to me including the risk and benefits I consent to the administration of sedation/analgesia as may be necessary or desirable in the judgment of my physician.    I CERTIFY THAT I HAVE READ AND FULLY UNDERSTAND THE ABOVE CONSENT TO OPERATION and/or OTHER PROCEDURE.     _________________________________________ _________________________________     ___________________________________  Signature of Patient     Signature of Responsible Person                   Printed Name of Responsible Person                              _________________________________________ ______________________________        ___________________________________  Signature of Witness         Date  Time         Relationship to Patient    STATEMENT OF PHYSICIAN My signature below affirms that prior to the time of the procedure; I have explained to the patient and/or his/her legal representative, the risks and benefits involved in the proposed treatment and any reasonable alternative to the proposed treatment. I have also explained the risks and benefits involved in refusal of the  proposed treatment and alternatives to the proposed treatment and have answered the patient's questions. If I have a significant financial interest in a co-management agreement or a significant financial interest in any product or implant, or other significant relationship used in this procedure/surgery, I have disclosed this and had a discussion with my patient.     _______________________________________________________________ _____________________________  (Signature of Physician)                                                                                         (Date)                                   (Time)  Patient Name: Ran Carter    : 1970   Printed: 2024      Medical Record #: [id number]                                              Page 1 of 1

## (undated) NOTE — LETTER
[facility]  Administration of Anesthesia  I, Ran Carter agree to be cared for by a physician anesthesiologist alone and/or with a nurse anesthetist, who is specially trained to monitor me and give me medicine to put me to sleep or keep me comfortable during my procedure    I understand that my anesthesiologist and/or anesthetist is not an employee or agent of [facility] or [employer]. He or she works for [facility]    As the patient asking for anesthesia services, I agree to:  Allow the anesthesiologist (anesthesia doctor) to give me medicine and do additional procedures as necessary. Some examples are: Starting or using an “IV” to give me medicine, fluids or blood during my procedure, and having a breathing tube placed to help me breathe when I’m asleep (intubation). In the event that my heart stops working properly, I understand that my anesthesiologist will make every effort to sustain my life, unless otherwise directed by [facility] Do Not Resuscitate documents.  Tell my anesthesia doctor before my procedure:  If I am pregnant.  The last time that I ate or drank.  iii. All of the medicines I take (including prescriptions, herbal supplements, and pills I can buy without a prescription (including street drugs/illegal medications). Failure to inform my anesthesiologist about these medicines may increase my risk of anesthetic complications.  iv.If I am allergic to anything or have had a reaction to anesthesia before.  I understand how the anesthesia medicine will help me (benefits).  I understand that with any type of anesthesia medicine there are risks:  The most common risks are: nausea, vomiting, sore throat, muscle soreness, damage to my eyes, mouth, or teeth (from breathing tube placement).  Rare risks include: remembering what happened during my procedure, allergic reactions to medications, injury to my airway, heart, lungs, vision,  nerves, or muscles and in extremely rare instances death.  My doctor has explained to me other choices available to me for my care (alternatives).  Pregnant Patients (“epidural”):  I understand that the risks of having an epidural (medicine given into my back to help control pain during labor), include itching, low blood pressure, difficulty urinating, headache or slowing of the baby’s heart. Very rare risks include infection, bleeding, seizure, irregular heart rhythms and nerve injury.  Regional Anesthesia (“spinal”, “epidural”, & “nerve blocks”):  I understand that rare but potential complications include headache, bleeding, infection, seizure, irregular heart rhythms, and nerve injury.    _____________________________________________________________________________  Patient (or Representative) Signature/Relationship to Patient  Date   Time    _____________________________________________________________________________   Name (if used)    Language/Organization   Time    _____________________________________________________________________________  Nurse Anesthetist Signature     Date   Time  _____________________________________________________________________________  Anesthesiologist Signature     Date   Time  I have discussed the procedure and information above with the patient (or patient’s representative) and answered their questions. The patient or their representative has agreed to have anesthesia services.    _____________________________________________________________________________  Witness        Date   Time  I have verified that the signature is that of the patient or patient’s representative, and that it was signed before the procedure  Patient Name: Ran Carter     : 1970                 Printed: 2023 at 4:05 PM    Medical Record #: [id number]                                            Page 1 of 1  ----------ANESTHESIA CONSENT----------

## (undated) NOTE — ED AVS SNAPSHOT
Aida Ennis   MRN: [id number]    Department:  [facility]   Date of Visit:  3/11/2020           Disclosure     Insurance plans vary and the physician(s) referred by the ER may not be covered by your plan.  Steven CARE PHYSICIAN AT ONCE OR RETURN IMMEDIATELY TO THE EMERGENCY DEPARTMENT. If you have been prescribed any medication(s), please fill your prescription right away and begin taking the medication(s) as directed.   If you believe that any of the medications

## (undated) NOTE — LETTER
1/20/2025      Juan A Mcgee MD  Physical Medicine and Rehabilitation  [address] [place] [address], [address]  [facility] [address]  Dept: [phone]  Dept Fax: [phone]        RE: Consultation for Ran Carter        Dear Todd Rivas MD,    Thank you very much for the opportunity to see your patient.  Attached please find a summary from your patient's recent visit.     I appreciate the chance to take care of your patient with you.  Please feel free to call me with any questions or concerns.    Sincerely,        Juan A Mcgee MD  Electronically Signed on 1/20/2025

## (undated) NOTE — LETTER
10/29/2024      Juan A Mcgee MD  Physical Medicine and Rehabilitation  [address] [place] [address], [address]  [facility] [address]  Dept: [phone]  Dept Fax: [phone]        RE: Consultation for Ran Carter        Dear Todd Rivas MD,    Thank you very much for the opportunity to see your patient.  Attached please find a summary from your patient's recent visit.     I appreciate the chance to take care of your patient with you.  Please feel free to call me with any questions or concerns.    Sincerely,        Juan A Mcgee MD  Electronically Signed on 10/29/2024

## (undated) NOTE — Clinical Note
Pt has a opioid agreement/contract. Pending Neuro surgeon evaluation Wednesday, 12/11/24, he will have surgery on 12/12/24

## (undated) NOTE — LETTER
April 30, 2024      No Recipients     Patient: Ran Carter   YOB: 1970   Date of Visit: 4/30/2024       Dear Dr. Gayle Recipients:    Thank you for referring Ran Carter to me for evaluation. Here is my assessment and plan of care:    Ran Carter is a 53 year old male.    HPI:     HPI    NP/Pt. Here for DM eye exam.   Pt. C/O occasional blurry vision, specially after prolong computer work and eyes feel tired.   States post shower or when tired the eyes turn blood shot red, for which he uses OTC AT's as needed.   C/O glare, that is making night driving difficult. He use to wear bifocals that he lost few months ago, states he was never satisfied with the glasses.   No H/O eye Sx/ trauma.   Last DFE 2 years old.     Pt has been a diabetic for 1 year     Pt's diabetes is currently controlled by pills only (from today)   Pt checks BS 2 times a week   Pt's last blood sugar was  100 2 days ago.   Last HA1C was 5.6 on 04/08/24  Endocrinologist: none       Last edited by Sophy Oneil OT on 4/30/2024  9:29 AM.        Patient History:  Past Medical History:    Back problem    Chronic neck pain    Contusion of cervical cord (HCC)    Diabetes (HCC)    Migraines    Went to emergency care clinic and suggested to get an MRI done because I've been dealing with a bad migraine for over a week straight today being a 10 from 1-10 10,being the worse    Primary hypertension    Visual impairment    readers       Surgical History: Ran Carter has a past surgical history that includes knee surgery (2008) (RIGHT ACL REPAIR); ct cervical spine (c3 & 4 ); hernia surgery (2005) (hiatal hernia); and colonoscopy (N/A, 2/7/2024) (Procedure: COLONOSCOPY;  Surgeon: Amy Means MD;  Location: [facility] ENDOSCOPY).    Family History   Problem Relation Age of Onset    No Known Problems Mother     No Known Problems Sister     No Known Problems Brother     Heart Disorder Paternal  Grandmother     Diabetes Paternal Grandmother     Cancer Paternal Grandmother     Glaucoma Neg     Macular degeneration Neg        Social History:   Social History     Socioeconomic History    Marital status:    Tobacco Use    Smoking status: Never    Smokeless tobacco: Never   Vaping Use    Vaping status: Never Used   Substance and Sexual Activity    Alcohol use: Not Currently     Alcohol/week: 1.0 standard drink of alcohol     Comment: Did social    Drug use: Never   Other Topics Concern    Caffeine Concern Yes    Exercise No   Social History Narrative    The patient does not use an assistive device..      The patient does live in a home with stairs.       Medications:  Current Outpatient Medications   Medication Sig Dispense Refill    pantoprazole 40 MG Oral Tab EC Take 1 tablet (40 mg total) by mouth every morning before breakfast. 90 tablet 3    HYDROcodone-acetaminophen  MG Oral Tab Take 1 tablet by mouth every 4-6 hours PRN pain (max 5 tabs in 24 hours). 150 tablet 0    SITagliptin Phosphate (JANUVIA) 100 MG Oral Tab Take 1 tablet (100 mg total) by mouth daily. 90 tablet 1    amoxicillin 500 MG Oral Cap  (Patient not taking: Reported on 4/8/2024)      FLOWFLEX COVID-19 AG HOME TEST In Vitro Kit  (Patient not taking: Reported on 4/8/2024)       MG Oral Tab  (Patient not taking: Reported on 4/8/2024)      losartan 50 MG Oral Tab Take 1 tablet (50 mg total) by mouth daily. 90 tablet 0    semaglutide (OZEMPIC, 0.25 OR 0.5 MG/DOSE,) 2 MG/3ML Subcutaneous Solution Pen-injector Inject 0.5 mg into the skin once a week. 1 each 12    Tadalafil 10 MG Oral Tab Take 1 tablet (10 mg total) by mouth daily as needed for Erectile Dysfunction. 30 tablet 0    testosterone cypionate 200 mg/mL Intramuscular Solution Inject 0.75 mL (150 mg total) into the muscle every 14 (fourteen) days. (Patient not taking: Reported on 4/8/2024) 4.5 mL 0    Insulin Pen Needle (PEN NEEDLES) 32G X 4 MM Does not apply Misc 1  each daily. (Patient not taking: Reported on 4/8/2024) 90 each 0    naproxen 500 MG Oral Tab EC Take 1 tablet (500 mg total) by mouth 2 (two) times daily with meals. (Patient not taking: Reported on 4/8/2024) 60 tablet 2    Syringe/Needle, Disp, 27G X 1/2\" 1 ML Does not apply Misc Inject testosterone every two weeks (Patient not taking: Reported on 4/8/2024) 50 each 0    Liraglutide (VICTOZA) 18 MG/3ML Subcutaneous Solution Pen-injector Inject 1.8 mg into the skin daily. 27 mL 0    Syringe 22G X 1\" 3 ML Does not apply Misc Inject testosterone every 2 weeks (Patient not taking: Reported on 1/26/2024) 50 each 0    Blood Glucose Monitoring Suppl (FREESTYLE LITE) Does not apply Device 1 Device by Other route 2 (two) times daily. Use as directed. (Patient not taking: Reported on 1/26/2024) 1 each 3    Glucose Blood (ONETOUCH VERIO) In Vitro Strip Test blood sugar twice daily. (Patient not taking: Reported on 1/26/2024) 200 strip 3    HYDROcodone-acetaminophen 7.5-325 MG Oral Tab Take 1 tablet by mouth every 4-6 hours PRN pain (max 5 tabs in 24 hours). (Patient not taking: Reported on 1/26/2024) 150 tablet 0    Lancets Does not apply Misc Check bid (Patient not taking: Reported on 1/26/2024) 200 each 1    Glucose Blood (FREESTYLE LITE TEST) In Vitro Strip Check bid (Patient not taking: Reported on 1/26/2024) 100 strip 0    Insulin Pen Needle (PEN NEEDLES) 32G X 4 MM Does not apply Misc 1 each daily. (Patient not taking: Reported on 1/26/2024) 90 each 0    metFORMIN  MG Oral Tablet 24 Hr  (Patient not taking: Reported on 12/28/2023)      Dulaglutide (TRULICITY) 0.75 MG/0.5ML Subcutaneous Solution Pen-injector Inject 0.75 mg into the skin once a week. (Patient not taking: Reported on 12/28/2023) 6 mL 0    meclizine 25 MG Oral Tab Take 1 tablet (25 mg total) by mouth 3 (three) times daily as needed. 30 tablet 0    multiple vitamin Oral Chew Tab Chew 1 tablet by mouth daily. (Patient not taking: Reported on 1/26/2024)          Allergies:  No Known Allergies    ROS:       PHYSICAL EXAM:     Base Eye Exam       Visual Acuity (Snellen - Linear)         Right Left    Dist sc 20/25 20/25 +1    Dist ph sc 20/20 20/20    Near cc 20/25 20/30              Tonometry (Applanation, 9:02 AM)         Right Left    Pressure 15 15              Pupils         Pupils    Right PERRL    Left PERRL              Visual Fields         Left Right     Full Full              Extraocular Movement         Right Left     Full Full              Dilation       Both eyes: 1.0% Mydriacyl and 2.5% Lavelle Synephrine @ 9:02 AM              Dilation #2       Both eyes: 1.0% Mydriacyl and 2.5% Lavelle Synephrine @ 9:02 AM                  Slit Lamp and Fundus Exam       Slit Lamp Exam         Right Left    Lids/Lashes Dermatochalasis, Meibomian gland dysfunction Dermatochalasis, Meibomian gland dysfunction    Conjunctiva/Sclera Nasal/temp pinguecula Nasal/temp pinguecula    Cornea Clear Clear    Anterior Chamber Deep and quiet Deep and quiet    Iris Normal Normal    Lens Clear Clear    Vitreous Clear Clear              Fundus Exam         Right Left    Disc Good rim, Temporal crescent Good rim, Temporal crescent    C/D Ratio 0.4 0.4    Macula Normal-no BDR Normal-no BDR    Vessels Normal Normal    Periphery Normal Normal                  Lacrimal Exam       Schirmers         Right Left     06 mm 03 mm      Anesthesia: Yes                  Refraction       Wearing Rx         Sphere Cylinder Axis    Right +1.00 +0.25 172    Left +1.25        Age: 5yrs    Type: Reading only              Manifest Refraction (Auto)         Sphere Cylinder [place] Dist VA Add Near VA    Right -0.50 +0.75 170       Left -0.25 +0.50 010                 Manifest Refraction #2         Sphere Cylinder [place] Dist VA Add Near VA    Right -0.50 +0.75 180 20/20 +2.00 20/20    Left -0.25 +0.50 010 20/20 +2.00 20/20              Manifest Refraction Comments    Pt. Requesting for bifocals.              Final Rx          Sphere Cylinder Stephan Dist VA Add Near VA    Right -0.50 +0.75 180 20/20 +2.00 20/20    Left -0.25 +0.50 010 20/20 +2.00 20/20      Type: Flat top bifocal                     ASSESSMENT/PLAN:     Diagnoses and Plan:     Controlled type 2 diabetes mellitus without complication, without long-term current use of insulin (HCC)  Diabetes type II: no background of retinopathy, no signs of neovascularization noted.  Discussed ocular and systemic benefits of blood sugar control.  Diagnosis and treatment discussed in detail with patient.    Will see patient in 1 year for a diabetic exam    Dry eye syndrome of both eyes  Schirmer's test today revealed dry eyes.  Patient was instructed to use warm compresses to the eyelids twice a day everyday.    Instructions for warm compress use:   Patient should place wash compresses on both eyelids for 5 minutes every morning and every night.  After 5 minutes of holding the warm compresses on the eyelids, patient should gently rub the eyelashes and then rinse thoroughly with warm water.   Patient should also use artificial tears (any over the counter brand is okay) up to 4-6  times per day as needed for dry eye symptoms.        No orders of the defined types were placed in this encounter.      Meds This Visit:  Requested Prescriptions      No prescriptions requested or ordered in this encounter        Follow up instructions:  Return in about 1 year (around 4/30/2025) for Diabetic eye exam.    4/30/2024  Scribed by: Ivan Looney MD        If you have questions, please do not hesitate to call me. I look forward to following Ran along with you.    Sincerely,        Ivan Looney MD        CC:   No Recipients    Document electronically generated by: Ivan Looney MD

## (undated) NOTE — LETTER
10/10/2019              [facility]        [address] [place] [address]         Dear Hallie Benito,    Our records indicate that the Testosterone test ordered for you by Danyell Kumar MD  have not been done.   If you have, in fact, already

## (undated) NOTE — LETTER
7/22/2024        Ran Robertuela        [address] [place] [address]         To Whom It May Concern,    We are writing to you on behalf of Ran Carter (Member ID: [id number]) to request reconsideration to cover Rybelsus 3 mg. Per the denial letter, this medication was denied because the A1c is not equal to or greater than 6.5%. The patient has type 2 diabetes mellitus and has previously tired metformin, Trulicity, and Januvia. He could not tolerate these medications due to side effects. He is currently on Victoza and his A1c is well controlled on this medication. However, given the gastric side effects from Victoza, he will not be able to continue this medication. Hence, an alternative medication is being prescribed to replace Victoza so that we can continue to maintain his A1c in a good range. If he has to stop the Victoza without taking an alternative medication, his A1c will go up. This increases his risk of complications from uncontrolled diabetes. The most recent office note has been attached for your convenience. Please feel free to contact the office with questions.     Sincerely,    MD [address]  [facility] [address]  Ph: [phone]  Fax: [phone]

## (undated) NOTE — Clinical Note
Lois,I saw Mr. Pete Freeman in clinic today for weight loss/management. I have recommended intensive lifestyle/behavioral modifications for weight loss. In addition, I have recommended wellbutrin, mild depressive symptoms and to help cravings.  He is on an o

## (undated) NOTE — LETTER
9/25/2024      Juan A Mcgee MD  Physical Medicine and Rehabilitation  [address] [place] [address], [address]  [facility] [address]  Dept: [phone]  Dept Fax: [phone]        RE: Consultation for Ran Carter        Dear Todd Rivas MD,    Thank you very much for the opportunity to see your patient.  Attached please find a summary from your patient's recent visit.     I appreciate the chance to take care of your patient with you.  Please feel free to call me with any questions or concerns.    Sincerely,        Juan A Mcgee MD  Electronically Signed on 9/25/2024